# Patient Record
Sex: FEMALE | Race: WHITE | NOT HISPANIC OR LATINO | Employment: OTHER | URBAN - METROPOLITAN AREA
[De-identification: names, ages, dates, MRNs, and addresses within clinical notes are randomized per-mention and may not be internally consistent; named-entity substitution may affect disease eponyms.]

---

## 2017-02-13 ENCOUNTER — HOSPITAL ENCOUNTER (OUTPATIENT)
Dept: FAMILY MEDICINE CLINIC | Facility: CLINIC | Age: 62
Setting detail: SPECIMEN
Discharge: HOME | End: 2017-02-13
Attending: FAMILY MEDICINE | Admitting: FAMILY MEDICINE

## 2017-02-13 LAB
BASOPHILS # BLD AUTO: 0 10*3/UL (ref 0–0.2)
BASOPHILS NFR BLD AUTO: 1 % (ref 0–2)
DIFFERENTIAL METHOD BLD: (no result)
EOSINOPHIL # BLD AUTO: 0.2 10*3/UL (ref 0–0.3)
EOSINOPHIL # BLD AUTO: 3 % (ref 0–3)
ERYTHROCYTE [DISTWIDTH] IN BLOOD BY AUTOMATED COUNT: 13.7 % (ref 11.5–14.5)
HCT VFR BLD AUTO: 37.4 % (ref 35–49)
HGB BLD-MCNC: 12.1 G/DL (ref 12–15)
LYMPHOCYTES # BLD AUTO: 1.9 10*3/UL (ref 0.8–4.8)
LYMPHOCYTES NFR BLD AUTO: 38 % (ref 18–42)
MCH RBC QN AUTO: 26.2 PG (ref 26–32)
MCHC RBC AUTO-ENTMCNC: 32.4 G/DL (ref 32–36)
MCV RBC AUTO: 81 FL (ref 80–94)
MONOCYTES # BLD AUTO: 0.3 10*3/UL (ref 0.1–1.3)
MONOCYTES NFR BLD AUTO: 7 % (ref 2–11)
NEUTROPHILS # BLD AUTO: 2.6 10*3/UL (ref 2.3–8.6)
NEUTROPHILS NFR BLD AUTO: 51 % (ref 50–75)
NRBC BLD AUTO-RTO: 0 /100{WBCS}
NRBC/RBC NFR BLD MANUAL: 0 10*3/UL
PLATELET # BLD AUTO: 213 10*3/UL (ref 150–450)
PMV BLD AUTO: 9 FL (ref 7.4–10.4)
RBC # BLD AUTO: 4.63 10*6/UL (ref 4–5.4)
TSH SERPL-ACNC: 2.94 UIU/ML (ref 0.34–5.6)
WBC # BLD AUTO: 5 10*3/UL (ref 4.5–11.5)

## 2017-02-27 ENCOUNTER — HOSPITAL ENCOUNTER (OUTPATIENT)
Dept: ULTRASOUND IMAGING | Facility: HOSPITAL | Age: 62
Discharge: HOME | End: 2017-02-27
Attending: FAMILY MEDICINE | Admitting: FAMILY MEDICINE

## 2017-03-06 ENCOUNTER — OFFICE (OUTPATIENT)
Dept: URBAN - METROPOLITAN AREA CLINIC 64 | Facility: CLINIC | Age: 62
End: 2017-03-06

## 2017-03-06 ENCOUNTER — HOSPITAL ENCOUNTER (OUTPATIENT)
Dept: MAMMOGRAPHY | Facility: HOSPITAL | Age: 62
Discharge: HOME | End: 2017-03-06
Attending: FAMILY MEDICINE | Admitting: FAMILY MEDICINE

## 2017-03-06 VITALS
HEART RATE: 56 BPM | WEIGHT: 130 LBS | HEIGHT: 66 IN | DIASTOLIC BLOOD PRESSURE: 65 MMHG | SYSTOLIC BLOOD PRESSURE: 121 MMHG

## 2017-03-06 DIAGNOSIS — R10.11 RIGHT UPPER QUADRANT PAIN: ICD-10-CM

## 2017-03-06 DIAGNOSIS — K21.9 GASTRO-ESOPHAGEAL REFLUX DISEASE WITHOUT ESOPHAGITIS: ICD-10-CM

## 2017-03-06 DIAGNOSIS — R93.3 ABNORMAL FINDINGS ON DIAGNOSTIC IMAGING OF OTHER PARTS OF DI: ICD-10-CM

## 2017-03-06 DIAGNOSIS — K59.00 CONSTIPATION, UNSPECIFIED: ICD-10-CM

## 2017-03-06 LAB
AMYLASE, SERUM: 39 U/L (ref 31–124)
COMP. METABOLIC PANEL (14): A/G RATIO: 2.5 (ref 1.1–2.5)
COMP. METABOLIC PANEL (14): ALBUMIN, SERUM: 5 G/DL (ref 3.5–5.5)
COMP. METABOLIC PANEL (14): ALKALINE PHOSPHATASE, S: 84 IU/L (ref 39–117)
COMP. METABOLIC PANEL (14): ALT (SGPT): 14 IU/L (ref 0–32)
COMP. METABOLIC PANEL (14): AST (SGOT): 23 IU/L (ref 0–40)
COMP. METABOLIC PANEL (14): BILIRUBIN, TOTAL: <0.2 MG/DL
COMP. METABOLIC PANEL (14): BUN/CREATININE RATIO: 27 — HIGH (ref 9–23)
COMP. METABOLIC PANEL (14): BUN: 21 MG/DL (ref 6–24)
COMP. METABOLIC PANEL (14): CALCIUM, SERUM: 9.5 MG/DL (ref 8.7–10.2)
COMP. METABOLIC PANEL (14): CARBON DIOXIDE, TOTAL: 24 MMOL/L (ref 18–29)
COMP. METABOLIC PANEL (14): CHLORIDE, SERUM: 97 MMOL/L (ref 96–106)
COMP. METABOLIC PANEL (14): CREATININE, SERUM: 0.79 MG/DL (ref 0.57–1)
COMP. METABOLIC PANEL (14): EGFR IF AFRICN AM: 95 ML/MIN/1.73 (ref 59–?)
COMP. METABOLIC PANEL (14): EGFR IF NONAFRICN AM: 82 ML/MIN/1.73 (ref 59–?)
COMP. METABOLIC PANEL (14): GLOBULIN, TOTAL: 2 G/DL (ref 1.5–4.5)
COMP. METABOLIC PANEL (14): GLUCOSE, SERUM: 86 MG/DL (ref 65–99)
COMP. METABOLIC PANEL (14): POTASSIUM, SERUM: 4.7 MMOL/L (ref 3.5–5.2)
COMP. METABOLIC PANEL (14): PROTEIN, TOTAL, SERUM: 7 G/DL (ref 6–8.5)
COMP. METABOLIC PANEL (14): SODIUM, SERUM: 139 MMOL/L (ref 134–144)
LIPASE, SERUM: 35 U/L (ref 0–59)

## 2017-03-06 PROCEDURE — 99244 OFF/OP CNSLTJ NEW/EST MOD 40: CPT | Performed by: INTERNAL MEDICINE

## 2017-03-13 ENCOUNTER — HOSPITAL ENCOUNTER (OUTPATIENT)
Dept: MAMMOGRAPHY | Facility: HOSPITAL | Age: 62
Discharge: HOME | End: 2017-03-13
Attending: FAMILY MEDICINE | Admitting: FAMILY MEDICINE

## 2017-03-27 ENCOUNTER — ON CAMPUS - OUTPATIENT (OUTPATIENT)
Dept: URBAN - METROPOLITAN AREA HOSPITAL 77 | Facility: HOSPITAL | Age: 62
End: 2017-03-27

## 2017-03-27 DIAGNOSIS — R10.11 RIGHT UPPER QUADRANT PAIN: ICD-10-CM

## 2017-03-27 DIAGNOSIS — K83.8 OTHER SPECIFIED DISEASES OF BILIARY TRACT: ICD-10-CM

## 2017-03-27 DIAGNOSIS — R10.13 EPIGASTRIC PAIN: ICD-10-CM

## 2017-03-27 DIAGNOSIS — K83.1 OBSTRUCTION OF BILE DUCT: ICD-10-CM

## 2017-03-27 PROCEDURE — 43237 ENDOSCOPIC US EXAM ESOPH: CPT | Performed by: INTERNAL MEDICINE

## 2017-04-05 ENCOUNTER — ON CAMPUS - OUTPATIENT (OUTPATIENT)
Dept: URBAN - METROPOLITAN AREA HOSPITAL 77 | Facility: HOSPITAL | Age: 62
End: 2017-04-05

## 2017-04-05 DIAGNOSIS — K83.8 OTHER SPECIFIED DISEASES OF BILIARY TRACT: ICD-10-CM

## 2017-04-05 DIAGNOSIS — R10.11 RIGHT UPPER QUADRANT PAIN: ICD-10-CM

## 2017-04-05 PROCEDURE — 43277 ERCP EA DUCT/AMPULLA DILATE: CPT | Performed by: INTERNAL MEDICINE

## 2017-04-11 ENCOUNTER — OFFICE (OUTPATIENT)
Dept: URBAN - METROPOLITAN AREA CLINIC 64 | Facility: CLINIC | Age: 62
End: 2017-04-11

## 2017-04-11 VITALS
WEIGHT: 134 LBS | HEART RATE: 56 BPM | HEIGHT: 66 IN | DIASTOLIC BLOOD PRESSURE: 81 MMHG | SYSTOLIC BLOOD PRESSURE: 133 MMHG

## 2017-04-11 DIAGNOSIS — K59.00 CONSTIPATION, UNSPECIFIED: ICD-10-CM

## 2017-04-11 DIAGNOSIS — R11.0 NAUSEA: ICD-10-CM

## 2017-04-11 DIAGNOSIS — R10.11 RIGHT UPPER QUADRANT PAIN: ICD-10-CM

## 2017-04-11 PROCEDURE — 99213 OFFICE O/P EST LOW 20 MIN: CPT | Performed by: NURSE PRACTITIONER

## 2017-04-13 ENCOUNTER — HOSPITAL ENCOUNTER (OUTPATIENT)
Dept: CARDIOLOGY | Facility: HOSPITAL | Age: 62
Discharge: HOME | End: 2017-04-13
Attending: NURSE PRACTITIONER | Admitting: NURSE PRACTITIONER

## 2017-05-03 ENCOUNTER — HOSPITAL ENCOUNTER (OUTPATIENT)
Dept: GENERAL RADIOLOGY | Facility: HOSPITAL | Age: 62
Discharge: HOME | End: 2017-05-03
Attending: INTERNAL MEDICINE | Admitting: INTERNAL MEDICINE

## 2017-05-23 ENCOUNTER — OFFICE (OUTPATIENT)
Dept: URBAN - METROPOLITAN AREA CLINIC 64 | Facility: CLINIC | Age: 62
End: 2017-05-23
Payer: COMMERCIAL

## 2017-05-23 VITALS
WEIGHT: 126 LBS | HEIGHT: 66 IN | SYSTOLIC BLOOD PRESSURE: 124 MMHG | HEART RATE: 53 BPM | DIASTOLIC BLOOD PRESSURE: 67 MMHG

## 2017-05-23 DIAGNOSIS — R14.0 ABDOMINAL DISTENSION (GASEOUS): ICD-10-CM

## 2017-05-23 DIAGNOSIS — R11.0 NAUSEA: ICD-10-CM

## 2017-05-23 DIAGNOSIS — R10.11 RIGHT UPPER QUADRANT PAIN: ICD-10-CM

## 2017-05-23 PROCEDURE — 99213 OFFICE O/P EST LOW 20 MIN: CPT | Performed by: INTERNAL MEDICINE

## 2017-05-23 RX ORDER — METHSCOPOLAMINE BROMIDE 5 MG/1
5 TABLET ORAL
Qty: 30 | Refills: 11 | Status: COMPLETED
Start: 2017-05-23 | End: 2017-07-18

## 2017-07-18 ENCOUNTER — OFFICE (OUTPATIENT)
Dept: URBAN - METROPOLITAN AREA CLINIC 64 | Facility: CLINIC | Age: 62
End: 2017-07-18
Payer: COMMERCIAL

## 2017-07-18 VITALS
HEART RATE: 51 BPM | SYSTOLIC BLOOD PRESSURE: 115 MMHG | HEIGHT: 66 IN | WEIGHT: 126 LBS | DIASTOLIC BLOOD PRESSURE: 64 MMHG

## 2017-07-18 DIAGNOSIS — R10.11 RIGHT UPPER QUADRANT PAIN: ICD-10-CM

## 2017-07-18 DIAGNOSIS — R11.0 NAUSEA: ICD-10-CM

## 2017-07-18 PROCEDURE — 99213 OFFICE O/P EST LOW 20 MIN: CPT | Performed by: INTERNAL MEDICINE

## 2017-07-18 RX ORDER — TRIMETHOBENZAMIDE HYDROCHLORIDE 300 MG/1
600 CAPSULE ORAL
Qty: 30 | Refills: 3 | Status: COMPLETED
Start: 2017-07-18 | End: 2018-01-04

## 2017-07-18 RX ORDER — NORTRIPTYLINE HYDROCHLORIDE 25 MG/1
25 CAPSULE ORAL
Qty: 30 | Refills: 11 | Status: COMPLETED
Start: 2017-07-18 | End: 2018-01-04

## 2017-07-18 RX ORDER — TRAZODONE HYDROCHLORIDE 50 MG/1
TABLET, FILM COATED ORAL
Qty: 0 | Refills: 0 | Status: COMPLETED
End: 2017-07-18

## 2017-08-30 ENCOUNTER — HOSPITAL ENCOUNTER (OUTPATIENT)
Dept: LAB | Facility: HOSPITAL | Age: 62
Discharge: HOME | End: 2017-08-30
Attending: NURSE PRACTITIONER | Admitting: NURSE PRACTITIONER

## 2017-08-30 LAB
ALBUMIN SERPL-MCNC: 4.1 G/DL (ref 3.5–4.8)
ALBUMIN/GLOB SERPL: 1.6 {RATIO} (ref 1–1.7)
ALP SERPL-CCNC: 69 IU/L (ref 32–91)
ALT SERPL-CCNC: 16 IU/L (ref 14–54)
AMYLASE SERPL-CCNC: 32 U/L (ref 36–128)
ANION GAP SERPL CALC-SCNC: 14.2 MMOL/L (ref 10–20)
AST SERPL-CCNC: 19 IU/L (ref 15–41)
BASOPHILS # BLD AUTO: 0 10*3/UL (ref 0–0.2)
BASOPHILS NFR BLD AUTO: 1 % (ref 0–2)
BILIRUB SERPL-MCNC: 0.4 MG/DL (ref 0.3–1.2)
BUN SERPL-MCNC: 13 MG/DL (ref 8–20)
BUN/CREAT SERPL: 16.3 (ref 5.4–26.2)
CALCIUM SERPL-MCNC: 9.5 MG/DL (ref 8.9–10.3)
CHLORIDE SERPL-SCNC: 101 MMOL/L (ref 101–111)
CONV CO2: 29 MMOL/L (ref 22–32)
CONV TOTAL PROTEIN: 6.6 G/DL (ref 6.1–7.9)
CREAT UR-MCNC: 0.8 MG/DL (ref 0.4–1)
DIFFERENTIAL METHOD BLD: (no result)
EOSINOPHIL # BLD AUTO: 0.1 10*3/UL (ref 0–0.3)
EOSINOPHIL # BLD AUTO: 3 % (ref 0–3)
ERYTHROCYTE [DISTWIDTH] IN BLOOD BY AUTOMATED COUNT: 14 % (ref 11.5–14.5)
GLOBULIN UR ELPH-MCNC: 2.5 G/DL (ref 2.5–3.8)
GLUCOSE SERPL-MCNC: 91 MG/DL (ref 65–99)
HCT VFR BLD AUTO: 38.2 % (ref 35–49)
HGB BLD-MCNC: 12.4 G/DL (ref 12–15)
LIPASE SERPL-CCNC: 27 U/L (ref 22–51)
LYMPHOCYTES # BLD AUTO: 1.1 10*3/UL (ref 0.8–4.8)
LYMPHOCYTES NFR BLD AUTO: 28 % (ref 18–42)
MAGNESIUM SERPL-MCNC: 2 MG/DL (ref 1.8–2.5)
MCH RBC QN AUTO: 27.8 PG (ref 26–32)
MCHC RBC AUTO-ENTMCNC: 32.5 G/DL (ref 32–36)
MCV RBC AUTO: 85.6 FL (ref 80–94)
MONOCYTES # BLD AUTO: 0.2 10*3/UL (ref 0.1–1.3)
MONOCYTES NFR BLD AUTO: 6 % (ref 2–11)
NEUTROPHILS # BLD AUTO: 2.5 10*3/UL (ref 2.3–8.6)
NEUTROPHILS NFR BLD AUTO: 62 % (ref 50–75)
NRBC BLD AUTO-RTO: 0 /100{WBCS}
NRBC/RBC NFR BLD MANUAL: 0 10*3/UL
PLATELET # BLD AUTO: 228 10*3/UL (ref 150–450)
PMV BLD AUTO: 8.4 FL (ref 7.4–10.4)
POTASSIUM SERPL-SCNC: 4.2 MMOL/L (ref 3.6–5.1)
RBC # BLD AUTO: 4.46 10*6/UL (ref 4–5.4)
SODIUM SERPL-SCNC: 140 MMOL/L (ref 136–144)
TSH SERPL-ACNC: 2.76 UIU/ML (ref 0.34–5.6)
VIT B12 SERPL-MCNC: 407 PG/ML (ref 180–914)
WBC # BLD AUTO: 4 10*3/UL (ref 4.5–11.5)

## 2017-09-01 LAB
ANA SER QL IA: NORMAL

## 2017-09-18 ENCOUNTER — HOSPITAL ENCOUNTER (OUTPATIENT)
Dept: GENERAL RADIOLOGY | Facility: HOSPITAL | Age: 62
Discharge: HOME | End: 2017-09-18
Attending: INTERNAL MEDICINE | Admitting: INTERNAL MEDICINE

## 2017-10-19 ENCOUNTER — HOSPITAL ENCOUNTER (OUTPATIENT)
Dept: MAMMOGRAPHY | Facility: HOSPITAL | Age: 62
Discharge: HOME | End: 2017-10-19
Attending: INTERNAL MEDICINE | Admitting: INTERNAL MEDICINE

## 2017-11-27 ENCOUNTER — OFFICE (OUTPATIENT)
Dept: URBAN - METROPOLITAN AREA CLINIC 64 | Facility: CLINIC | Age: 62
End: 2017-11-27

## 2017-11-28 ENCOUNTER — OFFICE (OUTPATIENT)
Dept: URBAN - METROPOLITAN AREA CLINIC 66 | Facility: CLINIC | Age: 62
End: 2017-11-28
Payer: COMMERCIAL

## 2017-11-28 VITALS — DIASTOLIC BLOOD PRESSURE: 56 MMHG | SYSTOLIC BLOOD PRESSURE: 109 MMHG | WEIGHT: 124 LBS | HEIGHT: 66 IN

## 2017-11-28 DIAGNOSIS — R10.11 RIGHT UPPER QUADRANT PAIN: ICD-10-CM

## 2017-11-28 DIAGNOSIS — K83.8 OTHER SPECIFIED DISEASES OF BILIARY TRACT: ICD-10-CM

## 2017-11-28 DIAGNOSIS — K58.2 MIXED IRRITABLE BOWEL SYNDROME: ICD-10-CM

## 2017-11-28 DIAGNOSIS — K21.9 GASTRO-ESOPHAGEAL REFLUX DISEASE WITHOUT ESOPHAGITIS: ICD-10-CM

## 2017-11-28 PROCEDURE — 99214 OFFICE O/P EST MOD 30 MIN: CPT

## 2017-11-30 ENCOUNTER — ON CAMPUS - OUTPATIENT (OUTPATIENT)
Dept: URBAN - METROPOLITAN AREA HOSPITAL 134 | Facility: HOSPITAL | Age: 62
End: 2017-11-30
Payer: COMMERCIAL

## 2017-11-30 DIAGNOSIS — Z98.890 OTHER SPECIFIED POSTPROCEDURAL STATES: ICD-10-CM

## 2017-11-30 DIAGNOSIS — K83.1 OBSTRUCTION OF BILE DUCT: ICD-10-CM

## 2017-11-30 PROCEDURE — 43274 ERCP DUCT STENT PLACEMENT: CPT

## 2017-12-01 ENCOUNTER — INPATIENT HOSPITAL (OUTPATIENT)
Dept: URBAN - METROPOLITAN AREA HOSPITAL 133 | Facility: HOSPITAL | Age: 62
End: 2017-12-01
Payer: COMMERCIAL

## 2017-12-01 DIAGNOSIS — Z98.890 OTHER SPECIFIED POSTPROCEDURAL STATES: ICD-10-CM

## 2017-12-01 DIAGNOSIS — D64.9 ANEMIA, UNSPECIFIED: ICD-10-CM

## 2017-12-01 DIAGNOSIS — R11.0 NAUSEA: ICD-10-CM

## 2017-12-01 DIAGNOSIS — R10.9 UNSPECIFIED ABDOMINAL PAIN: ICD-10-CM

## 2017-12-01 DIAGNOSIS — K83.1 OBSTRUCTION OF BILE DUCT: ICD-10-CM

## 2017-12-01 PROCEDURE — 99253 IP/OBS CNSLTJ NEW/EST LOW 45: CPT

## 2017-12-02 PROCEDURE — 99231 SBSQ HOSP IP/OBS SF/LOW 25: CPT

## 2017-12-03 PROCEDURE — 99231 SBSQ HOSP IP/OBS SF/LOW 25: CPT

## 2017-12-04 ENCOUNTER — INPATIENT HOSPITAL (OUTPATIENT)
Dept: URBAN - METROPOLITAN AREA HOSPITAL 133 | Facility: HOSPITAL | Age: 62
End: 2017-12-04
Payer: COMMERCIAL

## 2017-12-04 DIAGNOSIS — Z98.890 OTHER SPECIFIED POSTPROCEDURAL STATES: ICD-10-CM

## 2017-12-04 DIAGNOSIS — K92.2 GASTROINTESTINAL HEMORRHAGE, UNSPECIFIED: ICD-10-CM

## 2017-12-04 DIAGNOSIS — D50.0 IRON DEFICIENCY ANEMIA SECONDARY TO BLOOD LOSS (CHRONIC): ICD-10-CM

## 2017-12-04 DIAGNOSIS — R10.9 UNSPECIFIED ABDOMINAL PAIN: ICD-10-CM

## 2017-12-04 DIAGNOSIS — K92.1 MELENA: ICD-10-CM

## 2017-12-04 PROCEDURE — 99232 SBSQ HOSP IP/OBS MODERATE 35: CPT

## 2017-12-07 ENCOUNTER — HOSPITAL ENCOUNTER (OUTPATIENT)
Dept: CARDIOLOGY | Facility: HOSPITAL | Age: 62
Discharge: HOME | End: 2017-12-07
Attending: FAMILY MEDICINE | Admitting: FAMILY MEDICINE

## 2017-12-09 ENCOUNTER — HOSPITAL ENCOUNTER (OUTPATIENT)
Dept: LAB | Facility: HOSPITAL | Age: 62
Discharge: HOME | End: 2017-12-09
Attending: FAMILY MEDICINE | Admitting: FAMILY MEDICINE

## 2017-12-09 LAB
BASOPHILS # BLD AUTO: 0 10*3/UL (ref 0–0.2)
BASOPHILS NFR BLD AUTO: 1 % (ref 0–2)
DIFFERENTIAL METHOD BLD: (no result)
EOSINOPHIL # BLD AUTO: 0.1 10*3/UL (ref 0–0.3)
EOSINOPHIL # BLD AUTO: 3 % (ref 0–3)
ERYTHROCYTE [DISTWIDTH] IN BLOOD BY AUTOMATED COUNT: 14.6 % (ref 11.5–14.5)
HCT VFR BLD AUTO: 30.2 % (ref 35–49)
HGB BLD-MCNC: 9.6 G/DL (ref 12–15)
LYMPHOCYTES # BLD AUTO: 1.1 10*3/UL (ref 0.8–4.8)
LYMPHOCYTES NFR BLD AUTO: 22 % (ref 18–42)
MCH RBC QN AUTO: 28 PG (ref 26–32)
MCHC RBC AUTO-ENTMCNC: 31.8 G/DL (ref 32–36)
MCV RBC AUTO: 88.1 FL (ref 80–94)
MONOCYTES # BLD AUTO: 0.4 10*3/UL (ref 0.1–1.3)
MONOCYTES NFR BLD AUTO: 9 % (ref 2–11)
NEUTROPHILS # BLD AUTO: 3.3 10*3/UL (ref 2.3–8.6)
NEUTROPHILS NFR BLD AUTO: 65 % (ref 50–75)
NRBC BLD AUTO-RTO: 0 /100{WBCS}
NRBC/RBC NFR BLD MANUAL: 0 10*3/UL
PLATELET # BLD AUTO: 324 10*3/UL (ref 150–450)
PMV BLD AUTO: 8 FL (ref 7.4–10.4)
RBC # BLD AUTO: 3.43 10*6/UL (ref 4–5.4)
WBC # BLD AUTO: 5 10*3/UL (ref 4.5–11.5)

## 2017-12-12 ENCOUNTER — OFFICE (OUTPATIENT)
Dept: URBAN - METROPOLITAN AREA CLINIC 66 | Facility: CLINIC | Age: 62
End: 2017-12-12
Payer: COMMERCIAL

## 2017-12-12 VITALS
WEIGHT: 124 LBS | HEIGHT: 66 IN | SYSTOLIC BLOOD PRESSURE: 115 MMHG | TEMPERATURE: 97.9 F | DIASTOLIC BLOOD PRESSURE: 752 MMHG | HEART RATE: 62 BPM

## 2017-12-12 DIAGNOSIS — K31.84 GASTROPARESIS: ICD-10-CM

## 2017-12-12 DIAGNOSIS — K83.8 OTHER SPECIFIED DISEASES OF BILIARY TRACT: ICD-10-CM

## 2017-12-12 DIAGNOSIS — T81.89XA OTHER COMPLICATIONS OF PROCEDURES, NOT ELSEWHERE CLASSIFIED,: ICD-10-CM

## 2017-12-12 DIAGNOSIS — D50.0 IRON DEFICIENCY ANEMIA SECONDARY TO BLOOD LOSS (CHRONIC): ICD-10-CM

## 2017-12-12 DIAGNOSIS — R11.0 NAUSEA: ICD-10-CM

## 2017-12-12 PROCEDURE — 99213 OFFICE O/P EST LOW 20 MIN: CPT

## 2017-12-12 RX ORDER — METOCLOPRAMIDE 5 MG/1
TABLET ORAL
Qty: 120 | Refills: 3 | Status: COMPLETED
Start: 2017-12-12 | End: 2018-05-02

## 2018-01-03 ENCOUNTER — HOSPITAL ENCOUNTER (OUTPATIENT)
Dept: LAB | Facility: HOSPITAL | Age: 63
Discharge: HOME | End: 2018-01-03
Attending: INTERNAL MEDICINE | Admitting: INTERNAL MEDICINE

## 2018-01-03 LAB
ALBUMIN SERPL-MCNC: 4 G/DL (ref 3.5–4.8)
ALBUMIN/GLOB SERPL: 1.4 {RATIO} (ref 1–1.7)
ALP SERPL-CCNC: 67 IU/L (ref 32–91)
ALT SERPL-CCNC: 17 IU/L (ref 14–54)
ANION GAP SERPL CALC-SCNC: 11.2 MMOL/L (ref 10–20)
AST SERPL-CCNC: 21 IU/L (ref 15–41)
BASOPHILS # BLD AUTO: 0 10*3/UL (ref 0–0.2)
BASOPHILS NFR BLD AUTO: 0 % (ref 0–2)
BILIRUB SERPL-MCNC: 0.3 MG/DL (ref 0.3–1.2)
BUN SERPL-MCNC: 16 MG/DL (ref 8–20)
BUN/CREAT SERPL: 22.9 (ref 5.4–26.2)
CALCIUM SERPL-MCNC: 9.8 MG/DL (ref 8.9–10.3)
CHLORIDE SERPL-SCNC: 100 MMOL/L (ref 101–111)
CONV CO2: 29 MMOL/L (ref 22–32)
CONV TOTAL PROTEIN: 6.8 G/DL (ref 6.1–7.9)
CREAT UR-MCNC: 0.7 MG/DL (ref 0.4–1)
DIFFERENTIAL METHOD BLD: (no result)
EOSINOPHIL # BLD AUTO: 0.1 10*3/UL (ref 0–0.3)
EOSINOPHIL # BLD AUTO: 1 % (ref 0–3)
ERYTHROCYTE [DISTWIDTH] IN BLOOD BY AUTOMATED COUNT: 16.4 % (ref 11.5–14.5)
GLOBULIN UR ELPH-MCNC: 2.8 G/DL (ref 2.5–3.8)
GLUCOSE SERPL-MCNC: 99 MG/DL (ref 65–99)
HCT VFR BLD AUTO: 31.9 % (ref 35–49)
HGB BLD-MCNC: 10.1 G/DL (ref 12–15)
LYMPHOCYTES # BLD AUTO: 1.1 10*3/UL (ref 0.8–4.8)
LYMPHOCYTES NFR BLD AUTO: 12 % (ref 18–42)
MCH RBC QN AUTO: 25.9 PG (ref 26–32)
MCHC RBC AUTO-ENTMCNC: 31.8 G/DL (ref 32–36)
MCV RBC AUTO: 81.4 FL (ref 80–94)
MONOCYTES # BLD AUTO: 0.5 10*3/UL (ref 0.1–1.3)
MONOCYTES NFR BLD AUTO: 5 % (ref 2–11)
NEUTROPHILS # BLD AUTO: 7.4 10*3/UL (ref 2.3–8.6)
NEUTROPHILS NFR BLD AUTO: 82 % (ref 50–75)
NRBC BLD AUTO-RTO: 0 /100{WBCS}
NRBC/RBC NFR BLD MANUAL: 0 10*3/UL
PLATELET # BLD AUTO: 296 10*3/UL (ref 150–450)
PMV BLD AUTO: 7.9 FL (ref 7.4–10.4)
POTASSIUM SERPL-SCNC: 4.2 MMOL/L (ref 3.6–5.1)
PREALB SERPL-MCNC: NORMAL MG/DL (ref 16–38)
RBC # BLD AUTO: 3.92 10*6/UL (ref 4–5.4)
SODIUM SERPL-SCNC: 136 MMOL/L (ref 136–144)
WBC # BLD AUTO: 9.1 10*3/UL (ref 4.5–11.5)

## 2018-01-04 ENCOUNTER — OFFICE (OUTPATIENT)
Dept: URBAN - METROPOLITAN AREA CLINIC 66 | Facility: CLINIC | Age: 63
End: 2018-01-04

## 2018-01-04 VITALS
HEIGHT: 66 IN | WEIGHT: 122 LBS | SYSTOLIC BLOOD PRESSURE: 137 MMHG | HEART RATE: 87 BPM | TEMPERATURE: 98.1 F | DIASTOLIC BLOOD PRESSURE: 89 MMHG

## 2018-01-04 DIAGNOSIS — K83.8 OTHER SPECIFIED DISEASES OF BILIARY TRACT: ICD-10-CM

## 2018-01-04 DIAGNOSIS — K31.84 GASTROPARESIS: ICD-10-CM

## 2018-01-04 DIAGNOSIS — R77.8 OTHER SPECIFIED ABNORMALITIES OF PLASMA PROTEINS: ICD-10-CM

## 2018-01-04 PROCEDURE — 99214 OFFICE O/P EST MOD 30 MIN: CPT

## 2018-01-11 ENCOUNTER — OFFICE (OUTPATIENT)
Dept: URBAN - METROPOLITAN AREA INFUSION 3 | Facility: INFUSION | Age: 63
End: 2018-01-11

## 2018-01-11 VITALS
HEART RATE: 67 BPM | RESPIRATION RATE: 16 BRPM | HEIGHT: 66 IN | SYSTOLIC BLOOD PRESSURE: 109 MMHG | HEART RATE: 56 BPM | DIASTOLIC BLOOD PRESSURE: 63 MMHG | DIASTOLIC BLOOD PRESSURE: 66 MMHG | RESPIRATION RATE: 18 BRPM | TEMPERATURE: 97.1 F | TEMPERATURE: 97.8 F

## 2018-01-11 DIAGNOSIS — K90.9 INTESTINAL MALABSORPTION, UNSPECIFIED: ICD-10-CM

## 2018-01-11 DIAGNOSIS — R77.8 OTHER SPECIFIED ABNORMALITIES OF PLASMA PROTEINS: ICD-10-CM

## 2018-01-11 PROCEDURE — 96365 THER/PROPH/DIAG IV INF INIT: CPT

## 2018-01-18 ENCOUNTER — OFFICE (OUTPATIENT)
Dept: URBAN - METROPOLITAN AREA INFUSION 3 | Facility: INFUSION | Age: 63
End: 2018-01-18
Payer: COMMERCIAL

## 2018-01-18 VITALS
DIASTOLIC BLOOD PRESSURE: 65 MMHG | HEART RATE: 64 BPM | RESPIRATION RATE: 18 BRPM | DIASTOLIC BLOOD PRESSURE: 58 MMHG | HEIGHT: 66 IN | TEMPERATURE: 97.6 F | TEMPERATURE: 97.9 F | SYSTOLIC BLOOD PRESSURE: 100 MMHG | SYSTOLIC BLOOD PRESSURE: 110 MMHG | HEART RATE: 58 BPM

## 2018-01-18 DIAGNOSIS — R77.8 OTHER SPECIFIED ABNORMALITIES OF PLASMA PROTEINS: ICD-10-CM

## 2018-01-18 DIAGNOSIS — K90.9 INTESTINAL MALABSORPTION, UNSPECIFIED: ICD-10-CM

## 2018-01-18 PROCEDURE — 96365 THER/PROPH/DIAG IV INF INIT: CPT

## 2018-01-27 ENCOUNTER — HOSPITAL ENCOUNTER (OUTPATIENT)
Dept: URGENT CARE | Facility: CLINIC | Age: 63
Discharge: HOME | End: 2018-01-27
Attending: FAMILY MEDICINE | Admitting: FAMILY MEDICINE

## 2018-01-30 ENCOUNTER — ON CAMPUS - OUTPATIENT (OUTPATIENT)
Dept: URBAN - METROPOLITAN AREA HOSPITAL 134 | Facility: HOSPITAL | Age: 63
End: 2018-01-30

## 2018-01-30 DIAGNOSIS — K83.8 OTHER SPECIFIED DISEASES OF BILIARY TRACT: ICD-10-CM

## 2018-01-30 DIAGNOSIS — K83.1 OBSTRUCTION OF BILE DUCT: ICD-10-CM

## 2018-01-30 DIAGNOSIS — Z98.890 OTHER SPECIFIED POSTPROCEDURAL STATES: ICD-10-CM

## 2018-01-30 DIAGNOSIS — Z96.89 PRESENCE OF OTHER SPECIFIED FUNCTIONAL IMPLANTS: ICD-10-CM

## 2018-01-30 DIAGNOSIS — K80.20 CALCULUS OF GALLBLADDER WITHOUT CHOLECYSTITIS WITHOUT OBSTRU: ICD-10-CM

## 2018-01-30 PROCEDURE — 43275 ERCP REMOVE FORGN BODY DUCT: CPT

## 2018-01-30 PROCEDURE — 43264 ERCP REMOVE DUCT CALCULI: CPT

## 2018-02-05 ENCOUNTER — OFFICE (OUTPATIENT)
Dept: URBAN - METROPOLITAN AREA CLINIC 66 | Facility: CLINIC | Age: 63
End: 2018-02-05

## 2018-02-05 VITALS
WEIGHT: 128 LBS | TEMPERATURE: 98.4 F | HEART RATE: 63 BPM | SYSTOLIC BLOOD PRESSURE: 128 MMHG | HEIGHT: 66 IN | DIASTOLIC BLOOD PRESSURE: 78 MMHG

## 2018-02-05 DIAGNOSIS — K83.8 OTHER SPECIFIED DISEASES OF BILIARY TRACT: ICD-10-CM

## 2018-02-05 DIAGNOSIS — K31.84 GASTROPARESIS: ICD-10-CM

## 2018-02-05 DIAGNOSIS — R10.11 RIGHT UPPER QUADRANT PAIN: ICD-10-CM

## 2018-02-05 PROCEDURE — 99214 OFFICE O/P EST MOD 30 MIN: CPT

## 2018-02-15 ENCOUNTER — HOSPITAL ENCOUNTER (OUTPATIENT)
Dept: LAB | Facility: HOSPITAL | Age: 63
Discharge: HOME | End: 2018-02-15
Attending: INTERNAL MEDICINE | Admitting: INTERNAL MEDICINE

## 2018-02-15 LAB
BASOPHILS # BLD AUTO: 0.1 10*3/UL (ref 0–0.2)
BASOPHILS NFR BLD AUTO: 1 % (ref 0–2)
CONV ABS BANDS: 0.1 10*3/UL
CONV PLATELETS GIANT IN BLOOD BY LIGHT MICROSCOPY: (no result)
DIFFERENTIAL METHOD BLD: (no result)
EOSINOPHIL # BLD AUTO: 0.2 10*3/UL (ref 0–0.3)
EOSINOPHIL # BLD AUTO: 3 % (ref 0–3)
ERYTHROCYTE [DISTWIDTH] IN BLOOD BY AUTOMATED COUNT: 22.8 % (ref 11.5–14.5)
HCT VFR BLD AUTO: 41.2 % (ref 35–49)
HGB BLD-MCNC: 13.6 G/DL (ref 12–15)
LYMPHOCYTES # BLD AUTO: 1.1 10*3/UL (ref 0.8–4.8)
LYMPHOCYTES NFR BLD AUTO: 19 % (ref 18–42)
MCH RBC QN AUTO: 28.8 PG (ref 26–32)
MCHC RBC AUTO-ENTMCNC: 33 G/DL (ref 32–36)
MCV RBC AUTO: (no result) FL (ref 80–94)
MONOCYTES # BLD AUTO: 0.2 10*3/UL (ref 0.1–1.3)
MONOCYTES NFR BLD AUTO: 4 % (ref 2–11)
NEUTROPHILS # BLD AUTO: 4.3 10*3/UL (ref 2.3–8.6)
NEUTROPHILS NFR BLD AUTO: 72 % (ref 50–75)
NEUTS BAND NFR BLD MANUAL: 1 % (ref 0–5)
PLATELET # BLD AUTO: 205 10*3/UL (ref 150–450)
PMV BLD AUTO: 8.5 FL (ref 7.4–10.4)
RBC # BLD AUTO: 4.72 10*6/UL (ref 4–5.4)
WBC # BLD AUTO: 6 10*3/UL (ref 4.5–11.5)

## 2018-03-12 ENCOUNTER — OFFICE VISIT (OUTPATIENT)
Dept: PAIN MEDICINE | Facility: CLINIC | Age: 63
End: 2018-03-12

## 2018-03-12 VITALS
OXYGEN SATURATION: 97 % | BODY MASS INDEX: 19.99 KG/M2 | DIASTOLIC BLOOD PRESSURE: 74 MMHG | SYSTOLIC BLOOD PRESSURE: 122 MMHG | RESPIRATION RATE: 16 BRPM | HEART RATE: 57 BPM | HEIGHT: 65 IN | WEIGHT: 120 LBS | TEMPERATURE: 98 F

## 2018-03-12 DIAGNOSIS — Z79.891 ENCOUNTER FOR LONG-TERM USE OF OPIATE ANALGESIC: ICD-10-CM

## 2018-03-12 DIAGNOSIS — Z97.8 PRESENCE OF INTRATHECAL PUMP: ICD-10-CM

## 2018-03-12 DIAGNOSIS — G89.4 CHRONIC PAIN SYNDROME: Primary | ICD-10-CM

## 2018-03-12 DIAGNOSIS — M96.1 POSTLAMINECTOMY SYNDROME, CERVICAL REGION: ICD-10-CM

## 2018-03-12 DIAGNOSIS — M96.1 POSTLAMINECTOMY SYNDROME OF LUMBAR REGION: ICD-10-CM

## 2018-03-12 DIAGNOSIS — G90.512 COMPLEX REGIONAL PAIN SYNDROME TYPE 1 OF LEFT UPPER EXTREMITY: ICD-10-CM

## 2018-03-12 DIAGNOSIS — M79.2 NEUROPATHIC PAIN: ICD-10-CM

## 2018-03-12 LAB
POC AMPHETAMINES: POSITIVE
POC BARBITURATES: NEGATIVE
POC BENZODIAZEPHINES: NEGATIVE
POC COCAINE: NEGATIVE
POC METHADONE: NEGATIVE
POC METHAMPHETAMINE SCREEN URINE: NEGATIVE
POC OPIATES: POSITIVE
POC OXYCODONE: POSITIVE
POC PHENCYCLIDINE: NEGATIVE
POC PROPOXYPHENE: NEGATIVE
POC THC: NEGATIVE
POC TRICYCLIC ANTIDEPRESSANTS: NEGATIVE

## 2018-03-12 PROCEDURE — 99203 OFFICE O/P NEW LOW 30 MIN: CPT | Performed by: ANESTHESIOLOGY

## 2018-03-12 PROCEDURE — 80305 DRUG TEST PRSMV DIR OPT OBS: CPT | Performed by: ANESTHESIOLOGY

## 2018-03-12 RX ORDER — POLYETHYLENE GLYCOL 3350 17 G/17G
POWDER, FOR SOLUTION ORAL
COMMUNITY
End: 2020-01-06

## 2018-03-12 RX ORDER — ERGOCALCIFEROL 1.25 MG/1
CAPSULE ORAL
Refills: 3 | COMMUNITY
Start: 2018-01-29 | End: 2020-01-06

## 2018-03-12 RX ORDER — DRONABINOL 2.5 MG/1
2.5 CAPSULE ORAL 2 TIMES DAILY
Refills: 3 | COMMUNITY
Start: 2017-12-21 | End: 2018-03-20

## 2018-03-12 RX ORDER — GABAPENTIN 600 MG/1
600 TABLET ORAL
COMMUNITY
End: 2018-04-04 | Stop reason: SDUPTHER

## 2018-03-12 RX ORDER — OMEGA-3 FATTY ACIDS/FISH OIL 300-1000MG
400 CAPSULE ORAL
COMMUNITY
End: 2019-02-15

## 2018-03-12 RX ORDER — OXYCODONE AND ACETAMINOPHEN 10; 325 MG/1; MG/1
1 TABLET ORAL
COMMUNITY
End: 2018-03-12 | Stop reason: SDUPTHER

## 2018-03-12 RX ORDER — DEXTROAMPHETAMINE SACCHARATE, AMPHETAMINE ASPARTATE, DEXTROAMPHETAMINE SULFATE AND AMPHETAMINE SULFATE 2.5; 2.5; 2.5; 2.5 MG/1; MG/1; MG/1; MG/1
5 TABLET ORAL
COMMUNITY
End: 2018-04-25

## 2018-03-12 RX ORDER — FLUOXETINE HYDROCHLORIDE 40 MG/1
40 CAPSULE ORAL DAILY
Refills: 3 | COMMUNITY
Start: 2018-02-06

## 2018-03-12 RX ORDER — OXYCODONE AND ACETAMINOPHEN 10; 325 MG/1; MG/1
1 TABLET ORAL EVERY 8 HOURS PRN
Qty: 90 TABLET | Refills: 0 | Status: SHIPPED | OUTPATIENT
Start: 2018-03-12 | End: 2018-04-25

## 2018-03-12 RX ORDER — DEXLANSOPRAZOLE 60 MG/1
1 CAPSULE, DELAYED RELEASE ORAL DAILY
Refills: 11 | COMMUNITY
Start: 2018-03-05

## 2018-03-12 RX ORDER — DRONABINOL 5 MG/1
5 CAPSULE ORAL 2 TIMES DAILY
Refills: 1 | COMMUNITY
Start: 2017-12-14 | End: 2018-03-20

## 2018-03-12 RX ORDER — LORATADINE 10 MG/1
10 TABLET ORAL
COMMUNITY

## 2018-03-12 RX ORDER — ONDANSETRON 4 MG/1
4 TABLET, FILM COATED ORAL EVERY 8 HOURS
Refills: 0 | COMMUNITY
Start: 2018-03-05 | End: 2021-09-21

## 2018-03-12 RX ORDER — LIDOCAINE 50 MG/G
1 PATCH TOPICAL
COMMUNITY
End: 2018-04-04 | Stop reason: SDUPTHER

## 2018-03-12 RX ORDER — SUMATRIPTAN 100 MG/1
TABLET, FILM COATED ORAL
Refills: 6 | COMMUNITY
Start: 2018-03-05

## 2018-03-12 RX ORDER — TRAZODONE HYDROCHLORIDE 50 MG/1
25 TABLET ORAL
COMMUNITY
End: 2020-06-10

## 2018-03-12 RX ORDER — CHLORHEXIDINE GLUCONATE 0.12 MG/ML
RINSE ORAL
Refills: 11 | COMMUNITY
Start: 2018-01-29

## 2018-03-14 ENCOUNTER — TELEPHONE (OUTPATIENT)
Dept: PAIN MEDICINE | Facility: CLINIC | Age: 63
End: 2018-03-14

## 2018-03-17 ENCOUNTER — RESULTS ENCOUNTER (OUTPATIENT)
Dept: PAIN MEDICINE | Facility: CLINIC | Age: 63
End: 2018-03-17

## 2018-03-17 DIAGNOSIS — M96.1 POSTLAMINECTOMY SYNDROME, CERVICAL REGION: ICD-10-CM

## 2018-03-17 DIAGNOSIS — G90.512 COMPLEX REGIONAL PAIN SYNDROME TYPE 1 OF LEFT UPPER EXTREMITY: ICD-10-CM

## 2018-03-17 DIAGNOSIS — M79.2 NEUROPATHIC PAIN: ICD-10-CM

## 2018-03-17 DIAGNOSIS — Z97.8 PRESENCE OF INTRATHECAL PUMP: ICD-10-CM

## 2018-03-17 DIAGNOSIS — M96.1 POSTLAMINECTOMY SYNDROME OF LUMBAR REGION: ICD-10-CM

## 2018-03-17 DIAGNOSIS — Z79.891 ENCOUNTER FOR LONG-TERM USE OF OPIATE ANALGESIC: ICD-10-CM

## 2018-03-17 DIAGNOSIS — G89.4 CHRONIC PAIN SYNDROME: ICD-10-CM

## 2018-03-19 ENCOUNTER — OFFICE (OUTPATIENT)
Dept: URBAN - METROPOLITAN AREA CLINIC 66 | Facility: CLINIC | Age: 63
End: 2018-03-19

## 2018-03-19 VITALS
HEIGHT: 66 IN | WEIGHT: 120 LBS | DIASTOLIC BLOOD PRESSURE: 76 MMHG | SYSTOLIC BLOOD PRESSURE: 136 MMHG | HEART RATE: 67 BPM | TEMPERATURE: 98.2 F

## 2018-03-19 DIAGNOSIS — K31.84 GASTROPARESIS: ICD-10-CM

## 2018-03-19 DIAGNOSIS — R77.8 OTHER SPECIFIED ABNORMALITIES OF PLASMA PROTEINS: ICD-10-CM

## 2018-03-19 DIAGNOSIS — R11.0 NAUSEA: ICD-10-CM

## 2018-03-19 PROCEDURE — 99213 OFFICE O/P EST LOW 20 MIN: CPT

## 2018-03-20 ENCOUNTER — OFFICE VISIT (OUTPATIENT)
Dept: PAIN MEDICINE | Facility: CLINIC | Age: 63
End: 2018-03-20

## 2018-03-20 ENCOUNTER — PROCEDURE VISIT (OUTPATIENT)
Dept: PAIN MEDICINE | Facility: CLINIC | Age: 63
End: 2018-03-20

## 2018-03-20 VITALS
SYSTOLIC BLOOD PRESSURE: 131 MMHG | TEMPERATURE: 97.5 F | DIASTOLIC BLOOD PRESSURE: 81 MMHG | RESPIRATION RATE: 18 BRPM | HEIGHT: 65 IN | WEIGHT: 120 LBS | BODY MASS INDEX: 19.99 KG/M2 | HEART RATE: 60 BPM

## 2018-03-20 VITALS
HEIGHT: 65 IN | WEIGHT: 120 LBS | HEART RATE: 60 BPM | DIASTOLIC BLOOD PRESSURE: 81 MMHG | OXYGEN SATURATION: 96 % | TEMPERATURE: 97.5 F | BODY MASS INDEX: 19.99 KG/M2 | RESPIRATION RATE: 16 BRPM | SYSTOLIC BLOOD PRESSURE: 131 MMHG

## 2018-03-20 DIAGNOSIS — M79.2 NEUROPATHIC PAIN: ICD-10-CM

## 2018-03-20 DIAGNOSIS — Z96.89 SPINAL CORD STIMULATOR STATUS: ICD-10-CM

## 2018-03-20 DIAGNOSIS — G89.4 CHRONIC PAIN SYNDROME: Primary | ICD-10-CM

## 2018-03-20 DIAGNOSIS — Z97.8 PRESENCE OF INTRATHECAL PUMP: ICD-10-CM

## 2018-03-20 DIAGNOSIS — M96.1 CERVICAL POST-LAMINECTOMY SYNDROME: ICD-10-CM

## 2018-03-20 DIAGNOSIS — G90.512 COMPLEX REGIONAL PAIN SYNDROME TYPE 1 OF LEFT UPPER EXTREMITY: ICD-10-CM

## 2018-03-20 DIAGNOSIS — M54.81 BILATERAL OCCIPITAL NEURALGIA: Primary | ICD-10-CM

## 2018-03-20 DIAGNOSIS — M96.1 LUMBAR POST-LAMINECTOMY SYNDROME: ICD-10-CM

## 2018-03-20 DIAGNOSIS — M54.81 BILATERAL OCCIPITAL NEURALGIA: ICD-10-CM

## 2018-03-20 PROCEDURE — 64405 NJX AA&/STRD GR OCPL NRV: CPT | Performed by: PAIN MEDICINE

## 2018-03-20 PROCEDURE — 99213 OFFICE O/P EST LOW 20 MIN: CPT | Performed by: NURSE PRACTITIONER

## 2018-03-20 NOTE — PROGRESS NOTES
"CHIEF COMPLAINT: Headache      Amalia Lopez is a 62 y.o. female.  She is here for the following procedure: bilateral OCN.  Dr. Strauss is out of the office today on vacation. I will perform her greater occipital nerve block given her acute pain today.     Vitals:    03/20/18 1223   BP: 131/81   Pulse: 60   Resp: 18   Temp: 97.5 °F (36.4 °C)   Weight: 54.4 kg (120 lb)   Height: 165 cm (64.96\")   PainSc:   7   PainLoc: Head       Depo-Medrol lot #: [id number] EXP: 12/2018  Bupiviacaine lot #: [id number] EXP: 10/2019  Total of: 40 mg of Methylprednisolone used.     Greater occipital  Date/Time: 3/20/2018 5:31 PM  Performed by: DESTINI COLEY  Authorized by: DESTINI COLEY   Consent: Verbal consent obtained. Written consent obtained.  Risks and benefits: risks, benefits and alternatives were discussed  Consent given by: patient  Patient understanding: patient states understanding of the procedure being performed  Patient consent: the patient's understanding of the procedure matches consent given  Patient identity confirmed: verbally with patient  Time out: Immediately prior to procedure a \"time out\" was called to verify the correct patient, procedure, equipment, support staff and site/side marked as required.  Indications: pain relief  Body area: head  Nerve: greater occipital  Laterality: bilateral.  Patient position: sitting  Needle size: 25 G  Location technique: anatomical landmarks  Local Anesthetic: bupivacaine 0.25% without epinephrine  Anesthetic total: 8 mL  Outcome: pain unchanged  Patient tolerance: Patient tolerated the procedure well with no immediate complications            Visit Diagnoses:  1. Bilateral occipital neuralgia        Destini Coley MD  Pain Management  "

## 2018-03-20 NOTE — PROGRESS NOTES
"CHIEF COMPLAINT  Pt has had headaches described as \"constant occipital  pain,L > R side,for about 1 week,with no decrease in pain since outset.    Subjective   Amalia Lopez is a 62 y.o. female  who presents to the office for follow-up.She has a history of chronic neuropathic pain, neck and low back pain, history of back and neck surgery, CRPS of the RUE, and presence of both a SCS and IT pump.  She was seen as a new patient at the previous office visit. The plan is to switch from IT morphine to Prialt, we are still waiting on the medication to come in the mail.  We will continue with this plan once the medication is available. She does not need reprogramming of either device today (SCS or IT pump).      C/o headache today.  This is the same as previous headache patterns.  Bilateral occipital, but left side worse than right.  Percocet does not help.  Ibuprofen helps but she has to stop because of dental work next Wednesday.  Occipital nerve blocks have helped in the past.  She requests to repeat ONB today.      She continues with Percocet which does help her neck and back pain.  She denies adverse reactions.      History of Present Illness     PEG Assessment   What number best describes your pain on average in the past week?7  What number best describes how, during the past week, pain has interfered with your enjoyment of life?7  What number best describes how, during the past week, pain has interfered with your general activity?  7    The following portions of the patient's history were reviewed and updated as appropriate: allergies, current medications, past family history, past medical history, past social history, past surgical history and problem list.    Review of Systems   Constitutional: Positive for activity change (continues to be decreased) and appetite change (decreased).   Eyes: Negative for visual disturbance.   Respiratory: Positive for apnea. Negative for chest tightness and shortness of breath.  " "  Cardiovascular: Negative for chest pain.   Gastrointestinal: Positive for abdominal pain, constipation (gastroparesis), diarrhea and nausea.   Genitourinary: Positive for difficulty urinating (occasional). Negative for dysuria.   Musculoskeletal: Positive for back pain and neck pain. Negative for gait problem.   Neurological: Positive for numbness (R leg). Negative for dizziness, seizures and light-headedness.   Psychiatric/Behavioral: Positive for sleep disturbance. Negative for suicidal ideas. The patient is nervous/anxious.      Vitals:    03/20/18 1136   BP: 131/81   Pulse: 60   Resp: 16   Temp: 97.5 °F (36.4 °C)   SpO2: 96%   Weight: 54.4 kg (120 lb)   Height: 165 cm (64.96\")   PainSc: 7  Comment: occipital pain,L > R side, ranges from 5-8/10   PainLoc: Head     Objective   Physical Exam   Constitutional: She is oriented to person, place, and time. She appears well-developed and well-nourished.   HENT:   Head: Normocephalic.   Eyes: Conjunctivae are normal.   Neck: Neck supple. Muscular tenderness present. Decreased range of motion present.   + bilateral occipital tenderness, left > right   Cardiovascular: Normal rate.    Pulmonary/Chest: Effort normal. No respiratory distress.   Musculoskeletal:        Cervical back: She exhibits decreased range of motion, tenderness and pain. She exhibits no spasm.   Neurological: She is alert and oriented to person, place, and time.   Skin: Skin is warm and dry.   Psychiatric: She has a normal mood and affect. Her behavior is normal.   Nursing note and vitals reviewed.      Assessment/Plan   Amalia was seen today for headache.    Diagnoses and all orders for this visit:    Chronic pain syndrome    Neuropathic pain    Complex regional pain syndrome type 1 of left upper extremity    Presence of intrathecal pump    Spinal cord stimulator status    Cervical post-laminectomy syndrome    Lumbar post-laminectomy syndrome    Bilateral occipital neuralgia      --- Bilateral ONB in " office today  --- F/u for IT pump refill once Prialt is available  --- Continue Percocet, does not need refill today  --- The urine drug screen confirmation from 3/12/18 has been reviewed and the result is appropriate based on patient history and SMITH report  --- Follow-up as scheduled          SMITH REPORT    As part of the patient's treatment plan, I am prescribing controlled substances. The patient has been made aware of appropriate use of such medications, including potential risk of somnolence, limited ability to drive and/or work safely, and the potential for dependence or overdose. It has also bee made clear that these medications are for use by this patient only, without concomitant use of alcohol or other substances unless prescribed.     Patient has completed prescribing agreement detailing terms of continued prescribing of controlled substances, including monitoring SMITH reports, urine drug screening, and pill counts if necessary. The patient is aware that inappropriate use will results in cessation of prescribing such medications.    SMITH report has been reviewed and scanned into the patient's chart.    Date of last SMITH : 3/19/2018    History and physical exam exhibit continued safe and appropriate use of controlled substances.      EMR Dragon/Transcription disclaimer:   Much of this encounter note is an electronic transcription/translation of spoken language to printed text. The electronic translation of spoken language may permit erroneous, or at times, nonsensical words or phrases to be inadvertently transcribed; Although I have reviewed the note for such errors, some may still exist.

## 2018-03-22 ENCOUNTER — HOSPITAL ENCOUNTER (OUTPATIENT)
Dept: PHARMACY | Facility: HOSPITAL | Age: 63
Discharge: HOME | End: 2018-03-22
Attending: INTERNAL MEDICINE | Admitting: INTERNAL MEDICINE

## 2018-04-02 ENCOUNTER — TELEPHONE (OUTPATIENT)
Dept: PAIN MEDICINE | Facility: CLINIC | Age: 63
End: 2018-04-02

## 2018-04-02 NOTE — TELEPHONE ENCOUNTER
I left a voice message with the Pt regarding having her to come this week (any day this week) to begin prialt therapy.

## 2018-04-04 ENCOUNTER — OFFICE VISIT (OUTPATIENT)
Dept: PAIN MEDICINE | Facility: CLINIC | Age: 63
End: 2018-04-04

## 2018-04-04 VITALS
BODY MASS INDEX: 19.66 KG/M2 | DIASTOLIC BLOOD PRESSURE: 76 MMHG | HEIGHT: 65 IN | HEART RATE: 62 BPM | WEIGHT: 118 LBS | TEMPERATURE: 98.6 F | RESPIRATION RATE: 16 BRPM | SYSTOLIC BLOOD PRESSURE: 125 MMHG | OXYGEN SATURATION: 95 %

## 2018-04-04 DIAGNOSIS — M96.1 LUMBAR POST-LAMINECTOMY SYNDROME: ICD-10-CM

## 2018-04-04 DIAGNOSIS — G89.4 CHRONIC PAIN SYNDROME: ICD-10-CM

## 2018-04-04 DIAGNOSIS — G90.512 COMPLEX REGIONAL PAIN SYNDROME TYPE 1 OF LEFT UPPER EXTREMITY: ICD-10-CM

## 2018-04-04 DIAGNOSIS — Z96.89 SPINAL CORD STIMULATOR STATUS: ICD-10-CM

## 2018-04-04 DIAGNOSIS — M96.1 CERVICAL POST-LAMINECTOMY SYNDROME: Primary | ICD-10-CM

## 2018-04-04 DIAGNOSIS — Z97.8 PRESENCE OF INTRATHECAL PUMP: ICD-10-CM

## 2018-04-04 PROCEDURE — 62368 ANALYZE SP INF PUMP W/REPROG: CPT | Performed by: ANESTHESIOLOGY

## 2018-04-04 PROCEDURE — 99211 OFF/OP EST MAY X REQ PHY/QHP: CPT | Performed by: ANESTHESIOLOGY

## 2018-04-04 RX ORDER — LIDOCAINE 50 MG/G
1 PATCH TOPICAL EVERY 24 HOURS
Qty: 30 PATCH | Refills: 11 | Status: SHIPPED | OUTPATIENT
Start: 2018-04-04 | End: 2018-10-08 | Stop reason: SDUPTHER

## 2018-04-04 RX ORDER — GABAPENTIN 600 MG/1
600 TABLET ORAL DAILY
Qty: 90 TABLET | Refills: 0 | Status: SHIPPED | OUTPATIENT
Start: 2018-04-04 | End: 2018-07-03 | Stop reason: SDUPTHER

## 2018-04-04 NOTE — PROGRESS NOTES
"CHIEF COMPLAINT  Pt reports significant relief following 3/20/18 Bilat. OCB.  However, L arm and bilateral leg pain continues to be significant with episodic severe pain.  Pt continues to be apprehensive about beginning prialt, stating she will be travelling to the [facility]  Next week for \"diffuse neuropathy and gastroparesis\".  Pt was therefore going to have her existing intrathecal morphine decreased to .35 mg per day but unforunately at her concentration we cannot go that low  Subjective   Amalia Lopez is a 62 y.o. female  who presents to the office for follow-up.She has a history of neuromodulation use for chronic pains.      History of Present Illness     PEG Assessment   What number best describes your pain on average in the past week?8  What number best describes how, during the past week, pain has interfered with your enjoyment of life?9  What number best describes how, during the past week, pain has interfered with your general activity?  8      The following portions of the patient's history were reviewed and updated as appropriate: allergies, current medications, past family history, past medical history, past social history, past surgical history and problem list.    Review of Systems   Constitutional: Positive for activity change (continues to be decreased) and appetite change (decreased).   Eyes: Negative for visual disturbance.   Respiratory: Positive for apnea. Negative for chest tightness and shortness of breath.    Cardiovascular: Negative for chest pain.   Gastrointestinal: Positive for abdominal pain, constipation (gastroparesis), diarrhea and nausea.   Genitourinary: Positive for difficulty urinating (occasional). Negative for dysuria.   Musculoskeletal: Positive for back pain and neck pain. Negative for gait problem.   Neurological: Positive for numbness (R leg). Negative for dizziness, seizures and light-headedness.   Psychiatric/Behavioral: Positive for sleep disturbance. Negative " "for suicidal ideas. The patient is nervous/anxious.        Vitals:    04/04/18 1012   BP: 125/76   Pulse: 62   Resp: 16   Temp: 98.6 °F (37 °C)   SpO2: 95%   Weight: 53.5 kg (118 lb)   Height: 165 cm (64.96\")   PainSc: 8  Comment: upper (L arm ) and lower (bilateral,R>L) ranges from 6-9/10   PainLoc: Generalized  Comment: upper and lower extremity pain         Objective   Physical Exam        Assessment/Plan   Amalia was seen today for arm pain and leg pain.    Diagnoses and all orders for this visit:    Cervical post-laminectomy syndrome    Lumbar post-laminectomy syndrome    Presence of intrathecal pump    Chronic pain syndrome    Spinal cord stimulator status    Complex regional pain syndrome type 1 of left upper extremity    Other orders  -     gabapentin (NEURONTIN) 600 MG tablet; Take 1 tablet by mouth Daily.  -     lidocaine (LIDODERM) 5 %; Place 1 patch on the skin Daily.  -     diclofenac (FLECTOR) 1.3 % patch patch; Apply 1 patch topically 2 (Two) Times a Day.        --- Follow-up in a few weeks after her visit to [facility].    -- IT pump interrogated... She is placed on the minimal rate for her concentration of opioid which is 0.48mg per day.    Notes scanned into the chart.    -- she does not wish to change therapy to Prialt until after she has evaluation at [facility].    -- multimodal analgesics for her multiple pains continues.                   EMR Dragon/Transcription disclaimer:   Much of this encounter note is an electronic transcription/translation of spoken language to printed text. The electronic translation of spoken language may permit erroneous, or at times, nonsensical words or phrases to be inadvertently transcribed; Although I have reviewed the note for such errors, some may still exist.         "

## 2018-04-17 ENCOUNTER — TELEPHONE (OUTPATIENT)
Dept: PAIN MEDICINE | Facility: CLINIC | Age: 63
End: 2018-04-17

## 2018-04-17 NOTE — TELEPHONE ENCOUNTER
Ms. Lopez called today and left a message in the voicemail wanting to inform you that she doesn't want the Prialt put in her pump. She wants to talk to you first.

## 2018-04-25 ENCOUNTER — OFFICE VISIT (OUTPATIENT)
Dept: PAIN MEDICINE | Facility: CLINIC | Age: 63
End: 2018-04-25

## 2018-04-25 VITALS
HEART RATE: 56 BPM | TEMPERATURE: 98.4 F | RESPIRATION RATE: 16 BRPM | HEIGHT: 65 IN | DIASTOLIC BLOOD PRESSURE: 70 MMHG | WEIGHT: 119 LBS | OXYGEN SATURATION: 99 % | SYSTOLIC BLOOD PRESSURE: 109 MMHG | BODY MASS INDEX: 19.83 KG/M2

## 2018-04-25 DIAGNOSIS — Z97.8 PRESENCE OF INTRATHECAL PUMP: ICD-10-CM

## 2018-04-25 DIAGNOSIS — M96.1 CERVICAL POST-LAMINECTOMY SYNDROME: ICD-10-CM

## 2018-04-25 DIAGNOSIS — G89.4 CHRONIC PAIN SYNDROME: Primary | ICD-10-CM

## 2018-04-25 DIAGNOSIS — M96.1 LUMBAR POST-LAMINECTOMY SYNDROME: ICD-10-CM

## 2018-04-25 DIAGNOSIS — Z96.89 SPINAL CORD STIMULATOR STATUS: ICD-10-CM

## 2018-04-25 PROCEDURE — 62367 ANALYZE SPINE INFUS PUMP: CPT | Performed by: ANESTHESIOLOGY

## 2018-04-25 PROCEDURE — 99213 OFFICE O/P EST LOW 20 MIN: CPT | Performed by: ANESTHESIOLOGY

## 2018-04-25 NOTE — PROGRESS NOTES
"CHIEF COMPLAINT  Since 4/4/18 office visit, Pt has D/Cd oxycodone (and adderall) on 4/14/18 due to \"Doctors in [facility] giving me grief about pain medicine\".  Pt reportedly has not noticed an increase in pain,although her L arm,bilateral legs and neck pain continues to be the chief complaints.    Subjective   Amalia Lopez is a 62 y.o. female  who presents to the office for follow-up.She has a history of chronic pain.     Was last evaluated in office by Dr. Strauss on 4/4/18.  Had a recent occipital nerve block performed by Dr. Brower on 3/20/18.  Had previous discussed potential for Prialt for IT pump.  She did not want to change IT medication to Prialt until after evaluation at [facility].    --  Consult from [facility]    --    Pump interrogated today.... It is Medtronic Synchromed II, 40 ml pump, infusing morphine @ a conc. Of 10mg/ml.  Dose is a minimum rate which is 0.48mg per day.  Also has PA of 2 doses per 24 hrs of .02mg.  Again the max daily dose therefore is 0.52mg per day.  Used 7 PTM does, one LO, zero unsuccessful.  DASH is 8 months.  Reservoir volume is 24.5ml estimated.      History of Present Illness     PEG Assessment   What number best describes your pain on average in the past week?8  What number best describes how, during the past week, pain has interfered with your enjoyment of life?8  What number best describes how, during the past week, pain has interfered with your general activity?  8    The following portions of the patient's history were reviewed and updated as appropriate: allergies, current medications, past family history, past medical history, past social history, past surgical history and problem list.    Review of Systems   Constitutional: Positive for activity change (continues to be decreased) and appetite change (decreased).   Eyes: Negative for visual disturbance.   Respiratory: Positive for apnea. Negative for chest tightness and shortness of breath.  " "  Cardiovascular: Negative for chest pain.   Gastrointestinal: Positive for abdominal pain, constipation (gastroparesisdecreased), diarrhea (improved) and nausea (gastroporesis).   Genitourinary: Negative for difficulty urinating (occasional) and dysuria.   Musculoskeletal: Positive for back pain and neck pain. Negative for gait problem.   Neurological: Positive for numbness (R leg). Negative for dizziness, seizures and light-headedness.   Psychiatric/Behavioral: Positive for sleep disturbance. Negative for suicidal ideas. The patient is nervous/anxious.        Vitals:    04/25/18 1412   BP: 109/70   Pulse: 56   Resp: 16   Temp: 98.4 °F (36.9 °C)   SpO2: 99%   Weight: 54 kg (119 lb)   Height: 165 cm (64.96\")   PainSc: 7  Comment: pain ranges from 5-8/10   PainLoc: Generalized     Objective   Physical Exam        Assessment/Plan   Amalia was seen today for extremity pain and neck pain.    Diagnoses and all orders for this visit:    Chronic pain syndrome    Cervical post-laminectomy syndrome    Lumbar post-laminectomy syndrome    Presence of intrathecal pump    Spinal cord stimulator status      --- Interrogated IT pump today as above.  --- Follow-up for pump refill...    Counseling about a plan going forward was significant, and was 15 minutes of today's 20 minute office visit.  She wishes to wean from Intrathecal therapy and wishes not to start Prialt.   Therefore we discussed a slow wean off rather than abrupt \"cold turkey\" and she agrees.  If she weans off over 2-3 months she could still reconsider whether to try Prialt before the pump battery expires.      Therefore we will order Morphine at 2mg/ml, and reprogram at the next visit to 0.35mg per day for 3 wks, then down to 0.20 or 0.25mg for a few weeks, etc, to wean to 0.05mg per day then off in about 9-12 wks.      We will not prescribe oral opioids.  She has weaned off them.    We will not manage further intrathecal opioids once weaned off.      We will " consider options for explant of the pump in the future.      SMITH REPORT    As part of the patient's treatment plan, I am prescribing controlled substances. The patient has been made aware of appropriate use of such medications, including potential risk of somnolence, limited ability to drive and/or work safely, and the potential for dependence or overdose. It has also bee made clear that these medications are for use by this patient only, without concomitant use of alcohol or other substances unless prescribed.     Patient has completed prescribing agreement detailing terms of continued prescribing of controlled substances, including monitoring SMITH reports, urine drug screening, and pill counts if necessary. The patient is aware that inappropriate use will results in cessation of prescribing such medications.    SMITH report has been reviewed and scanned into the patient's chart.    Date of last SMITH : 4-23-18    History and physical exam exhibit continued safe and appropriate use of controlled substances.      EMR Dragon/Transcription disclaimer:   Much of this encounter note is an electronic transcription/translation of spoken language to printed text. The electronic translation of spoken language may permit erroneous, or at times, nonsensical words or phrases to be inadvertently transcribed; Although I have reviewed the note for such errors, some may still exist.

## 2018-04-30 ENCOUNTER — PROCEDURE VISIT (OUTPATIENT)
Dept: PAIN MEDICINE | Facility: CLINIC | Age: 63
End: 2018-04-30

## 2018-04-30 ENCOUNTER — TELEPHONE (OUTPATIENT)
Dept: PAIN MEDICINE | Facility: CLINIC | Age: 63
End: 2018-04-30

## 2018-04-30 VITALS
HEART RATE: 58 BPM | HEIGHT: 65 IN | RESPIRATION RATE: 16 BRPM | WEIGHT: 119 LBS | BODY MASS INDEX: 19.83 KG/M2 | TEMPERATURE: 97.5 F | SYSTOLIC BLOOD PRESSURE: 112 MMHG | OXYGEN SATURATION: 98 % | DIASTOLIC BLOOD PRESSURE: 72 MMHG

## 2018-04-30 DIAGNOSIS — M54.81 BILATERAL OCCIPITAL NEURALGIA: ICD-10-CM

## 2018-04-30 PROCEDURE — 64405 NJX AA&/STRD GR OCPL NRV: CPT | Performed by: ANESTHESIOLOGY

## 2018-04-30 NOTE — TELEPHONE ENCOUNTER
Patient called wanting to know if she can come in for and ONB today. Her last ONB was on 3/20/2018

## 2018-05-02 ENCOUNTER — OFFICE (OUTPATIENT)
Dept: URBAN - METROPOLITAN AREA CLINIC 66 | Facility: CLINIC | Age: 63
End: 2018-05-02

## 2018-05-02 VITALS
WEIGHT: 120 LBS | HEART RATE: 61 BPM | SYSTOLIC BLOOD PRESSURE: 127 MMHG | HEIGHT: 66 IN | DIASTOLIC BLOOD PRESSURE: 83 MMHG

## 2018-05-02 DIAGNOSIS — K59.00 CONSTIPATION, UNSPECIFIED: ICD-10-CM

## 2018-05-02 DIAGNOSIS — R77.8 OTHER SPECIFIED ABNORMALITIES OF PLASMA PROTEINS: ICD-10-CM

## 2018-05-02 DIAGNOSIS — K83.8 OTHER SPECIFIED DISEASES OF BILIARY TRACT: ICD-10-CM

## 2018-05-02 DIAGNOSIS — K31.84 GASTROPARESIS: ICD-10-CM

## 2018-05-02 PROCEDURE — 99214 OFFICE O/P EST MOD 30 MIN: CPT

## 2018-05-02 RX ORDER — SCOPALAMINE 1 MG/3D
PATCH, EXTENDED RELEASE TRANSDERMAL
Qty: 5 | Refills: 0 | Status: COMPLETED
Start: 2018-05-02 | End: 2018-08-08

## 2018-05-09 ENCOUNTER — OFFICE VISIT (OUTPATIENT)
Dept: PAIN MEDICINE | Facility: CLINIC | Age: 63
End: 2018-05-09

## 2018-05-09 VITALS
SYSTOLIC BLOOD PRESSURE: 117 MMHG | HEIGHT: 65 IN | WEIGHT: 119 LBS | OXYGEN SATURATION: 100 % | TEMPERATURE: 97.8 F | BODY MASS INDEX: 19.83 KG/M2 | DIASTOLIC BLOOD PRESSURE: 75 MMHG | RESPIRATION RATE: 16 BRPM | HEART RATE: 54 BPM

## 2018-05-09 DIAGNOSIS — M96.1 LUMBAR POST-LAMINECTOMY SYNDROME: ICD-10-CM

## 2018-05-09 DIAGNOSIS — M96.1 CERVICAL POST-LAMINECTOMY SYNDROME: ICD-10-CM

## 2018-05-09 DIAGNOSIS — G89.4 CHRONIC PAIN SYNDROME: Primary | ICD-10-CM

## 2018-05-09 DIAGNOSIS — M47.812 CERVICAL SPONDYLOSIS WITHOUT MYELOPATHY: ICD-10-CM

## 2018-05-09 DIAGNOSIS — Z96.89 SPINAL CORD STIMULATOR STATUS: ICD-10-CM

## 2018-05-09 DIAGNOSIS — Z97.8 PRESENCE OF INTRATHECAL PUMP: ICD-10-CM

## 2018-05-09 PROCEDURE — 62369 ANAL SP INF PMP W/REPRG&FILL: CPT | Performed by: ANESTHESIOLOGY

## 2018-05-09 PROCEDURE — 99213 OFFICE O/P EST LOW 20 MIN: CPT | Performed by: ANESTHESIOLOGY

## 2018-05-09 NOTE — PROGRESS NOTES
"CHIEF COMPLAINT  Pt is here today for an intrathecal pump refill,using Morphine 1mg/ml.  Pt states her bilateral leg pain,R > L leg, as \"status quo\", with pain level ranging from 6-8/10.  Pt continues to not take oxycodone or adderal.    Subjective   Amalia Lopez is a 62 y.o. female  who presents to the office for follow-up.She has a history of multiple painful conditions.      IT pump interrogation & refill and reprogramming..... Pump accessed per routine and in sterile fashion after patient consents.  ERV 23.9ml, ARV 23.2ml.  Had Morphine at 10mg/ml running at 0.48mg per day.  PTM was discontinued.  Pump rinsed with 20ml of saline and then this extracted, then filled with 40ml of Morphine 1mg/ml.  Rate adjusted to 0.35mg per day.  DASH is 7 months.      Neck Pain    This is a recurrent problem. The current episode started more than 1 year ago. The problem has been gradually worsening. The pain is present in the left side (upper). The quality of the pain is described as aching and stabbing. The pain is severe. Associated symptoms include headaches. Pertinent negatives include no pain with swallowing or visual change. Numbness: L arm, R leg into foot.        PEG Assessment   What number best describes your pain on average in the past week?7  What number best describes how, during the past week, pain has interfered with your enjoyment of life?7  What number best describes how, during the past week, pain has interfered with your general activity?  7      The following portions of the patient's history were reviewed and updated as appropriate: allergies, current medications, past family history, past medical history, past social history, past surgical history and problem list.    Review of Systems   Constitutional: Positive for activity change (\"variable\") and appetite change (decreased).   HENT: Negative for hearing loss.    Eyes: Negative for visual disturbance.   Respiratory: Positive for apnea. Negative for chest " "tightness and shortness of breath.    Gastrointestinal: Positive for constipation, diarrhea and nausea (\"chronic\"-gastroporesis).   Genitourinary: Negative for difficulty urinating and dysuria.   Musculoskeletal: Positive for back pain and neck pain. Negative for gait problem.   Neurological: Positive for headaches. Negative for dizziness, seizures and light-headedness. Numbness: L arm, R leg into foot.   Psychiatric/Behavioral: Positive for sleep disturbance. Negative for suicidal ideas. The patient is nervous/anxious.        Vitals:    05/09/18 1049   BP: 117/75   Pulse: 54   Resp: 16   Temp: 97.8 °F (36.6 °C)   SpO2: 100%   Weight: 54 kg (119 lb)   Height: 165 cm (64.96\")   PainSc: 7  Comment: Bilateral leg pain ,R>L, ranges from 6-8/10   PainLoc: Leg         Objective   Physical Exam   Constitutional: She is oriented to person, place, and time. Vital signs are normal. She appears well-developed and well-nourished.  Non-toxic appearance. No distress.   HENT:   Head: Normocephalic and atraumatic.   Right Ear: Hearing and external ear normal.   Left Ear: Hearing and external ear normal.   Nose: Nose normal.   Eyes: Conjunctivae and lids are normal. Pupils are equal, round, and reactive to light.   Neck: Spinous process tenderness present. Decreased range of motion present.   Pulmonary/Chest: Effort normal. No respiratory distress.   Abdominal: Normal appearance.   Musculoskeletal:        Cervical back: She exhibits decreased range of motion, tenderness, bony tenderness (left upper facets) and pain (spurling is not truly radicular but does hurt axially to the left).   Neurological: She is alert and oriented to person, place, and time. No cranial nerve deficit.   Psychiatric: She has a normal mood and affect. Her behavior is normal.   Nursing note and vitals reviewed.          Assessment/Plan   Amalia was seen today for leg pain.    Diagnoses and all orders for this visit:    Chronic pain syndrome    Spinal cord " stimulator status    Presence of intrathecal pump    Cervical post-laminectomy syndrome    Lumbar post-laminectomy syndrome    Cervical spondylosis without myelopathy  -     Case Request        --- Follow-up for procedure.... Left C2-4 cervical medial branch blockade, x2, 2 wks apart  --- intrathecal pump refill today with reprogram as above to continue the morphine wean.    -- as previously noted, we will wean slowly over many weeks and then she can decide if she wants to trial Prialt for a month or two before the DASH date arrives on her pump.             SMITH REPORT    As part of the patient's treatment plan, I am prescribing controlled substances, in the form of the intrathecal medication as we wean it off.   The patient has been made aware of appropriate use of such medications, including potential risk of somnolence, limited ability to drive and/or work safely, and the potential for dependence or overdose. It has also bee made clear that these medications are for use by this patient only, without concomitant use of alcohol or other substances unless prescribed.     Patient has completed prescribing agreement detailing terms of continued prescribing of controlled substances, including monitoring SMITH reports, urine drug screening, and pill counts if necessary. The patient is aware that inappropriate use will results in cessation of prescribing such medications.    SMITH report has been reviewed and scanned into the patient's chart.    Date of last SMITH : checked today, scanned into the Media folder    History and physical exam exhibit continued safe and appropriate use of controlled substances.      EMR Dragon/Transcription disclaimer:   Much of this encounter note is an electronic transcription/translation of spoken language to printed text. The electronic translation of spoken language may permit erroneous, or at times, nonsensical words or phrases to be inadvertently transcribed; Although I have  reviewed the note for such errors, some may still exist.

## 2018-05-17 ENCOUNTER — HOSPITAL ENCOUNTER (OUTPATIENT)
Dept: LAB | Facility: HOSPITAL | Age: 63
Discharge: HOME | End: 2018-05-17
Attending: NURSE PRACTITIONER | Admitting: NURSE PRACTITIONER

## 2018-05-17 LAB
BASOPHILS # BLD AUTO: 0 10*3/UL (ref 0–0.2)
BASOPHILS NFR BLD AUTO: 1 % (ref 0–2)
DIFFERENTIAL METHOD BLD: (no result)
EOSINOPHIL # BLD AUTO: 0.1 10*3/UL (ref 0–0.3)
EOSINOPHIL # BLD AUTO: 1 % (ref 0–3)
ERYTHROCYTE [DISTWIDTH] IN BLOOD BY AUTOMATED COUNT: 12.9 % (ref 11.5–14.5)
HCT VFR BLD AUTO: 45 % (ref 35–49)
HGB BLD-MCNC: 14.8 G/DL (ref 12–15)
LYMPHOCYTES # BLD AUTO: 1 10*3/UL (ref 0.8–4.8)
LYMPHOCYTES NFR BLD AUTO: 13 % (ref 18–42)
MCH RBC QN AUTO: 29.9 PG (ref 26–32)
MCHC RBC AUTO-ENTMCNC: 32.9 G/DL (ref 32–36)
MCV RBC AUTO: 91 FL (ref 80–94)
MONOCYTES # BLD AUTO: 0.4 10*3/UL (ref 0.1–1.3)
MONOCYTES NFR BLD AUTO: 5 % (ref 2–11)
NEUTROPHILS # BLD AUTO: 5.7 10*3/UL (ref 2.3–8.6)
NEUTROPHILS NFR BLD AUTO: 80 % (ref 50–75)
NRBC BLD AUTO-RTO: 0 /100{WBCS}
NRBC/RBC NFR BLD MANUAL: 0 10*3/UL
PLATELET # BLD AUTO: 233 10*3/UL (ref 150–450)
PMV BLD AUTO: 8.3 FL (ref 7.4–10.4)
RBC # BLD AUTO: 4.94 10*6/UL (ref 4–5.4)
WBC # BLD AUTO: 7.2 10*3/UL (ref 4.5–11.5)

## 2018-05-23 ENCOUNTER — OFFICE VISIT (OUTPATIENT)
Dept: PAIN MEDICINE | Facility: CLINIC | Age: 63
End: 2018-05-23

## 2018-05-23 VITALS
DIASTOLIC BLOOD PRESSURE: 81 MMHG | OXYGEN SATURATION: 100 % | RESPIRATION RATE: 16 BRPM | SYSTOLIC BLOOD PRESSURE: 120 MMHG | BODY MASS INDEX: 20.23 KG/M2 | HEIGHT: 65 IN | HEART RATE: 64 BPM | WEIGHT: 121.4 LBS | TEMPERATURE: 98.5 F

## 2018-05-23 DIAGNOSIS — Z97.8 PRESENCE OF INTRATHECAL PUMP: Primary | ICD-10-CM

## 2018-05-23 PROCEDURE — 62368 ANALYZE SP INF PUMP W/REPROG: CPT | Performed by: ANESTHESIOLOGY

## 2018-05-23 NOTE — PROGRESS NOTES
"CHIEF COMPLAINT    F/U leg pain, Last visit 5-09-18. Pt has pain pump. States she had major dental surgery so that is causing her pain now in the top part of her mouth. States her leg pain is unchanged since last visit.     Subjective   Amalia Lopez is a 62 y.o. female  who presents to the office for follow-up.She has a history of severe chronic pain and intrathecal pump use which she wishes to wean from opioids.      At the last office visit, she had IT pump interrogation & refill and reprogramming.....  PTM was discontinued.  Pump rinsed with 20ml of saline and then this extracted, then filled with 40ml of Morphine 1mg/ml.  Rate adjusted to 0.35mg per day.      Pump interrogated today... This is confirmed to be running at 0.3497mg / day morphine.  ERV 38.1ml.  DASH 7 months.      Changed to 0.25mg per day.  No PTM.        History of Present Illness     PEG Assessment   What number best describes your pain on average in the past week?7-8  What number best describes how, during the past week, pain has interfered with your enjoyment of life?7-8  What number best describes how, during the past week, pain has interfered with your general activity?  7-8      The following portions of the patient's history were reviewed and updated as appropriate: allergies, current medications, past family history, past medical history, past social history, past surgical history and problem list.    Review of Systems   Constitutional: Positive for activity change (\"variable\") and appetite change (decreased). Negative for chills and fever.   HENT: Negative for hearing loss.    Eyes: Negative for visual disturbance.   Respiratory: Positive for apnea. Negative for chest tightness and shortness of breath.    Gastrointestinal: Positive for constipation, diarrhea and nausea (\"chronic\"-gastroporesis).   Genitourinary: Negative for difficulty urinating and dysuria.   Musculoskeletal: Positive for back pain and neck pain. Negative for gait " "problem.   Neurological: Positive for headaches. Negative for dizziness, seizures and light-headedness. Numbness: L arm, R leg into foot.   Psychiatric/Behavioral: Positive for sleep disturbance. Negative for suicidal ideas. The patient is nervous/anxious.             Vitals:    05/23/18 1446   BP: 120/81   Pulse: 64   Resp: 16   Temp: 98.5 °F (36.9 °C)   SpO2: 100%   Weight: 55.1 kg (121 lb 6.4 oz)   Height: 165 cm (64.96\")   PainSc:   7   PainLoc: Leg         Objective   Physical Exam        Assessment/Plan   Amalia was seen today for extremity pain.    Diagnoses and all orders for this visit:    Presence of intrathecal pump      -- office follow up for continued weaning.... Pt request 1 wk.  Plan to go down another 20-30% each time.    --- Follow-up for procedure.... Left C2-4 cervical medial branch blockade, x2, 2 wks apart  --- intrathecal pump refill today with reprogram as above to continue the morphine wean.     -- as previously noted, we will wean slowly over many weeks and then she can decide if she wants to trial Prialt for a month or two before the DASH date arrives on her pump.               SMITH REPORT    As part of the patient's treatment plan, I am prescribing controlled substances which is the weaning dose of intrathecal opioid. The patient has been made aware of appropriate use of such medications, including potential risk of somnolence, limited ability to drive and/or work safely, and the potential for dependence or overdose. It has also bee made clear that these medications are for use by this patient only, without concomitant use of alcohol or other substances unless prescribed.     Patient has completed prescribing agreement detailing terms of continued prescribing of controlled substances, including monitoring SMITH reports, urine drug screening, and pill counts if necessary. The patient is aware that inappropriate use will results in cessation of prescribing such medications.    SMITH " report has been reviewed and scanned into the patient's chart.    As the clinician, I personally reviewed the SMITH from as above while the patient was in the office today.    History and physical exam exhibit continued safe and appropriate use of controlled substances.      EMR Dragon/Transcription disclaimer:   Much of this encounter note is an electronic transcription/translation of spoken language to printed text. The electronic translation of spoken language may permit erroneous, or at times, nonsensical words or phrases to be inadvertently transcribed; Although I have reviewed the note for such errors, some may still exist.

## 2018-05-30 ENCOUNTER — OFFICE VISIT (OUTPATIENT)
Dept: PAIN MEDICINE | Facility: CLINIC | Age: 63
End: 2018-05-30

## 2018-05-30 VITALS
HEART RATE: 56 BPM | RESPIRATION RATE: 16 BRPM | BODY MASS INDEX: 20.66 KG/M2 | HEIGHT: 65 IN | WEIGHT: 124 LBS | DIASTOLIC BLOOD PRESSURE: 61 MMHG | OXYGEN SATURATION: 99 % | SYSTOLIC BLOOD PRESSURE: 107 MMHG | TEMPERATURE: 98 F

## 2018-05-30 DIAGNOSIS — G89.4 CHRONIC PAIN SYNDROME: ICD-10-CM

## 2018-05-30 DIAGNOSIS — Z97.8 PRESENCE OF INTRATHECAL PUMP: Primary | ICD-10-CM

## 2018-05-30 PROCEDURE — 62368 ANALYZE SP INF PUMP W/REPROG: CPT | Performed by: ANESTHESIOLOGY

## 2018-05-30 NOTE — PROGRESS NOTES
"CHIEF COMPLAINT  Pt is anxious to have a cervical medial branch block; overall, her generalized pain (neck,back,bilateral hips,legs) has not increased since having her intrathecal morphine daily dose lowered from .35 to .25 mg daily.  Pt also reports having no \"withdrawal\" symptoms.    Subjective   Amalia Lopez is a 62 y.o. female  who presents to the office for follow-up.She has a history of CRPS among other painful problems and she desires to wean off intrathecal opioids.    --  The aforementioned information the Chief Complaint section and above subjective data including any HPI data has been personally reviewed and affirmed.  --        Pump interrogated, infusing at 0.25mg morphine per day, est reserve volume in pump is >37ml and refill date is into January 2019 by the calculation.  Rate changed to 0.15mg per day.   No complications.      History of Present Illness     PEG Assessment   What number best describes your pain on average in the past week?7  What number best describes how, during the past week, pain has interfered with your enjoyment of life?7  What number best describes how, during the past week, pain has interfered with your general activity?  7      The following portions of the patient's history were reviewed and updated as appropriate: allergies, current medications, past family history, past medical history, past social history, past surgical history and problem list.    Review of Systems   Constitutional: Negative for activity change (\"variable\"), appetite change (decreased), chills and fever.   HENT: Negative for hearing loss.    Eyes: Negative for visual disturbance.   Respiratory: Positive for apnea. Negative for chest tightness and shortness of breath.    Gastrointestinal: Positive for constipation (no change) and diarrhea (chronic). Negative for nausea (\"chronic\"-gastroporesis).   Genitourinary: Negative for difficulty urinating and dysuria.   Musculoskeletal: Positive for back pain and " "neck pain. Negative for gait problem.   Neurological: Positive for headaches. Negative for dizziness, seizures and light-headedness. Numbness: L arm, R leg into foot.   Psychiatric/Behavioral: Positive for sleep disturbance. Negative for suicidal ideas. The patient is nervous/anxious.        Vitals:    05/30/18 1045   BP: 107/61   Pulse: 56   Resp: 16   Temp: 98 °F (36.7 °C)   SpO2: 99%   Weight: 56.2 kg (124 lb)   Height: 165 cm (64.96\")   PainSc:   7   PainLoc: Neck         Objective   Physical Exam   Nursing note and vitals reviewed.          Assessment/Plan   Amalia was seen today for neck pain.    Diagnoses and all orders for this visit:    Presence of intrathecal pump    Chronic pain syndrome            --- Follow-up 1-2 wks for more downward adjustment of IT therapy               SMITH REPORT    As part of the patient's treatment plan, I am prescribing controlled substances. The patient has been made aware of appropriate use of such medications, including potential risk of somnolence, limited ability to drive and/or work safely, and the potential for dependence or overdose. It has also bee made clear that these medications are for use by this patient only, without concomitant use of alcohol or other substances unless prescribed.     Patient has completed prescribing agreement detailing terms of continued prescribing of controlled substances, including monitoring SMITH reports, urine drug screening, and pill counts if necessary. The patient is aware that inappropriate use will results in cessation of prescribing such medications.    SMITH report has been reviewed and scanned into the patient's chart.    As the clinician, I personally reviewed the SMITH from 5-27-18 while the patient was in the office today.    History and physical exam exhibit continued safe and appropriate use of controlled substances.      EMR Dragon/Transcription disclaimer:   Much of this encounter note is an electronic " transcription/translation of spoken language to printed text. The electronic translation of spoken language may permit erroneous, or at times, nonsensical words or phrases to be inadvertently transcribed; Although I have reviewed the note for such errors, some may still exist.

## 2018-06-11 ENCOUNTER — OFFICE (OUTPATIENT)
Dept: URBAN - METROPOLITAN AREA CLINIC 66 | Facility: CLINIC | Age: 63
End: 2018-06-11
Payer: COMMERCIAL

## 2018-06-11 VITALS
SYSTOLIC BLOOD PRESSURE: 131 MMHG | HEIGHT: 66 IN | HEART RATE: 65 BPM | WEIGHT: 121 LBS | DIASTOLIC BLOOD PRESSURE: 74 MMHG

## 2018-06-11 DIAGNOSIS — R10.11 RIGHT UPPER QUADRANT PAIN: ICD-10-CM

## 2018-06-11 DIAGNOSIS — K31.84 GASTROPARESIS: ICD-10-CM

## 2018-06-11 DIAGNOSIS — K90.9 INTESTINAL MALABSORPTION, UNSPECIFIED: ICD-10-CM

## 2018-06-11 PROCEDURE — 99214 OFFICE O/P EST MOD 30 MIN: CPT

## 2018-06-13 ENCOUNTER — OFFICE VISIT (OUTPATIENT)
Dept: PAIN MEDICINE | Facility: CLINIC | Age: 63
End: 2018-06-13

## 2018-06-13 VITALS
OXYGEN SATURATION: 100 % | TEMPERATURE: 98.3 F | WEIGHT: 123 LBS | HEIGHT: 65 IN | BODY MASS INDEX: 20.49 KG/M2 | RESPIRATION RATE: 16 BRPM | DIASTOLIC BLOOD PRESSURE: 61 MMHG | HEART RATE: 58 BPM | SYSTOLIC BLOOD PRESSURE: 123 MMHG

## 2018-06-13 DIAGNOSIS — G89.4 CHRONIC PAIN SYNDROME: Primary | ICD-10-CM

## 2018-06-13 DIAGNOSIS — M96.1 CERVICAL POST-LAMINECTOMY SYNDROME: ICD-10-CM

## 2018-06-13 DIAGNOSIS — M96.1 LUMBAR POST-LAMINECTOMY SYNDROME: ICD-10-CM

## 2018-06-13 DIAGNOSIS — Z97.8 PRESENCE OF INTRATHECAL PUMP: ICD-10-CM

## 2018-06-13 PROCEDURE — 62368 ANALYZE SP INF PUMP W/REPROG: CPT | Performed by: NURSE PRACTITIONER

## 2018-06-13 PROCEDURE — 99214 OFFICE O/P EST MOD 30 MIN: CPT | Performed by: NURSE PRACTITIONER

## 2018-06-13 RX ORDER — HYDROCODONE BITARTRATE AND ACETAMINOPHEN 5; 325 MG/1; MG/1
1 TABLET ORAL EVERY 6 HOURS PRN
COMMUNITY
End: 2018-07-06

## 2018-06-13 NOTE — PROGRESS NOTES
"CHIEF COMPLAINT  Pt states she is \"ready for prialt\".  Pt's generalized pain has increased since 5/30/18 office visit.  Pt now is taking hydrocodone 5-325mg PRN since having 5 dental implants removed.    Xu Lopez is a 62 y.o. female  who presents to the office for follow-up.She has a history of chronic neck and back pain.      She presents today for IT pump check/reprogramming.  Working on weaning off IT Morphine with goal of switching to Prialt regimen.  Her current dose is 0.15mg/day (down from 0.25 mg/day at the previous visit). She reported no signs or symptoms of withdrawal.  Reports that her pain is worsening.  She states today \"I am ready for the Prialt\".      C/o generalized pain. Pain in neck, back, joints.  Pain today 6-8/10 in severity. Increasing since medication has been decreased.  Pain is worse with activity.  Better with rest and medication.      History of Present Illness     PEG Assessment   What number best describes your pain on average in the past week?7  What number best describes how, during the past week, pain has interfered with your enjoyment of life?7  What number best describes how, during the past week, pain has interfered with your general activity?  7    The following portions of the patient's history were reviewed and updated as appropriate: allergies, current medications, past family history, past medical history, past social history, past surgical history and problem list.    Review of Systems   Constitutional: Negative for activity change (\"variable\"), appetite change (decreased), chills and fever.   HENT: Negative for hearing loss.    Eyes: Negative for visual disturbance.   Respiratory: Positive for apnea. Negative for chest tightness and shortness of breath.    Gastrointestinal: Positive for diarrhea (chronic). Negative for constipation (no change) and nausea (\"chronic\"-gastroporesis).   Genitourinary: Negative for difficulty urinating and dysuria. " "  Musculoskeletal: Positive for back pain and neck pain. Negative for gait problem.   Neurological: Positive for headaches. Negative for dizziness, seizures and light-headedness. Numbness: L arm, R leg into foot.   Psychiatric/Behavioral: Positive for sleep disturbance. Negative for suicidal ideas. The patient is nervous/anxious.      Vitals:    06/13/18 1529   BP: 123/61   Pulse: 58   Resp: 16   Temp: 98.3 °F (36.8 °C)   SpO2: 100%   Weight: 55.8 kg (123 lb)   Height: 165 cm (64.96\")   PainSc: 7  Comment: generalized pain ranges from 6-8/10   PainLoc: Generalized     Objective   Physical Exam   Constitutional: She is oriented to person, place, and time. She appears well-developed and well-nourished.   HENT:   Head: Normocephalic.   Eyes: Conjunctivae and EOM are normal.   Cardiovascular: Normal rate.    Pulmonary/Chest: Effort normal. No respiratory distress.   Neurological: She is alert and oriented to person, place, and time. She has normal strength. Gait normal.   Skin: Skin is warm and dry.   Psychiatric: She has a normal mood and affect. Her behavior is normal.   Nursing note and vitals reviewed.    Assessment/Plan   Amalia was seen today for pain.    Diagnoses and all orders for this visit:    Chronic pain syndrome    Lumbar post-laminectomy syndrome    Cervical post-laminectomy syndrome    Presence of intrathecal pump      --- IT pump interrogated, results scanned in.  Decrease IT Morphine to 0.05 mg/day  --- Order Prialt and refill at next visit  --- Follow-up 1 week/Prialt refericka HANSEN  As part of the patient's treatment plan, I am prescribing controlled substances. The patient has been made aware of appropriate use of such medications, including potential risk of somnolence, limited ability to drive and/or work safely, and the potential for dependence or overdose. It has also bee made clear that these medications are for use by this patient only, without concomitant use of alcohol or " other substances unless prescribed.     Patient has completed prescribing agreement detailing terms of continued prescribing of controlled substances, including monitoring SMITH reports, urine drug screening, and pill counts if necessary. The patient is aware that inappropriate use will results in cessation of prescribing such medications.    SMITH report has been reviewed and scanned into the patient's chart.    As the clinician, I personally reviewed the SMITH from 6/7/2018 while the patient was in the office today.    History and physical exam exhibit continued safe and appropriate use of controlled substances.    EMR Dragon/Transcription disclaimer:   Much of this encounter note is an electronic transcription/translation of spoken language to printed text. The electronic translation of spoken language may permit erroneous, or at times, nonsensical words or phrases to be inadvertently transcribed; Although I have reviewed the note for such errors, some may still exist.

## 2018-06-14 ENCOUNTER — OFFICE VISIT (OUTPATIENT)
Dept: PAIN MEDICINE | Facility: CLINIC | Age: 63
End: 2018-06-14

## 2018-06-14 VITALS
SYSTOLIC BLOOD PRESSURE: 129 MMHG | HEART RATE: 71 BPM | HEIGHT: 65 IN | DIASTOLIC BLOOD PRESSURE: 85 MMHG | TEMPERATURE: 98.3 F | WEIGHT: 123 LBS | BODY MASS INDEX: 20.49 KG/M2 | RESPIRATION RATE: 16 BRPM | OXYGEN SATURATION: 99 %

## 2018-06-14 DIAGNOSIS — M96.1 LUMBAR POST-LAMINECTOMY SYNDROME: ICD-10-CM

## 2018-06-14 DIAGNOSIS — G89.4 CHRONIC PAIN SYNDROME: Primary | ICD-10-CM

## 2018-06-14 DIAGNOSIS — Z97.8 PRESENCE OF INTRATHECAL PUMP: ICD-10-CM

## 2018-06-14 DIAGNOSIS — M96.1 CERVICAL POST-LAMINECTOMY SYNDROME: ICD-10-CM

## 2018-06-14 PROCEDURE — 62368 ANALYZE SP INF PUMP W/REPROG: CPT | Performed by: NURSE PRACTITIONER

## 2018-06-14 PROCEDURE — 99214 OFFICE O/P EST MOD 30 MIN: CPT | Performed by: NURSE PRACTITIONER

## 2018-06-14 NOTE — PROGRESS NOTES
"CHIEF COMPLAINT  F/U generalized pain. Pt has a pain pump. Was seen yesterday and pain pump medicine decreased. Now pt c/o worsened pain, rating it 9/10.     Xu Lopez is a 62 y.o. female  who presents to the office for follow-up.She has a history of chronic neck and back pain.    Patient has been weaning down on IT Morphine over the previous two months.  She presented to the office yesterday to decrease pump and stated that she was \"ready for the Prialt\", therefore her pump rate was decreased from 0.15 mg/day to 0.05mgday. Today she returns stating that her pain is severe, she requests to go back up on the rate some.  She still wishes to switch to Prialt but wishes to continue with a more gradual decrease.      Asking for some acute breakthrough pain medication.  She previously weaned herself of oral opioids, Dr. Strauss discussed with her previously that we will not write for more orals in the future (note 4/25/2018)    History of Present Illness     PEG Assessment   What number best describes your pain on average in the past week?8-9  What number best describes how, during the past week, pain has interfered with your enjoyment of life?9  What number best describes how, during the past week, pain has interfered with your general activity?  9    The following portions of the patient's history were reviewed and updated as appropriate: allergies, current medications, past family history, past medical history, past social history, past surgical history and problem list.    Review of Systems   Constitutional: Negative for activity change (\"variable\"), appetite change (decreased), chills and fever.   HENT: Negative for hearing loss.    Eyes: Negative for visual disturbance.   Respiratory: Positive for apnea. Negative for chest tightness and shortness of breath.    Gastrointestinal: Positive for diarrhea (chronic). Negative for constipation (no change) and nausea (\"chronic\"-gastroporesis). " "  Genitourinary: Negative for difficulty urinating and dysuria.   Musculoskeletal: Positive for back pain and neck pain. Negative for gait problem.   Neurological: Positive for headaches. Negative for dizziness, seizures and light-headedness. Numbness: L arm, R leg into foot.   Psychiatric/Behavioral: Positive for sleep disturbance. Negative for suicidal ideas. The patient is nervous/anxious.        Vitals:    06/14/18 1024   BP: 129/85   Pulse: 71   Resp: 16   Temp: 98.3 °F (36.8 °C)   SpO2: 99%   Weight: 55.8 kg (123 lb)   Height: 165 cm (64.96\")   PainSc:   8   PainLoc: Generalized     Objective   Physical Exam   Constitutional: She is oriented to person, place, and time. She appears well-developed and well-nourished.   HENT:   Head: Normocephalic.   Eyes: Conjunctivae and EOM are normal.   Cardiovascular: Normal rate.    Pulmonary/Chest: Effort normal. No respiratory distress.   Musculoskeletal:        Cervical back: She exhibits tenderness.        Lumbar back: She exhibits tenderness.   Neurological: She is alert and oriented to person, place, and time. She has normal strength. Gait normal.   Skin: Skin is warm and dry.   Psychiatric: She has a normal mood and affect. Her behavior is normal.   Nursing note and vitals reviewed.    Assessment/Plan   Amalia was seen today for pain.    Diagnoses and all orders for this visit:    Chronic pain syndrome    Cervical post-laminectomy syndrome    Lumbar post-laminectomy syndrome    Presence of intrathecal pump      --- IT pump interrogated, results scanned into computer.  DASH = 6 months   --- Increase IT Morphine rate to 0.1. Next week will decrease to 0.075, then to 0.05, then off and switch to Prialt.    --- Simple one time bolus today of 0.05mg   --- Follow-up Monday          SMITH REPORT  As part of the patient's treatment plan, I am prescribing controlled substances. The patient has been made aware of appropriate use of such medications, including potential risk " of somnolence, limited ability to drive and/or work safely, and the potential for dependence or overdose. It has also bee made clear that these medications are for use by this patient only, without concomitant use of alcohol or other substances unless prescribed.     Patient has completed prescribing agreement detailing terms of continued prescribing of controlled substances, including monitoring SMITH reports, urine drug screening, and pill counts if necessary. The patient is aware that inappropriate use will results in cessation of prescribing such medications.    SMITH report has been reviewed and scanned into the patient's chart.    As the clinician, I personally reviewed the SMITH from 6/13/2018 while the patient was in the office today.    History and physical exam exhibit continued safe and appropriate use of controlled substances.    EMR Dragon/Transcription disclaimer:   Much of this encounter note is an electronic transcription/translation of spoken language to printed text. The electronic translation of spoken language may permit erroneous, or at times, nonsensical words or phrases to be inadvertently transcribed; Although I have reviewed the note for such errors, some may still exist.

## 2018-06-18 ENCOUNTER — OFFICE VISIT (OUTPATIENT)
Dept: PAIN MEDICINE | Facility: CLINIC | Age: 63
End: 2018-06-18

## 2018-06-18 VITALS
HEIGHT: 65 IN | OXYGEN SATURATION: 98 % | BODY MASS INDEX: 19.99 KG/M2 | WEIGHT: 120 LBS | DIASTOLIC BLOOD PRESSURE: 67 MMHG | HEART RATE: 63 BPM | TEMPERATURE: 97.6 F | SYSTOLIC BLOOD PRESSURE: 123 MMHG | RESPIRATION RATE: 16 BRPM

## 2018-06-18 DIAGNOSIS — Z97.8 PRESENCE OF INTRATHECAL PUMP: ICD-10-CM

## 2018-06-18 DIAGNOSIS — M96.1 LUMBAR POST-LAMINECTOMY SYNDROME: ICD-10-CM

## 2018-06-18 DIAGNOSIS — M47.812 CERVICAL SPONDYLOSIS WITHOUT MYELOPATHY: ICD-10-CM

## 2018-06-18 DIAGNOSIS — Z96.89 SPINAL CORD STIMULATOR STATUS: ICD-10-CM

## 2018-06-18 DIAGNOSIS — G89.4 CHRONIC PAIN SYNDROME: Primary | ICD-10-CM

## 2018-06-18 DIAGNOSIS — G90.512 COMPLEX REGIONAL PAIN SYNDROME TYPE 1 OF LEFT UPPER EXTREMITY: ICD-10-CM

## 2018-06-18 PROCEDURE — 62368 ANALYZE SP INF PUMP W/REPROG: CPT | Performed by: NURSE PRACTITIONER

## 2018-06-18 PROCEDURE — 99214 OFFICE O/P EST MOD 30 MIN: CPT | Performed by: NURSE PRACTITIONER

## 2018-06-18 NOTE — PROGRESS NOTES
"CHIEF COMPLAINT  Pt continues with significant bilateral legs into feet pain, ranging from a 7-9/10 since this weekend.  Initial evaluation by Jacy Mcnair   Amalia Lopez is a 62 y.o. female  who presents to the office for follow-up.She has a history of chronic pain in her legs, as well as a history of back and neck surgery.    Complains of pain in her legs and neck. Today her pain is 7-8/10VAS(legs) and 6/10VAS(neck) and 7/10VAS(LUE-due to CRPS). Describes the pain as continuous and worsening. Continues with Morphine IT pump medication. Denies any specific side effects from this. Admits her pain is worsening with weaning. \"I can't wait to try the Prialt.\"     Also has [employer] SCS.  Declines reprogramming. \"It's been reprogrammed so many times that it's probably as good as it is going to get.\"   Has a history of gastroparesis. Has biliary and pancreatic duct stenosis.   'If I don't get back to work soon, I don't know if I should.\"       Intrathecal pump was interrogated in office today. Results are in chart.    The patient is currently at a dose of morphine 1 mg/ml of .0999 mg/ per day.    +++ DASH is 6 months.+++    Neck Pain    This is a chronic problem. The current episode started more than 1 year ago. The problem occurs constantly. The problem has been gradually worsening. The pain is at a severity of 6/10. The pain is moderate. The symptoms are aggravated by position and bending. Associated symptoms include headaches. Pertinent negatives include no fever. Numbness: L arm, R leg into foot.   Back Pain   This is a chronic problem. The problem occurs constantly. The problem has been gradually worsening since onset. Radiates to: BLE's. The pain is at a severity of 7/10. The pain is moderate. The symptoms are aggravated by bending, position, sitting, standing and twisting. Associated symptoms include headaches. Pertinent negatives include no bladder incontinence, bowel incontinence, dysuria or " "fever. Numbness: L arm, R leg into foot. Treatments tried: IT pump. The treatment provided mild relief.   Arm Pain    Incident onset: chronic- reports history of CRPS- LUE. The pain radiates to the left arm. The pain is at a severity of 7/10. The pain is moderate. The pain has been worsening since the incident. Numbness: L arm, R leg into foot.      PEG Assessment   What number best describes your pain on average in the past week?8  What number best describes how, during the past week, pain has interfered with your enjoyment of life?8  What number best describes how, during the past week, pain has interfered with your general activity?  8    The following portions of the patient's history were reviewed and updated as appropriate: allergies, current medications, past family history, past medical history, past social history, past surgical history and problem list.    Review of Systems   Constitutional: Negative for activity change (\"variable\"), appetite change (decreased), chills and fever.   HENT: Negative for hearing loss.    Eyes: Negative for visual disturbance.   Respiratory: Positive for apnea. Negative for chest tightness and shortness of breath.    Gastrointestinal: Positive for diarrhea (chronic). Negative for bowel incontinence, constipation (no change) and nausea (\"chronic\"-gastroporesis).   Genitourinary: Negative for bladder incontinence, difficulty urinating and dysuria.   Musculoskeletal: Positive for back pain and neck pain. Negative for gait problem.   Neurological: Positive for headaches. Negative for dizziness, seizures and light-headedness. Numbness: L arm, R leg into foot.   Psychiatric/Behavioral: Positive for sleep disturbance. Negative for suicidal ideas. The patient is nervous/anxious.        Vitals:    06/18/18 1023   BP: 123/67   Pulse: 63   Resp: 16   Temp: 97.6 °F (36.4 °C)   SpO2: 98%   Weight: 54.4 kg (120 lb)   Height: 165 cm (64.96\")   PainSc: 7  Comment:  Bilateral leg pain ranges " from 7-9/10   PainLoc: Leg     Objective   Physical Exam   Constitutional: She is oriented to person, place, and time. Vital signs are normal. She appears well-developed and well-nourished. She is cooperative.   HENT:   Head: Normocephalic and atraumatic.   Nose: Nose normal.   Eyes: Conjunctivae and lids are normal.   Neck: Trachea normal. Neck supple. Spinous process tenderness present.   Posterior cervical scar   Cardiovascular: Normal rate.    Pulmonary/Chest: Effort normal.   Abdominal: Normal appearance.   Musculoskeletal:        Back:    Guarding of LUE  Surgical scars of lumbar spine.    Neurological: She is alert and oriented to person, place, and time. Gait normal.   Reflex Scores:       Patellar reflexes are 1+ on the right side and 1+ on the left side.  Skin: Skin is warm, dry and intact.   Psychiatric: She has a normal mood and affect. Her speech is normal and behavior is normal. Judgment and thought content normal. Cognition and memory are normal.   Nursing note and vitals reviewed.      Assessment/Plan   Amalia was seen today for leg pain.    Diagnoses and all orders for this visit:    Chronic pain syndrome    Lumbar post-laminectomy syndrome    Complex regional pain syndrome type 1 of left upper extremity    Presence of intrathecal pump    Spinal cord stimulator status      --- Proceed with cervical MBB as ordered by Dr. Strauss.  --- Decrease pump as planned by Deana VUONG.  --- Decrease IT morphine to 0.075 mg/day.   --- Plan to proceed with weaning until able to trial Prialt as previously ordered by Dr. Strauss and Deana VUONG.   --- Declines reprogramming of SCS at this time.   --- Will need to consider IT pump revision in future due to impending battery life depletion.  --- Follow-up as scheduled.        SMITH REPORT      SMITH report has been reviewed and scanned into the patient's chart.    As the clinician, I personally reviewed the SMITH from 6-15-18 while the patient was  in the office today.          EMR Dragon/Transcription disclaimer:   Much of this encounter note is an electronic transcription/translation of spoken language to printed text. The electronic translation of spoken language may permit erroneous, or at times, nonsensical words or phrases to be inadvertently transcribed; Although I have reviewed the note for such errors, some may still exist.

## 2018-06-20 ENCOUNTER — OFFICE VISIT (OUTPATIENT)
Dept: PAIN MEDICINE | Facility: CLINIC | Age: 63
End: 2018-06-20

## 2018-06-20 VITALS
DIASTOLIC BLOOD PRESSURE: 69 MMHG | BODY MASS INDEX: 19.99 KG/M2 | HEART RATE: 64 BPM | OXYGEN SATURATION: 100 % | SYSTOLIC BLOOD PRESSURE: 112 MMHG | RESPIRATION RATE: 16 BRPM | HEIGHT: 65 IN | TEMPERATURE: 98 F | WEIGHT: 120 LBS

## 2018-06-20 DIAGNOSIS — G89.4 CHRONIC PAIN SYNDROME: Primary | ICD-10-CM

## 2018-06-20 DIAGNOSIS — Z97.8 PRESENCE OF INTRATHECAL PUMP: ICD-10-CM

## 2018-06-20 DIAGNOSIS — M96.1 LUMBAR POST-LAMINECTOMY SYNDROME: ICD-10-CM

## 2018-06-20 DIAGNOSIS — M96.1 CERVICAL POST-LAMINECTOMY SYNDROME: ICD-10-CM

## 2018-06-20 PROCEDURE — 99214 OFFICE O/P EST MOD 30 MIN: CPT | Performed by: NURSE PRACTITIONER

## 2018-06-20 PROCEDURE — 62368 ANALYZE SP INF PUMP W/REPROG: CPT | Performed by: NURSE PRACTITIONER

## 2018-06-20 NOTE — PROGRESS NOTES
"CHIEF COMPLAINT  Pt is looking forward to changing to prialt on 6/22/18  Bilat leg pain remains severe,R > L     Subjective   Amalia Lopez is a 62 y.o. female  who presents to the office for follow-up.She has a history of chronic neck and back pain.    She presents today for IT pump check/reprogramming.  Working on weaning off IT Morphine with goal of switching to Prialt regimen.  Her current dose is 0.075mg/day (down from 0.1 mg/day at the previous visit). She reported no signs or symptoms of withdrawal.  Reports that her pain is worsening.       C/o generalized pain. Pain in neck, back, joints.  Pain today 7-8/10 in severity. Increasing since medication has been decreased.  Pain is worse with activity.  Better with rest and medication.      Neck Pain    This is a chronic problem. The current episode started more than 1 year ago. The problem occurs constantly. The problem has been gradually worsening (as IT medication has been reduced). The quality of the pain is described as aching and burning. The pain is at a severity of 8/10. The symptoms are aggravated by position and stress (activity). Associated symptoms include headaches (\"always\"). Pertinent negatives include no fever. Numbness: L arm, R leg into foot. She has tried NSAIDs, oral narcotics and muscle relaxants (IT Morphine) for the symptoms. The treatment provided mild relief.   Back Pain   This is a chronic problem. The current episode started more than 1 year ago. The problem occurs constantly. The problem has been gradually worsening (since IT medication has been weaned) since onset. The pain is present in the lumbar spine. The quality of the pain is described as aching, burning and shooting. The pain radiates to the left foot and right foot. The pain is at a severity of 8/10. The symptoms are aggravated by sitting and standing (activity). Associated symptoms include headaches (\"always\"). Pertinent negatives include no bladder incontinence, bowel " "incontinence, dysuria or fever. Numbness: L arm, R leg into foot. She has tried NSAIDs and analgesics (IT Morphine) for the symptoms. The treatment provided mild relief.      PEG Assessment   What number best describes your pain on average in the past week?7  What number best describes how, during the past week, pain has interfered with your enjoyment of life?8  What number best describes how, during the past week, pain has interfered with your general activity?  7    The following portions of the patient's history were reviewed and updated as appropriate: allergies, current medications, past family history, past medical history, past social history, past surgical history and problem list.    Review of Systems   Constitutional: Negative for activity change (\"variable\"), appetite change (decreased), chills and fever.   HENT: Negative for hearing loss.    Eyes: Negative for visual disturbance.   Respiratory: Positive for apnea. Negative for chest tightness and shortness of breath.    Gastrointestinal: Positive for diarrhea (chronic  improved). Negative for bowel incontinence, constipation (no change) and nausea (\"chronic\"-gastroporesis).   Genitourinary: Negative for bladder incontinence, difficulty urinating and dysuria.   Musculoskeletal: Positive for back pain and neck pain. Negative for gait problem.   Neurological: Positive for headaches (\"always\"). Negative for dizziness, seizures and light-headedness. Numbness: L arm, R leg into foot.   Psychiatric/Behavioral: Positive for sleep disturbance. Negative for suicidal ideas. The patient is nervous/anxious.      Vitals:    06/20/18 1314   BP: 112/69   Pulse: 64   Resp: 16   Temp: 98 °F (36.7 °C)   SpO2: 100%   Weight: 54.4 kg (120 lb)   Height: 165 cm (64.96\")   PainSc: 8  Comment: bilateral leg pain ranges from 7-8/10   PainLoc: Leg     Objective   Physical Exam   Constitutional: She is oriented to person, place, and time. She appears well-developed and " well-nourished.   HENT:   Head: Normocephalic.   Eyes: Conjunctivae and EOM are normal.   Cardiovascular: Normal rate.    Pulmonary/Chest: Effort normal. No respiratory distress.   Musculoskeletal:        Cervical back: She exhibits pain.        Lumbar back: She exhibits pain.   Neurological: She is alert and oriented to person, place, and time. She has normal strength. Gait normal.   Skin: Skin is warm and dry.   Psychiatric: She has a normal mood and affect. Her behavior is normal.   Nursing note and vitals reviewed.    Assessment/Plan   Amalia was seen today for leg pain.    Diagnoses and all orders for this visit:    Chronic pain syndrome    Lumbar post-laminectomy syndrome    Cervical post-laminectomy syndrome    Presence of intrathecal pump      --- Decrease IT Morphine to 0.05 mg/day  --- IT pump interrogated. Results scanned in.    --- Proceed with plan to switch to Prialt on Friday  --- Proceed with cervical MBB 6/26/18 as scheduled with Dr. Strauss  --- Will need to plan pump revision in the near future, DASH = 6 months   --- Follow-up Friday          SMITH REPORT  As part of the patient's treatment plan, I am prescribing controlled substances. The patient has been made aware of appropriate use of such medications, including potential risk of somnolence, limited ability to drive and/or work safely, and the potential for dependence or overdose. It has also bee made clear that these medications are for use by this patient only, without concomitant use of alcohol or other substances unless prescribed.     Patient has completed prescribing agreement detailing terms of continued prescribing of controlled substances, including monitoring SMITH reports, urine drug screening, and pill counts if necessary. The patient is aware that inappropriate use will results in cessation of prescribing such medications.    SMITH report has been reviewed and scanned into the patient's chart.    As the clinician, I personally  reviewed the SMITH from 6/19/2018 while the patient was in the office today.    History and physical exam exhibit continued safe and appropriate use of controlled substances.    EMR Dragon/Transcription disclaimer:   Much of this encounter note is an electronic transcription/translation of spoken language to printed text. The electronic translation of spoken language may permit erroneous, or at times, nonsensical words or phrases to be inadvertently transcribed; Although I have reviewed the note for such errors, some may still exist.

## 2018-06-22 ENCOUNTER — OFFICE VISIT (OUTPATIENT)
Dept: PAIN MEDICINE | Facility: CLINIC | Age: 63
End: 2018-06-22

## 2018-06-22 VITALS
WEIGHT: 121 LBS | DIASTOLIC BLOOD PRESSURE: 57 MMHG | RESPIRATION RATE: 16 BRPM | BODY MASS INDEX: 20.16 KG/M2 | OXYGEN SATURATION: 99 % | TEMPERATURE: 98.4 F | SYSTOLIC BLOOD PRESSURE: 115 MMHG | HEART RATE: 64 BPM | HEIGHT: 65 IN

## 2018-06-22 DIAGNOSIS — G89.4 CHRONIC PAIN SYNDROME: Primary | ICD-10-CM

## 2018-06-22 DIAGNOSIS — M96.1 LUMBAR POST-LAMINECTOMY SYNDROME: ICD-10-CM

## 2018-06-22 DIAGNOSIS — M96.1 CERVICAL POST-LAMINECTOMY SYNDROME: ICD-10-CM

## 2018-06-22 DIAGNOSIS — Z97.8 PRESENCE OF INTRATHECAL PUMP: ICD-10-CM

## 2018-06-22 PROCEDURE — 62369 ANAL SP INF PMP W/REPRG&FILL: CPT | Performed by: NURSE PRACTITIONER

## 2018-06-22 PROCEDURE — 99214 OFFICE O/P EST MOD 30 MIN: CPT | Performed by: NURSE PRACTITIONER

## 2018-06-22 NOTE — PROGRESS NOTES
"CHIEF COMPLAINT  Pt is here today to begin intrathecal prialt  for treatment of significant to severe LBP and R leg pain.    Subjective   Amalia Lopez is a 62 y.o. female  who presents to the office for follow-up.She has a history of chronic back and neck pain.    She presents today for IT pump refill with Prialt. IT Morphine has been weaned over the previous 2 months and today she wishes to change therapy to Prialt.      Neck Pain    This is a chronic problem. The current episode started more than 1 year ago. The problem occurs constantly. The problem has been gradually worsening (as IT medication has been reduced). The quality of the pain is described as aching and burning. The pain is at a severity of 8/10. The symptoms are aggravated by position and stress (activity). Associated symptoms include headaches (\"always\"). Pertinent negatives include no fever. Numbness: L arm, R leg into foot. She has tried NSAIDs, oral narcotics and muscle relaxants (IT Morphine) for the symptoms. The treatment provided mild relief.   Back Pain   This is a chronic problem. The current episode started more than 1 year ago. The problem occurs constantly. The problem has been gradually worsening (since IT medication has been weaned) since onset. The pain is present in the lumbar spine. The quality of the pain is described as aching, burning and shooting. The pain radiates to the left foot and right foot. The pain is at a severity of 8/10. The symptoms are aggravated by sitting and standing (activity). Associated symptoms include headaches (\"always\"). Pertinent negatives include no bladder incontinence, bowel incontinence, dysuria or fever. Numbness: L arm, R leg into foot. She has tried NSAIDs and analgesics (IT Morphine) for the symptoms. The treatment provided mild relief.      I have reviewed the above HPI today, 06/22/18.  The HPI is unchanged from the previous office visit.      PEG Assessment   What number best describes your " "pain on average in the past week?8  What number best describes how, during the past week, pain has interfered with your enjoyment of life?8  What number best describes how, during the past week, pain has interfered with your general activity?  8    The following portions of the patient's history were reviewed and updated as appropriate: allergies, current medications, past family history, past medical history, past social history, past surgical history and problem list.    Review of Systems   Constitutional: Negative for activity change (\"variable\"), appetite change (decreased), chills and fever.   HENT: Negative for hearing loss.    Eyes: Negative for visual disturbance.   Respiratory: Positive for apnea. Negative for chest tightness and shortness of breath.    Gastrointestinal: Negative for bowel incontinence, constipation (no change), diarrhea (chronic  improved) and nausea (\"chronic\"-gastroporesis).   Genitourinary: Negative for bladder incontinence, difficulty urinating and dysuria.   Musculoskeletal: Positive for back pain (leg pain,R>L as well) and neck pain. Negative for gait problem.   Neurological: Positive for headaches (\"always\"). Negative for dizziness, seizures and light-headedness. Numbness: L arm, R leg into foot.   Psychiatric/Behavioral: Positive for sleep disturbance. Negative for suicidal ideas. The patient is nervous/anxious.        Vitals:    06/22/18 1136   BP: 115/57   Pulse: 64   Resp: 16   Temp: 98.4 °F (36.9 °C)   SpO2: 99%   Weight: 54.9 kg (121 lb)   Height: 165 cm (64.96\")   PainSc: 8  Comment: LBP and R leg pain ranges from 7-8/10   PainLoc: Leg     Objective   Physical Exam   Constitutional: She is oriented to person, place, and time. She appears well-developed and well-nourished.   HENT:   Head: Normocephalic.   Eyes: Conjunctivae and EOM are normal.   Neck: Neck supple.   Cardiovascular: Normal rate.    Pulmonary/Chest: Effort normal. No respiratory distress.   Musculoskeletal:      "   Cervical back: She exhibits pain.        Lumbar back: She exhibits pain.   Neurological: She is alert and oriented to person, place, and time. She has normal strength. Gait normal.   Skin: Skin is warm and dry.   Psychiatric: Her behavior is normal. Her mood appears anxious.   Nursing note and vitals reviewed.      Intrathecal pump was interrogated in office today. Results are in chart.    The patient is currently at a dose of 0.05 mg of Morphine per day.     Under sterile technique, the pump was accessed using a [employer] proprietary refill kit. The volume was withdrawn from the pump. The expected residual volume of the pump was 33.5 ml. The actual residual volume of the pump was 33.2 ml.  Additionally, a catheter access kit was used to access the side port, and 0.3mL medication withdrawn from this.     The pump was then refilled with 20 ml of Prialt at a concentration of 25 mcg/ml.     Patient tolerated procedure well. Minimal bleeding was noted. Pump site remains intact with no evidence of erythema or drainage.    Patient will return for pump refill when due or sooner if needed.     DASH is 6 months.    Assessment/Plan   Amalia was seen today for back pain and leg pain.    Diagnoses and all orders for this visit:    Chronic pain syndrome    Cervical post-laminectomy syndrome    Lumbar post-laminectomy syndrome    Presence of intrathecal pump      --- IT Morphine discontinued and pump filled with Prialt as documented above  --- Prialt started at 1.5 mcg/day   --- Pump DASH is 6 months   --- Proceed with cervical MBB 6/26/18 as scheduled with Dr. Strauss  --- Follow-up 1 week          SMITH REPORT    SMITH report has been reviewed and scanned into the patient's chart.    As the clinician, I personally reviewed the SMITH from 6/21/2018 while the patient was in the office today.    EMR Dragon/Transcription disclaimer:   Much of this encounter note is an electronic transcription/translation of spoken language to  printed text. The electronic translation of spoken language may permit erroneous, or at times, nonsensical words or phrases to be inadvertently transcribed; Although I have reviewed the note for such errors, some may still exist.

## 2018-06-26 ENCOUNTER — OUTSIDE FACILITY SERVICE (OUTPATIENT)
Dept: PAIN MEDICINE | Facility: CLINIC | Age: 63
End: 2018-06-26

## 2018-06-26 ENCOUNTER — DOCUMENTATION (OUTPATIENT)
Dept: PAIN MEDICINE | Facility: CLINIC | Age: 63
End: 2018-06-26

## 2018-06-26 ENCOUNTER — CLINICAL SUPPORT (OUTPATIENT)
Dept: PAIN MEDICINE | Facility: CLINIC | Age: 63
End: 2018-06-26

## 2018-06-26 PROCEDURE — 64491 INJ PARAVERT F JNT C/T 2 LEV: CPT | Performed by: ANESTHESIOLOGY

## 2018-06-26 PROCEDURE — 62368 ANALYZE SP INF PUMP W/REPROG: CPT | Performed by: ANESTHESIOLOGY

## 2018-06-26 PROCEDURE — 64490 INJ PARAVERT F JNT C/T 1 LEV: CPT | Performed by: ANESTHESIOLOGY

## 2018-06-26 NOTE — PROGRESS NOTES
Pt has intrathecal pain pump. She rates her pain an 8/10.    Per verbal order from Dr. Strauss he is increasing her prialt basal dose to 3.0 mcg/day and giving her a single bolus of 1.0 mcg.     Pt tolerated increase.

## 2018-06-27 NOTE — PROGRESS NOTES
Two Independent procedures.....      ---    Cervical Medial Branch Blockade  [facility]      PREOPERATIVE DIAGNOSIS:  Cervical spondylosis without myelopathy   POSTOPERATIVE DIAGNOSIS:  Same as preoperative diagnosis    PROCEDURE:    Cervical Facet Nerve (medial branch) Blocks, with Fluoroscopy:      LEVELS: left  C2, C3 and C4    PRE-PROCEDURE DISCUSSION WITH PATIENT:    Risks and complications were discussed with the patient prior to starting the procedure and informed consent was obtained.  We discussed various topics, including but not limited to bleeding, infection, injury, nerve injury, paralysis, coma, death, postprocedural soreness or painful flare, worsening of clinical picture, lack of pain relief.  We discussed the diagnostic nature of medial branch blockades.      SURGEON:  Jordin Strauss MD    REASON FOR PROCEDURE:    The patient complains of pain that seems to have a significant axial component Tenderness of the affected facet joints on palpation Tenderness of the affected facet joints on exam under fluoroscopy L>R bilateral neck pain.     SEDATION:  Patient declined administration of moderate sedation   and , but an IV access was obtained for safety.  ANESTHETIC:   Marcaine 0.5%  STEROID:  Dexamethasone 3mg  TOTAL VOLUME OF SOLUTION:  3 mL      DESCRIPTON OF PROCEDURE:  After obtaining informed consent, the patient was placed in the prone position. IV access was obtained.  EKG, blood pressure, and pulse oximeter were monitored and all sedation was administered by an RN under my direct guidance.  The cervical area was prepped with Chloraprep and draped with sterile barrier.     Under fluoroscopic guidance the waists of the above mentioned vertebra were identified and marked.  Skin and subcutaneous tissue were then anesthetized with 1% lidocaine 1mL at each point. Then spinal needles were introduced under fluoroscopic guidance at the waists of these vertebrae contacting periosteum  on the lateral edge of the vertebra on the AP fluroscopic view. After confirming the position of the needle under fluoroscopic PA and lateral views, confirming the needle position in the center of the trapezoid at each level, and confirming negative aspiration of blood and CSF, 0.25 mL of Omnipaque was injected.  Proper spread and lack of vascular uptake was demonstrated.  A solution was prepared as above, and 1 mL of that solution was injected very slowly each level.      Needles were removed intact from all levels.  Vitals were stable throughout.       ESTIMATED BLOOD LOSS:  minimal  SPECIMENS:  None    COMPLICATIONS:    No complications were noted., There was no indication of vascular uptake on live injection of contrast dye., There was no indication of intrathecal uptake on live injection of contrast dye. and The patient did not have any signs of postprocedure numbness nor weakness.    TOLERANCE & DISCHARGE CONDITION:    The patient tolerated the procedure well.  The patient was transported to the recovery area without difficulties.  The patient was discharged to home under the care of family in stable and satisfactory condition.  The patient did notice improvement in pain on extension and rotation of the cervical spine.      PLAN OF CARE:  1. The patient was given our standard instruction sheet.  2. We discussed that Cervical Medial Branch Blockade is a diagnostic procedure in consideration for radiofrequency ablation if two diagnostic procedures proved to be positive for significant benefit.  If sustained relief of six to eight weeks is obtained, then an alternative plan could be therapeutic cervical medial branch blocks on an as-needed basis.  3. The patient is asked to keep a pain log hourly for 8 hours postoperatively today.  4. The patient will Return to clinic 1 wk, Repeat injection 2 wks and of note she is requesting pump adjustment, as below.  5. The patient will resume all medications as per the  medication reconciliation sheet.        --    Pump interrogation and reprogramming...  ddx  PLLS CPS PResc of IT pump  - the pump was reprogrammed as she does not feel pain relief in the low back after the switch to Prialt  - pump running at 1.5 mcg per day basal rate.  No ptm.    Pump ERV > 10 ml  - she is given a 1mcg bolus today  - rate increased to 3 mcg per day  - no ptm added.    - only a few months left on her DASH

## 2018-06-28 ENCOUNTER — OFFICE VISIT (OUTPATIENT)
Dept: PAIN MEDICINE | Facility: CLINIC | Age: 63
End: 2018-06-28

## 2018-06-28 VITALS
OXYGEN SATURATION: 98 % | SYSTOLIC BLOOD PRESSURE: 125 MMHG | HEIGHT: 65 IN | TEMPERATURE: 98.3 F | RESPIRATION RATE: 16 BRPM | DIASTOLIC BLOOD PRESSURE: 76 MMHG | HEART RATE: 65 BPM

## 2018-06-28 DIAGNOSIS — Z97.8 PRESENCE OF INTRATHECAL PUMP: ICD-10-CM

## 2018-06-28 DIAGNOSIS — M96.1 CERVICAL POST-LAMINECTOMY SYNDROME: ICD-10-CM

## 2018-06-28 DIAGNOSIS — G89.4 CHRONIC PAIN SYNDROME: Primary | ICD-10-CM

## 2018-06-28 DIAGNOSIS — M96.1 LUMBAR POST-LAMINECTOMY SYNDROME: ICD-10-CM

## 2018-06-28 PROCEDURE — 62368 ANALYZE SP INF PUMP W/REPROG: CPT | Performed by: NURSE PRACTITIONER

## 2018-06-28 PROCEDURE — 99214 OFFICE O/P EST MOD 30 MIN: CPT | Performed by: NURSE PRACTITIONER

## 2018-06-28 NOTE — PROGRESS NOTES
"CHIEF COMPLAINT  Pt states she is in excruciating pain,in arms and legs bilaterally,since beginning on prialt.  Pt reportedly taking 16 ibuprofen (3200 mg) and 8 tylenol 500 mg together \"and has in the past had to go to the [facility] in 1/18 for a G.I. Bleed\".    Subjective   Amalia Lopez is a 62 y.o. female  who presents to the office for follow-up.She has a history of chronic neck and back pain.    Patient was weaned from IT Morphine and switched to Prialt 6/22/18.  She reports that her pain has been terrible since that time.  Reports excruciating pain of all of the extremities.  Asks about oral pain medication today, I told her that this was not part of the plan, she stated \"well then I will just have to go get some alcohol then\".      Left cervical MBB (6/26/18) provided day of relief, \"I cant do anything else until this gets better\".      Neck Pain    This is a chronic problem. The current episode started more than 1 year ago. The problem occurs constantly. The problem has been gradually worsening (since changing IT medication). The quality of the pain is described as aching and burning. The pain is at a severity of 9/10. The symptoms are aggravated by position and stress (activity). Associated symptoms include headaches (\"always\"). Pertinent negatives include no fever. Numbness: L arm, R leg into foot. She has tried NSAIDs, oral narcotics and muscle relaxants (IT Morphine) for the symptoms. The treatment provided mild relief.   Back Pain   This is a chronic problem. The current episode started more than 1 year ago. The problem occurs constantly. The problem has been gradually worsening (since IT medication has been weaned) since onset. The pain is present in the lumbar spine. The quality of the pain is described as aching, burning and shooting. The pain radiates to the left foot and right foot. The pain is at a severity of 9/10. The symptoms are aggravated by sitting and standing (activity). Associated " "symptoms include headaches (\"always\"). Pertinent negatives include no bladder incontinence, bowel incontinence, dysuria or fever. Numbness: L arm, R leg into foot. She has tried NSAIDs and analgesics (IT Morphine) for the symptoms. The treatment provided mild relief.      PEG Assessment   What number best describes your pain on average in the past week?9  What number best describes how, during the past week, pain has interfered with your enjoyment of life?9  What number best describes how, during the past week, pain has interfered with your general activity?  9    The following portions of the patient's history were reviewed and updated as appropriate: allergies, current medications, past family history, past medical history, past social history, past surgical history and problem list.    Review of Systems   Constitutional: Positive for activity change (\"zero\"). Negative for appetite change (decreased), chills and fever.   HENT: Negative for hearing loss.    Eyes: Negative for visual disturbance.   Respiratory: Positive for apnea. Negative for chest tightness and shortness of breath.    Gastrointestinal: Negative for bowel incontinence, constipation (no change), diarrhea (chronic  improved) and nausea (\"chronic\"-gastroporesis).   Genitourinary: Negative for bladder incontinence, difficulty urinating and dysuria.   Musculoskeletal: Positive for back pain (leg pain,R>L as well) and neck pain. Negative for gait problem.   Neurological: Positive for headaches (\"always\"). Negative for dizziness, seizures and light-headedness. Numbness: L arm, R leg into foot.   Psychiatric/Behavioral: Positive for dysphoric mood and sleep disturbance. Negative for suicidal ideas. The patient is nervous/anxious.      Vitals:    06/28/18 1300   BP: 125/76   Pulse: 65   Resp: 16   Temp: 98.3 °F (36.8 °C)   SpO2: 98%   Height: 165 cm (64.96\")   PainSc: 9  Comment: extremity pain ranges from 8-9/10   PainLoc: Generalized       Objective "   Physical Exam   Constitutional: She is oriented to person, place, and time. She appears well-developed and well-nourished.   Patient lying on exam table, wrapped in a blanket from home    HENT:   Head: Normocephalic.   Eyes: Conjunctivae and EOM are normal.   Neck: Neck supple.   Cardiovascular: Normal rate.    Pulmonary/Chest: Effort normal. No respiratory distress.   Musculoskeletal:        Cervical back: She exhibits pain.        Lumbar back: She exhibits pain.   Neurological: She is alert and oriented to person, place, and time. She has normal strength. Gait normal.   Skin: Skin is warm and dry.   Psychiatric: Her behavior is normal. Her mood appears anxious.   Nursing note and vitals reviewed.    Assessment/Plan   Amalia was seen today for extremity pain.    Diagnoses and all orders for this visit:    Chronic pain syndrome    Lumbar post-laminectomy syndrome    Cervical post-laminectomy syndrome    Presence of intrathecal pump      --- Increase Prialt to 4.5 mcg / day  --- Given 1 mcg simple bolus today  --- No oral pain medications   --- Follow-up 1 week or sooner if needed          SMITH REPORT  SMITH report has been reviewed and scanned into the patient's chart.    As the clinician, I personally reviewed the SMITH from 6/27/2018 while the patient was in the office today.    EMR Dragon/Transcription disclaimer:   Much of this encounter note is an electronic transcription/translation of spoken language to printed text. The electronic translation of spoken language may permit erroneous, or at times, nonsensical words or phrases to be inadvertently transcribed; Although I have reviewed the note for such errors, some may still exist.

## 2018-06-30 ENCOUNTER — DOCUMENTATION (OUTPATIENT)
Dept: PAIN MEDICINE | Facility: CLINIC | Age: 63
End: 2018-06-30

## 2018-07-02 ENCOUNTER — OFFICE VISIT (OUTPATIENT)
Dept: PAIN MEDICINE | Facility: CLINIC | Age: 63
End: 2018-07-02

## 2018-07-02 VITALS
HEART RATE: 74 BPM | BODY MASS INDEX: 19.93 KG/M2 | SYSTOLIC BLOOD PRESSURE: 151 MMHG | OXYGEN SATURATION: 100 % | TEMPERATURE: 98.2 F | DIASTOLIC BLOOD PRESSURE: 87 MMHG | WEIGHT: 119.6 LBS | HEIGHT: 65 IN | RESPIRATION RATE: 16 BRPM

## 2018-07-02 DIAGNOSIS — G90.512 COMPLEX REGIONAL PAIN SYNDROME TYPE 1 OF LEFT UPPER EXTREMITY: ICD-10-CM

## 2018-07-02 DIAGNOSIS — G89.4 CHRONIC PAIN SYNDROME: Primary | ICD-10-CM

## 2018-07-02 DIAGNOSIS — M96.1 CERVICAL POST-LAMINECTOMY SYNDROME: ICD-10-CM

## 2018-07-02 DIAGNOSIS — M96.1 LUMBAR POST-LAMINECTOMY SYNDROME: ICD-10-CM

## 2018-07-02 DIAGNOSIS — Z97.8 PRESENCE OF INTRATHECAL PUMP: ICD-10-CM

## 2018-07-02 PROCEDURE — 99213 OFFICE O/P EST LOW 20 MIN: CPT | Performed by: NURSE PRACTITIONER

## 2018-07-02 RX ORDER — OXYCODONE AND ACETAMINOPHEN 10; 325 MG/1; MG/1
1 TABLET ORAL EVERY 6 HOURS PRN
COMMUNITY
End: 2018-07-06 | Stop reason: SDUPTHER

## 2018-07-02 RX ORDER — LEVETIRACETAM 250 MG/1
500 TABLET ORAL 2 TIMES DAILY
COMMUNITY
End: 2018-07-18

## 2018-07-02 RX ORDER — CARISOPRODOL 350 MG/1
350 TABLET ORAL 4 TIMES DAILY PRN
COMMUNITY
End: 2018-07-25

## 2018-07-02 NOTE — PROGRESS NOTES
"CHIEF COMPLAINT  F/U back, upper and lower extremity pain, and abdominal pain. Pt states prialt has not helped her pain at all. She was in [facility] over last weekend and states she got 8 mg of morphine, kepra, ativan, and zofran.     Subjective   Amalia Lopez is a 62 y.o. female  who presents to the office for follow-up.She has a history of chronic pain. She was recently transitioned from Morphine to Prialt in her IT pump. She called Dr. Strauss over the weekend and went to [facility]. Was given Percocet. ADmits she has been taking \"a lot of gabapentin.\" Has been taking 3600 mg/day, whereas she usually only takes 600 mg HS. Is having dizziness.     \"I think things worse than ever.\" \"I haven't had any relief with this.\" 'I;ve been pretty much bed and couch bound since we started.\"      She requests an increase today until she can see Dr. Strauss tomorrow.    She had a left C2-C4 MBB performed by Dr. Strasus on 6-26-18. She had 75-80% relief for one day. \"that's the least of my problems right now.\"     Complains of pain in her BUE, abdomen, neck and back. Today her pain is 8/10VAS. Her primary complaint is her abdomen. Was increased last week to Prialt 4.5 mcg/day. Denies any side effects. Had been given bolus but reports no relief. ADL's by self.      Neck Pain    This is a chronic problem. The current episode started more than 1 year ago. The problem occurs constantly. The problem has been gradually worsening. The pain is at a severity of 6/10. The pain is moderate. The symptoms are aggravated by position and bending. Associated symptoms include headaches (\"always\" to some degree, mild right now) and numbness (L arm, R leg into foot). Pertinent negatives include no fever.   Back Pain   This is a chronic problem. The problem occurs constantly. The problem has been gradually worsening since onset. Radiates to: BLE's. The pain is at a severity of 8/10. The pain is moderate. The symptoms are aggravated " "by bending, position, sitting, standing and twisting. Associated symptoms include abdominal pain, headaches (\"always\" to some degree, mild right now) and numbness (L arm, R leg into foot). Pertinent negatives include no bladder incontinence, bowel incontinence, dysuria or fever. Treatments tried: IT pump. The treatment provided mild relief.   Arm Pain    Incident onset: chronic- reports history of CRPS- LUE but also has RUE pain. The pain radiates to the left arm. The pain is at a severity of 8/10. The pain is severe. The pain has been worsening since the incident. Associated symptoms include numbness (L arm, R leg into foot).      PEG Assessment   What number best describes your pain on average in the past week?9  What number best describes how, during the past week, pain has interfered with your enjoyment of life?9  What number best describes how, during the past week, pain has interfered with your general activity?  9    The following portions of the patient's history were reviewed and updated as appropriate: allergies, current medications, past family history, past medical history, past social history, past surgical history and problem list.    Review of Systems   Constitutional: Positive for activity change. Negative for appetite change (decreased), chills and fever.   HENT: Negative for hearing loss.    Eyes: Negative for visual disturbance.   Respiratory: Positive for apnea. Negative for chest tightness and shortness of breath.    Gastrointestinal: Positive for abdominal pain and nausea (\"chronic\"-gastroporesis). Negative for bowel incontinence, constipation (no change) and diarrhea (chronic  improved).   Genitourinary: Negative for bladder incontinence, difficulty urinating and dysuria.   Musculoskeletal: Positive for back pain (leg pain,R>L as well) and neck pain. Negative for gait problem.   Neurological: Positive for dizziness, light-headedness, numbness (L arm, R leg into foot) and headaches (\"always\" to " "some degree, mild right now). Negative for seizures.   Psychiatric/Behavioral: Positive for sleep disturbance. Negative for agitation and suicidal ideas. The patient is nervous/anxious.        Vitals:    07/02/18 1449   BP: 151/87   Pulse: 74   Resp: 16   Temp: 98.2 °F (36.8 °C)   SpO2: 100%   Weight: 54.3 kg (119 lb 9.6 oz)   Height: 165 cm (64.96\")   PainSc:   8   PainLoc: Abdomen     Objective   Physical Exam   Constitutional: She is oriented to person, place, and time. Vital signs are normal. She appears well-developed and well-nourished. She is cooperative.   HENT:   Head: Normocephalic and atraumatic.   Nose: Nose normal.   Eyes: Conjunctivae and lids are normal.   Cardiovascular: Normal rate.    Pulmonary/Chest: Effort normal.   Abdominal: Normal appearance. There is guarding (RUQ).   Neurological: She is alert and oriented to person, place, and time. Gait normal.   Skin: Skin is warm, dry and intact.   Psychiatric: She has a normal mood and affect. Her speech is normal and behavior is normal.   Nursing note and vitals reviewed.      Assessment/Plan   Amalia was seen today for extremity pain, abdominal pain and back pain.    Diagnoses and all orders for this visit:    Chronic pain syndrome    Cervical post-laminectomy syndrome    Lumbar post-laminectomy syndrome    Presence of intrathecal pump    Complex regional pain syndrome type 1 of left upper extremity      --- Increase pump to 5.25 mcg/day.  --- She did ask about a bolus in office today. She states the previous bolus x2 did not help. \"Just to help get the medicine in faster.\" Hold on bolus at this time.  --- Follow-up as scheduled with Dr. Strauss.     +++ DASH is 5 months+++       SMITH REPORT    SMITH report has been reviewed and scanned into the patient's chart.    As the clinician, I personally reviewed the SMITH from 6-22-18 while the patient was in the office today.          EMR Dragon/Transcription disclaimer:   Much of this encounter note is " an electronic transcription/translation of spoken language to printed text. The electronic translation of spoken language may permit erroneous, or at times, nonsensical words or phrases to be inadvertently transcribed; Although I have reviewed the note for such errors, some may still exist.

## 2018-07-03 ENCOUNTER — OFFICE VISIT (OUTPATIENT)
Dept: PAIN MEDICINE | Facility: CLINIC | Age: 63
End: 2018-07-03

## 2018-07-03 VITALS
OXYGEN SATURATION: 100 % | RESPIRATION RATE: 16 BRPM | HEART RATE: 84 BPM | DIASTOLIC BLOOD PRESSURE: 83 MMHG | SYSTOLIC BLOOD PRESSURE: 140 MMHG | TEMPERATURE: 97.9 F | BODY MASS INDEX: 19.83 KG/M2 | HEIGHT: 65 IN | WEIGHT: 119 LBS

## 2018-07-03 DIAGNOSIS — G89.4 CHRONIC PAIN SYNDROME: Primary | ICD-10-CM

## 2018-07-03 DIAGNOSIS — G90.512 COMPLEX REGIONAL PAIN SYNDROME TYPE 1 OF LEFT UPPER EXTREMITY: ICD-10-CM

## 2018-07-03 DIAGNOSIS — M79.2 NEUROPATHIC PAIN: ICD-10-CM

## 2018-07-03 DIAGNOSIS — Z97.8 PRESENCE OF INTRATHECAL PUMP: ICD-10-CM

## 2018-07-03 DIAGNOSIS — Z96.89 SPINAL CORD STIMULATOR STATUS: ICD-10-CM

## 2018-07-03 DIAGNOSIS — M96.1 LUMBAR POST-LAMINECTOMY SYNDROME: ICD-10-CM

## 2018-07-03 DIAGNOSIS — M96.1 CERVICAL POST-LAMINECTOMY SYNDROME: ICD-10-CM

## 2018-07-03 PROCEDURE — 99212 OFFICE O/P EST SF 10 MIN: CPT | Performed by: ANESTHESIOLOGY

## 2018-07-03 PROCEDURE — 62368 ANALYZE SP INF PUMP W/REPROG: CPT | Performed by: ANESTHESIOLOGY

## 2018-07-03 RX ORDER — GABAPENTIN 600 MG/1
1200 TABLET ORAL 3 TIMES DAILY
Qty: 180 TABLET | Refills: 0 | Status: SHIPPED | OUTPATIENT
Start: 2018-07-03 | End: 2018-08-22

## 2018-07-03 NOTE — PROGRESS NOTES
"CHIEF COMPLAINT    F/U back, bilateral arm and leg pain. Pt also c/o abdominal pain. She has intrathecal pain pump and not experiencing relief yet with Prialt.     Subjective   Amalia Lopez is a 62 y.o. female  who presents to the office for follow-up.She has a history of CRPS.    She had a painful flare up over the weekend and went to [facility] ER after she spoke to me on the Answering Service on Saturday June 30th.  I spoke with the ER attending doctor and there were no acute deficits.    She has a history of radiating pain into the arms kya the LUE and also noted into the RLE.  Shooting, burning.  Arm is improving slightly.      Pump rate was changed yesterday.      Pump was running with its initial fill of Prialt, and running at 5.24 mcg per day.  Today the pump was reprogrammed to an increased rate of 7.25 mcg/day, and a PTM dosing was added of 0.25 mcg q6h PRN.  ERV 18.5ml.  DASH 5 months.      PUMP STATUS AFTER UPDATE:          History of Present Illness     PEG Assessment   What number best describes your pain on average in the past week?9  What number best describes how, during the past week, pain has interfered with your enjoyment of life?9  What number best describes how, during the past week, pain has interfered with your general activity?  9      The following portions of the patient's history were reviewed and updated as appropriate: allergies, current medications, past family history, past medical history, past social history, past surgical history and problem list.    Review of Systems   Constitutional: Positive for activity change and fatigue. Negative for appetite change (decreased), chills and fever.   HENT: Negative for hearing loss.    Eyes: Negative for visual disturbance.   Respiratory: Positive for apnea. Negative for chest tightness and shortness of breath.    Gastrointestinal: Positive for abdominal pain and nausea (\"chronic\"-gastroporesis). Negative for constipation (no change) and " "diarrhea (chronic  improved).   Genitourinary: Negative for difficulty urinating and dysuria.   Musculoskeletal: Positive for back pain (leg pain,R>L as well) and neck pain. Negative for gait problem.   Neurological: Positive for dizziness, light-headedness, numbness (L arm, R leg into foot) and headaches (\"always\" to some degree, mild right now). Negative for seizures.   Psychiatric/Behavioral: Positive for sleep disturbance. Negative for agitation and suicidal ideas. The patient is nervous/anxious.        Vitals:    07/03/18 1345   BP: 140/83   Pulse: 84   Resp: 16   Temp: 97.9 °F (36.6 °C)   SpO2: 100%   Weight: 54 kg (119 lb)   Height: 165 cm (64.96\")   PainSc:   8   PainLoc: Arm         Objective   Physical Exam   Nursing note and vitals reviewed.  she is using lidoderm patches cut into strips on the wrists/hands.         Assessment/Plan   Amalia was seen today for abdominal pain and extremity pain.    Diagnoses and all orders for this visit:    Chronic pain syndrome    Presence of intrathecal pump    Spinal cord stimulator status    Cervical post-laminectomy syndrome    Lumbar post-laminectomy syndrome    Complex regional pain syndrome type 1 of left upper extremity    Neuropathic pain    Other orders  -     gabapentin (NEURONTIN) 600 MG tablet; Take 2 tablets by mouth 3 (Three) Times a Day.        --- Follow-up Friday  -- increase in Prialt today  -- refill on Gabapentin                 SMITH HANSEN    As part of the patient's treatment plan, I am prescribing controlled substances. The patient has been made aware of appropriate use of such medications, including potential risk of somnolence, limited ability to drive and/or work safely, and the potential for dependence or overdose. It has also bee made clear that these medications are for use by this patient only, without concomitant use of alcohol or other substances unless prescribed.     Patient has completed prescribing agreement detailing terms of " continued prescribing of controlled substances, including monitoring SMITH reports, urine drug screening, and pill counts if necessary. The patient is aware that inappropriate use will results in cessation of prescribing such medications.    SMITH report has been reviewed and scanned into the patient's chart.    As the clinician, I personally reviewed the SMITH from today which is scanned into the media tab while the patient was in the office today.    History and physical exam exhibit continued safe and appropriate use of controlled substances.      EMR Dragon/Transcription disclaimer:   Much of this encounter note is an electronic transcription/translation of spoken language to printed text. The electronic translation of spoken language may permit erroneous, or at times, nonsensical words or phrases to be inadvertently transcribed; Although I have reviewed the note for such errors, some may still exist.

## 2018-07-06 ENCOUNTER — OFFICE VISIT (OUTPATIENT)
Dept: PAIN MEDICINE | Facility: CLINIC | Age: 63
End: 2018-07-06

## 2018-07-06 VITALS
DIASTOLIC BLOOD PRESSURE: 79 MMHG | TEMPERATURE: 98.1 F | WEIGHT: 116.8 LBS | BODY MASS INDEX: 19.46 KG/M2 | OXYGEN SATURATION: 100 % | HEART RATE: 89 BPM | RESPIRATION RATE: 16 BRPM | HEIGHT: 65 IN | SYSTOLIC BLOOD PRESSURE: 117 MMHG

## 2018-07-06 DIAGNOSIS — M79.2 NEUROPATHIC PAIN: ICD-10-CM

## 2018-07-06 DIAGNOSIS — F11.93 OPIOID WITHDRAWAL (HCC): ICD-10-CM

## 2018-07-06 DIAGNOSIS — Z97.8 PRESENCE OF INTRATHECAL PUMP: ICD-10-CM

## 2018-07-06 DIAGNOSIS — G89.4 CHRONIC PAIN SYNDROME: ICD-10-CM

## 2018-07-06 DIAGNOSIS — M96.1 CERVICAL POST-LAMINECTOMY SYNDROME: ICD-10-CM

## 2018-07-06 DIAGNOSIS — M96.1 LUMBAR POST-LAMINECTOMY SYNDROME: ICD-10-CM

## 2018-07-06 DIAGNOSIS — G90.512 COMPLEX REGIONAL PAIN SYNDROME TYPE 1 OF LEFT UPPER EXTREMITY: Primary | ICD-10-CM

## 2018-07-06 PROCEDURE — 62369 ANAL SP INF PMP W/REPRG&FILL: CPT | Performed by: ANESTHESIOLOGY

## 2018-07-06 PROCEDURE — 99212 OFFICE O/P EST SF 10 MIN: CPT | Performed by: ANESTHESIOLOGY

## 2018-07-06 RX ORDER — OXYCODONE AND ACETAMINOPHEN 10; 325 MG/1; MG/1
1 TABLET ORAL EVERY 6 HOURS PRN
Qty: 56 TABLET | Refills: 0 | Status: SHIPPED | OUTPATIENT
Start: 2018-07-06 | End: 2018-07-18 | Stop reason: SDUPTHER

## 2018-07-06 NOTE — PROGRESS NOTES
CHIEF COMPLAINT    F/U arm pain, neck pain. Pt has intrathecal pain pump with prialt. Pain is unchanged.     Subjective   Amalia Lopez is a 62 y.o. female  who presents to the office for follow-up.She has a history of CRPS 1, and also increase in neck pain.    IT pump running at 7.25 mcg/day... Also since Tuesday she used PTM 2 successful activations.  DASH is 5 months.  Reservoir volume is 17.7ml.  Of course, as stated below, we are refilling today.       Informed consent was obtained and under sterile conditions the RN tried to access the reservoir but had difficulties, and after a few attempts I attempted to access the reservoir personally.  Thankfully I was able to access the reservoir the first pass, and aspiration was positive for clear fluid out of the reservoir.  Actual and expected reservoir residual volumes were equivalent.  A new quantity of 20 mL of stock concentration ziconotide was placed into the reservoir by the RN.    The basal rate is increased from 7.5 µg daily, now to 8 µg per day.  PTM use is being initiated so that she can try to titrate some therapy and see if she is better able to tolerate higher doses of the medication and also achieve some pain relief with some higher doses.    History of Present Illness     She does not have any new increase or change in cervical radicular symptoms.  She does have continued lumbar radicular symptoms.  Moreover she just states overall level of feeling miserable and tired, flulike malaise, but no fever and no chills.  She does have some mild intermittent nausea.    PEG Assessment   What number best describes your pain on average in the past week?9  What number best describes how, during the past week, pain has interfered with your enjoyment of life?9  What number best describes how, during the past week, pain has interfered with your general activity?  9      The following portions of the patient's history were reviewed and updated as appropriate:  "allergies, current medications, past family history, past medical history, past social history, past surgical history and problem list.    Review of Systems   Constitutional: Positive for activity change, appetite change (decreased) and fatigue. Negative for chills and fever.   HENT: Negative for hearing loss.    Eyes: Negative for visual disturbance.   Respiratory: Positive for apnea. Negative for chest tightness and shortness of breath.    Gastrointestinal: Positive for abdominal pain and nausea (\"chronic\"-gastroporesis). Negative for constipation (no change) and diarrhea (chronic  improved).   Genitourinary: Positive for difficulty urinating (pt states she has trouble getting started and emptying her bladder). Negative for dysuria.   Musculoskeletal: Positive for back pain (leg pain,R>L as well) and neck pain. Negative for gait problem.   Neurological: Positive for dizziness, light-headedness, numbness (L arm, R leg into foot) and headaches (\"always\" to some degree, mild right now). Negative for seizures.   Psychiatric/Behavioral: Positive for sleep disturbance. Negative for agitation and suicidal ideas. The patient is nervous/anxious.            Vitals:    07/06/18 0921   BP: 117/79   Pulse: 89   Resp: 16   Temp: 98.1 °F (36.7 °C)   SpO2: 100%   Weight: 53 kg (116 lb 12.8 oz)   Height: 165 cm (64.96\")   PainSc:   9   PainLoc: Generalized  Comment: arms, abdomen, legs, and neck         Objective   Physical Exam  Alert and oriented ×3.  Normal mood and normal affect.  There was no irritation or erythema of the intrathecal pump site      Assessment/Plan   Amalia was seen today for neck pain and extremity pain.    Diagnoses and all orders for this visit:    Complex regional pain syndrome type 1 of left upper extremity    Neuropathic pain    Chronic pain syndrome    Presence of intrathecal pump    Cervical post-laminectomy syndrome    Lumbar post-laminectomy syndrome    Opioid withdrawal (CMS/HCC)    Other orders  - "     oxyCODONE-acetaminophen (PERCOCET)  MG per tablet; Take 1 tablet by mouth Every 6 (Six) Hours As Needed for Moderate Pain .        --- Follow-up 1 week    - IT pump refill today on schedule (14 day eric)...    -- IT Pump changes... Increase the basal from 7.25mcg/day up to 8mcg/day  -- ITP PTM... Change to 1mcg q4 hours  -- keep a log of PTM use    -- acute use of Percocet for the acute painful flareup.  Do not plan for long term therapy.     -- Some of her symptoms may be just the continued process of adjusting to life without chronic opiate therapy either orally or intrathecal.  Hopefully a small amount of some oral opiate can help to mitigate this transition.  It does not seem that she is having any new level change in her hypersensitivity syndrome and also it does not seem that she is ill or dealing with any infectious issues.               SMITH REPORT  SMITH report has been reviewed and scanned into the patient's chart.  As the clinician, I personally reviewed the SMITH from 7-2-18 while the patient was in the office today.  History and physical exam exhibit continued safe and appropriate use of controlled substances.      EMR Dragon/Transcription disclaimer:   Much of this encounter note is an electronic transcription/translation of spoken language to printed text. The electronic translation of spoken language may permit erroneous, or at times, nonsensical words or phrases to be inadvertently transcribed; Although I have reviewed the note for such errors, some may still exist.

## 2018-07-09 ENCOUNTER — DOCUMENTATION (OUTPATIENT)
Dept: PAIN MEDICINE | Facility: CLINIC | Age: 63
End: 2018-07-09

## 2018-07-09 NOTE — PROGRESS NOTES
Patient called answering service Saturday 7-7-18 at approximately 1500. She called complaining of nausea. She states she had seen Dr. Strauss the previous day and the IT Prialt had been increased to 8 mcg/day. Her PTM was also activated. Patient states that she had been nauseous since she left office. Decided to do first bolus with PTM morning of 7-8-18. States she was having nausea worse since then. She had self-medicated with Zofran and Tigan. Denies any vomiting.  Denies any fever, chills, bowel/bladder changes. She wanted me to decrease pump. Reviewed I did not have interrogator for this and I did not travel to patient's houses. THe best we could do was to have her come in Monday and decrease then. Patient verbalized understanding and agreed with plan of care.

## 2018-07-12 ENCOUNTER — OFFICE VISIT (OUTPATIENT)
Dept: PAIN MEDICINE | Facility: CLINIC | Age: 63
End: 2018-07-12

## 2018-07-12 VITALS
OXYGEN SATURATION: 99 % | HEIGHT: 65 IN | RESPIRATION RATE: 16 BRPM | BODY MASS INDEX: 19.66 KG/M2 | DIASTOLIC BLOOD PRESSURE: 81 MMHG | WEIGHT: 118 LBS | HEART RATE: 75 BPM | SYSTOLIC BLOOD PRESSURE: 136 MMHG | TEMPERATURE: 98.8 F

## 2018-07-12 DIAGNOSIS — G89.4 CHRONIC PAIN SYNDROME: Primary | ICD-10-CM

## 2018-07-12 DIAGNOSIS — Z96.89 SPINAL CORD STIMULATOR STATUS: ICD-10-CM

## 2018-07-12 DIAGNOSIS — M96.1 LUMBAR POST-LAMINECTOMY SYNDROME: ICD-10-CM

## 2018-07-12 DIAGNOSIS — M96.1 CERVICAL POST-LAMINECTOMY SYNDROME: ICD-10-CM

## 2018-07-12 PROCEDURE — 62368 ANALYZE SP INF PUMP W/REPROG: CPT | Performed by: NURSE PRACTITIONER

## 2018-07-12 PROCEDURE — 99214 OFFICE O/P EST MOD 30 MIN: CPT | Performed by: NURSE PRACTITIONER

## 2018-07-12 NOTE — PROGRESS NOTES
"CHIEF COMPLAINT  Pt had been an inpatient at [facility] for 2 nights,D/Cd 7/11/18,for dehydration,\"heart pounding',dyspnea.  Pt reports no relief of neck,bilat. arm pain.    Subjective   Amalia Lopez is a 62 y.o. female  who presents to the office for follow-up.She has a history of chronic neck and back pain.    Prialt increased to 8 mcg daily with a PTM of 1 mcg every 4 hours.  She continues to report absolutely no benefit with the Prialt.  She called the answering service over the weekend reporting nausea which she felt was associated with the recent pump increase and asked for the pump to be decreased.  She reports that she went to the hospital for dehydration, felt that she could not get up to eat or do anything. It should be noted that she was experiecing GI issues prior to the medication change including gastroparesis and nausea, however she felt that it was worse when the medication was changed \"it is obvious\".  She used the PTM once and felt that her nausea was worse from this.  She also reports that her gait seems to be off.  Stated \"I am over the Prialt\"     Neck Pain    This is a chronic problem. The current episode started more than 1 year ago. The problem occurs constantly. The problem has been gradually worsening. The quality of the pain is described as aching and burning. The pain is at a severity of 9/10. The symptoms are aggravated by position and stress (activity). Associated symptoms include headaches (\"always\" to some degree, mild right now) and numbness (L arm, R leg into foot). Pertinent negatives include no fever. She has tried NSAIDs, oral narcotics and muscle relaxants (IT Morphine) for the symptoms. The treatment provided mild relief.   Back Pain   This is a chronic problem. The current episode started more than 1 year ago. The problem occurs constantly. The problem has been gradually worsening since onset. The pain is present in the lumbar spine. The quality of the pain is described " "as aching, burning and shooting. The pain radiates to the left foot and right foot. The pain is at a severity of 9/10. The symptoms are aggravated by sitting and standing (activity). Associated symptoms include abdominal pain, headaches (\"always\" to some degree, mild right now) and numbness (L arm, R leg into foot). Pertinent negatives include no bladder incontinence, bowel incontinence, dysuria or fever. She has tried NSAIDs and analgesics (IT Morphine) for the symptoms. The treatment provided mild relief.      PEG Assessment   What number best describes your pain on average in the past week?9  What number best describes how, during the past week, pain has interfered with your enjoyment of life?9  What number best describes how, during the past week, pain has interfered with your general activity?  9    The following portions of the patient's history were reviewed and updated as appropriate: allergies, current medications, past family history, past medical history, past social history, past surgical history and problem list.    Review of Systems   Constitutional: Positive for activity change, appetite change (decreased) and fatigue. Negative for chills and fever.   HENT: Negative for hearing loss.    Eyes: Negative for visual disturbance.   Respiratory: Positive for apnea. Negative for chest tightness and shortness of breath.    Gastrointestinal: Positive for abdominal pain and nausea (\"chronic\"-gastroporesis). Negative for bowel incontinence, constipation (no change) and diarrhea (chronic  improved).   Genitourinary: Positive for difficulty urinating (pt states she has trouble getting started and emptying her bladder). Negative for bladder incontinence and dysuria.   Musculoskeletal: Positive for back pain (leg pain,R>L as well) and neck pain. Negative for gait problem.   Neurological: Positive for dizziness, light-headedness, numbness (L arm, R leg into foot) and headaches (\"always\" to some degree, mild right " "now). Negative for seizures.   Psychiatric/Behavioral: Positive for sleep disturbance. Negative for agitation and suicidal ideas. The patient is nervous/anxious.      Vitals:    07/12/18 1130   BP: 136/81   Pulse: 75   Resp: 16   Temp: 98.8 °F (37.1 °C)   SpO2: 99%   Weight: 53.5 kg (118 lb)   Height: 165 cm (64.96\")   PainSc: 9  Comment: neck,L arm pain ranges from 8-9/10   PainLoc: Neck     Objective   Physical Exam   Constitutional: She is oriented to person, place, and time. She appears well-developed and well-nourished.   HENT:   Head: Normocephalic.   Eyes: Conjunctivae and EOM are normal.   Neck: Neck supple.   Cardiovascular: Normal rate.    Pulmonary/Chest: Effort normal. No respiratory distress.   Musculoskeletal:        Cervical back: She exhibits pain.        Lumbar back: She exhibits pain.   Neurological: She is alert and oriented to person, place, and time. She has normal strength. Gait normal.   Skin: Skin is warm and dry.   Psychiatric: Her behavior is normal. Her mood appears anxious.   Nursing note and vitals reviewed.    Assessment/Plan   Amalia was seen today for neck pain.    Diagnoses and all orders for this visit:    Chronic pain syndrome    Lumbar post-laminectomy syndrome    Cervical post-laminectomy syndrome    Spinal cord stimulator status      --- Patient wishes to discontinue Prialt. We discussed weaning versus removing the medication completely and infusing saline through the pump.  She wishes to wean by 50% today and return in a few days to stop the Prialt completely.    --- Prialt dose reduced to 4 mcg/day and PTM discontinued  --- Follow-up approximately 1 week          SMITH REPORT  SMITH report has been reviewed and scanned into the patient's chart.    As the clinician, I personally reviewed the SMITH from 7/11/2018 while the patient was in the office today.    EMR Dragon/Transcription disclaimer:   Much of this encounter note is an electronic transcription/translation of " spoken language to printed text. The electronic translation of spoken language may permit erroneous, or at times, nonsensical words or phrases to be inadvertently transcribed; Although I have reviewed the note for such errors, some may still exist.

## 2018-07-18 ENCOUNTER — OFFICE VISIT (OUTPATIENT)
Dept: PAIN MEDICINE | Facility: CLINIC | Age: 63
End: 2018-07-18

## 2018-07-18 VITALS
TEMPERATURE: 98 F | BODY MASS INDEX: 19.49 KG/M2 | OXYGEN SATURATION: 99 % | WEIGHT: 117 LBS | RESPIRATION RATE: 16 BRPM | DIASTOLIC BLOOD PRESSURE: 64 MMHG | SYSTOLIC BLOOD PRESSURE: 113 MMHG | HEIGHT: 65 IN | HEART RATE: 75 BPM

## 2018-07-18 DIAGNOSIS — G90.512 COMPLEX REGIONAL PAIN SYNDROME TYPE 1 OF LEFT UPPER EXTREMITY: ICD-10-CM

## 2018-07-18 DIAGNOSIS — M96.1 CERVICAL POST-LAMINECTOMY SYNDROME: ICD-10-CM

## 2018-07-18 DIAGNOSIS — M96.1 LUMBAR POST-LAMINECTOMY SYNDROME: ICD-10-CM

## 2018-07-18 DIAGNOSIS — Z97.8 PRESENCE OF INTRATHECAL PUMP: ICD-10-CM

## 2018-07-18 DIAGNOSIS — G89.4 CHRONIC PAIN SYNDROME: Primary | ICD-10-CM

## 2018-07-18 DIAGNOSIS — M79.2 NEUROPATHIC PAIN: ICD-10-CM

## 2018-07-18 DIAGNOSIS — G90.50 COMPLEX REGIONAL PAIN SYNDROME TYPE 1, AFFECTING UNSPECIFIED SITE: ICD-10-CM

## 2018-07-18 DIAGNOSIS — Z96.89 SPINAL CORD STIMULATOR STATUS: ICD-10-CM

## 2018-07-18 PROCEDURE — 99214 OFFICE O/P EST MOD 30 MIN: CPT | Performed by: ANESTHESIOLOGY

## 2018-07-18 PROCEDURE — 62368 ANALYZE SP INF PUMP W/REPROG: CPT | Performed by: ANESTHESIOLOGY

## 2018-07-18 RX ORDER — PREGABALIN 50 MG/1
50 CAPSULE ORAL 3 TIMES DAILY
Qty: 42 CAPSULE | Refills: 0 | Status: SHIPPED | OUTPATIENT
Start: 2018-07-18 | End: 2018-08-22

## 2018-07-18 RX ORDER — OXYCODONE AND ACETAMINOPHEN 10; 325 MG/1; MG/1
1 TABLET ORAL EVERY 6 HOURS PRN
Qty: 56 TABLET | Refills: 0 | Status: SHIPPED | OUTPATIENT
Start: 2018-07-18 | End: 2018-08-22

## 2018-07-18 RX ORDER — PREGABALIN 75 MG/1
75 CAPSULE ORAL 2 TIMES DAILY
Qty: 60 CAPSULE | Refills: 0 | Status: SHIPPED | OUTPATIENT
Start: 2018-07-18 | End: 2018-08-01

## 2018-07-18 NOTE — PROGRESS NOTES
"CHIEF COMPLAINT  Pt states she \"continues to have no benefit\" from prialt therapy.    Subjective   Amalia Lopez is a 62 y.o. female  who presents to the office for follow-up.She has a history of CRPS and laminectomy syndromes.    The trial of Prialt (ziconotide) has not been successful.  She has been weaning it off... Currently the pump ERV is 17.1 ml of stock concentration 25mcg/ml, running at 4 mcg per day.  The PTM was previously discontinued.  DASH on the pump battery is 5 months. The pump was changed to a minimal rate.      Neck Pain    This is a chronic problem. The current episode started more than 1 year ago. The problem occurs constantly. The problem has been gradually worsening. The pain is present in the left side and right side. The quality of the pain is described as aching, burning and shooting (arms bilat). The pain is severe. The symptoms are aggravated by position and stress (activity). Associated symptoms include headaches (\"always\" to some degree, mild right now), numbness (intermittant in arms,legs) and weakness (arms,legs bilaterally). Pertinent negatives include no fever. She has tried NSAIDs, oral narcotics and muscle relaxants (IT Morphine) for the symptoms. The treatment provided no relief.   Back Pain   This is a chronic problem. The current episode started more than 1 year ago. The problem occurs constantly. The problem has been gradually worsening since onset. The pain is present in the lumbar spine. The quality of the pain is described as aching, burning and shooting. The pain radiates to the left foot and right foot. The pain is severe. The symptoms are aggravated by sitting and standing (activity). Associated symptoms include abdominal pain, headaches (\"always\" to some degree, mild right now), numbness (intermittant in arms,legs) and weakness (arms,legs bilaterally). Pertinent negatives include no bladder incontinence, bowel incontinence, dysuria or fever. She has tried NSAIDs and " "analgesics (IT Morphine) for the symptoms. The treatment provided mild relief.        PEG Assessment   What number best describes your pain on average in the past week?9  What number best describes how, during the past week, pain has interfered with your enjoyment of life?9  What number best describes how, during the past week, pain has interfered with your general activity?  9    --  The aforementioned information the Chief Complaint section and above subjective data including any HPI data has been personally reviewed and affirmed.  --        The following portions of the patient's history were reviewed and updated as appropriate: allergies, current medications, past family history, past medical history, past social history, past surgical history and problem list.    Review of Systems   Constitutional: Positive for activity change (decreased) and appetite change (decreased). Negative for fever.   HENT: Negative for voice change.    Eyes: Positive for visual disturbance (blurry).   Gastrointestinal: Positive for abdominal pain, constipation, diarrhea (gastroporesis) and nausea. Negative for bowel incontinence.   Genitourinary: Negative for bladder incontinence, difficulty urinating and dysuria.   Musculoskeletal: Positive for back pain (leg pain,R>L as well), gait problem (due to balance) and neck pain.   Neurological: Positive for weakness (arms,legs bilaterally), light-headedness, numbness (intermittant in arms,legs) and headaches (\"always\" to some degree, mild right now). Negative for dizziness.   Psychiatric/Behavioral: Positive for dysphoric mood and sleep disturbance. Negative for suicidal ideas.           Vitals:    07/18/18 1010   BP: 113/64   Pulse: 75   Resp: 16   Temp: 98 °F (36.7 °C)   SpO2: 99%   Weight: 53.1 kg (117 lb)   Height: 165 cm (64.96\")   PainSc: 8  Comment: extremity pain ranges  from 7-9/10   PainLoc: Generalized     Entire visit chaperoned by my RN, D.N.          Objective   Physical Exam "   Constitutional: She is oriented to person, place, and time. She appears well-developed and well-nourished.   HENT:   Head: Normocephalic and atraumatic.   Eyes: Conjunctivae and EOM are normal.   Neck: Neck supple.   Pulmonary/Chest: Effort normal.   Musculoskeletal:        Cervical back: She exhibits decreased range of motion, tenderness and pain.        Lumbar back: She exhibits decreased range of motion, tenderness and pain.   Neurological: She is alert and oriented to person, place, and time. She has normal strength. She displays no atrophy. No sensory deficit. Gait normal.   Skin: Skin is warm and dry.   Psychiatric: Her behavior is normal. Thought content normal. Her mood appears anxious. She exhibits a depressed mood. She expresses no suicidal ideation.   Nursing note and vitals reviewed.          Assessment/Plan   Amalia was seen today for extremity pain.    Diagnoses and all orders for this visit:    Chronic pain syndrome    Complex regional pain syndrome type 1 of left upper extremity  -     EMG & Nerve Conduction Test; Future  -     Ambulatory Referral to Psychology    Neuropathic pain  -     EMG & Nerve Conduction Test; Future  -     Ambulatory Referral to Psychology    Complex regional pain syndrome type 1, affecting unspecified site    Spinal cord stimulator status  -     Ambulatory Referral to Psychology    Presence of intrathecal pump  -     Ambulatory Referral to Psychology    Cervical post-laminectomy syndrome    Lumbar post-laminectomy syndrome    Other orders  -     pregabalin (LYRICA) 75 MG capsule; Take 1 capsule by mouth 2 (Two) Times a Day.  -     oxyCODONE-acetaminophen (PERCOCET)  MG per tablet; Take 1 tablet by mouth Every 6 (Six) Hours As Needed for Moderate Pain .  -     pregabalin (LYRICA) 50 MG capsule; Take 1 capsule by mouth 3 (Three) Times a Day.        -- IT Pump reprogramming as above.... In summary, the rate was set to minimal rate on the current Ziconotide, with an  intent to discontinue with trial of Ropivacaine, which will be ordered today    --- Follow-up 1 wk for pump refill... Plan to trial with Ropivacaine infusion.  We discussed risks including but not limited to weakness, sedation, respiratory depression, hemodynamic instability, coma, toxicity, death.     -- we will trial this in the IT pump.  I am hesitant to go back to opioid infusion for this patient.    -- with flaring upper and lower extremity symptoms, complaining of increasing 4 extremity pain, I think that further investigation is warranted to help to rule out peripheral disease versus a radicular component, versus the concern for potential spreading CRPS.  EMG/NCV ordered    -- pain psychology consultation placed for pain coping / CBT / multimodal strategies.   This could be helpful before we ever consider initiation of an intrathecal opioid, or even whether to replace her pump when the battery reaches end of life.      -- acute pain reliever Percocet.  Do not intend for this to be a long term solution and this is time limited.         Counseling was significant today, and we discussed that she had originally sought therapy here to request a trial of Prialt.  I counseled that I feel oral opioids are not a good long term solution.  Also that I am hesitant to go back on intrathecal opioids as she was not succeeding with such therapy.  We discussed expanding our options to trial local anesthetic.  She feels now that IT opioid was helping more than she thought; however, I counseled her that I would be hesitant to restart this.            SMITH REPORT    As part of the patient's treatment plan, I am prescribing controlled substances. The patient has been made aware of appropriate use of such medications, including potential risk of somnolence, limited ability to drive and/or work safely, and the potential for dependence or overdose. It has also bee made clear that these medications are for use by this patient  only, without concomitant use of alcohol or other substances unless prescribed.     SMITH report has been reviewed and scanned into the patient's chart.    As the clinician, I personally reviewed the SMITH from as above while the patient was in the office today.    History and physical exam exhibit continued safe and appropriate use of controlled substances.      EMR Dragon/Transcription disclaimer:   Much of this encounter note is an electronic transcription/translation of spoken language to printed text. The electronic translation of spoken language may permit erroneous, or at times, nonsensical words or phrases to be inadvertently transcribed; Although I have reviewed the note for such errors, some may still exist.

## 2018-07-23 ENCOUNTER — TELEPHONE (OUTPATIENT)
Dept: PAIN MEDICINE | Facility: CLINIC | Age: 63
End: 2018-07-23

## 2018-07-25 ENCOUNTER — OFFICE VISIT (OUTPATIENT)
Dept: PAIN MEDICINE | Facility: CLINIC | Age: 63
End: 2018-07-25

## 2018-07-25 VITALS
DIASTOLIC BLOOD PRESSURE: 64 MMHG | HEART RATE: 55 BPM | SYSTOLIC BLOOD PRESSURE: 99 MMHG | RESPIRATION RATE: 16 BRPM | OXYGEN SATURATION: 99 % | BODY MASS INDEX: 19.49 KG/M2 | TEMPERATURE: 98.1 F | HEIGHT: 65 IN | WEIGHT: 117 LBS

## 2018-07-25 DIAGNOSIS — Z97.8 PRESENCE OF INTRATHECAL PUMP: ICD-10-CM

## 2018-07-25 DIAGNOSIS — G89.4 CHRONIC PAIN SYNDROME: Primary | ICD-10-CM

## 2018-07-25 PROCEDURE — 62369 ANAL SP INF PMP W/REPRG&FILL: CPT | Performed by: ANESTHESIOLOGY

## 2018-07-25 NOTE — PROGRESS NOTES
CHIEF COMPLAINT  Pt is here today for a pump refill, changing to Naropin . Pt states she has not noticed any improvement with current intrathecal med (Prialt).    Subjective   Amalia Lopez is a 62 y.o. female  who presents to the office for follow-up.She has a history of CRPS, CPLS, PLLS.    Intrathecal pump was interrogated in office today. Results are in chart.    Patient tolerated procedure well. Minimal bleeding was noted. Pump site remains intact with no evidence of erythema or drainage.    DASH is 5 months.    The pump is running a stock concentration of Prialt @ 1.2 mcg per day.  ERV 16.7ml which is equiv to the ARV.  Pump accessed with sterile technique w/o difficulty.  The contents were removed and the pump was filled with Ropivacaine 5mg/ml, and set to run at 1mg per day.  There was a 1mg bolus given simply over 12 hrs.  No PTM.      History of Present Illness     PEG Assessment   What number best describes your pain on average in the past week?7  What number best describes how, during the past week, pain has interfered with your enjoyment of life?8  What number best describes how, during the past week, pain has interfered with your general activity?  8    --  The aforementioned information the Chief Complaint section and above subjective data including any HPI data has been personally reviewed and affirmed.  --        The following portions of the patient's history were reviewed and updated as appropriate: allergies, current medications, past family history, past medical history, past social history, past surgical history and problem list.    Review of Systems   Constitutional: Positive for activity change (decreased) and appetite change (decreased). Negative for fever.   HENT: Negative for voice change.    Eyes: Positive for visual disturbance (blurry).   Gastrointestinal: Positive for abdominal pain, constipation, diarrhea (gastroporesis) and nausea.   Genitourinary: Negative for difficulty urinating  "and dysuria.   Musculoskeletal: Positive for back pain (leg pain,R>L as well), gait problem (due to balance) and neck pain.   Neurological: Positive for weakness (arms,legs bilaterally), light-headedness (mild) and numbness (intermittant in arms,legs). Negative for dizziness and headaches (\"always\" to some degree, mild right now).   Psychiatric/Behavioral: Positive for dysphoric mood and sleep disturbance. Negative for suicidal ideas.       Vitals:    07/25/18 1040   BP: 99/64   Pulse: 55   Resp: 16   Temp: 98.1 °F (36.7 °C)   SpO2: 99%   Weight: 53.1 kg (117 lb)   Height: 165 cm (64.96\")   PainSc: 7  Comment: bilateral leg pain, R > L, ranges from 7-9/10   PainLoc: Leg         Objective   Physical Exam   Nursing note and vitals reviewed.          Assessment/Plan   Amalia was seen today for leg pain.    Diagnoses and all orders for this visit:    Chronic pain syndrome    Presence of intrathecal pump        --- Follow-up 1 wk  -- pump initiated with ropivacaine infusion as above    -- plan to escalate if not therapeutic and if tolerated well.  Would plan to go up by 0.5mg/day per week.      -- if not tolerated then deescalate by 0.25mg/day per week.      -- I do not plan to re-initiate opioid therapy in the pump at this time.               EMR Dragon/Transcription disclaimer:   Much of this encounter note is an electronic transcription/translation of spoken language to printed text. The electronic translation of spoken language may permit erroneous, or at times, nonsensical words or phrases to be inadvertently transcribed; Although I have reviewed the note for such errors, some may still exist.         "

## 2018-08-01 ENCOUNTER — OFFICE VISIT (OUTPATIENT)
Dept: PAIN MEDICINE | Facility: CLINIC | Age: 63
End: 2018-08-01

## 2018-08-01 VITALS
OXYGEN SATURATION: 100 % | SYSTOLIC BLOOD PRESSURE: 99 MMHG | HEART RATE: 52 BPM | WEIGHT: 119 LBS | RESPIRATION RATE: 15 BRPM | DIASTOLIC BLOOD PRESSURE: 60 MMHG | BODY MASS INDEX: 19.83 KG/M2 | TEMPERATURE: 97.4 F | HEIGHT: 65 IN

## 2018-08-01 DIAGNOSIS — G89.4 CHRONIC PAIN SYNDROME: Primary | ICD-10-CM

## 2018-08-01 DIAGNOSIS — G90.512 COMPLEX REGIONAL PAIN SYNDROME TYPE 1 OF LEFT UPPER EXTREMITY: ICD-10-CM

## 2018-08-01 DIAGNOSIS — Z97.8 PRESENCE OF INTRATHECAL PUMP: ICD-10-CM

## 2018-08-01 DIAGNOSIS — M96.1 CERVICAL POST-LAMINECTOMY SYNDROME: ICD-10-CM

## 2018-08-01 DIAGNOSIS — M96.1 LUMBAR POST-LAMINECTOMY SYNDROME: ICD-10-CM

## 2018-08-01 PROCEDURE — 99214 OFFICE O/P EST MOD 30 MIN: CPT | Performed by: NURSE PRACTITIONER

## 2018-08-01 NOTE — PROGRESS NOTES
"CHIEF COMPLAINT  F/U pain pump, leg pain. Pt states her side effects she was having with prialt has subsided. She is tolerating naropin and would like it to be increased.She is also asking about a refill on percocet which she will be out of on Monday.     Subjective   Amalia Lopez is a 62 y.o. female  who presents to the office for follow-up.She has a history of chronic back, neck and CRPS. Reports her pain is unchanged since last office visit. At last office visit, her IT medication was changed to Naropin from Prialt due to side effects.  Reports her side effects have dissipated but \" pain is no different.\"  Notes no side effects from the Ropivicaine.     Complains of pain in her neck, back, leg and arm. Today her pain is 7/10VAS. Describes the pain as continuous and unchanged.  Has been taking percocet 10/325 4/day. Is worried because she is going to run out soon.    Has an appointment with neuropsych next week.     Reviewed that DR. Strauss had no intention of re-initiating opioids in the pump. \"that's not at all what we discussed last week.\" I reviewed that according to Dr. Strauss's last office visit and my recent discussion of plan of care, that he did not intend to try opioid pain medication in the pump again. She states he had discussed with her last week that they were going to try an opioid with possibly bupivicaine. She states they also discussed about updating EMG's, imaging, and lab-work.     She is requesting refill of Percocet. Reviewed that prior to coming into her room for evaluation, I had discussed this with Dr. Strauss and he had refused to fill this. Reviewed with patient.     Neck Pain    This is a chronic problem. The current episode started more than 1 year ago. The problem occurs constantly. The problem has been gradually worsening. The pain is at a severity of 7/10. The pain is moderate. The symptoms are aggravated by position and bending. Associated symptoms include numbness " "(intermittent in arms,legs) and weakness (arms,legs bilaterally). Pertinent negatives include no fever or headaches (\"always\" to some degree, mild right now).   Back Pain   This is a chronic problem. The problem occurs constantly. The problem has been gradually worsening since onset. Radiates to: BLE's. The pain is at a severity of 7/10. The pain is moderate. The symptoms are aggravated by bending, position, sitting, standing and twisting. Associated symptoms include abdominal pain (pt sees her GI next week), numbness (intermittent in arms,legs) and weakness (arms,legs bilaterally). Pertinent negatives include no bladder incontinence, bowel incontinence, dysuria, fever or headaches (\"always\" to some degree, mild right now). Treatments tried: IT pump. The treatment provided mild relief.   Arm Pain    Incident onset: chronic- reports history of CRPS- LUE but also has RUE pain. The pain radiates to the left arm. The pain is at a severity of 7/10. The pain is severe. The pain has been worsening since the incident. Associated symptoms include numbness (intermittent in arms,legs).      PEG Assessment   What number best describes your pain on average in the past week?8  What number best describes how, during the past week, pain has interfered with your enjoyment of life?9  What number best describes how, during the past week, pain has interfered with your general activity?  9    The following portions of the patient's history were reviewed and updated as appropriate: allergies, current medications, past family history, past medical history, past social history, past surgical history and problem list.    Review of Systems   Constitutional: Positive for appetite change (decreased). Negative for activity change, chills and fever.   HENT: Negative for voice change.    Eyes: Positive for visual disturbance (blurry).   Gastrointestinal: Positive for abdominal pain (pt sees her GI next week), constipation, diarrhea " "(gastroporesis) and nausea (occ). Negative for bowel incontinence and vomiting.   Genitourinary: Negative for bladder incontinence, difficulty urinating and dysuria.   Musculoskeletal: Positive for back pain (leg pain,R>L as well) and neck pain. Negative for gait problem.   Neurological: Positive for weakness (arms,legs bilaterally) and numbness (intermittent in arms,legs). Negative for dizziness, light-headedness and headaches (\"always\" to some degree, mild right now).   Psychiatric/Behavioral: Positive for sleep disturbance. Negative for agitation, behavioral problems, confusion, hallucinations and suicidal ideas. The patient is not nervous/anxious.        Vitals:    08/01/18 1029   BP: 99/60   Pulse: 52   Resp: 15   Temp: 97.4 °F (36.3 °C)   SpO2: 100%   Weight: 54 kg (119 lb)   Height: 165 cm (64.96\")   PainSc:   7   PainLoc: Leg     Objective   Physical Exam   Constitutional: She is oriented to person, place, and time. Vital signs are normal. She appears well-developed and well-nourished. She is cooperative.   HENT:   Head: Normocephalic and atraumatic.   Nose: Nose normal.   Eyes: Conjunctivae and lids are normal.   Cardiovascular: Normal rate.    Pulmonary/Chest: Effort normal.   Abdominal: Normal appearance.   Musculoskeletal:   Posterior cervical scar   Neurological: She is alert and oriented to person, place, and time. Gait normal.   Reflex Scores:       Bicep reflexes are 1+ on the right side and 1+ on the left side.       Brachioradialis reflexes are 1+ on the right side and 1+ on the left side.       Patellar reflexes are 1+ on the right side and 1+ on the left side.  Skin: Skin is warm, dry and intact.   Psychiatric: She has a normal mood and affect. Her speech is normal.   Nursing note and vitals reviewed.      Assessment/Plan   Amalia was seen today for extremity pain and pain.    Diagnoses and all orders for this visit:    Chronic pain syndrome    Cervical post-laminectomy syndrome    Lumbar " post-laminectomy syndrome    Complex regional pain syndrome type 1 of left upper extremity    Presence of intrathecal pump      --- Reviewed plan of care at length with Dr. Strauss. Per Dr. Strauss, he has no intention of resuming IT opioid therapy or refilling Percocet.  Patient was very upset and voiced this frustration. I said I understood her concerns but that I was following dr. Strauss's previously outlined plan of care from previous office note and discussion before seeing patient.   --- No refill on Percocet today per Dr. Strauss order.  --- Increase IT pump to 1.5 mg/day of Ropivicaine per Dr. Strauss order. Reviewed max dose would be close to 2-2.5 mg/day.  Reviewed to call office if she has any type of side effects with increase, including but not limited to dizziness, hypotension, etc.  --- Continue other medications/regimen as previously prescribed.   --- Discussed potential for pump revision due to waning DASH. Patient wants to wait to discuss with Dr. Strauss. DASH is now 4 months.   --- Follow-up as scheduled or sooner if needed.       SMITH REPORT    SMITH report has been reviewed and scanned into the patient's chart.    As the clinician, I personally reviewed the SMITH from 7-31-18 while the patient was in the office today.          EMR Dragon/Transcription disclaimer:   Much of this encounter note is an electronic transcription/translation of spoken language to printed text. The electronic translation of spoken language may permit erroneous, or at times, nonsensical words or phrases to be inadvertently transcribed; Although I have reviewed the note for such errors, some may still exist.

## 2018-08-08 ENCOUNTER — OFFICE (OUTPATIENT)
Dept: URBAN - METROPOLITAN AREA CLINIC 66 | Facility: CLINIC | Age: 63
End: 2018-08-08
Payer: COMMERCIAL

## 2018-08-08 VITALS
HEIGHT: 66 IN | WEIGHT: 118 LBS | SYSTOLIC BLOOD PRESSURE: 110 MMHG | HEART RATE: 53 BPM | DIASTOLIC BLOOD PRESSURE: 72 MMHG

## 2018-08-08 DIAGNOSIS — R10.11 RIGHT UPPER QUADRANT PAIN: ICD-10-CM

## 2018-08-08 DIAGNOSIS — K31.84 GASTROPARESIS: ICD-10-CM

## 2018-08-08 DIAGNOSIS — R11.0 NAUSEA: ICD-10-CM

## 2018-08-08 PROCEDURE — 99213 OFFICE O/P EST LOW 20 MIN: CPT

## 2018-08-13 ENCOUNTER — TELEPHONE (OUTPATIENT)
Dept: PAIN MEDICINE | Facility: CLINIC | Age: 63
End: 2018-08-13

## 2018-08-13 NOTE — TELEPHONE ENCOUNTER
Pt called and is asking if you can increase the Lyrica up to 75 mg three times a day. States you had changed her rx to Lyrica 75 mg BID. She is wanting a higher dose and was thinking it would be an increase from her prior dose of 50 mg 3 times a day.She states she does not need a refill and has an apt next week. She is just asking if she can increase it to 75 mg TID.

## 2018-08-14 ENCOUNTER — HOSPITAL ENCOUNTER (OUTPATIENT)
Dept: INFUSION THERAPY | Facility: HOSPITAL | Age: 63
End: 2018-08-14
Attending: ANESTHESIOLOGY

## 2018-08-14 NOTE — TELEPHONE ENCOUNTER
I relayed the message to the patient; she has an appointment next week here and has enough lyrica to take TID until then.

## 2018-08-22 ENCOUNTER — OFFICE VISIT (OUTPATIENT)
Dept: PAIN MEDICINE | Facility: CLINIC | Age: 63
End: 2018-08-22

## 2018-08-22 VITALS
TEMPERATURE: 98.2 F | DIASTOLIC BLOOD PRESSURE: 73 MMHG | SYSTOLIC BLOOD PRESSURE: 110 MMHG | BODY MASS INDEX: 19.43 KG/M2 | OXYGEN SATURATION: 100 % | RESPIRATION RATE: 15 BRPM | HEART RATE: 72 BPM | HEIGHT: 65 IN | WEIGHT: 116.6 LBS

## 2018-08-22 DIAGNOSIS — G89.4 CHRONIC PAIN SYNDROME: Primary | ICD-10-CM

## 2018-08-22 DIAGNOSIS — Z96.89 SPINAL CORD STIMULATOR STATUS: ICD-10-CM

## 2018-08-22 DIAGNOSIS — M96.1 CERVICAL POST-LAMINECTOMY SYNDROME: ICD-10-CM

## 2018-08-22 DIAGNOSIS — Z97.8 PRESENCE OF INTRATHECAL PUMP: ICD-10-CM

## 2018-08-22 DIAGNOSIS — M79.2 NEUROPATHIC PAIN: ICD-10-CM

## 2018-08-22 DIAGNOSIS — G90.512 COMPLEX REGIONAL PAIN SYNDROME TYPE 1 OF LEFT UPPER EXTREMITY: ICD-10-CM

## 2018-08-22 DIAGNOSIS — M96.1 LUMBAR POST-LAMINECTOMY SYNDROME: ICD-10-CM

## 2018-08-22 PROCEDURE — 99213 OFFICE O/P EST LOW 20 MIN: CPT | Performed by: ANESTHESIOLOGY

## 2018-08-22 PROCEDURE — 62367 ANALYZE SPINE INFUS PUMP: CPT | Performed by: ANESTHESIOLOGY

## 2018-08-22 RX ORDER — OXYCODONE AND ACETAMINOPHEN 10; 325 MG/1; MG/1
1 TABLET ORAL 2 TIMES DAILY PRN
Qty: 60 TABLET | Refills: 0 | Status: SHIPPED | OUTPATIENT
Start: 2018-08-22 | End: 2019-02-15 | Stop reason: SDUPTHER

## 2018-08-22 RX ORDER — PREGABALIN 75 MG/1
75 CAPSULE ORAL 3 TIMES DAILY
Qty: 90 CAPSULE | Refills: 2 | Status: SHIPPED | OUTPATIENT
Start: 2018-08-22 | End: 2018-10-08 | Stop reason: DRUGHIGH

## 2018-08-22 RX ORDER — DEXTROAMPHETAMINE SACCHARATE, AMPHETAMINE ASPARTATE, DEXTROAMPHETAMINE SULFATE AND AMPHETAMINE SULFATE 2.5; 2.5; 2.5; 2.5 MG/1; MG/1; MG/1; MG/1
5 TABLET ORAL DAILY
COMMUNITY

## 2018-08-22 RX ORDER — PREGABALIN 75 MG/1
75 CAPSULE ORAL 3 TIMES DAILY
COMMUNITY
End: 2018-08-22 | Stop reason: SDUPTHER

## 2018-08-22 NOTE — PROGRESS NOTES
"CHIEF COMPLAINT    F/U bilateral leg pain. Pt states her leg pain has been decreased possibly due to increasing the dosage of her lyrica. States she has been having low back pain.     Subjective   Amalia Lopez is a 62 y.o. female  who presents to the office for follow-up.She has a history of CPRS, CPLS, PLLS.    Intrathecal pump interrogates.... Running with Naropin 5mg/ml, at a dose of 1.5 mg/day.  Still has 32 ml in the tank according to ERV.  The DASH is 4 months on the battery.  No PTM.  No rate changes today.      She notes that she is doing better with regards to neuropathic pain and also radicular pain with use of Lyrica.  She is tolerating it better than Gabapentin.  Off gabapentin now, she realized she was \"foggy\" and her mood is improved.      We discussed in detail that she notes she was feeling overall better with regards to back pain when she was using Intrathecal opioid, with functional goals of being able to ambulate better and also drive with less discomfort.     She is traveling frequently to see a family member, driving 160 miles round trip multiple times a week, and Low back is flared.   Aching, shooting.  Increases with driving and will not settle down until lying down flat.  Severe.        History of Present Illness     PEG Assessment   What number best describes your pain on average in the past week?8  What number best describes how, during the past week, pain has interfered with your enjoyment of life?6 (legs), 8-9 (back and left arm)  What number best describes how, during the past week, pain has interfered with your general activity?  7      The following portions of the patient's history were reviewed and updated as appropriate: allergies, current medications, past family history, past medical history, past social history, past surgical history and problem list.    Review of Systems   Constitutional: Positive for appetite change (increased with lyrica). Negative for activity change, " "chills and fever.   HENT: Negative for voice change.    Eyes: Positive for visual disturbance (blurry).   Respiratory: Positive for apnea (wears c-pap). Negative for cough, shortness of breath and wheezing.    Cardiovascular: Negative for chest pain.   Gastrointestinal: Positive for abdominal pain, diarrhea (gastroporesis) and nausea (occ). Negative for constipation and vomiting.   Genitourinary: Negative for difficulty urinating and dysuria.   Musculoskeletal: Positive for back pain (leg pain,R>L as well) and neck pain. Negative for gait problem.   Neurological: Positive for weakness (arms,legs bilaterally), numbness (intermittent in arms,legs) and headaches (occ). Negative for dizziness and light-headedness.   Psychiatric/Behavioral: Positive for sleep disturbance. Negative for agitation, behavioral problems, confusion, hallucinations and suicidal ideas. The patient is not nervous/anxious.          Vitals:    08/22/18 1109   BP: 110/73   Pulse: 72   Resp: 15   Temp: 98.2 °F (36.8 °C)   SpO2: 100%   Weight: 52.9 kg (116 lb 9.6 oz)   Height: 165 cm (64.96\")   PainSc:   7   PainLoc: Leg  Comment: and back         Objective   Physical Exam   Nursing note and vitals reviewed.          Assessment/Plan   Amalia was seen today for extremity pain and back pain.    Diagnoses and all orders for this visit:    Chronic pain syndrome    Complex regional pain syndrome type 1 of left upper extremity    Presence of intrathecal pump    Spinal cord stimulator status    Cervical post-laminectomy syndrome    Lumbar post-laminectomy syndrome    Neuropathic pain    Other orders  -     oxyCODONE-acetaminophen (PERCOCET)  MG per tablet; Take 1 tablet by mouth 2 (Two) Times a Day As Needed for Moderate Pain .  -     pregabalin (LYRICA) 75 MG capsule; Take 1 capsule by mouth 3 (Three) Times a Day.        --- Follow-up for pump refill... Ordering new pump med... Morphine 0.2mg/ml plus Naropin 3mg/ml, 40 ml.. Plan to run at 0.5 ml/day, " I.e. 0.1mg morphine / 1.5mg Naropin / day.    -- continue Lyrica for neuropathic pain    -- education was extended today, 15 minutes of this 18 minute office visit, in addition to the pump interrogation.  We discussed whether to continue pump therapy and the role of ITP therapy and also nonnarcotic adjuvant antineuropathics for treating her multiple neuropathic type pains.  Weighing risks and benefits she feels she would like to continue IT pump therapy at this time and we will trial microdosing opioid.  We will not plan to escalate opioid above microdosing.  We educated about oral opioid risk again.  We discussed IT pump battery replacement surgery risks and benefits.      -- no plan to continue oral opioids long term    -- she is also nearing the end of life on her Spinal cord stimulator battery             SMITH REPORT    As part of the patient's treatment plan, I am prescribing controlled substances. The patient has been made aware of appropriate use of such medications, including potential risk of somnolence, limited ability to drive and/or work safely, and the potential for dependence or overdose. It has also bee made clear that these medications are for use by this patient only, without concomitant use of alcohol or other substances unless prescribed.     Patient has completed prescribing agreement detailing terms of continued prescribing of controlled substances, including monitoring SMITH reports, urine drug screening, and pill counts if necessary. The patient is aware that inappropriate use will results in cessation of prescribing such medications.    SMITH report has been reviewed and scanned into the patient's chart.    As the clinician, I personally reviewed the SMITH from as above while the patient was in the office today.    History and physical exam exhibit continued safe and appropriate use of controlled substances.      EMR Dragon/Transcription disclaimer:   Much of this encounter note is an  electronic transcription/translation of spoken language to printed text. The electronic translation of spoken language may permit erroneous, or at times, nonsensical words or phrases to be inadvertently transcribed; Although I have reviewed the note for such errors, some may still exist.

## 2018-08-27 ENCOUNTER — TELEPHONE (OUTPATIENT)
Dept: PAIN MEDICINE | Facility: CLINIC | Age: 63
End: 2018-08-27

## 2018-09-10 ENCOUNTER — OFFICE VISIT (OUTPATIENT)
Dept: PAIN MEDICINE | Facility: CLINIC | Age: 63
End: 2018-09-10

## 2018-09-10 VITALS
HEIGHT: 65 IN | RESPIRATION RATE: 16 BRPM | DIASTOLIC BLOOD PRESSURE: 62 MMHG | BODY MASS INDEX: 19.83 KG/M2 | WEIGHT: 119 LBS | OXYGEN SATURATION: 99 % | TEMPERATURE: 98.1 F | SYSTOLIC BLOOD PRESSURE: 105 MMHG | HEART RATE: 71 BPM

## 2018-09-10 DIAGNOSIS — M96.1 CERVICAL POST-LAMINECTOMY SYNDROME: ICD-10-CM

## 2018-09-10 DIAGNOSIS — G90.512 COMPLEX REGIONAL PAIN SYNDROME TYPE 1 OF LEFT UPPER EXTREMITY: ICD-10-CM

## 2018-09-10 DIAGNOSIS — M96.1 LUMBAR POST-LAMINECTOMY SYNDROME: ICD-10-CM

## 2018-09-10 DIAGNOSIS — G89.4 CHRONIC PAIN SYNDROME: Primary | ICD-10-CM

## 2018-09-10 DIAGNOSIS — Z97.8 PRESENCE OF INTRATHECAL PUMP: ICD-10-CM

## 2018-09-10 DIAGNOSIS — Z96.89 SPINAL CORD STIMULATOR STATUS: ICD-10-CM

## 2018-09-10 PROCEDURE — 62369 ANAL SP INF PMP W/REPRG&FILL: CPT | Performed by: ANESTHESIOLOGY

## 2018-09-10 NOTE — PROGRESS NOTES
CHIEF COMPLAINT  Pt states her R leg pain overall has been improved since about 4 weeks now.    Subjective   Amalia Lopez is a 62 y.o. female  who presents to the office for follow-up.She has a history of CRPS, CPLS, PLLS.    Intrathecal pump was interrogated in office today. Results are in chart.    The patient is currently at a dose of Nauropin of 1.5mg per day. There was no PTM    Under sterile technique, the pump was accessed using a [employer] proprietary refill kit. The volume was withdrawn from the pump. The expected residual volume of the pump was 26.5 ml. The actual residual volume of the pump was 26.8 ml.    The pump was then refilled with Intrathecal pump was interrogated in office today. Results are in chart.       Morphine 0.2mg/ml plus Naropin 3mg/ml, 40 ml.  Run at 0.5 ml/day, I.e. 0.1mg morphine / 1.5mg Naropin / day.    No PTM programmed.    Patient tolerated procedure well. Minimal bleeding was noted. Pump site remains intact with no evidence of erythema or drainage.    Patient will return for pump refill when due or sooner if needed.     DASH is 3 months.        History of Present Illness     PEG Assessment   What number best describes your pain on average in the past week?6  What number best describes how, during the past week, pain has interfered with your enjoyment of life?7  What number best describes how, during the past week, pain has interfered with your general activity?  6      The following portions of the patient's history were reviewed and updated as appropriate: allergies, current medications, past family history, past medical history, past social history, past surgical history and problem list.    Review of Systems   Constitutional: Positive for appetite change (increased with lyrica). Negative for activity change, chills and fever.   HENT: Positive for sinus pain. Negative for voice change.    Eyes: Positive for visual disturbance (blurry).   Respiratory: Positive for apnea (wears  "c-pap). Negative for cough, shortness of breath and wheezing.    Cardiovascular: Negative for chest pain.   Gastrointestinal: Positive for abdominal pain (improved), diarrhea (gastroporesis) and nausea (occ). Negative for constipation and vomiting.   Genitourinary: Negative for difficulty urinating and dysuria.   Musculoskeletal: Positive for back pain (leg pain,R>L as well) and neck pain. Negative for gait problem.   Neurological: Positive for weakness (arms,legs bilaterally  improved), numbness (intermittent in arms,legs) and headaches (occ). Negative for dizziness and light-headedness.   Psychiatric/Behavioral: Positive for sleep disturbance. Negative for agitation, behavioral problems, confusion, hallucinations and suicidal ideas. The patient is not nervous/anxious.            Vitals:    09/10/18 1247   BP: 105/62   Pulse: 71   Resp: 16   Temp: 98.1 °F (36.7 °C)   SpO2: 99%   Weight: 54 kg (119 lb)   Height: 165 cm (64.96\")   PainSc: 7  Comment: R leg pain ranges from 6-8/10   PainLoc: Leg         Objective   Physical Exam        Assessment/Plan   Amalia was seen today for leg pain.    Diagnoses and all orders for this visit:    Chronic pain syndrome    Presence of intrathecal pump    Spinal cord stimulator status    Cervical post-laminectomy syndrome    Lumbar post-laminectomy syndrome    Complex regional pain syndrome type 1 of left upper extremity        --- Follow-up 1 wk    -- reinitiate pump therapy as above with the new solution: Morphine 0.2mg/ml plus Naropin 3mg/ml, 40 ml.  Run at 0.5 ml/day, I.e. 0.1mg morphine / 1.5mg Naropin / day.               SMITH REPORT    As part of the patient's treatment plan, I am prescribing controlled substances. The patient has been made aware of appropriate use of such medications, including potential risk of somnolence, limited ability to drive and/or work safely, and the potential for dependence or overdose. It has also bee made clear that these medications are for " use by this patient only, without concomitant use of alcohol or other substances unless prescribed.     Patient has completed prescribing agreement detailing terms of continued prescribing of controlled substances, including monitoring SMITH reports, urine drug screening, and pill counts if necessary. The patient is aware that inappropriate use will results in cessation of prescribing such medications.    SMITH report has been reviewed and scanned into the patient's chart.    As the clinician, I personally reviewed the SMITH from as above while the patient was in the office today.    History and physical exam exhibit continued safe and appropriate use of controlled substances.      EMR Dragon/Transcription disclaimer:   Much of this encounter note is an electronic transcription/translation of spoken language to printed text. The electronic translation of spoken language may permit erroneous, or at times, nonsensical words or phrases to be inadvertently transcribed; Although I have reviewed the note for such errors, some may still exist.

## 2018-09-17 ENCOUNTER — OFFICE VISIT (OUTPATIENT)
Dept: PAIN MEDICINE | Facility: CLINIC | Age: 63
End: 2018-09-17

## 2018-09-17 VITALS
RESPIRATION RATE: 15 BRPM | BODY MASS INDEX: 19.86 KG/M2 | HEIGHT: 65 IN | TEMPERATURE: 96.4 F | SYSTOLIC BLOOD PRESSURE: 106 MMHG | WEIGHT: 119.2 LBS | OXYGEN SATURATION: 95 % | HEART RATE: 70 BPM | DIASTOLIC BLOOD PRESSURE: 61 MMHG

## 2018-09-17 DIAGNOSIS — G89.4 CHRONIC PAIN SYNDROME: ICD-10-CM

## 2018-09-17 DIAGNOSIS — Z96.89 SPINAL CORD STIMULATOR STATUS: ICD-10-CM

## 2018-09-17 DIAGNOSIS — Z46.2 END OF BATTERY LIFE OF INTRATHECAL INFUSION PUMP: ICD-10-CM

## 2018-09-17 DIAGNOSIS — G90.512 COMPLEX REGIONAL PAIN SYNDROME TYPE 1 OF LEFT UPPER EXTREMITY: ICD-10-CM

## 2018-09-17 DIAGNOSIS — M96.1 LUMBAR POST-LAMINECTOMY SYNDROME: ICD-10-CM

## 2018-09-17 DIAGNOSIS — Z97.8 PRESENCE OF INTRATHECAL PUMP: Primary | ICD-10-CM

## 2018-09-17 DIAGNOSIS — M96.1 CERVICAL POST-LAMINECTOMY SYNDROME: ICD-10-CM

## 2018-09-17 PROCEDURE — 99213 OFFICE O/P EST LOW 20 MIN: CPT | Performed by: ANESTHESIOLOGY

## 2018-09-17 PROCEDURE — 62367 ANALYZE SPINE INFUS PUMP: CPT | Performed by: ANESTHESIOLOGY

## 2018-09-17 NOTE — PROGRESS NOTES
CHIEF COMPLAINT    F/U leg and back pain. Pt states her pain has improved.     Subjective   Amalia Lopez is a 62 y.o. female  who presents to the office for follow-up.She has a history of back and neck issues. H/o CRPS.  PLLS. CPLS.       Pain   This is a chronic problem. The problem occurs constantly. The problem has been gradually improving. Associated symptoms include abdominal pain (improved), headaches (occ), nausea (occ), neck pain, numbness (intermittent in arms,legs) and weakness (arms,legs bilaterally  improved). Pertinent negatives include no chest pain, chills, coughing, fever or vomiting. The symptoms are aggravated by exertion and stress. The treatment provided moderate relief.        At the last office visit, her Intrathecal pump therapy was changed to continue with local anesthetic along with opioid microdosing.    Interrogation today.....  Cath tip at T1.    Estimated DASH is 3 months  Pump has Morphine 0.2mg/ML with Naropin 3mg/ml, infusing at 0.5mL per day, which is 0.1mg morphine and 1.5mg ropivacaine per day  Simple mode, no PTM  ERV is 36.5ml, with a refill interval of 69 days.  No program changes.  --    She continues to manage gastroparesis with Dr. Marta Paulino, and she is trialing dronabinol for gastroparesis symptoms.     PEG Assessment   What number best describes your pain on average in the past week?6  What number best describes how, during the past week, pain has interfered with your enjoyment of life?3  What number best describes how, during the past week, pain has interfered with your general activity?  4    --  The aforementioned information the Chief Complaint section and above subjective data including any HPI data has been personally reviewed and affirmed.  --        The following portions of the patient's history were reviewed and updated as appropriate: allergies, current medications, past family history, past medical history, past social history, past surgical history and  problem list.    -------    The following portions of the patient's history were reviewed and updated as appropriate: allergies, current medications, past family history, past medical history, past social history, past surgical history and problem list.    Allergies   Allergen Reactions   • Codeine Itching   • Fentanyl Other (See Comments)     Dystonic movements   • Morphine Other (See Comments)     Other reaction(s): Other (See Comments)  Headaches         Current Outpatient Prescriptions:   •  amphetamine-dextroamphetamine (ADDERALL) 10 MG tablet, Take 5 mg by mouth Daily., Disp: , Rfl:   •  CBD oil (cannabidiol) capsule, Take 1 capsule by mouth. PRN sleep, Disp: , Rfl:   •  chlorhexidine (PERIDEX) 0.12 % solution, RINSE AND SPIT 15ML DAILY, Disp: , Rfl: 11  •  DEXILANT 60 MG capsule, Take 1 capsule by mouth Daily., Disp: , Rfl: 11  •  Dronabinol (MARINOL PO), Take  by mouth., Disp: , Rfl:   •  FLUoxetine (PROzac) 40 MG capsule, Take 40 mg by mouth Daily., Disp: , Rfl: 3  •  Ibuprofen 200 MG capsule, Take 400 mg by mouth., Disp: , Rfl:   •  lidocaine (LIDODERM) 5 %, Place 1 patch on the skin Daily., Disp: 30 patch, Rfl: 11  •  linaclotide (LINZESS) 145 MCG capsule capsule, Take  by mouth., Disp: , Rfl:   •  loratadine (CLARITIN) 10 MG tablet, Take 10 mg by mouth., Disp: , Rfl:   •  ondansetron (ZOFRAN) 4 MG tablet, Take 4 mg by mouth Every 8 (Eight) Hours., Disp: , Rfl: 0  •  oxyCODONE-acetaminophen (PERCOCET)  MG per tablet, Take 1 tablet by mouth 2 (Two) Times a Day As Needed for Moderate Pain ., Disp: 60 tablet, Rfl: 0  •  polyethylene glycol (MIRALAX) packet, Take  by mouth., Disp: , Rfl:   •  pregabalin (LYRICA) 75 MG capsule, Take 1 capsule by mouth 3 (Three) Times a Day., Disp: 90 capsule, Rfl: 2  •  SENNA CO, by Combination route, Disp: , Rfl:   •  SUMAtriptan (IMITREX) 100 MG tablet, TAKE 1 TABLET BY MOUTH AS NEEDED FOR HEADACHE, Disp: , Rfl: 6  •  traZODone (DESYREL) 50 MG tablet, Take 25 mg by  mouth., Disp: , Rfl:   •  vitamin D (ERGOCALCIFEROL) 77161 units capsule capsule, TAKE 1 CAPSULE BY MOUTH EVERY 2 WEEKS, Disp: , Rfl: 3    Current Outpatient Prescriptions on File Prior to Visit   Medication Sig Dispense Refill   • amphetamine-dextroamphetamine (ADDERALL) 10 MG tablet Take 5 mg by mouth Daily.     • chlorhexidine (PERIDEX) 0.12 % solution RINSE AND SPIT 15ML DAILY  11   • DEXILANT 60 MG capsule Take 1 capsule by mouth Daily.  11   • Dronabinol (MARINOL PO) Take  by mouth.     • FLUoxetine (PROzac) 40 MG capsule Take 40 mg by mouth Daily.  3   • Ibuprofen 200 MG capsule Take 400 mg by mouth.     • lidocaine (LIDODERM) 5 % Place 1 patch on the skin Daily. 30 patch 11   • linaclotide (LINZESS) 145 MCG capsule capsule Take  by mouth.     • loratadine (CLARITIN) 10 MG tablet Take 10 mg by mouth.     • ondansetron (ZOFRAN) 4 MG tablet Take 4 mg by mouth Every 8 (Eight) Hours.  0   • oxyCODONE-acetaminophen (PERCOCET)  MG per tablet Take 1 tablet by mouth 2 (Two) Times a Day As Needed for Moderate Pain . 60 tablet 0   • polyethylene glycol (MIRALAX) packet Take  by mouth.     • pregabalin (LYRICA) 75 MG capsule Take 1 capsule by mouth 3 (Three) Times a Day. 90 capsule 2   • SENNA CO by Combination route     • SUMAtriptan (IMITREX) 100 MG tablet TAKE 1 TABLET BY MOUTH AS NEEDED FOR HEADACHE  6   • traZODone (DESYREL) 50 MG tablet Take 25 mg by mouth.     • vitamin D (ERGOCALCIFEROL) 61180 units capsule capsule TAKE 1 CAPSULE BY MOUTH EVERY 2 WEEKS  3     No current facility-administered medications on file prior to visit.        Patient Active Problem List   Diagnosis   • Chronic pain syndrome   • Neuropathic pain   • Complex regional pain syndrome type 1 of left upper extremity   • Presence of intrathecal pump   • Spinal cord stimulator status   • Cervical post-laminectomy syndrome   • Lumbar post-laminectomy syndrome   • Bilateral occipital neuralgia   • Cervical spondylosis without myelopathy   •  End of battery life of intrathecal infusion pump       Past Medical History:   Diagnosis Date   • Depression    • Joint pain    • Low back pain    • Migraine    • Neck pain    • Osteoarthritis    • Peripheral neuropathy        Past Surgical History:   Procedure Laterality Date   • BACK SURGERY     • CHOLECYSTECTOMY     • EPIDURAL BLOCK     • HYSTERECTOMY     • IMPLANTATION / REPLACEMENT INFUSION PUMP     • KNEE ARTHROSCOPY Right    • NECK SURGERY     • PANCREAS SURGERY     • SPINAL CORD STIMULATOR IMPLANT     • TRIGGER POINT INJECTION         History reviewed. No pertinent family history.    Social History     Social History   • Marital status:      Spouse name: N/A   • Number of children: N/A   • Years of education: N/A     Occupational History   • Not on file.     Social History Main Topics   • Smoking status: Never Smoker   • Smokeless tobacco: Never Used   • Alcohol use No   • Drug use: No   • Sexual activity: Not on file     Other Topics Concern   • Not on file     Social History Narrative   • No narrative on file       -------        Review of Systems   Constitutional: Positive for appetite change (increased with lyrica). Negative for activity change, chills and fever.   HENT: Negative for sinus pain and voice change.    Eyes: Positive for visual disturbance (blurry).   Respiratory: Positive for apnea (wears c-pap). Negative for cough, shortness of breath and wheezing.    Cardiovascular: Negative for chest pain.   Gastrointestinal: Positive for abdominal pain (improved), diarrhea (gastroporesis) and nausea (occ). Negative for constipation and vomiting.   Genitourinary: Negative for difficulty urinating and dysuria.   Musculoskeletal: Positive for back pain (leg pain,R>L as well) and neck pain. Negative for gait problem.   Neurological: Positive for weakness (arms,legs bilaterally  improved), numbness (intermittent in arms,legs) and headaches (occ). Negative for dizziness and light-headedness.  "  Psychiatric/Behavioral: Positive for sleep disturbance (pt states she is trying CBD softgels for sleep). Negative for agitation, behavioral problems, confusion, hallucinations and suicidal ideas. The patient is not nervous/anxious.              Vitals:    09/17/18 1108   BP: 106/61   Pulse: 70   Resp: 15   Temp: 96.4 °F (35.8 °C)   SpO2: 95%   Weight: 54.1 kg (119 lb 3.2 oz)   Height: 165 cm (64.96\")   PainSc:   6   PainLoc: Leg  Comment: right leg and back         Objective   Physical Exam   Constitutional: She is oriented to person, place, and time. She appears well-developed and well-nourished.   HENT:   Head: Normocephalic and atraumatic.   Pulmonary/Chest: Effort normal.   Abdominal: Normal appearance.       Neurological: She is alert and oriented to person, place, and time.   Psychiatric: She has a normal mood and affect. Her behavior is normal.   Nursing note and vitals reviewed.          Assessment/Plan   Amalia was seen today for back pain and extremity pain.    Diagnoses and all orders for this visit:    Presence of intrathecal pump  -     Cancel: Case Request  -     Case Request    Spinal cord stimulator status    Cervical post-laminectomy syndrome    Lumbar post-laminectomy syndrome    Chronic pain syndrome    Complex regional pain syndrome type 1 of left upper extremity    End of battery life of intrathecal infusion pump  -     Case Request        --- Follow-up 2 months for routine follow up for pump refill  --- in the meantime we are seeking auth for pump battery replacement, as she is approaching end of life for the device                 SMITH REPORT    As part of the patient's treatment plan, I am prescribing controlled substances. The patient has been made aware of appropriate use of such medications, including potential risk of somnolence, limited ability to drive and/or work safely, and the potential for dependence or overdose. It has also bee made clear that these medications are for use by " this patient only, without concomitant use of alcohol or other substances unless prescribed.     Patient has completed prescribing agreement detailing terms of continued prescribing of controlled substances, including monitoring SMITH reports, urine drug screening, and pill counts if necessary. The patient is aware that inappropriate use will results in cessation of prescribing such medications.    SMITH report has been reviewed and scanned into the patient's chart.    As the clinician, I personally reviewed the SMITH from as above while the patient was in the office today.    History and physical exam exhibit continued safe and appropriate use of controlled substances.      EMR Dragon/Transcription disclaimer:   Much of this encounter note is an electronic transcription/translation of spoken language to printed text. The electronic translation of spoken language may permit erroneous, or at times, nonsensical words or phrases to be inadvertently transcribed; Although I have reviewed the note for such errors, some may still exist.

## 2018-09-21 ENCOUNTER — HOSPITAL ENCOUNTER (OUTPATIENT)
Dept: LAB | Facility: HOSPITAL | Age: 63
Discharge: HOME | End: 2018-09-21
Attending: INTERNAL MEDICINE | Admitting: INTERNAL MEDICINE

## 2018-09-21 LAB
ALBUMIN SERPL-MCNC: 4.7 G/DL (ref 3.5–4.8)
ALBUMIN/GLOB SERPL: 1.9 {RATIO} (ref 1–1.7)
ALP SERPL-CCNC: 68 IU/L (ref 32–91)
ALT SERPL-CCNC: 14 IU/L (ref 14–54)
ANION GAP SERPL CALC-SCNC: 13.1 MMOL/L (ref 10–20)
AST SERPL-CCNC: 19 IU/L (ref 15–41)
BASOPHILS # BLD AUTO: 0.1 10*3/UL (ref 0–0.2)
BASOPHILS NFR BLD AUTO: 1 % (ref 0–2)
BILIRUB SERPL-MCNC: 0.4 MG/DL (ref 0.3–1.2)
BUN SERPL-MCNC: 12 MG/DL (ref 8–20)
BUN/CREAT SERPL: 15 (ref 5.4–26.2)
CALCIUM SERPL-MCNC: 9.7 MG/DL (ref 8.9–10.3)
CHLORIDE SERPL-SCNC: 98 MMOL/L (ref 101–111)
CONV CO2: 29 MMOL/L (ref 22–32)
CONV TOTAL PROTEIN: 7.2 G/DL (ref 6.1–7.9)
CREAT UR-MCNC: 0.8 MG/DL (ref 0.4–1)
DIFFERENTIAL METHOD BLD: (no result)
EOSINOPHIL # BLD AUTO: 0.1 10*3/UL (ref 0–0.3)
EOSINOPHIL # BLD AUTO: 2 % (ref 0–3)
ERYTHROCYTE [DISTWIDTH] IN BLOOD BY AUTOMATED COUNT: 11.9 % (ref 11.5–14.5)
GLOBULIN UR ELPH-MCNC: 2.5 G/DL (ref 2.5–3.8)
GLUCOSE SERPL-MCNC: 121 MG/DL (ref 65–99)
HCT VFR BLD AUTO: 42.9 % (ref 35–49)
HGB BLD-MCNC: 14.2 G/DL (ref 12–15)
LYMPHOCYTES # BLD AUTO: 1.5 10*3/UL (ref 0.8–4.8)
LYMPHOCYTES NFR BLD AUTO: 23 % (ref 18–42)
MCH RBC QN AUTO: 30 PG (ref 26–32)
MCHC RBC AUTO-ENTMCNC: 33 G/DL (ref 32–36)
MCV RBC AUTO: 90.9 FL (ref 80–94)
MONOCYTES # BLD AUTO: 0.4 10*3/UL (ref 0.1–1.3)
MONOCYTES NFR BLD AUTO: 7 % (ref 2–11)
NEUTROPHILS # BLD AUTO: 4.5 10*3/UL (ref 2.3–8.6)
NEUTROPHILS NFR BLD AUTO: 67 % (ref 50–75)
NRBC BLD AUTO-RTO: 0 /100{WBCS}
NRBC/RBC NFR BLD MANUAL: 0 10*3/UL
PLATELET # BLD AUTO: 296 10*3/UL (ref 150–450)
PMV BLD AUTO: 8.7 FL (ref 7.4–10.4)
POTASSIUM SERPL-SCNC: 4.1 MMOL/L (ref 3.6–5.1)
RBC # BLD AUTO: 4.72 10*6/UL (ref 4–5.4)
SODIUM SERPL-SCNC: 136 MMOL/L (ref 136–144)
WBC # BLD AUTO: 6.7 10*3/UL (ref 4.5–11.5)

## 2018-09-24 ENCOUNTER — OFFICE (OUTPATIENT)
Dept: URBAN - METROPOLITAN AREA CLINIC 66 | Facility: CLINIC | Age: 63
End: 2018-09-24
Payer: COMMERCIAL

## 2018-09-24 VITALS
SYSTOLIC BLOOD PRESSURE: 112 MMHG | DIASTOLIC BLOOD PRESSURE: 72 MMHG | HEIGHT: 66 IN | HEART RATE: 56 BPM | WEIGHT: 118 LBS

## 2018-09-24 DIAGNOSIS — K21.9 GASTRO-ESOPHAGEAL REFLUX DISEASE WITHOUT ESOPHAGITIS: ICD-10-CM

## 2018-09-24 DIAGNOSIS — K31.84 GASTROPARESIS: ICD-10-CM

## 2018-09-24 DIAGNOSIS — R11.0 NAUSEA: ICD-10-CM

## 2018-09-24 DIAGNOSIS — D50.0 IRON DEFICIENCY ANEMIA SECONDARY TO BLOOD LOSS (CHRONIC): ICD-10-CM

## 2018-09-24 PROCEDURE — 99214 OFFICE O/P EST MOD 30 MIN: CPT

## 2018-10-03 DIAGNOSIS — Z01.818 PRE-OP EVALUATION: Primary | ICD-10-CM

## 2018-10-08 ENCOUNTER — TELEPHONE (OUTPATIENT)
Dept: PAIN MEDICINE | Facility: CLINIC | Age: 63
End: 2018-10-08

## 2018-10-08 ENCOUNTER — OFFICE VISIT (OUTPATIENT)
Dept: PAIN MEDICINE | Facility: CLINIC | Age: 63
End: 2018-10-08

## 2018-10-08 ENCOUNTER — RESULTS ENCOUNTER (OUTPATIENT)
Dept: PAIN MEDICINE | Facility: CLINIC | Age: 63
End: 2018-10-08

## 2018-10-08 ENCOUNTER — HOSPITAL ENCOUNTER (OUTPATIENT)
Dept: CARDIOLOGY | Facility: HOSPITAL | Age: 63
Discharge: HOME | End: 2018-10-08
Attending: ANESTHESIOLOGY

## 2018-10-08 ENCOUNTER — LAB (OUTPATIENT)
Dept: LAB | Facility: HOSPITAL | Age: 63
End: 2018-10-08

## 2018-10-08 ENCOUNTER — HOSPITAL ENCOUNTER (OUTPATIENT)
Dept: GENERAL RADIOLOGY | Facility: HOSPITAL | Age: 63
Discharge: HOME | End: 2018-10-08
Attending: ANESTHESIOLOGY | Admitting: ANESTHESIOLOGY

## 2018-10-08 VITALS
HEART RATE: 72 BPM | WEIGHT: 118.6 LBS | DIASTOLIC BLOOD PRESSURE: 59 MMHG | HEIGHT: 65 IN | BODY MASS INDEX: 19.76 KG/M2 | TEMPERATURE: 98 F | OXYGEN SATURATION: 99 % | RESPIRATION RATE: 15 BRPM | SYSTOLIC BLOOD PRESSURE: 98 MMHG

## 2018-10-08 DIAGNOSIS — M96.1 LUMBAR POST-LAMINECTOMY SYNDROME: ICD-10-CM

## 2018-10-08 DIAGNOSIS — Z01.818 PRE-OP EVALUATION: ICD-10-CM

## 2018-10-08 DIAGNOSIS — Z96.89 SPINAL CORD STIMULATOR STATUS: ICD-10-CM

## 2018-10-08 DIAGNOSIS — Z97.8 PRESENCE OF INTRATHECAL PUMP: ICD-10-CM

## 2018-10-08 DIAGNOSIS — G89.4 CHRONIC PAIN SYNDROME: Primary | ICD-10-CM

## 2018-10-08 DIAGNOSIS — M79.2 NEUROPATHIC PAIN: ICD-10-CM

## 2018-10-08 DIAGNOSIS — Z46.2 END OF BATTERY LIFE OF INTRATHECAL INFUSION PUMP: ICD-10-CM

## 2018-10-08 DIAGNOSIS — M96.1 CERVICAL POST-LAMINECTOMY SYNDROME: ICD-10-CM

## 2018-10-08 DIAGNOSIS — G90.512 COMPLEX REGIONAL PAIN SYNDROME TYPE 1 OF LEFT UPPER EXTREMITY: ICD-10-CM

## 2018-10-08 LAB
ANION GAP SERPL CALCULATED.3IONS-SCNC: 10.5 MMOL/L
BASOPHILS # BLD AUTO: 0.05 10*3/MM3 (ref 0–0.2)
BASOPHILS NFR BLD AUTO: 0.5 % (ref 0–1.5)
BUN BLD-MCNC: 14 MG/DL (ref 8–23)
BUN/CREAT SERPL: 18.2 (ref 7–25)
CALCIUM SPEC-SCNC: 9.7 MG/DL (ref 8.6–10.5)
CHLORIDE SERPL-SCNC: 101 MMOL/L (ref 98–107)
CO2 SERPL-SCNC: 29.5 MMOL/L (ref 22–29)
CREAT BLD-MCNC: 0.77 MG/DL (ref 0.57–1)
DEPRECATED RDW RBC AUTO: 39.8 FL (ref 37–54)
EOSINOPHIL # BLD AUTO: 0.14 10*3/MM3 (ref 0–0.7)
EOSINOPHIL NFR BLD AUTO: 1.4 % (ref 0.3–6.2)
ERYTHROCYTE [DISTWIDTH] IN BLOOD BY AUTOMATED COUNT: 11.8 % (ref 11.7–13)
GFR SERPL CREATININE-BSD FRML MDRD: 76 ML/MIN/1.73
GFR SERPL CREATININE-BSD FRML MDRD: 93 ML/MIN/1.73
GLUCOSE BLD-MCNC: 98 MG/DL (ref 65–99)
HCT VFR BLD AUTO: 38.6 % (ref 35.6–45.5)
HGB BLD-MCNC: 12.7 G/DL (ref 11.9–15.5)
IMM GRANULOCYTES # BLD: 0.04 10*3/MM3 (ref 0–0.03)
IMM GRANULOCYTES NFR BLD: 0.4 % (ref 0–0.5)
LYMPHOCYTES # BLD AUTO: 1.52 10*3/MM3 (ref 0.9–4.8)
LYMPHOCYTES NFR BLD AUTO: 15.7 % (ref 19.6–45.3)
MCH RBC QN AUTO: 30.3 PG (ref 26.9–32)
MCHC RBC AUTO-ENTMCNC: 32.9 G/DL (ref 32.4–36.3)
MCV RBC AUTO: 92.1 FL (ref 80.5–98.2)
MONOCYTES # BLD AUTO: 0.68 10*3/MM3 (ref 0.2–1.2)
MONOCYTES NFR BLD AUTO: 7 % (ref 5–12)
NEUTROPHILS # BLD AUTO: 7.28 10*3/MM3 (ref 1.9–8.1)
NEUTROPHILS NFR BLD AUTO: 75 % (ref 42.7–76)
NRBC BLD MANUAL-RTO: 0 /100 WBC (ref 0–0)
PLATELET # BLD AUTO: 204 10*3/MM3 (ref 140–500)
PMV BLD AUTO: 10.1 FL (ref 6–12)
POTASSIUM BLD-SCNC: 4.2 MMOL/L (ref 3.5–5.2)
RBC # BLD AUTO: 4.19 10*6/MM3 (ref 3.9–5.2)
SODIUM BLD-SCNC: 141 MMOL/L (ref 136–145)
WBC NRBC COR # BLD: 9.71 10*3/MM3 (ref 4.5–10.7)

## 2018-10-08 PROCEDURE — 36415 COLL VENOUS BLD VENIPUNCTURE: CPT | Performed by: NURSE PRACTITIONER

## 2018-10-08 PROCEDURE — 87081 CULTURE SCREEN ONLY: CPT | Performed by: ANESTHESIOLOGY

## 2018-10-08 PROCEDURE — 99214 OFFICE O/P EST MOD 30 MIN: CPT | Performed by: ANESTHESIOLOGY

## 2018-10-08 PROCEDURE — 80048 BASIC METABOLIC PNL TOTAL CA: CPT | Performed by: ANESTHESIOLOGY

## 2018-10-08 PROCEDURE — 62367 ANALYZE SPINE INFUS PUMP: CPT | Performed by: ANESTHESIOLOGY

## 2018-10-08 PROCEDURE — 85025 COMPLETE CBC W/AUTO DIFF WBC: CPT | Performed by: ANESTHESIOLOGY

## 2018-10-08 PROCEDURE — 71046 X-RAY EXAM CHEST 2 VIEWS: CPT

## 2018-10-08 RX ORDER — LIDOCAINE 50 MG/G
3 PATCH TOPICAL EVERY 24 HOURS
Qty: 90 PATCH | Refills: 11 | Status: SHIPPED | OUTPATIENT
Start: 2018-10-08 | End: 2020-06-12 | Stop reason: SDUPTHER

## 2018-10-08 RX ORDER — PREGABALIN 100 MG/1
100 CAPSULE ORAL 3 TIMES DAILY
Qty: 90 CAPSULE | Refills: 2 | Status: SHIPPED | OUTPATIENT
Start: 2018-10-08 | End: 2019-02-15 | Stop reason: SDUPTHER

## 2018-10-08 NOTE — PROGRESS NOTES
CHIEF COMPLAINT    F/U back pain. Pt's low back pain is decreased, legs and neck pain bothering her moreso.     Subjective   Amalia Lopez is a 62 y.o. female  who presents to the office for follow-up.She has a history of CRPS, spine pain.    Pump interrogation:  Running concentration of Morphine 0.2mg and Naropin 3mg/ml, running at 0.5 mL/day (0.1mg/1.5mg/day).  ERV 26ml.  Refill interval 48 days.  DASH is 2 months.  Cath vol is 0.197ml.    Cath tip at T1.  This is a 40ml pump but on replacement she requests a 20ml pump.     Regarding SCS therapy, she is recharging her SCS system every 2 days.  It is about 10 years old.       Pain   This is a chronic problem. The problem occurs constantly. The problem has been unchanged. Associated symptoms include abdominal pain (improved), headaches (occ), nausea (occ), neck pain, numbness (intermittent in arms,legs) and weakness (arms,legs bilaterally  improved). Pertinent negatives include no chest pain, chills, coughing, fever, rash or vomiting. The symptoms are aggravated by exertion and stress. The treatment provided moderate relief.   Back Pain   This is a chronic problem. The current episode started more than 1 year ago. The problem occurs constantly. The problem is unchanged. The pain is present in the lumbar spine. The quality of the pain is described as aching. The pain radiates to the left foot and right foot. The pain is moderate. Associated symptoms include abdominal pain (improved), headaches (occ), numbness (intermittent in arms,legs) and weakness (arms,legs bilaterally  improved). Pertinent negatives include no chest pain, dysuria or fever. Risk factors include menopause and sedentary lifestyle. She has tried analgesics for the symptoms. The treatment provided moderate relief.   Neck Pain    This is a chronic problem. The problem occurs constantly. The problem has been gradually worsening. The pain is present in the left side, right side, midline and occipital  region. The quality of the pain is described as aching, burning and shooting. The pain is moderate. Associated symptoms include headaches (occ), numbness (intermittent in arms,legs) and weakness (arms,legs bilaterally  improved). Pertinent negatives include no chest pain or fever. She has tried acetaminophen, heat, muscle relaxants and oral narcotics for the symptoms. The treatment provided mild relief.   Arm Pain    Incident onset: years ago. Injury mechanism: CRPS. The pain is present in the left hand, left wrist, left fingers and left forearm. The quality of the pain is described as burning and shooting. The pain is moderate. Associated symptoms include numbness (intermittent in arms,legs). Pertinent negatives include no chest pain. The symptoms are aggravated by movement and palpation. The treatment provided moderate relief.        PEG Assessment   What number best describes your pain on average in the past week?2 (low back), 7 (legs and neck)  What number best describes how, during the past week, pain has interfered with your enjoyment of life?4  What number best describes how, during the past week, pain has interfered with your general activity?  8    --  The aforementioned information the Chief Complaint section and above subjective data including any HPI data has been personally reviewed and affirmed.  --        The following portions of the patient's history were reviewed and updated as appropriate: allergies, current medications, past family history, past medical history, past social history, past surgical history and problem list.    -------    The following portions of the patient's history were reviewed and updated as appropriate: allergies, current medications, past family history, past medical history, past social history, past surgical history and problem list.    Allergies   Allergen Reactions   • Codeine Itching   • Fentanyl Other (See Comments)     Dystonic movements   • Morphine Other (See  Comments)     Other reaction(s): Other (See Comments)  Headaches         Current Outpatient Prescriptions:   •  amphetamine-dextroamphetamine (ADDERALL) 10 MG tablet, Take 5 mg by mouth Daily., Disp: , Rfl:   •  CBD oil (cannabidiol) capsule, Take 1 capsule by mouth. PRN sleep, Disp: , Rfl:   •  chlorhexidine (PERIDEX) 0.12 % solution, RINSE AND SPIT 15ML DAILY, Disp: , Rfl: 11  •  DEXILANT 60 MG capsule, Take 1 capsule by mouth Daily., Disp: , Rfl: 11  •  Dronabinol (MARINOL PO), Take  by mouth., Disp: , Rfl:   •  FLUoxetine (PROzac) 40 MG capsule, Take 40 mg by mouth Daily., Disp: , Rfl: 3  •  Ibuprofen 200 MG capsule, Take 400 mg by mouth., Disp: , Rfl:   •  lidocaine (LIDODERM) 5 %, Place 3 patches on the skin as directed by provider Daily., Disp: 90 patch, Rfl: 11  •  linaclotide (LINZESS) 145 MCG capsule capsule, Take  by mouth., Disp: , Rfl:   •  loratadine (CLARITIN) 10 MG tablet, Take 10 mg by mouth., Disp: , Rfl:   •  NON FORMULARY, Pancreatic enzymes, Disp: , Rfl:   •  ondansetron (ZOFRAN) 4 MG tablet, Take 4 mg by mouth Every 8 (Eight) Hours., Disp: , Rfl: 0  •  oxyCODONE-acetaminophen (PERCOCET)  MG per tablet, Take 1 tablet by mouth 2 (Two) Times a Day As Needed for Moderate Pain ., Disp: 60 tablet, Rfl: 0  •  polyethylene glycol (MIRALAX) packet, Take  by mouth., Disp: , Rfl:   •  SENNA CO, by Combination route, Disp: , Rfl:   •  SUMAtriptan (IMITREX) 100 MG tablet, TAKE 1 TABLET BY MOUTH AS NEEDED FOR HEADACHE, Disp: , Rfl: 6  •  traZODone (DESYREL) 50 MG tablet, Take 25 mg by mouth., Disp: , Rfl:   •  vitamin D (ERGOCALCIFEROL) 46642 units capsule capsule, TAKE 1 CAPSULE BY MOUTH EVERY 2 WEEKS, Disp: , Rfl: 3  •  pregabalin (LYRICA) 100 MG capsule, Take 1 capsule by mouth 3 (Three) Times a Day., Disp: 90 capsule, Rfl: 2    Current Outpatient Prescriptions on File Prior to Visit   Medication Sig Dispense Refill   • amphetamine-dextroamphetamine (ADDERALL) 10 MG tablet Take 5 mg by mouth Daily.      • CBD oil (cannabidiol) capsule Take 1 capsule by mouth. PRN sleep     • chlorhexidine (PERIDEX) 0.12 % solution RINSE AND SPIT 15ML DAILY  11   • DEXILANT 60 MG capsule Take 1 capsule by mouth Daily.  11   • Dronabinol (MARINOL PO) Take  by mouth.     • FLUoxetine (PROzac) 40 MG capsule Take 40 mg by mouth Daily.  3   • Ibuprofen 200 MG capsule Take 400 mg by mouth.     • linaclotide (LINZESS) 145 MCG capsule capsule Take  by mouth.     • loratadine (CLARITIN) 10 MG tablet Take 10 mg by mouth.     • ondansetron (ZOFRAN) 4 MG tablet Take 4 mg by mouth Every 8 (Eight) Hours.  0   • oxyCODONE-acetaminophen (PERCOCET)  MG per tablet Take 1 tablet by mouth 2 (Two) Times a Day As Needed for Moderate Pain . 60 tablet 0   • polyethylene glycol (MIRALAX) packet Take  by mouth.     • SENNA CO by Combination route     • SUMAtriptan (IMITREX) 100 MG tablet TAKE 1 TABLET BY MOUTH AS NEEDED FOR HEADACHE  6   • traZODone (DESYREL) 50 MG tablet Take 25 mg by mouth.     • vitamin D (ERGOCALCIFEROL) 08858 units capsule capsule TAKE 1 CAPSULE BY MOUTH EVERY 2 WEEKS  3     No current facility-administered medications on file prior to visit.        Patient Active Problem List   Diagnosis   • Chronic pain syndrome   • Neuropathic pain   • Complex regional pain syndrome type 1 of left upper extremity   • Presence of intrathecal pump   • Spinal cord stimulator status   • Cervical post-laminectomy syndrome   • Lumbar post-laminectomy syndrome   • Bilateral occipital neuralgia   • Cervical spondylosis without myelopathy   • End of battery life of intrathecal infusion pump       Past Medical History:   Diagnosis Date   • Depression    • Joint pain    • Low back pain    • Migraine    • Neck pain    • Osteoarthritis    • Peripheral neuropathy        Past Surgical History:   Procedure Laterality Date   • BACK SURGERY     • CHOLECYSTECTOMY     • EPIDURAL BLOCK     • HYSTERECTOMY     • IMPLANTATION / REPLACEMENT INFUSION PUMP     • KNEE  ARTHROSCOPY Right    • NECK SURGERY     • PANCREAS SURGERY     • SPINAL CORD STIMULATOR IMPLANT     • TRIGGER POINT INJECTION         History reviewed. No pertinent family history.    Social History     Social History   • Marital status:      Spouse name: N/A   • Number of children: N/A   • Years of education: N/A     Occupational History   • Not on file.     Social History Main Topics   • Smoking status: Never Smoker   • Smokeless tobacco: Never Used   • Alcohol use No   • Drug use: No   • Sexual activity: Not on file     Other Topics Concern   • Not on file     Social History Narrative   • No narrative on file       -------        Review of Systems   Constitutional: Positive for appetite change (increased taking myrinol ). Negative for activity change, chills and fever.   HENT: Negative for sinus pain and voice change.    Eyes: Positive for visual disturbance (blurry).   Respiratory: Positive for apnea (wears c-pap). Negative for cough, shortness of breath and wheezing.    Cardiovascular: Negative for chest pain.   Gastrointestinal: Positive for abdominal pain (improved), diarrhea (gastroporesis) and nausea (occ). Negative for constipation and vomiting.   Endocrine: Negative for polydipsia and polyuria.   Genitourinary: Negative for difficulty urinating and dysuria.   Musculoskeletal: Positive for back pain (leg pain,R>L as well) and neck pain. Negative for gait problem.   Skin: Negative for rash and wound.   Allergic/Immunologic: Negative for immunocompromised state.   Neurological: Positive for weakness (arms,legs bilaterally  improved), numbness (intermittent in arms,legs) and headaches (occ). Negative for dizziness and light-headedness.   Hematological: Does not bruise/bleed easily.   Psychiatric/Behavioral: Negative for agitation, behavioral problems, confusion, hallucinations, sleep disturbance and suicidal ideas. The patient is not nervous/anxious.            Vitals:    10/08/18 1238   BP: 98/59  "  Pulse: 72   Resp: 15   Temp: 98 °F (36.7 °C)   SpO2: 99%   Weight: 53.8 kg (118 lb 9.6 oz)   Height: 165 cm (64.96\")   PainSc:   5   PainLoc: Leg  Comment: and neck         Objective   Physical Exam   Constitutional: She is oriented to person, place, and time. She appears well-developed and well-nourished.   HENT:   Head: Normocephalic and atraumatic.   Abdominal:       Neurological: She is alert and oriented to person, place, and time.   Psychiatric: She has a normal mood and affect. Her behavior is normal.   Nursing note and vitals reviewed.          Assessment/Plan   Amalia was seen today for back pain, extremity pain and neck pain.    Diagnoses and all orders for this visit:    Chronic pain syndrome    Complex regional pain syndrome type 1 of left upper extremity    Neuropathic pain    Presence of intrathecal pump    Spinal cord stimulator status    Cervical post-laminectomy syndrome  -     Ambulatory Referral to Physical Therapy Evaluate and treat    Lumbar post-laminectomy syndrome    End of battery life of intrathecal infusion pump    Other orders  -     pregabalin (LYRICA) 100 MG capsule; Take 1 capsule by mouth 3 (Three) Times a Day.  -     lidocaine (LIDODERM) 5 %; Place 3 patches on the skin as directed by provider Daily.          --- Follow-up for IT pump replacement.. She requests a 20ml pump... We would need to go to a higher concentration.    --- increase in Lyrica from 75mg to 100mg    -- Lidoderm continues to help... Dose adjustment for nerve related pain    -- if this new Lyrica dose is helpful and if she is stable at that dose, then we could consider Lyrica CR at equivalent dose    -- request SCS interrogation with [facility]... Concern for battery end of life for the SCS system                 SMITH REPORT    As part of the patient's treatment plan, I am prescribing controlled substances. The patient has been made aware of appropriate use of such medications, including potential risk of " somnolence, limited ability to drive and/or work safely, and the potential for dependence or overdose. It has also bee made clear that these medications are for use by this patient only, without concomitant use of alcohol or other substances unless prescribed.     Patient has completed prescribing agreement detailing terms of continued prescribing of controlled substances, including monitoring SMITH reports, urine drug screening, and pill counts if necessary. The patient is aware that inappropriate use will results in cessation of prescribing such medications.    SMITH report has been reviewed and scanned into the patient's chart.    As the clinician, I personally reviewed the SMITH from as above while the patient was in the office today.    History and physical exam exhibit continued safe and appropriate use of controlled substances.      EMR Dragon/Transcription disclaimer:   Much of this encounter note is an electronic transcription/translation of spoken language to printed text. The electronic translation of spoken language may permit erroneous, or at times, nonsensical words or phrases to be inadvertently transcribed; Although I have reviewed the note for such errors, some may still exist.

## 2018-10-09 NOTE — TELEPHONE ENCOUNTER
Spoke to patient that she does need to complete the EKG anytime between now and up to a week before surgery

## 2018-10-10 LAB — MRSA SPEC QL CULT: NORMAL

## 2018-10-15 ENCOUNTER — HOSPITAL ENCOUNTER (OUTPATIENT)
Dept: OTHER | Facility: HOSPITAL | Age: 63
Setting detail: RECURRING SERIES
Discharge: HOME | End: 2018-12-21
Attending: ANESTHESIOLOGY | Admitting: ANESTHESIOLOGY

## 2018-10-17 ENCOUNTER — AMBULATORY SURGICAL CENTER (OUTPATIENT)
Dept: URBAN - METROPOLITAN AREA SURGERY 20 | Facility: SURGERY | Age: 63
End: 2018-10-17
Payer: COMMERCIAL

## 2018-10-17 ENCOUNTER — OFFICE (OUTPATIENT)
Dept: URBAN - METROPOLITAN AREA PATHOLOGY 4 | Facility: PATHOLOGY | Age: 63
End: 2018-10-17
Payer: COMMERCIAL

## 2018-10-17 DIAGNOSIS — K21.0 GASTRO-ESOPHAGEAL REFLUX DISEASE WITH ESOPHAGITIS: ICD-10-CM

## 2018-10-17 DIAGNOSIS — K63.5 POLYP OF COLON: ICD-10-CM

## 2018-10-17 DIAGNOSIS — K90.9 INTESTINAL MALABSORPTION, UNSPECIFIED: ICD-10-CM

## 2018-10-17 DIAGNOSIS — K63.89 OTHER SPECIFIED DISEASES OF INTESTINE: ICD-10-CM

## 2018-10-17 DIAGNOSIS — D50.9 IRON DEFICIENCY ANEMIA, UNSPECIFIED: ICD-10-CM

## 2018-10-17 DIAGNOSIS — K57.30 DIVERTICULOSIS OF LARGE INTESTINE WITHOUT PERFORATION OR ABS: ICD-10-CM

## 2018-10-17 DIAGNOSIS — K44.9 DIAPHRAGMATIC HERNIA WITHOUT OBSTRUCTION OR GANGRENE: ICD-10-CM

## 2018-10-17 DIAGNOSIS — Z86.010 PERSONAL HISTORY OF COLONIC POLYPS: ICD-10-CM

## 2018-10-17 DIAGNOSIS — D12.5 BENIGN NEOPLASM OF SIGMOID COLON: ICD-10-CM

## 2018-10-17 DIAGNOSIS — K64.8 OTHER HEMORRHOIDS: ICD-10-CM

## 2018-10-17 PROBLEM — Z83.71 FAMILY HISTORY OF COLON POLYPS: Status: ACTIVE | Noted: 2018-10-17

## 2018-10-17 PROBLEM — Z80.0 FAMILY HISTORY OF COLON CANCER: Status: ACTIVE | Noted: 2018-10-17

## 2018-10-17 PROBLEM — Z12.11 SURVEILLANCE DUE TO PRIOR COLONIC NEOPLASIA: Status: ACTIVE | Noted: 2018-10-17

## 2018-10-17 PROCEDURE — 43239 EGD BIOPSY SINGLE/MULTIPLE: CPT | Mod: 59

## 2018-10-17 PROCEDURE — 45385 COLONOSCOPY W/LESION REMOVAL: CPT | Mod: 33

## 2018-10-17 PROCEDURE — 45380 COLONOSCOPY AND BIOPSY: CPT | Mod: 33,59

## 2018-10-17 PROCEDURE — 88305 TISSUE EXAM BY PATHOLOGIST: CPT

## 2018-10-23 ENCOUNTER — TELEPHONE (OUTPATIENT)
Dept: PAIN MEDICINE | Facility: CLINIC | Age: 63
End: 2018-10-23

## 2018-10-23 NOTE — TELEPHONE ENCOUNTER
Amalia Jessica left a voice mail on 10/22/18 requesting having her SCS removed and replaced on the same day of her pump replacement on 1122/18!

## 2018-10-29 ENCOUNTER — HOSPITAL ENCOUNTER (OUTPATIENT)
Dept: CARDIOLOGY | Facility: HOSPITAL | Age: 63
Discharge: HOME | End: 2018-10-29
Attending: ANESTHESIOLOGY | Admitting: ANESTHESIOLOGY

## 2018-11-02 ENCOUNTER — OUTSIDE FACILITY SERVICE (OUTPATIENT)
Dept: PAIN MEDICINE | Facility: CLINIC | Age: 63
End: 2018-11-02

## 2018-11-02 ENCOUNTER — DOCUMENTATION (OUTPATIENT)
Dept: PAIN MEDICINE | Facility: CLINIC | Age: 63
End: 2018-11-02

## 2018-11-02 PROCEDURE — 62369 ANAL SP INF PMP W/REPRG&FILL: CPT | Performed by: ANESTHESIOLOGY

## 2018-11-02 PROCEDURE — 62362 IMPLANT SPINE INFUSION PUMP: CPT | Performed by: ANESTHESIOLOGY

## 2018-11-02 NOTE — PROGRESS NOTES
Intrathecal Drug Delivery System - Pump replacement  [facility]      Preop Dx: End of life of the intrathecal pump.  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  We discussed various topics including but not limited to bleeding, infection, injury, allergic reactions, spinal cord and/or neural injury, implant site pain, post procedural site soreness, equipment malfunction including but not limited to catheter fracture or migration or risk of high or low levels of medication delivery, risk of overdose & coma & death, risk of the lack of pain relief, and risk of worsening clinical picture.    Surgeon:     Jordin Strauss MD    Reason for procedure:  Success with previous ITP therapy and the patient wishes to continue.      Sedation:  General Anesthesia with LMA  Antibiotics:   Vancomycin 1g IV (for patient <80 kg)     Description of Procedure:    After obtaining informed consent, IV access had been obtained by nursing staff in the preoperative area.  The patient was transported to the operative suite and was placed in a supine position.  Appropriate padding was placed.  Anesthesia care and cardiopulmonary monitoring maintained by member of the anesthesiology team throughout the procedure.  Perioperative antibodies were given prior to incision per routine as indicated in the anesthesia record and indicated above.  The patient's abdomen was cleansed appropriately with routine cleansing, and then prepped in a sterile routine fashion.  The area was then draped in sterile routine fashion.     As noted above,  the pump pocket site was identified.  This line was marked in a perpendicular fashion on the skin and then the area along the line was anesthetized with copious amounts of local anesthetic, and also anesthetized caudad and cephalad to that line.  Also was anesthetized a line connecting the medial aspect of the IPG pocket to the inferior aspect of the midline incision.  The incision was made to about 1 cm in depth and then undermined caudad and cephalad to create a subcutaneous pocket at a depth of about 3/4 cm.  Electrocautery was used judiciously to control bleeding, and the pocket was then irrigated with copious amounts of irrigant solution ×2.  The old pump was carefully released from the pocket.    The new pump was primed per routine and prepared on the back table, observing strict sterile technique upon filling the pump.  This is detailed more below.    The catheter was connected to the pump in routine fashion using the locking pump catheter extension, and locked in place.  Catheter function was tested with positive aspiration from the side port confirming free CSF flow, and was noted to be functioning appropriately.  Excess catheter lead length was coiled beneath the pump battery and the pump battery was placed into the pocket.  The skin and subcutaneous tissues were easily opposable without any stress or duress on the overlying tissues.    The pump pocket incision was closed with 2-0 Vicryl interrupted sutures for the deep layer, 3-0 Vicryl running suture for the subcutaneous layer and Stapling Device for Skin Closure.   The incisions were dressed with Telfa and Tegaderm.        Estimated Blood Loss: Minimal   Specimens:   Her previous Synchromed 2 pump, which was a 40 ml device  Complications:  No complications were noted.    Tolerance and discharge condition:    The patient tolerated the procedure well and was awakened from anesthesia without incident.  The patient was transported to the recovery area  without difficulties.  Further device testing and programming was performed by the device representative in the recovery area as necessary.  The patient was again cautioned not to drive at this time and avoid strenuous activities and to avoid reaching overhead and to avoid bending and avoid lifting objects over 5 pounds.  The patient was discharged home under the care of family members in stable and satisfactory condition.      Plan of care:    The patient was given our standard instruction sheet and will resume all medications as per the medication reconciliation sheet.  The patient will return to my office at the appropriate time scheduled in about one business day with the  device representative for further device programming and education about device function if necessary.  The patient will also present to my office in 10-14 days for a postoperative wound check.    The patient understands that there is any signs of complication, bleeding, infection, fever, chills, increasing back pain or spine pain, any neurologic deficit, or any other concerning finding, that the patient of a family member should call my office at [phone] at any time to access the answering service.      INITIAL DEVICE SETTINGS:  The patient did request the 20ml pump.  14ml of drug was extracted from previous pump and instilled into the new pump on the back table.  Priming bolus of 21 minutes.  Refill date is 12-3-18.  No change therefore to concentration of drug, which is Morphine 0.2mg per ML with Naropin 3 mg per mil.... Daily dose slightly decreased from previous, and is now at 0.075mg of morphine per day, and therefore also 1.12mg of Naropin per day.

## 2018-11-05 ENCOUNTER — TELEPHONE (OUTPATIENT)
Dept: PAIN MEDICINE | Facility: CLINIC | Age: 63
End: 2018-11-05

## 2018-11-05 DIAGNOSIS — G89.18 POST-OP PAIN: Primary | ICD-10-CM

## 2018-11-05 NOTE — TELEPHONE ENCOUNTER
Ms. Roach called and left a message on the voicemail stating that she is having a lot of discomfort after having her pain pump replaced last Friday. She states that she's not taking any pain pills right now because she's constipated. She wanted to know if you could e-scribe and order for an abdominal binder to her [facility] pharmacy in  [place], [place] that she can go .

## 2018-11-05 NOTE — TELEPHONE ENCOUNTER
I faxed the abdominal binder order to [facility] in [place], [place] per Ms. Lopez's request. [facility] doesn't fill medical device orders.

## 2018-11-21 ENCOUNTER — OFFICE VISIT (OUTPATIENT)
Dept: PAIN MEDICINE | Facility: CLINIC | Age: 63
End: 2018-11-21

## 2018-11-21 VITALS
DIASTOLIC BLOOD PRESSURE: 74 MMHG | OXYGEN SATURATION: 99 % | TEMPERATURE: 98.2 F | WEIGHT: 119 LBS | BODY MASS INDEX: 19.83 KG/M2 | RESPIRATION RATE: 16 BRPM | HEIGHT: 65 IN | HEART RATE: 57 BPM | SYSTOLIC BLOOD PRESSURE: 119 MMHG

## 2018-11-21 DIAGNOSIS — Z96.89 SPINAL CORD STIMULATOR STATUS: ICD-10-CM

## 2018-11-21 DIAGNOSIS — M25.561 RIGHT KNEE PAIN, UNSPECIFIED CHRONICITY: ICD-10-CM

## 2018-11-21 DIAGNOSIS — G89.4 CHRONIC PAIN SYNDROME: ICD-10-CM

## 2018-11-21 DIAGNOSIS — M79.2 NEUROPATHIC PAIN: ICD-10-CM

## 2018-11-21 DIAGNOSIS — R19.04 ABDOMINAL SWELLING, LLQ: Primary | ICD-10-CM

## 2018-11-21 DIAGNOSIS — Z97.8 PRESENCE OF INTRATHECAL PUMP: ICD-10-CM

## 2018-11-21 DIAGNOSIS — Z45.42 BATTERY END OF LIFE OF SPINAL CORD STIMULATOR: ICD-10-CM

## 2018-11-21 DIAGNOSIS — G90.512 COMPLEX REGIONAL PAIN SYNDROME TYPE 1 OF LEFT UPPER EXTREMITY: ICD-10-CM

## 2018-11-21 PROCEDURE — 99212 OFFICE O/P EST SF 10 MIN: CPT | Performed by: ANESTHESIOLOGY

## 2018-11-21 PROCEDURE — 62369 ANAL SP INF PMP W/REPRG&FILL: CPT | Performed by: ANESTHESIOLOGY

## 2018-11-21 NOTE — PROGRESS NOTES
"CHIEF COMPLAINT  Pt is here for staple removal and Pump refill post op intrathecal replacement on 11/2/18.  Pt states the R leg pain has significantly increased over the last 2 weeks.      Subjective   Amalia Lopez is a 63 y.o. female  who presents to the office for follow-up of procedure.  She completed a pump replacement   on  11-2-18 performed by Dr. HARDING for management of CRPS pain and also radicular pain. Patient reports some modest abdominal pain and swelling at the site from the procedure.     Intrathecal pump was interrogated in office today. Results are in chart.    The patient is currently at a dose of Morphine/Naropin of 0.075mg/1.125mg per day. There is no PTM    Under sterile technique, the pump was accessed using a [employer] proprietary refill kit. The volume was withdrawn from the pump. The expected residual volume of the pump was 6.5 ml. The actual residual volume of the pump was equivalent to the ERV.    The pump was then refilled with 20 ml of Morphine/Naropin at a concentration of 0.66 mg/ 10mg/ ml. 2 ml of the medication was used for priming the needle, syringe and filling system. The pump was set to continue at  An increased dose, at 0.1mg morphine / qs Naropin / day.     This was her previous preop dose per day    Patient tolerated procedure well. Minimal bleeding was noted. Pump site remains intact with no evidence of erythema or drainage.    Patient will return for pump refill when due or sooner if needed.     DASH is 80 months.    Surgical incision is well-approximated with staples. Staples removed. Incision remained well-approximated. No erythema, edema, drainage or tenderness. Steri-strips applied.     --    She did not get fitted for back brace, wanted to \"wait and see\".    --    Worsening right knee pain x3 wks.  No trauma.  This is acute on chronic pain.  Radiates to ankle.  Tenderness medially above the joint line.      ---    History of Present Illness     PEG Assessment   What number " "best describes your pain on average in the past week?7  What number best describes how, during the past week, pain has interfered with your enjoyment of life?7  What number best describes how, during the past week, pain has interfered with your general activity?  6      The following portions of the patient's history were reviewed and updated as appropriate: allergies, current medications, past family history, past medical history, past social history, past surgical history and problem list.    Review of Systems   Constitutional: Negative for activity change, appetite change (increased taking myrinol ), chills and fever.   HENT: Negative for sinus pain and voice change.    Eyes: Positive for visual disturbance (blurry).   Respiratory: Positive for apnea (wears c-pap). Negative for cough, shortness of breath and wheezing.    Cardiovascular: Negative for chest pain.   Gastrointestinal: Positive for abdominal pain (improved gastroporesis) and nausea (occ). Negative for constipation, diarrhea (gastroporesis) and vomiting.   Endocrine: Negative for polydipsia and polyuria.   Genitourinary: Negative for difficulty urinating and dysuria.   Musculoskeletal: Positive for back pain (leg pain,R>L as well) and neck pain. Negative for gait problem.   Skin: Negative for rash and wound.   Allergic/Immunologic: Negative for immunocompromised state.   Neurological: Positive for weakness (arms,legs bilaterally  improved), numbness (intermittent in arms,legs) and headaches (occ). Negative for dizziness and light-headedness.   Hematological: Does not bruise/bleed easily.   Psychiatric/Behavioral: Positive for sleep disturbance. Negative for agitation, behavioral problems, confusion, hallucinations and suicidal ideas. The patient is not nervous/anxious.            Vitals:    11/21/18 1100   BP: 119/74   Pulse: 57   Resp: 16   Temp: 98.2 °F (36.8 °C)   SpO2: 99%   Weight: 54 kg (119 lb)   Height: 165 cm (64.96\")   PainSc:   7 "         Objective   Physical Exam   Constitutional: She appears well-developed and well-nourished.   Musculoskeletal:        Right knee: She exhibits decreased range of motion and swelling. She exhibits no effusion.   Skin:   See incision site discussion above.  Some pocket swelling. Not fluctuant and seems c/w normal postop state.   Nursing note and vitals reviewed.          Assessment/Plan   Amalia was seen today for leg pain.    Diagnoses and all orders for this visit:    Abdominal swelling, LLQ  -     Abdominal Binder    Presence of intrathecal pump    Spinal cord stimulator status    Chronic pain syndrome    Neuropathic pain    Complex regional pain syndrome type 1 of left upper extremity    Right knee pain, unspecified chronicity  -     XR Knee 3 View Right; Future    Battery end of life of spinal cord stimulator  -     Case Request        --- Follow-up for next pump refill  -- change in IT pump rate as above  -- Abdominal binder prescribed  -- she may want to wait about SCS IPG battery replacement... She will let us know.                  SMITH REPORT    As part of the patient's treatment plan, I am prescribing controlled substances. The patient has been made aware of appropriate use of such medications, including potential risk of somnolence, limited ability to drive and/or work safely, and the potential for dependence or overdose. It has also bee made clear that these medications are for use by this patient only, without concomitant use of alcohol or other substances unless prescribed.     Patient has completed prescribing agreement detailing terms of continued prescribing of controlled substances, including monitoring SMITH reports, urine drug screening, and pill counts if necessary. The patient is aware that inappropriate use will results in cessation of prescribing such medications.    SMITH report has been reviewed and scanned into the patient's chart.    As the clinician, I personally reviewed the  SMITH from as above while the patient was in the office today.    History and physical exam exhibit continued safe and appropriate use of controlled substances.       EMR Dragon/Transcription disclaimer:   Much of this encounter note is an electronic transcription/translation of spoken language to printed text. The electronic translation of spoken language may permit erroneous, or at times, nonsensical words or phrases to be inadvertently transcribed; Although I have reviewed the note for such errors, some may still exist.

## 2018-11-24 ENCOUNTER — HOSPITAL ENCOUNTER (OUTPATIENT)
Dept: OTHER | Facility: HOSPITAL | Age: 63
Discharge: HOME | End: 2018-11-24
Attending: NURSE PRACTITIONER | Admitting: NURSE PRACTITIONER

## 2018-12-11 ENCOUNTER — TELEPHONE (OUTPATIENT)
Dept: PAIN MEDICINE | Facility: CLINIC | Age: 63
End: 2018-12-11

## 2018-12-11 NOTE — TELEPHONE ENCOUNTER
Called patient,left voice mail instructing her (as previously planned) to come to the office this morning.

## 2018-12-12 ENCOUNTER — TELEPHONE (OUTPATIENT)
Dept: PAIN MEDICINE | Facility: CLINIC | Age: 63
End: 2018-12-12

## 2018-12-12 NOTE — TELEPHONE ENCOUNTER
"Pt called to report having increased leg pain since yesterday,when Pt's daily intrathecal pump medicine rate was reprogrammed to .1 mg per day.  Pt does not have insurance currently but says her new insurance,(\"[employer]\"),will be in effect by midnight (12/13/18).  Pt wanted to be seen this week instead of on 12/16/18 as scheduled,and became irate with me when I told her there is not availability to be seen before 12/16/18. I told her I will tell Dr Strauss about her increased leg pain and she continued to raise her voice and tell me it was our fault she has more pain.  "

## 2018-12-17 ENCOUNTER — OFFICE VISIT (OUTPATIENT)
Dept: PAIN MEDICINE | Facility: CLINIC | Age: 63
End: 2018-12-17

## 2018-12-17 VITALS
HEART RATE: 67 BPM | RESPIRATION RATE: 15 BRPM | SYSTOLIC BLOOD PRESSURE: 113 MMHG | BODY MASS INDEX: 19.73 KG/M2 | TEMPERATURE: 98.4 F | OXYGEN SATURATION: 97 % | WEIGHT: 118.4 LBS | HEIGHT: 65 IN | DIASTOLIC BLOOD PRESSURE: 62 MMHG

## 2018-12-17 DIAGNOSIS — M25.561 CHRONIC PAIN OF RIGHT KNEE: ICD-10-CM

## 2018-12-17 DIAGNOSIS — M96.1 CERVICAL POST-LAMINECTOMY SYNDROME: ICD-10-CM

## 2018-12-17 DIAGNOSIS — M96.1 LUMBAR POST-LAMINECTOMY SYNDROME: ICD-10-CM

## 2018-12-17 DIAGNOSIS — M79.2 NEUROPATHIC PAIN: ICD-10-CM

## 2018-12-17 DIAGNOSIS — M23.203 DEGENERATIVE TEAR OF MEDIAL MENISCUS OF RIGHT KNEE: ICD-10-CM

## 2018-12-17 DIAGNOSIS — Z97.8 PRESENCE OF INTRATHECAL PUMP: ICD-10-CM

## 2018-12-17 DIAGNOSIS — G89.29 CHRONIC PAIN OF RIGHT KNEE: ICD-10-CM

## 2018-12-17 DIAGNOSIS — G90.512 COMPLEX REGIONAL PAIN SYNDROME TYPE 1 OF LEFT UPPER EXTREMITY: ICD-10-CM

## 2018-12-17 DIAGNOSIS — Z96.89 SPINAL CORD STIMULATOR STATUS: ICD-10-CM

## 2018-12-17 DIAGNOSIS — G89.4 CHRONIC PAIN SYNDROME: Primary | ICD-10-CM

## 2018-12-17 PROBLEM — Z46.2 END OF BATTERY LIFE OF INTRATHECAL INFUSION PUMP: Status: RESOLVED | Noted: 2018-09-17 | Resolved: 2018-12-17

## 2018-12-17 PROCEDURE — 62368 ANALYZE SP INF PUMP W/REPROG: CPT | Performed by: ANESTHESIOLOGY

## 2018-12-17 PROCEDURE — 99213 OFFICE O/P EST LOW 20 MIN: CPT | Performed by: ANESTHESIOLOGY

## 2018-12-17 NOTE — PROGRESS NOTES
CHIEF COMPLAINT    F/U leg pain, right leg pain worse than left. Pt has pain pump.     Subjective   Amalia Lopez is a 63 y.o. female  who presents to the office for follow-up.She has a history of right medial knee pain.    Right knee pain runs medially to the foot, and she has some history of radicular pain also.      She is requesting an increase in intrathecal therapy.  I did ask her about her previous intrathecal usage, and reviewed that the previous intrathecal programming was appropriate on the device interrogation, but also reviewed the history that the programming was different when she had the low reservoir noted.  She denies owning or having access to a [employer] .    Knee Pain    The incident occurred more than 1 week ago (worse over the past few months). The injury mechanism is unknown (Previous medial meniscus tear, previous arthroscopies ×2). The pain is present in the right knee. The quality of the pain is described as aching. The pain is severe. The pain has been worsening since onset. Associated symptoms include numbness (intermittent in arms,legs). Associated symptoms comments: There is some radiating pain down the medial aspect of the right leg down to the ankle but she also has radicular symptoms down to the right dorsum of the foot including the big toes well. The symptoms are aggravated by movement and weight bearing. She has tried acetaminophen, ice, heat and rest for the symptoms. The treatment provided no relief.        PEG Assessment   What number best describes your pain on average in the past week?8  What number best describes how, during the past week, pain has interfered with your enjoyment of life?7  What number best describes how, during the past week, pain has interfered with your general activity?  7    --  The aforementioned information the Chief Complaint section and above subjective data including any HPI data has been personally reviewed and affirmed.  --        The  following portions of the patient's history were reviewed and updated as appropriate: allergies, current medications, past family history, past medical history, past social history, past surgical history and problem list.    -------    The following portions of the patient's history were reviewed and updated as appropriate: allergies, current medications, past family history, past medical history, past social history, past surgical history and problem list.    Allergies   Allergen Reactions   • Codeine Itching   • Fentanyl Other (See Comments)     Dystonic movements   • Morphine Other (See Comments)     Other reaction(s): Other (See Comments)  Headaches         Current Outpatient Medications:   •  amphetamine-dextroamphetamine (ADDERALL) 10 MG tablet, Take 5 mg by mouth Daily., Disp: , Rfl:   •  chlorhexidine (PERIDEX) 0.12 % solution, RINSE AND SPIT 15ML DAILY, Disp: , Rfl: 11  •  DEXILANT 60 MG capsule, Take 1 capsule by mouth Daily., Disp: , Rfl: 11  •  Dronabinol (MARINOL PO), Take  by mouth., Disp: , Rfl:   •  FLUoxetine (PROzac) 40 MG capsule, Take 40 mg by mouth Daily., Disp: , Rfl: 3  •  Ibuprofen 200 MG capsule, Take 400 mg by mouth., Disp: , Rfl:   •  lidocaine (LIDODERM) 5 %, Place 3 patches on the skin as directed by provider Daily., Disp: 90 patch, Rfl: 11  •  loratadine (CLARITIN) 10 MG tablet, Take 10 mg by mouth., Disp: , Rfl:   •  NON FORMULARY, Pancreatic enzymes, Disp: , Rfl:   •  ondansetron (ZOFRAN) 4 MG tablet, Take 4 mg by mouth Every 8 (Eight) Hours., Disp: , Rfl: 0  •  oxyCODONE-acetaminophen (PERCOCET)  MG per tablet, Take 1 tablet by mouth 2 (Two) Times a Day As Needed for Moderate Pain ., Disp: 60 tablet, Rfl: 0  •  polyethylene glycol (MIRALAX) packet, Take  by mouth., Disp: , Rfl:   •  pregabalin (LYRICA) 100 MG capsule, Take 1 capsule by mouth 3 (Three) Times a Day., Disp: 90 capsule, Rfl: 2  •  SENNA CO, by Combination route, Disp: , Rfl:   •  SUMAtriptan (IMITREX) 100 MG  tablet, TAKE 1 TABLET BY MOUTH AS NEEDED FOR HEADACHE, Disp: , Rfl: 6  •  traZODone (DESYREL) 50 MG tablet, Take 25 mg by mouth., Disp: , Rfl:   •  vitamin D (ERGOCALCIFEROL) 26351 units capsule capsule, TAKE 1 CAPSULE BY MOUTH EVERY 2 WEEKS, Disp: , Rfl: 3    Current Outpatient Medications on File Prior to Visit   Medication Sig Dispense Refill   • amphetamine-dextroamphetamine (ADDERALL) 10 MG tablet Take 5 mg by mouth Daily.     • chlorhexidine (PERIDEX) 0.12 % solution RINSE AND SPIT 15ML DAILY  11   • DEXILANT 60 MG capsule Take 1 capsule by mouth Daily.  11   • Dronabinol (MARINOL PO) Take  by mouth.     • FLUoxetine (PROzac) 40 MG capsule Take 40 mg by mouth Daily.  3   • Ibuprofen 200 MG capsule Take 400 mg by mouth.     • lidocaine (LIDODERM) 5 % Place 3 patches on the skin as directed by provider Daily. 90 patch 11   • loratadine (CLARITIN) 10 MG tablet Take 10 mg by mouth.     • NON FORMULARY Pancreatic enzymes     • ondansetron (ZOFRAN) 4 MG tablet Take 4 mg by mouth Every 8 (Eight) Hours.  0   • oxyCODONE-acetaminophen (PERCOCET)  MG per tablet Take 1 tablet by mouth 2 (Two) Times a Day As Needed for Moderate Pain . 60 tablet 0   • polyethylene glycol (MIRALAX) packet Take  by mouth.     • pregabalin (LYRICA) 100 MG capsule Take 1 capsule by mouth 3 (Three) Times a Day. 90 capsule 2   • SENNA CO by Combination route     • SUMAtriptan (IMITREX) 100 MG tablet TAKE 1 TABLET BY MOUTH AS NEEDED FOR HEADACHE  6   • traZODone (DESYREL) 50 MG tablet Take 25 mg by mouth.     • vitamin D (ERGOCALCIFEROL) 48923 units capsule capsule TAKE 1 CAPSULE BY MOUTH EVERY 2 WEEKS  3   • [DISCONTINUED] linaclotide (LINZESS) 145 MCG capsule capsule Take  by mouth.       No current facility-administered medications on file prior to visit.        Patient Active Problem List   Diagnosis   • Chronic pain syndrome   • Neuropathic pain   • Complex regional pain syndrome type 1 of left upper extremity   • Presence of  intrathecal pump   • Spinal cord stimulator status   • Cervical post-laminectomy syndrome   • Lumbar post-laminectomy syndrome   • Bilateral occipital neuralgia   • Cervical spondylosis without myelopathy   • End of battery life of intrathecal infusion pump       Past Medical History:   Diagnosis Date   • Depression    • Joint pain    • Low back pain    • Migraine    • Neck pain    • Osteoarthritis    • Peripheral neuropathy        Past Surgical History:   Procedure Laterality Date   • BACK SURGERY     • CHOLECYSTECTOMY     • EPIDURAL BLOCK     • HYSTERECTOMY     • IMPLANTATION / REPLACEMENT INFUSION PUMP     • KNEE ARTHROSCOPY Right    • NECK SURGERY     • PANCREAS SURGERY     • SPINAL CORD STIMULATOR IMPLANT     • TRIGGER POINT INJECTION         History reviewed. No pertinent family history.    Social History     Socioeconomic History   • Marital status:      Spouse name: Not on file   • Number of children: Not on file   • Years of education: Not on file   • Highest education level: Not on file   Social Needs   • Financial resource strain: Not on file   • Food insecurity - worry: Not on file   • Food insecurity - inability: Not on file   • Transportation needs - medical: Not on file   • Transportation needs - non-medical: Not on file   Occupational History   • Not on file   Tobacco Use   • Smoking status: Never Smoker   • Smokeless tobacco: Never Used   Substance and Sexual Activity   • Alcohol use: No   • Drug use: No   • Sexual activity: Not on file   Other Topics Concern   • Not on file   Social History Narrative   • Not on file       -------        Review of Systems   Constitutional: Negative for activity change, appetite change (increased taking myrinol ), chills and fever.   HENT: Negative for sinus pain and voice change.    Eyes: Positive for visual disturbance (blurry).   Respiratory: Positive for apnea (wears c-pap). Negative for cough, shortness of breath and wheezing.    Cardiovascular: Negative  "for chest pain.   Gastrointestinal: Positive for abdominal pain (improved gastroporesis) and nausea (occ). Negative for constipation, diarrhea (gastroporesis) and vomiting.   Endocrine: Negative for polydipsia and polyuria.   Genitourinary: Negative for difficulty urinating and dysuria.   Musculoskeletal: Positive for back pain (leg pain,R>L as well) and neck pain. Negative for gait problem.   Skin: Negative for rash and wound.   Allergic/Immunologic: Negative for immunocompromised state.   Neurological: Positive for weakness (arms,legs bilaterally  improved), numbness (intermittent in arms,legs) and headaches (occ). Negative for dizziness and light-headedness.   Hematological: Does not bruise/bleed easily.   Psychiatric/Behavioral: Positive for sleep disturbance. Negative for agitation, behavioral problems, confusion, hallucinations and suicidal ideas. The patient is not nervous/anxious.            Vitals:    12/17/18 1011   BP: 113/62   Pulse: 67   Resp: 15   Temp: 98.4 °F (36.9 °C)   SpO2: 97%   Weight: 53.7 kg (118 lb 6.4 oz)   Height: 165 cm (64.96\")   PainSc:   7   PainLoc: Leg         Objective   Physical Exam   Constitutional: She is oriented to person, place, and time. Vital signs are normal. She appears well-developed and well-nourished.  Non-toxic appearance. No distress.   HENT:   Head: Normocephalic and atraumatic.   Right Ear: Hearing and external ear normal.   Left Ear: Hearing and external ear normal.   Nose: Nose normal.   Eyes: Conjunctivae and lids are normal. Pupils are equal, round, and reactive to light.   Pulmonary/Chest: Effort normal. No respiratory distress.   Abdominal: Normal appearance.   Musculoskeletal:        Right knee: She exhibits decreased range of motion (mild crepitus) and deformity. She exhibits no swelling, no effusion, no ecchymosis, no laceration, no erythema and normal patellar mobility. Tenderness found. Medial joint line tenderness noted. No lateral joint line tenderness " noted.   Neurological: She is alert and oriented to person, place, and time. No cranial nerve deficit.   Skin:   Pump site healing well   Psychiatric: She has a normal mood and affect. Her behavior is normal.   Nursing note and vitals reviewed.          Assessment/Plan   Amalia was seen today for extremity pain.    Diagnoses and all orders for this visit:    Chronic pain syndrome    Chronic pain of right knee    Degenerative tear of medial meniscus of right knee        -- right knee injection, now.  No bill.    --- Follow-up for next scheduled pump refill  -- give information about [employer] patient assistance for Lyrica coverage assistance  -- she has some supply of previous Percocet, hope to not need continued refills on that         Right Knee injection:    Informed consent was obtained.  We discussed risks including but not limited to bleeding and infection and injury and risk of site soreness.  We discussed risk of postprocedural painful flareup.  Patient identified.  Laterality confirmed.      A solution was prepared of 4ml of Marcaine 0.5% and 40mg Depomedrol.  The areas over the entirety of the knee was prepared with a wide surgical prep of chlorhexidine solution.    On the RIGHT KNEE, a point on the medial aspect of the joint line was identified to enter into the joint space, and the sterile needle was inserted parallel with a slight posterior angle to enter easily into the joint space.  Aspiration was negative.  Then, slowly, half of the aforementioned solution was injected easily into the joint space.   The needle was removed intact on each side and bleeding was minimal. The insertion site was dressed with a Band-Aid.  There were no apparent complications.       Pump interrogation and reprogramming:  The intrathecal pump is a 20 mL Medtronic SynchroMed 2 pump.  Total Volume is 0.197 mL.  The last changes on December 11, 2018.  The pump contains morphine 0.7 mg/mL along with ropivacaine 10 mg/mL.  The  infusion mode is morphine at 0.1 mg per day and this includes with ropivacaine 1.43 mg per day.  The estimated battery life is 79 months.  The estimated residual volume is 19.2 mL.  The pump was reprogrammed with a 50% increase in the infusion rate, and increase in the morphine to 0.15 milligrams per day and ropivacaine 2.14 mg per day.  This changes the refill interval to 79 days.        SMITH REPORT    As part of the patient's treatment plan, I am prescribing controlled substances. The patient has been made aware of appropriate use of such medications, including potential risk of somnolence, limited ability to drive and/or work safely, and the potential for dependence or overdose. It has also bee made clear that these medications are for use by this patient only, without concomitant use of alcohol or other substances unless prescribed.     Patient has completed prescribing agreement detailing terms of continued prescribing of controlled substances, including monitoring SMITH reports, urine drug screening, and pill counts if necessary. The patient is aware that inappropriate use will results in cessation of prescribing such medications.    SMITH report has been reviewed and scanned into the patient's chart.    As the clinician, I personally reviewed the SMITH from as above while the patient was in the office today.    History and physical exam exhibit continued safe and appropriate use of controlled substances.      EMR Dragon/Transcription disclaimer:   Much of this encounter note is an electronic transcription/translation of spoken language to printed text. The electronic translation of spoken language may permit erroneous, or at times, nonsensical words or phrases to be inadvertently transcribed; Although I have reviewed the note for such errors, some may still exist.

## 2018-12-27 ENCOUNTER — OFFICE (OUTPATIENT)
Dept: URBAN - METROPOLITAN AREA CLINIC 66 | Facility: CLINIC | Age: 63
End: 2018-12-27

## 2018-12-27 VITALS
SYSTOLIC BLOOD PRESSURE: 110 MMHG | HEIGHT: 66 IN | WEIGHT: 120 LBS | DIASTOLIC BLOOD PRESSURE: 66 MMHG | HEART RATE: 74 BPM

## 2018-12-27 DIAGNOSIS — K21.9 GASTRO-ESOPHAGEAL REFLUX DISEASE WITHOUT ESOPHAGITIS: ICD-10-CM

## 2018-12-27 DIAGNOSIS — K31.84 GASTROPARESIS: ICD-10-CM

## 2018-12-27 DIAGNOSIS — R11.0 NAUSEA: ICD-10-CM

## 2018-12-27 DIAGNOSIS — K59.00 CONSTIPATION, UNSPECIFIED: ICD-10-CM

## 2018-12-27 PROCEDURE — 99214 OFFICE O/P EST MOD 30 MIN: CPT

## 2019-01-02 ENCOUNTER — TELEPHONE (OUTPATIENT)
Dept: PAIN MEDICINE | Facility: CLINIC | Age: 64
End: 2019-01-02

## 2019-01-04 ENCOUNTER — OFFICE VISIT (OUTPATIENT)
Dept: PAIN MEDICINE | Facility: CLINIC | Age: 64
End: 2019-01-04

## 2019-01-04 VITALS
WEIGHT: 118 LBS | SYSTOLIC BLOOD PRESSURE: 111 MMHG | BODY MASS INDEX: 19.66 KG/M2 | RESPIRATION RATE: 16 BRPM | TEMPERATURE: 97.7 F | HEIGHT: 65 IN | OXYGEN SATURATION: 95 % | HEART RATE: 85 BPM | DIASTOLIC BLOOD PRESSURE: 64 MMHG

## 2019-01-04 DIAGNOSIS — G90.512 COMPLEX REGIONAL PAIN SYNDROME TYPE 1 OF LEFT UPPER EXTREMITY: ICD-10-CM

## 2019-01-04 DIAGNOSIS — M96.1 CERVICAL POST-LAMINECTOMY SYNDROME: ICD-10-CM

## 2019-01-04 DIAGNOSIS — Z97.8 PRESENCE OF INTRATHECAL PUMP: ICD-10-CM

## 2019-01-04 DIAGNOSIS — G89.4 CHRONIC PAIN SYNDROME: Primary | ICD-10-CM

## 2019-01-04 DIAGNOSIS — M96.1 LUMBAR POST-LAMINECTOMY SYNDROME: ICD-10-CM

## 2019-01-04 PROCEDURE — 62368 ANALYZE SP INF PUMP W/REPROG: CPT | Performed by: ANESTHESIOLOGY

## 2019-01-04 NOTE — PROGRESS NOTES
CHIEF COMPLAINT  Pt is here to discuss continued significant  R leg pain  (bilateral with R being worse than L)        Subjective   Amalia Lopez is a 63 y.o. female  who presents to the office for follow-up.She has a history of PLLS and CRPS.    I feel that there could likely be some benefit from modification of pump settings.    History of Present Illness     PEG Assessment   What number best describes your pain on average in the past week?7  What number best describes how, during the past week, pain has interfered with your enjoyment of life?6  What number best describes how, during the past week, pain has interfered with your general activity?  7    --  The aforementioned information the Chief Complaint section and above subjective data including any HPI data has been personally reviewed and affirmed.  --        The following portions of the patient's history were reviewed and updated as appropriate: allergies, current medications, past family history, past medical history, past social history, past surgical history and problem list.    Review of Systems   Constitutional: Negative for activity change, appetite change (increased taking myrinol ), chills and fever.   HENT: Negative for sinus pain and voice change.    Eyes: Positive for visual disturbance (blurry).   Respiratory: Positive for apnea (wears c-pap). Negative for cough, shortness of breath and wheezing.    Cardiovascular: Negative for chest pain.   Gastrointestinal: Positive for abdominal pain (improved gastroporesis) and nausea (occ). Negative for constipation, diarrhea (gastroporesis) and vomiting.   Endocrine: Negative for polydipsia and polyuria.   Genitourinary: Negative for difficulty urinating and dysuria.   Musculoskeletal: Positive for back pain (leg pain,R>L as well) and neck pain. Negative for gait problem.   Skin: Negative for rash and wound.   Allergic/Immunologic: Negative for immunocompromised state.   Neurological: Positive for  "weakness (arms,legs bilaterally  improved) and numbness (intermittent in arms,legs). Negative for dizziness, light-headedness and headaches (occ).   Hematological: Does not bruise/bleed easily.   Psychiatric/Behavioral: Positive for sleep disturbance. Negative for agitation, behavioral problems, confusion, hallucinations and suicidal ideas. The patient is not nervous/anxious.        Vitals:    01/04/19 1201   BP: 111/64   Pulse: 85   Resp: 16   Temp: 97.7 °F (36.5 °C)   SpO2: 95%   Weight: 53.5 kg (118 lb)   Height: 165 cm (64.96\")   PainSc: 6  Comment: R leg pain ranges from 6-8/10         Objective   Physical Exam  VSS, NNR, NCAT, NMNA, NRD, AAOx3.  See below also.      Assessment/Plan   Amalia was seen today for leg pain.    Diagnoses and all orders for this visit:    Chronic pain syndrome    Presence of intrathecal pump    Lumbar post-laminectomy syndrome    Cervical post-laminectomy syndrome    Complex regional pain syndrome type 1 of left upper extremity        --- Follow-up for previously scheduled office visit which is in 3 wks.  --- adjustment in IT pump rate today  --- as she has more morning stiffness/pain, if other changes are required, we may consider a flex dose of medication in the AM         Pump Note:   Intrathecal pump was interrogated in office today. Results are in chart.  The patient is currently at a dose of 0.15mg of morphine with 2.1 mg ropivacaine per day. There is no PTM bolus.  The expected residual volume of the pump was 15.3 ml.   Pump site remains intact with no evidence of erythema or drainage.  DASH is 78 months.  The rate was changed.  New rate is 0.2mg/2.86mg/day.  Low res date is 2-19-19.            EMR Dragon/Transcription disclaimer:   Much of this encounter note is an electronic transcription/translation of spoken language to printed text. The electronic translation of spoken language may permit erroneous, or at times, nonsensical words or phrases to be inadvertently " transcribed; Although I have reviewed the note for such errors, some may still exist.

## 2019-01-23 ENCOUNTER — OFFICE VISIT (OUTPATIENT)
Dept: PAIN MEDICINE | Facility: CLINIC | Age: 64
End: 2019-01-23

## 2019-01-23 VITALS
WEIGHT: 118 LBS | HEART RATE: 87 BPM | OXYGEN SATURATION: 98 % | RESPIRATION RATE: 16 BRPM | HEIGHT: 65 IN | DIASTOLIC BLOOD PRESSURE: 63 MMHG | BODY MASS INDEX: 19.66 KG/M2 | SYSTOLIC BLOOD PRESSURE: 116 MMHG | TEMPERATURE: 97.6 F

## 2019-01-23 DIAGNOSIS — G90.512 COMPLEX REGIONAL PAIN SYNDROME TYPE 1 OF LEFT UPPER EXTREMITY: ICD-10-CM

## 2019-01-23 DIAGNOSIS — G89.4 CHRONIC PAIN SYNDROME: Primary | ICD-10-CM

## 2019-01-23 DIAGNOSIS — M96.1 LUMBAR POST-LAMINECTOMY SYNDROME: ICD-10-CM

## 2019-01-23 DIAGNOSIS — M96.1 CERVICAL POST-LAMINECTOMY SYNDROME: ICD-10-CM

## 2019-01-23 DIAGNOSIS — Z97.8 PRESENCE OF INTRATHECAL PUMP: ICD-10-CM

## 2019-01-23 DIAGNOSIS — Z96.89 SPINAL CORD STIMULATOR STATUS: ICD-10-CM

## 2019-01-23 PROCEDURE — 62368 ANALYZE SP INF PUMP W/REPROG: CPT | Performed by: ANESTHESIOLOGY

## 2019-01-23 PROCEDURE — 99212 OFFICE O/P EST SF 10 MIN: CPT | Performed by: ANESTHESIOLOGY

## 2019-01-23 NOTE — PROGRESS NOTES
CHIEF COMPLAINT  Pt states her bilateral leg pain,R > L, is gradually worsening over the last 4 weeks.    Subjective   Amalia Lopez is a 63 y.o. female  who presents to the office for follow-up.She has a history of CRPS in the upper extremity with treatment including IDDS with the medtronic pump... She also has a SJM stimulator in the thoracic spine for low back and leg pain issues.    We have discussed that the IT pump may not cover her back/leg pain adequately.      Pump interrogation:  Filled with Morphine 0.7mg/ml with Naropin 10mg/ml.  ERV is 9.8 ml.   DASH on the battery is 79 months.   Tip is at T1.    Rate is 0.2mg/2.86mg/day    Pump reprogram:       New flex dosing is trialed...  Basal rate is minimal infusion rate, with a dose of 0.07mg Morphine with QS Naropin every 8 hrs.  No PTM.   As below:         Pain   This is a chronic problem. The problem occurs constantly. The problem has been gradually worsening. Associated symptoms include abdominal pain (improved gastroporesis), neck pain, numbness (intermittent in arms,legs) and weakness (arms,legs bilaterally  improved). Pertinent negatives include no chest pain, chills, coughing, fever, headaches (occ), nausea (occ), rash or vomiting. The symptoms are aggravated by exertion. The treatment provided mild relief.        PEG Assessment   What number best describes your pain on average in the past week?7  What number best describes how, during the past week, pain has interfered with your enjoyment of life?6  What number best describes how, during the past week, pain has interfered with your general activity?  7    --  The aforementioned information the Chief Complaint section and above subjective data including any HPI data has been personally reviewed and affirmed.  --        The following portions of the patient's history were reviewed and updated as appropriate: allergies, current medications, past family history, past medical history, past social history,  past surgical history and problem list.    -------    The following portions of the patient's history were reviewed and updated as appropriate: allergies, current medications, past family history, past medical history, past social history, past surgical history and problem list.    Allergies   Allergen Reactions   • Codeine Itching   • Fentanyl Other (See Comments)     Dystonic movements   • Morphine Other (See Comments)     Other reaction(s): Other (See Comments)  Headaches         Current Outpatient Medications:   •  amphetamine-dextroamphetamine (ADDERALL) 10 MG tablet, Take 5 mg by mouth Daily., Disp: , Rfl:   •  chlorhexidine (PERIDEX) 0.12 % solution, RINSE AND SPIT 15ML DAILY, Disp: , Rfl: 11  •  DEXILANT 60 MG capsule, Take 1 capsule by mouth Daily., Disp: , Rfl: 11  •  Dronabinol (MARINOL PO), Take  by mouth., Disp: , Rfl:   •  FLUoxetine (PROzac) 40 MG capsule, Take 40 mg by mouth Daily., Disp: , Rfl: 3  •  Ibuprofen 200 MG capsule, Take 400 mg by mouth., Disp: , Rfl:   •  lidocaine (LIDODERM) 5 %, Place 3 patches on the skin as directed by provider Daily., Disp: 90 patch, Rfl: 11  •  loratadine (CLARITIN) 10 MG tablet, Take 10 mg by mouth., Disp: , Rfl:   •  NON FORMULARY, Pancreatic enzymes, Disp: , Rfl:   •  ondansetron (ZOFRAN) 4 MG tablet, Take 4 mg by mouth Every 8 (Eight) Hours., Disp: , Rfl: 0  •  oxyCODONE-acetaminophen (PERCOCET)  MG per tablet, Take 1 tablet by mouth 2 (Two) Times a Day As Needed for Moderate Pain ., Disp: 60 tablet, Rfl: 0  •  polyethylene glycol (MIRALAX) packet, Take  by mouth., Disp: , Rfl:   •  pregabalin (LYRICA) 100 MG capsule, Take 1 capsule by mouth 3 (Three) Times a Day., Disp: 90 capsule, Rfl: 2  •  SENNA CO, by Combination route, Disp: , Rfl:   •  SUMAtriptan (IMITREX) 100 MG tablet, TAKE 1 TABLET BY MOUTH AS NEEDED FOR HEADACHE, Disp: , Rfl: 6  •  traZODone (DESYREL) 50 MG tablet, Take 25 mg by mouth., Disp: , Rfl:   •  vitamin D (ERGOCALCIFEROL) 29787 units  capsule capsule, TAKE 1 CAPSULE BY MOUTH EVERY 2 WEEKS, Disp: , Rfl: 3    Current Outpatient Medications on File Prior to Visit   Medication Sig Dispense Refill   • amphetamine-dextroamphetamine (ADDERALL) 10 MG tablet Take 5 mg by mouth Daily.     • chlorhexidine (PERIDEX) 0.12 % solution RINSE AND SPIT 15ML DAILY  11   • DEXILANT 60 MG capsule Take 1 capsule by mouth Daily.  11   • Dronabinol (MARINOL PO) Take  by mouth.     • FLUoxetine (PROzac) 40 MG capsule Take 40 mg by mouth Daily.  3   • Ibuprofen 200 MG capsule Take 400 mg by mouth.     • lidocaine (LIDODERM) 5 % Place 3 patches on the skin as directed by provider Daily. 90 patch 11   • loratadine (CLARITIN) 10 MG tablet Take 10 mg by mouth.     • NON FORMULARY Pancreatic enzymes     • ondansetron (ZOFRAN) 4 MG tablet Take 4 mg by mouth Every 8 (Eight) Hours.  0   • oxyCODONE-acetaminophen (PERCOCET)  MG per tablet Take 1 tablet by mouth 2 (Two) Times a Day As Needed for Moderate Pain . 60 tablet 0   • polyethylene glycol (MIRALAX) packet Take  by mouth.     • pregabalin (LYRICA) 100 MG capsule Take 1 capsule by mouth 3 (Three) Times a Day. 90 capsule 2   • SENNA CO by Combination route     • SUMAtriptan (IMITREX) 100 MG tablet TAKE 1 TABLET BY MOUTH AS NEEDED FOR HEADACHE  6   • traZODone (DESYREL) 50 MG tablet Take 25 mg by mouth.     • vitamin D (ERGOCALCIFEROL) 37777 units capsule capsule TAKE 1 CAPSULE BY MOUTH EVERY 2 WEEKS  3     No current facility-administered medications on file prior to visit.        Patient Active Problem List   Diagnosis   • Chronic pain syndrome   • Neuropathic pain   • Complex regional pain syndrome type 1 of left upper extremity   • Presence of intrathecal pump   • Spinal cord stimulator status   • Cervical post-laminectomy syndrome   • Lumbar post-laminectomy syndrome   • Bilateral occipital neuralgia   • Cervical spondylosis without myelopathy       Past Medical History:   Diagnosis Date   • Depression    • Joint  pain    • Low back pain    • Migraine    • Neck pain    • Osteoarthritis    • Peripheral neuropathy        Past Surgical History:   Procedure Laterality Date   • BACK SURGERY     • CHOLECYSTECTOMY     • EPIDURAL BLOCK     • HYSTERECTOMY     • IMPLANTATION / REPLACEMENT INFUSION PUMP     • KNEE ARTHROSCOPY Right    • NECK SURGERY     • PANCREAS SURGERY     • SPINAL CORD STIMULATOR IMPLANT     • TRIGGER POINT INJECTION         History reviewed. No pertinent family history.    Social History     Socioeconomic History   • Marital status:      Spouse name: Not on file   • Number of children: Not on file   • Years of education: Not on file   • Highest education level: Not on file   Social Needs   • Financial resource strain: Not on file   • Food insecurity - worry: Not on file   • Food insecurity - inability: Not on file   • Transportation needs - medical: Not on file   • Transportation needs - non-medical: Not on file   Occupational History   • Not on file   Tobacco Use   • Smoking status: Never Smoker   • Smokeless tobacco: Never Used   Substance and Sexual Activity   • Alcohol use: No   • Drug use: No   • Sexual activity: Not on file   Other Topics Concern   • Not on file   Social History Narrative   • Not on file       -------        Review of Systems   Constitutional: Negative for activity change, appetite change (increased taking myrinol ), chills and fever.   HENT: Negative for sinus pain and voice change.    Eyes: Positive for visual disturbance (blurry).   Respiratory: Positive for apnea (wears c-pap). Negative for cough, shortness of breath and wheezing.    Cardiovascular: Negative for chest pain.   Gastrointestinal: Positive for abdominal pain (improved gastroporesis). Negative for constipation, diarrhea (gastroporesis), nausea (occ) and vomiting.   Endocrine: Negative for polydipsia and polyuria.   Genitourinary: Negative for difficulty urinating and dysuria.   Musculoskeletal: Positive for back pain  "(leg pain,R>L as well) and neck pain. Negative for gait problem.   Skin: Negative for rash and wound.   Allergic/Immunologic: Negative for immunocompromised state.   Neurological: Positive for weakness (arms,legs bilaterally  improved) and numbness (intermittent in arms,legs). Negative for dizziness, light-headedness and headaches (occ).   Hematological: Does not bruise/bleed easily.   Psychiatric/Behavioral: Positive for sleep disturbance. Negative for agitation, behavioral problems, confusion, hallucinations and suicidal ideas. The patient is not nervous/anxious.          Vitals:    01/23/19 1456   BP: 116/63   Pulse: 87   Resp: 16   Temp: 97.6 °F (36.4 °C)   SpO2: 98%   Weight: 53.5 kg (118 lb)   Height: 165 cm (64.96\")   PainSc:   7   PainLoc: Leg  Comment: bilateral leg pain ranges from 6-8/10         Objective   Physical Exam   Constitutional: She appears well-developed and well-nourished.   Psychiatric: She has a normal mood and affect.   Nursing note and vitals reviewed.          Assessment/Plan   Amalia was seen today for leg pain.    Diagnoses and all orders for this visit:    Chronic pain syndrome    Cervical post-laminectomy syndrome    Lumbar post-laminectomy syndrome    Presence of intrathecal pump    Spinal cord stimulator status    Complex regional pain syndrome type 1 of left upper extremity        --- Follow-up 1 wk for follow up  -- intrathecal pump reprogramming as above    - she is still taking Lyrica for radicular / CPLS & PLLS pain also                 SMITH REPORT    As part of the patient's treatment plan, I am prescribing controlled substances. The patient has been made aware of appropriate use of such medications, including potential risk of somnolence, limited ability to drive and/or work safely, and the potential for dependence or overdose. It has also bee made clear that these medications are for use by this patient only, without concomitant use of alcohol or other substances unless " prescribed.     Patient has completed prescribing agreement detailing terms of continued prescribing of controlled substances, including monitoring SMITH reports, urine drug screening, and pill counts if necessary. The patient is aware that inappropriate use will results in cessation of prescribing such medications.    SMITH report has been reviewed and scanned into the patient's chart.    As the clinician, I personally reviewed the SMITH from as above while the patient was in the office today.    History and physical exam exhibit continued safe and appropriate use of controlled substances.      EMR Dragon/Transcription disclaimer:   Much of this encounter note is an electronic transcription/translation of spoken language to printed text. The electronic translation of spoken language may permit erroneous, or at times, nonsensical words or phrases to be inadvertently transcribed; Although I have reviewed the note for such errors, some may still exist.

## 2019-01-30 ENCOUNTER — OFFICE VISIT (OUTPATIENT)
Dept: PAIN MEDICINE | Facility: CLINIC | Age: 64
End: 2019-01-30

## 2019-01-30 VITALS
RESPIRATION RATE: 15 BRPM | OXYGEN SATURATION: 98 % | TEMPERATURE: 97.8 F | HEART RATE: 59 BPM | BODY MASS INDEX: 19.66 KG/M2 | HEIGHT: 65 IN | DIASTOLIC BLOOD PRESSURE: 76 MMHG | WEIGHT: 118 LBS | SYSTOLIC BLOOD PRESSURE: 117 MMHG

## 2019-01-30 DIAGNOSIS — Z97.8 PRESENCE OF INTRATHECAL PUMP: ICD-10-CM

## 2019-01-30 DIAGNOSIS — M79.2 NEUROPATHIC PAIN: ICD-10-CM

## 2019-01-30 DIAGNOSIS — G89.4 CHRONIC PAIN SYNDROME: Primary | ICD-10-CM

## 2019-01-30 DIAGNOSIS — G90.512 COMPLEX REGIONAL PAIN SYNDROME TYPE 1 OF LEFT UPPER EXTREMITY: ICD-10-CM

## 2019-01-30 DIAGNOSIS — M96.1 CERVICAL POST-LAMINECTOMY SYNDROME: ICD-10-CM

## 2019-01-30 DIAGNOSIS — M96.1 LUMBAR POST-LAMINECTOMY SYNDROME: ICD-10-CM

## 2019-01-30 PROCEDURE — 62368 ANALYZE SP INF PUMP W/REPROG: CPT | Performed by: ANESTHESIOLOGY

## 2019-01-30 PROCEDURE — 99212 OFFICE O/P EST SF 10 MIN: CPT | Performed by: ANESTHESIOLOGY

## 2019-01-31 ENCOUNTER — TELEPHONE (OUTPATIENT)
Dept: PAIN MEDICINE | Facility: CLINIC | Age: 64
End: 2019-01-31

## 2019-01-31 NOTE — TELEPHONE ENCOUNTER
nIdira Strauss,  I spoke to a pharmacist at [facility] and was told the highest concentration of naropin they have is 10mg/ml.

## 2019-02-03 NOTE — TELEPHONE ENCOUNTER
Let's make it at 20ml @ 4mg/ml morphine and 10mg/ml ropivacaine .... This should allow her to go 6 months on refill intervals !

## 2019-02-15 ENCOUNTER — OFFICE VISIT (OUTPATIENT)
Dept: PAIN MEDICINE | Facility: CLINIC | Age: 64
End: 2019-02-15

## 2019-02-15 VITALS
HEIGHT: 65 IN | TEMPERATURE: 97.8 F | WEIGHT: 115 LBS | DIASTOLIC BLOOD PRESSURE: 76 MMHG | SYSTOLIC BLOOD PRESSURE: 121 MMHG | RESPIRATION RATE: 16 BRPM | HEART RATE: 60 BPM | OXYGEN SATURATION: 98 % | BODY MASS INDEX: 19.16 KG/M2

## 2019-02-15 DIAGNOSIS — Z97.8 PRESENCE OF INTRATHECAL PUMP: ICD-10-CM

## 2019-02-15 DIAGNOSIS — M96.1 CERVICAL POST-LAMINECTOMY SYNDROME: ICD-10-CM

## 2019-02-15 DIAGNOSIS — Z96.89 SPINAL CORD STIMULATOR STATUS: ICD-10-CM

## 2019-02-15 DIAGNOSIS — G90.512 COMPLEX REGIONAL PAIN SYNDROME TYPE 1 OF LEFT UPPER EXTREMITY: ICD-10-CM

## 2019-02-15 DIAGNOSIS — G57.01 PIRIFORMIS SYNDROME OF RIGHT SIDE: ICD-10-CM

## 2019-02-15 DIAGNOSIS — G89.4 CHRONIC PAIN SYNDROME: Primary | ICD-10-CM

## 2019-02-15 DIAGNOSIS — M96.1 LUMBAR POST-LAMINECTOMY SYNDROME: ICD-10-CM

## 2019-02-15 PROCEDURE — 62369 ANAL SP INF PMP W/REPRG&FILL: CPT | Performed by: ANESTHESIOLOGY

## 2019-02-15 PROCEDURE — 99213 OFFICE O/P EST LOW 20 MIN: CPT | Performed by: ANESTHESIOLOGY

## 2019-02-15 RX ORDER — OXYCODONE AND ACETAMINOPHEN 10; 325 MG/1; MG/1
1 TABLET ORAL DAILY PRN
Qty: 30 TABLET | Refills: 0 | Status: SHIPPED | OUTPATIENT
Start: 2019-02-15 | End: 2019-05-20 | Stop reason: SDUPTHER

## 2019-02-15 RX ORDER — CELECOXIB 200 MG/1
200 CAPSULE ORAL DAILY
Qty: 30 CAPSULE | Refills: 11 | Status: SHIPPED | OUTPATIENT
Start: 2019-02-15 | End: 2019-09-13 | Stop reason: SDUPTHER

## 2019-02-15 RX ORDER — PREGABALIN 100 MG/1
100 CAPSULE ORAL 3 TIMES DAILY
Qty: 90 CAPSULE | Refills: 2 | Status: SHIPPED | OUTPATIENT
Start: 2019-02-15 | End: 2019-03-11

## 2019-02-15 NOTE — PROGRESS NOTES
CHIEF COMPLAINT  Pt states her R leg pain is increasing and her mid line LBP is increased but not as painful as R leg.    Subjective   Amalia Lopez is a 63 y.o. female  who presents to the office for follow-up.She has a history of CRPS & PLLS & CPLS.        Pain   This is a chronic problem. The problem occurs constantly. The problem has been waxing and waning. Associated symptoms include abdominal pain (improved gastroporesis), congestion, neck pain and numbness (intermittent in arms,legs). Pertinent negatives include no chest pain, chills, coughing, fever, headaches (occ), nausea (occ), rash, vomiting or weakness (arms,legs bilaterally  improved). The symptoms are aggravated by exertion, stress, walking and standing. The treatment provided mild relief.   Back Pain   This is a recurrent problem. The problem has been gradually worsening since onset. The pain is present in the lumbar spine. The quality of the pain is described as aching and burning. The pain radiates to the right thigh. The pain is moderate. The pain is the same all the time. Associated symptoms include abdominal pain (improved gastroporesis) and numbness (intermittent in arms,legs). Pertinent negatives include no chest pain, dysuria, fever, headaches (occ) or weakness (arms,legs bilaterally  improved). She has tried bed rest, heat and ice for the symptoms. The treatment provided mild relief.   Neck Pain    This is a chronic problem. The problem occurs daily. The problem has been gradually improving. The pain is present in the left side, midline and right side. The quality of the pain is described as aching, burning and shooting. The pain is mild. Associated symptoms include numbness (intermittent in arms,legs). Pertinent negatives include no chest pain, fever, headaches (occ) or weakness (arms,legs bilaterally  improved). The treatment provided significant relief.        PEG Assessment   What number best describes your pain on average in the past  week?7  What number best describes how, during the past week, pain has interfered with your enjoyment of life?8  What number best describes how, during the past week, pain has interfered with your general activity?  7    --  The aforementioned information the Chief Complaint section and above subjective data including any HPI data has been personally reviewed and affirmed.  --        The following portions of the patient's history were reviewed and updated as appropriate: allergies, current medications, past family history, past medical history, past social history, past surgical history and problem list.    Review of Systems   Constitutional: Negative for activity change, appetite change (increased taking myrinol ), chills and fever.   HENT: Positive for congestion. Negative for sinus pain and voice change.    Eyes: Positive for visual disturbance (blurry).   Respiratory: Positive for apnea (wears c-pap). Negative for cough, shortness of breath and wheezing.    Cardiovascular: Negative for chest pain.   Gastrointestinal: Positive for abdominal pain (improved gastroporesis). Negative for constipation, diarrhea (gastroporesis), nausea (occ) and vomiting.   Endocrine: Negative for polydipsia and polyuria.   Genitourinary: Negative for difficulty urinating and dysuria.   Musculoskeletal: Positive for back pain (leg pain,R>L as well) and neck pain. Negative for gait problem.   Skin: Negative for rash and wound.   Allergic/Immunologic: Negative for immunocompromised state.   Neurological: Positive for numbness (intermittent in arms,legs). Negative for dizziness, weakness (arms,legs bilaterally  improved), light-headedness and headaches (occ).   Hematological: Does not bruise/bleed easily.   Psychiatric/Behavioral: Positive for sleep disturbance. Negative for agitation, behavioral problems, confusion, hallucinations and suicidal ideas. The patient is not nervous/anxious.            Vitals:    02/15/19 1215   BP: 121/76  "  Pulse: 60   Resp: 16   Temp: 97.8 °F (36.6 °C)   SpO2: 98%   Weight: 52.2 kg (115 lb)   Height: 165 cm (64.96\")   PainSc: 7  Comment: R leg pain ranges from 7-8/10         Objective   Physical Exam  VSS, NNR, NCAT, NMNA, NRD, AAOx3.  The right leg straight leg raising exam increases pain in the buttock, but is not classically radicular.  The piriformis exam is positive.  The Braxton exam of the right is painful but not as painful as the piriformis exam.    --  The aforementioned information the Physical Exam section and above objective and semiobjective data including any HPI & PE data has been personally reviewed and affirmed as unchanged.  --        Assessment/Plan   Amalia was seen today for leg pain and back pain.    Diagnoses and all orders for this visit:    Chronic pain syndrome    Cervical post-laminectomy syndrome    Lumbar post-laminectomy syndrome    Presence of intrathecal pump    Spinal cord stimulator status    Complex regional pain syndrome type 1 of left upper extremity    Piriformis syndrome of right side    Other orders  -     pregabalin (LYRICA) 100 MG capsule; Take 1 capsule by mouth 3 (Three) Times a Day.  -     oxyCODONE-acetaminophen (PERCOCET)  MG per tablet; Take 1 tablet by mouth Daily As Needed for Severe Pain .  -     celecoxib (CeleBREX) 200 MG capsule; Take 1 capsule by mouth Daily.      -- Pump refill and interrogation and reprogram today, as above    --- Follow-up in 2 wks, already scheduled  -- may consider a right piriformis injection at that time, in office with ultrasound guidance    --Regarding the narcotic management, as noted below, Lyrica is continued.  Percocet was provided for severe painful flareups, maximal 1 pill per day.       Patient appears stable with current regimen. No adverse effects. Regarding continuation of opioids, there is no evidence of aberrant behavior or any red flags.  The patient continues with appropriate response to opioid therapy. ADL's remain " intact by self.     she does have a significant history of CRPS & CPLS and PLLS and demonstrates moderate improvement with multimodal therapy including opioid therapy, which allows her to engage in ADLs and family activities. The continuation of opioid therapy is therefore not contraindicated. We will however respect the March 2016 CDC Guidelines and will plan to avoid overexposure to opioids and avoid dose escalation.        Intrathecal pump was interrogated in office today. Results are in chart.    Regarding the intrathecal pump, the patient was currently using flex dosing with a lower concentration of morphine.  The flex dosing did have a minumum basal rate, and was infusing 0.1 mg of morphine at 4 AM at the 0.07 mg of morphine at 7 PM for a total 24-hour dose of 0.21 mg/day, when accounting for them minimal basal rate.      Under sterile technique, the pump was accessed using a [employer] proprietary refill kit. The volume was withdrawn from the pump. The expected residual volume of the pump was 2.5 ml. The actual residual volume of the pump was 3.5 ml.    The pump was then refilled with 20 ml of Morphine & Roipvacaine at a concentration of 4mg/10mg/ml.     The pump was then reprogrammed.  The basal rate changed with the review volume of and concentration of morphine.  The basal rate was at 0.192 mg of morphine per day.  A flex dose of 0.1 mg of morphine was given over 15 minutes at 4 AM, a dose of 0.025 mg of morphine was given over 15 minutes at mood, and a dose of 0.05 mg of morphine was given at 7:30 PM, of course all of these doses are with the calculated quantities of ropivacaine.      Patient tolerated procedure well. Minimal bleeding was noted. Pump site remains intact with no evidence of erythema or drainage.    Patient will return for pump refill when due or sooner if needed.     DASH is >6 yrs.      SMITH REPORT    As part of the patient's treatment plan, I am prescribing controlled substances. The  patient has been made aware of appropriate use of such medications, including potential risk of somnolence, limited ability to drive and/or work safely, and the potential for dependence or overdose. It has also bee made clear that these medications are for use by this patient only, without concomitant use of alcohol or other substances unless prescribed.     Patient has completed prescribing agreement detailing terms of continued prescribing of controlled substances, including monitoring SMITH reports, urine drug screening, and pill counts if necessary. The patient is aware that inappropriate use will results in cessation of prescribing such medications.    SMITH report has been reviewed and scanned into the patient's chart.    As the clinician, I personally reviewed the SMITH from as above while the patient was in the office today.    History and physical exam exhibit continued safe and appropriate use of controlled substances.      EMR Dragon/Transcription disclaimer:   Much of this encounter note is an electronic transcription/translation of spoken language to printed text. The electronic translation of spoken language may permit erroneous, or at times, nonsensical words or phrases to be inadvertently transcribed; Although I have reviewed the note for such errors, some may still exist.

## 2019-02-20 ENCOUNTER — OFFICE (OUTPATIENT)
Dept: URBAN - METROPOLITAN AREA CLINIC 66 | Facility: CLINIC | Age: 64
End: 2019-02-20

## 2019-02-20 VITALS
DIASTOLIC BLOOD PRESSURE: 65 MMHG | HEIGHT: 66 IN | SYSTOLIC BLOOD PRESSURE: 111 MMHG | WEIGHT: 117 LBS | HEART RATE: 54 BPM

## 2019-02-20 DIAGNOSIS — K90.9 INTESTINAL MALABSORPTION, UNSPECIFIED: ICD-10-CM

## 2019-02-20 DIAGNOSIS — K59.00 CONSTIPATION, UNSPECIFIED: ICD-10-CM

## 2019-02-20 DIAGNOSIS — R53.83 OTHER FATIGUE: ICD-10-CM

## 2019-02-20 PROCEDURE — 99214 OFFICE O/P EST MOD 30 MIN: CPT

## 2019-02-27 ENCOUNTER — OFFICE VISIT (OUTPATIENT)
Dept: PAIN MEDICINE | Facility: CLINIC | Age: 64
End: 2019-02-27

## 2019-02-27 ENCOUNTER — PRIOR AUTHORIZATION (OUTPATIENT)
Dept: PAIN MEDICINE | Facility: CLINIC | Age: 64
End: 2019-02-27

## 2019-02-27 VITALS
SYSTOLIC BLOOD PRESSURE: 115 MMHG | TEMPERATURE: 97.9 F | DIASTOLIC BLOOD PRESSURE: 74 MMHG | WEIGHT: 115 LBS | BODY MASS INDEX: 19.16 KG/M2 | OXYGEN SATURATION: 94 % | HEART RATE: 83 BPM | RESPIRATION RATE: 16 BRPM | HEIGHT: 65 IN

## 2019-02-27 DIAGNOSIS — M96.1 LUMBAR POST-LAMINECTOMY SYNDROME: ICD-10-CM

## 2019-02-27 DIAGNOSIS — Z79.899 ENCOUNTER FOR LONG-TERM (CURRENT) USE OF HIGH-RISK MEDICATION: ICD-10-CM

## 2019-02-27 DIAGNOSIS — G89.4 CHRONIC PAIN SYNDROME: ICD-10-CM

## 2019-02-27 DIAGNOSIS — Z97.8 PRESENCE OF INTRATHECAL PUMP: Primary | ICD-10-CM

## 2019-02-27 DIAGNOSIS — M96.1 CERVICAL POST-LAMINECTOMY SYNDROME: ICD-10-CM

## 2019-02-27 PROCEDURE — 99212 OFFICE O/P EST SF 10 MIN: CPT | Performed by: ANESTHESIOLOGY

## 2019-02-27 PROCEDURE — 62368 ANALYZE SP INF PUMP W/REPROG: CPT | Performed by: ANESTHESIOLOGY

## 2019-02-27 NOTE — TELEPHONE ENCOUNTER
Amalia Lopez (Key: [id number])     Your request has been successfully sent to [employer] for review. You may close this dialog, return to your dashboard, and perform other tasks.  To check for an update later, open this request again from your dashboard.  Your request will be reviewed within 24 hours. If needed, you may contact [employer] at [phone].      PA initiated for Lyrica.

## 2019-03-01 ENCOUNTER — TELEPHONE (OUTPATIENT)
Dept: PAIN MEDICINE | Facility: CLINIC | Age: 64
End: 2019-03-01

## 2019-03-08 ENCOUNTER — TELEPHONE (OUTPATIENT)
Dept: PAIN MEDICINE | Facility: CLINIC | Age: 64
End: 2019-03-08

## 2019-03-11 ENCOUNTER — TELEPHONE (OUTPATIENT)
Dept: PAIN MEDICINE | Facility: CLINIC | Age: 64
End: 2019-03-11

## 2019-03-11 RX ORDER — PREGABALIN 50 MG/1
50 CAPSULE ORAL 2 TIMES DAILY
Qty: 28 CAPSULE | Refills: 0 | Status: SHIPPED | OUTPATIENT
Start: 2019-03-11 | End: 2020-01-06

## 2019-03-11 RX ORDER — LEVETIRACETAM 500 MG/1
500 TABLET ORAL 2 TIMES DAILY
Qty: 60 TABLET | Refills: 0 | Status: SHIPPED | OUTPATIENT
Start: 2019-03-11 | End: 2020-06-10

## 2019-03-11 NOTE — TELEPHONE ENCOUNTER
Relayed message to pt about Lyrica denial. She is asking if you can order 6-8 tablets Lyrica 50 mg so she can wean off of it. She will pay for this out of pocket. She states she would like to try Kepra and can't remember if she has had it before. Please e-scribe this to her pharmacy thank you.

## 2019-03-18 ENCOUNTER — TELEPHONE (OUTPATIENT)
Dept: PAIN MEDICINE | Facility: CLINIC | Age: 64
End: 2019-03-18

## 2019-05-20 ENCOUNTER — OFFICE VISIT (OUTPATIENT)
Dept: PAIN MEDICINE | Facility: CLINIC | Age: 64
End: 2019-05-20

## 2019-05-20 VITALS
BODY MASS INDEX: 19.66 KG/M2 | DIASTOLIC BLOOD PRESSURE: 65 MMHG | WEIGHT: 118 LBS | HEIGHT: 65 IN | SYSTOLIC BLOOD PRESSURE: 105 MMHG | RESPIRATION RATE: 16 BRPM | HEART RATE: 48 BPM | TEMPERATURE: 97.8 F | OXYGEN SATURATION: 100 %

## 2019-05-20 DIAGNOSIS — Z96.89 SPINAL CORD STIMULATOR STATUS: ICD-10-CM

## 2019-05-20 DIAGNOSIS — G89.4 CHRONIC PAIN SYNDROME: Primary | ICD-10-CM

## 2019-05-20 DIAGNOSIS — G90.512 COMPLEX REGIONAL PAIN SYNDROME TYPE 1 OF LEFT UPPER EXTREMITY: ICD-10-CM

## 2019-05-20 DIAGNOSIS — M25.561 RIGHT KNEE PAIN, UNSPECIFIED CHRONICITY: ICD-10-CM

## 2019-05-20 DIAGNOSIS — M96.1 LUMBAR POST-LAMINECTOMY SYNDROME: ICD-10-CM

## 2019-05-20 DIAGNOSIS — Z97.8 PRESENCE OF INTRATHECAL PUMP: ICD-10-CM

## 2019-05-20 DIAGNOSIS — M96.1 CERVICAL POST-LAMINECTOMY SYNDROME: ICD-10-CM

## 2019-05-20 PROCEDURE — 99214 OFFICE O/P EST MOD 30 MIN: CPT | Performed by: ANESTHESIOLOGY

## 2019-05-20 RX ORDER — OXYCODONE AND ACETAMINOPHEN 10; 325 MG/1; MG/1
1 TABLET ORAL DAILY PRN
Qty: 30 TABLET | Refills: 0 | Status: SHIPPED | OUTPATIENT
Start: 2019-05-20 | End: 2019-09-13 | Stop reason: SDUPTHER

## 2019-05-20 NOTE — PROGRESS NOTES
CHIEF COMPLAINT  Pt is here to f/u on back and R leg pain pt sts her pain is the same. Pt sts her Right knee has been bothering her lately. Pt also sts she needs her medication refilled.     Xu Lopez is a 63 y.o. female  who presents to the office for follow-up.She has a history of complex regional pain syndrome type I of the left arm and she also has history of lumbar and cervical radiculopathies.  She has a history of cervical postlaminectomy syndrome and lumbar postlaminectomy syndrome.  She uses an intrathecal pump and also has a spinal cord stimulator which has been placed with a cervical placement for the CRPS symptoms.    Overall, she states that she is doing pretty well.  She expresses that she has been feeling pretty good lately, has been somewhat more active, trying to get some exercise, and she also expresses some desire to hope to find a part-time job and she feels well enough to it in order to meet such a functional goal.    Pain   This is a chronic problem. The problem occurs constantly. The problem has been gradually improving. Associated symptoms include neck pain (Pt sts all the time) and numbness (Right foot). Pertinent negatives include no abdominal pain, chest pain, chills, congestion, coughing, fatigue, fever, headaches (occ), nausea (occ), rash, vomiting or weakness (arms,legs bilaterally  improved). The symptoms are aggravated by exertion, stress, walking and standing. The treatment provided moderate relief.   Back Pain   This is a recurrent problem. The problem has been gradually improving since onset. The pain is present in the lumbar spine. The quality of the pain is described as aching and burning. The pain radiates to the right thigh. The pain is moderate. The pain is the same all the time. Associated symptoms include numbness (Right foot). Pertinent negatives include no abdominal pain, chest pain, dysuria, fever, headaches (occ) or weakness (arms,legs bilaterally   improved). She has tried bed rest, heat and ice for the symptoms. The treatment provided moderate relief.   Neck Pain    This is a chronic problem. The problem occurs daily. The problem has been gradually improving. The pain is present in the left side, midline and right side. The quality of the pain is described as aching, burning and shooting. The pain is mild. Associated symptoms include numbness (Right foot). Pertinent negatives include no chest pain, fever, headaches (occ) or weakness (arms,legs bilaterally  improved). The treatment provided significant relief.   Knee Pain    The incident occurred more than 1 week ago. There was no injury mechanism. The pain is present in the right knee. The pain is moderate. The pain has been fluctuating since onset. Associated symptoms include numbness (Right foot). She reports no foreign bodies present. The symptoms are aggravated by movement. She has tried acetaminophen, heat, ice, NSAIDs and rest for the symptoms. The treatment provided mild relief.        PEG Assessment   What number best describes your pain on average in the past week?6  What number best describes how, during the past week, pain has interfered with your enjoyment of life?5  What number best describes how, during the past week, pain has interfered with your general activity?  5    --  The aforementioned information the Chief Complaint section and above subjective data including any HPI data has been personally reviewed and affirmed.  --        The following portions of the patient's history were reviewed and updated as appropriate: allergies, current medications, past family history, past medical history, past social history, past surgical history and problem list.    -------    The following portions of the patient's history were reviewed and updated as appropriate: allergies, current medications, past family history, past medical history, past social history, past surgical history and problem  list.    Allergies   Allergen Reactions   • Codeine Itching   • Fentanyl Other (See Comments)     Dystonic movements   • Morphine Other (See Comments)     Other reaction(s): Other (See Comments)  Headaches         Current Outpatient Medications:   •  amphetamine-dextroamphetamine (ADDERALL) 10 MG tablet, Take 5 mg by mouth Daily., Disp: , Rfl:   •  celecoxib (CeleBREX) 200 MG capsule, Take 1 capsule by mouth Daily., Disp: 30 capsule, Rfl: 11  •  chlorhexidine (PERIDEX) 0.12 % solution, RINSE AND SPIT 15ML DAILY, Disp: , Rfl: 11  •  Cholecalciferol (VITAMIN D PO), Take 50,000 Units by mouth Every 30 (Thirty) Days., Disp: , Rfl:   •  DEXILANT 60 MG capsule, Take 1 capsule by mouth Daily., Disp: , Rfl: 11  •  Dronabinol (MARINOL PO), Take  by mouth., Disp: , Rfl:   •  FLUoxetine (PROzac) 40 MG capsule, Take 40 mg by mouth Daily., Disp: , Rfl: 3  •  levETIRAcetam (KEPPRA) 500 MG tablet, Take 1 tablet by mouth 2 (Two) Times a Day. (start 1 tab po qhs x5 days, then BID), Disp: 60 tablet, Rfl: 0  •  lidocaine (LIDODERM) 5 %, Place 3 patches on the skin as directed by provider Daily., Disp: 90 patch, Rfl: 11  •  loratadine (CLARITIN) 10 MG tablet, Take 10 mg by mouth., Disp: , Rfl:   •  LYRICA 100 MG capsule, TAKE 1 CAPSULE BY MOUTH THREE TIMES A DAY, Disp: 90 capsule, Rfl: 2  •  NON FORMULARY, Pancreatic enzymes, Disp: , Rfl:   •  ondansetron (ZOFRAN) 4 MG tablet, Take 4 mg by mouth Every 8 (Eight) Hours., Disp: , Rfl: 0  •  oxyCODONE-acetaminophen (PERCOCET)  MG per tablet, Take 1 tablet by mouth Daily As Needed for Severe Pain ., Disp: 30 tablet, Rfl: 0  •  polyethylene glycol (MIRALAX) packet, Take  by mouth., Disp: , Rfl:   •  pregabalin (LYRICA) 50 MG capsule, Take 1 capsule by mouth 2 (Two) Times a Day., Disp: 28 capsule, Rfl: 0  •  SENNA CO, by Combination route, Disp: , Rfl:   •  SUMAtriptan (IMITREX) 100 MG tablet, TAKE 1 TABLET BY MOUTH AS NEEDED FOR HEADACHE, Disp: , Rfl: 6  •  traZODone (DESYREL) 50 MG  tablet, Take 25 mg by mouth., Disp: , Rfl:   •  vitamin D (ERGOCALCIFEROL) 84573 units capsule capsule, TAKE 1 CAPSULE BY MOUTH EVERY 2 WEEKS, Disp: , Rfl: 3    Current Outpatient Medications on File Prior to Visit   Medication Sig Dispense Refill   • amphetamine-dextroamphetamine (ADDERALL) 10 MG tablet Take 5 mg by mouth Daily.     • celecoxib (CeleBREX) 200 MG capsule Take 1 capsule by mouth Daily. 30 capsule 11   • chlorhexidine (PERIDEX) 0.12 % solution RINSE AND SPIT 15ML DAILY  11   • Cholecalciferol (VITAMIN D PO) Take 50,000 Units by mouth Every 30 (Thirty) Days.     • DEXILANT 60 MG capsule Take 1 capsule by mouth Daily.  11   • Dronabinol (MARINOL PO) Take  by mouth.     • FLUoxetine (PROzac) 40 MG capsule Take 40 mg by mouth Daily.  3   • levETIRAcetam (KEPPRA) 500 MG tablet Take 1 tablet by mouth 2 (Two) Times a Day. (start 1 tab po qhs x5 days, then BID) 60 tablet 0   • lidocaine (LIDODERM) 5 % Place 3 patches on the skin as directed by provider Daily. 90 patch 11   • loratadine (CLARITIN) 10 MG tablet Take 10 mg by mouth.     • LYRICA 100 MG capsule TAKE 1 CAPSULE BY MOUTH THREE TIMES A DAY 90 capsule 2   • NON FORMULARY Pancreatic enzymes     • ondansetron (ZOFRAN) 4 MG tablet Take 4 mg by mouth Every 8 (Eight) Hours.  0   • polyethylene glycol (MIRALAX) packet Take  by mouth.     • pregabalin (LYRICA) 50 MG capsule Take 1 capsule by mouth 2 (Two) Times a Day. 28 capsule 0   • SENNA CO by Combination route     • SUMAtriptan (IMITREX) 100 MG tablet TAKE 1 TABLET BY MOUTH AS NEEDED FOR HEADACHE  6   • traZODone (DESYREL) 50 MG tablet Take 25 mg by mouth.     • vitamin D (ERGOCALCIFEROL) 61896 units capsule capsule TAKE 1 CAPSULE BY MOUTH EVERY 2 WEEKS  3     No current facility-administered medications on file prior to visit.        Patient Active Problem List   Diagnosis   • Chronic pain syndrome   • Neuropathic pain   • Complex regional pain syndrome type 1 of left upper extremity   • Presence of  intrathecal pump   • Spinal cord stimulator status   • Cervical post-laminectomy syndrome   • Lumbar post-laminectomy syndrome   • Bilateral occipital neuralgia   • Cervical spondylosis without myelopathy       Past Medical History:   Diagnosis Date   • Depression    • Joint pain    • Low back pain    • Migraine    • Neck pain    • Osteoarthritis    • Peripheral neuropathy        Past Surgical History:   Procedure Laterality Date   • BACK SURGERY     • CHOLECYSTECTOMY     • EPIDURAL BLOCK     • HYSTERECTOMY     • IMPLANTATION / REPLACEMENT INFUSION PUMP     • KNEE ARTHROSCOPY Right    • NECK SURGERY     • PANCREAS SURGERY     • SPINAL CORD STIMULATOR IMPLANT     • TRIGGER POINT INJECTION         History reviewed. No pertinent family history.    Social History     Socioeconomic History   • Marital status:      Spouse name: Not on file   • Number of children: Not on file   • Years of education: Not on file   • Highest education level: Not on file   Tobacco Use   • Smoking status: Never Smoker   • Smokeless tobacco: Never Used   Substance and Sexual Activity   • Alcohol use: No   • Drug use: No       -------        Review of Systems   Constitutional: Negative for chills, fatigue and fever.   HENT: Negative for congestion.    Respiratory: Negative for cough and shortness of breath.    Cardiovascular: Negative for chest pain.   Gastrointestinal: Negative for abdominal pain, nausea (occ) and vomiting.   Genitourinary: Negative for difficulty urinating and dysuria.   Musculoskeletal: Positive for back pain (leg pain,R>L as well) and neck pain (Pt sts all the time).   Skin: Negative for rash.   Neurological: Positive for numbness (Right foot). Negative for weakness (arms,legs bilaterally  improved) and headaches (occ).   Psychiatric/Behavioral: Negative for sleep disturbance.           Vitals:    05/20/19 1250   BP: 105/65   Pulse: (!) 48   Resp: 16   Temp: 97.8 °F (36.6 °C)   SpO2: 100%   Weight: 53.5 kg (118 lb)  "  Height: 165 cm (64.96\")   PainSc:   7   PainLoc: Knee         Objective   Physical Exam   Constitutional: She is oriented to person, place, and time. Vital signs are normal. She appears well-developed and well-nourished.  Non-toxic appearance. No distress.   HENT:   Head: Normocephalic and atraumatic.   Right Ear: Hearing and external ear normal.   Left Ear: Hearing and external ear normal.   Nose: Nose normal.   Eyes: Conjunctivae and lids are normal. Pupils are equal, round, and reactive to light.   Pulmonary/Chest: Effort normal. No respiratory distress.   Abdominal: Normal appearance.   Musculoskeletal:        Right knee: She exhibits normal range of motion, no swelling, no effusion and no erythema. Tenderness found. Medial joint line and lateral joint line tenderness noted.   Neurological: She is alert and oriented to person, place, and time. No cranial nerve deficit.   Psychiatric: She has a normal mood and affect. Her behavior is normal.   Nursing note and vitals reviewed.          Assessment/Plan   Amalia was seen today for back pain, leg pain and knee pain.    Diagnoses and all orders for this visit:    Chronic pain syndrome    Complex regional pain syndrome type 1 of left upper extremity    Cervical post-laminectomy syndrome    Lumbar post-laminectomy syndrome    Presence of intrathecal pump    Spinal cord stimulator status    Right knee pain, unspecified chronicity    Other orders  -     oxyCODONE-acetaminophen (PERCOCET)  MG per tablet; Take 1 tablet by mouth Daily As Needed for Severe Pain .        --- Follow-up 2 months for pump refill  -- she needs to know cost of that appt and also that pump drug for the refill... This will be her last refill prior to Medicare  -- Celebrex for knee pain  -- continue Percocet, acute & intermittent judicious use  -- continue Lyrica         Patient appears stable with current regimen. No adverse effects. Regarding continuation of opioids, there is no evidence " of aberrant behavior or any red flags.  The patient continues with appropriate response to opioid therapy. ADL's remain intact by self.           SMITH REPORT    As part of the patient's treatment plan, I am prescribing controlled substances. The patient has been made aware of appropriate use of such medications, including potential risk of somnolence, limited ability to drive and/or work safely, and the potential for dependence or overdose. It has also bee made clear that these medications are for use by this patient only, without concomitant use of alcohol or other substances unless prescribed.     Patient has completed prescribing agreement detailing terms of continued prescribing of controlled substances, including monitoring SMITH reports, urine drug screening, and pill counts if necessary. The patient is aware that inappropriate use will results in cessation of prescribing such medications.    SMITH report has been reviewed and scanned into the patient's chart.    As the clinician, I personally reviewed the SMITH from as above while the patient was in the office today.    History and physical exam exhibit continued safe and appropriate use of controlled substances.      EMR Dragon/Transcription disclaimer:   Much of this encounter note is an electronic transcription/translation of spoken language to printed text. The electronic translation of spoken language may permit erroneous, or at times, nonsensical words or phrases to be inadvertently transcribed; Although I have reviewed the note for such errors, some may still exist.

## 2019-05-21 ENCOUNTER — CONVERSION ENCOUNTER (OUTPATIENT)
Dept: FAMILY MEDICINE CLINIC | Facility: CLINIC | Age: 64
End: 2019-05-21

## 2019-05-30 ENCOUNTER — OFFICE (OUTPATIENT)
Dept: URBAN - METROPOLITAN AREA CLINIC 66 | Facility: CLINIC | Age: 64
End: 2019-05-30
Payer: COMMERCIAL

## 2019-05-30 VITALS
DIASTOLIC BLOOD PRESSURE: 63 MMHG | HEIGHT: 66 IN | HEART RATE: 50 BPM | SYSTOLIC BLOOD PRESSURE: 123 MMHG | WEIGHT: 117 LBS

## 2019-05-30 DIAGNOSIS — K21.9 GASTRO-ESOPHAGEAL REFLUX DISEASE WITHOUT ESOPHAGITIS: ICD-10-CM

## 2019-05-30 DIAGNOSIS — K31.84 GASTROPARESIS: ICD-10-CM

## 2019-05-30 DIAGNOSIS — R53.83 OTHER FATIGUE: ICD-10-CM

## 2019-05-30 DIAGNOSIS — Z80.0 FAMILY HISTORY OF MALIGNANT NEOPLASM OF DIGESTIVE ORGANS: ICD-10-CM

## 2019-05-30 DIAGNOSIS — K59.00 CONSTIPATION, UNSPECIFIED: ICD-10-CM

## 2019-05-30 PROCEDURE — 99213 OFFICE O/P EST LOW 20 MIN: CPT

## 2019-06-04 VITALS
OXYGEN SATURATION: 100 % | BODY MASS INDEX: 19.66 KG/M2 | HEIGHT: 65 IN | SYSTOLIC BLOOD PRESSURE: 115 MMHG | DIASTOLIC BLOOD PRESSURE: 73 MMHG | WEIGHT: 118 LBS | HEART RATE: 52 BPM

## 2019-07-18 ENCOUNTER — TELEPHONE (OUTPATIENT)
Dept: PAIN MEDICINE | Facility: CLINIC | Age: 64
End: 2019-07-18

## 2019-07-18 NOTE — TELEPHONE ENCOUNTER
I saw that the patient is scheduled for a pump refill on wed 7/24. Her alarm date on her pump is 9/1/19. Do you want me to refill her on this date or is that for just a follow up?

## 2019-07-24 NOTE — TELEPHONE ENCOUNTER
Patient wanted to know if we could decrease her pump because she currently has a lapse in her insurance and wont have insurance until September. Please advise. Her alarm date is 8/8/19

## 2019-07-26 ENCOUNTER — CLINICAL SUPPORT (OUTPATIENT)
Dept: PAIN MEDICINE | Facility: CLINIC | Age: 64
End: 2019-07-26

## 2019-07-26 NOTE — PROGRESS NOTES
Patient arrived for pump adjustment- changes made and reviewed with Bev Card. New Alarm Date of 9/14/19

## 2019-08-19 ENCOUNTER — PRIOR AUTHORIZATION (OUTPATIENT)
Dept: PAIN MEDICINE | Facility: CLINIC | Age: 64
End: 2019-08-19

## 2019-08-19 NOTE — TELEPHONE ENCOUNTER
Amalia Lopez (Key: [id number]) - [id number]  Lidocaine 5% patches  Status: PA Request  Created: August 15th, 2019 [phone]  Sent: August 19th, 2019  Open  Archiv

## 2019-08-23 ENCOUNTER — TELEPHONE (OUTPATIENT)
Dept: PAIN MEDICINE | Facility: CLINIC | Age: 64
End: 2019-08-23

## 2019-08-23 RX ORDER — TIZANIDINE HYDROCHLORIDE 4 MG/1
4 CAPSULE, GELATIN COATED ORAL 3 TIMES DAILY PRN
Qty: 90 CAPSULE | Refills: 0 | Status: SHIPPED | OUTPATIENT
Start: 2019-08-23 | End: 2020-08-13

## 2019-08-23 NOTE — TELEPHONE ENCOUNTER
Pt called in today c/o increased spasms in her neck. She has been walking 4 miles every morning, she thinks that has exacerbated her neck pain. She used tizanidine 4mg in the past which helped her spasms and she was wondering if you would prescribe it to her? Thank you.

## 2019-09-11 ENCOUNTER — OFFICE VISIT (OUTPATIENT)
Dept: PAIN MEDICINE | Facility: CLINIC | Age: 64
End: 2019-09-11

## 2019-09-11 VITALS
DIASTOLIC BLOOD PRESSURE: 63 MMHG | RESPIRATION RATE: 20 BRPM | WEIGHT: 118.6 LBS | BODY MASS INDEX: 19.76 KG/M2 | HEART RATE: 61 BPM | HEIGHT: 65 IN | TEMPERATURE: 98.4 F | SYSTOLIC BLOOD PRESSURE: 123 MMHG | OXYGEN SATURATION: 100 %

## 2019-09-11 DIAGNOSIS — M25.561 CHRONIC PAIN OF RIGHT KNEE: ICD-10-CM

## 2019-09-11 DIAGNOSIS — Z97.8 PRESENCE OF INTRATHECAL PUMP: ICD-10-CM

## 2019-09-11 DIAGNOSIS — G90.512 COMPLEX REGIONAL PAIN SYNDROME TYPE 1 OF LEFT UPPER EXTREMITY: ICD-10-CM

## 2019-09-11 DIAGNOSIS — G89.4 CHRONIC PAIN SYNDROME: Primary | ICD-10-CM

## 2019-09-11 DIAGNOSIS — M79.2 NEUROPATHIC PAIN: ICD-10-CM

## 2019-09-11 DIAGNOSIS — M96.1 LUMBAR POST-LAMINECTOMY SYNDROME: ICD-10-CM

## 2019-09-11 DIAGNOSIS — Z96.89 SPINAL CORD STIMULATOR STATUS: ICD-10-CM

## 2019-09-11 DIAGNOSIS — G89.29 CHRONIC PAIN OF RIGHT KNEE: ICD-10-CM

## 2019-09-11 DIAGNOSIS — M96.1 CERVICAL POST-LAMINECTOMY SYNDROME: ICD-10-CM

## 2019-09-11 PROCEDURE — 20610 DRAIN/INJ JOINT/BURSA W/O US: CPT | Performed by: ANESTHESIOLOGY

## 2019-09-11 PROCEDURE — 62369 ANAL SP INF PMP W/REPRG&FILL: CPT | Performed by: ANESTHESIOLOGY

## 2019-09-11 PROCEDURE — 99212 OFFICE O/P EST SF 10 MIN: CPT | Performed by: ANESTHESIOLOGY

## 2019-09-11 RX ORDER — TRAZODONE HYDROCHLORIDE 50 MG/1
TABLET ORAL EVERY 24 HOURS
COMMUNITY
Start: 2019-02-05 | End: 2020-01-06

## 2019-09-11 RX ORDER — DEXTROAMPHETAMINE SACCHARATE, AMPHETAMINE ASPARTATE, DEXTROAMPHETAMINE SULFATE AND AMPHETAMINE SULFATE 2.5; 2.5; 2.5; 2.5 MG/1; MG/1; MG/1; MG/1
TABLET ORAL
COMMUNITY
Start: 2017-02-13 | End: 2020-01-06

## 2019-09-11 RX ORDER — LIDOCAINE 50 MG/G
PATCH TOPICAL
COMMUNITY
Start: 2017-02-13 | End: 2020-01-06

## 2019-09-11 RX ORDER — OXYCODONE AND ACETAMINOPHEN 7.5; 325 MG/1; MG/1
TABLET ORAL
COMMUNITY
Start: 2017-02-13 | End: 2019-09-13

## 2019-09-11 RX ORDER — BETAMETHASONE VALERATE 1.2 MG/G
AEROSOL, FOAM TOPICAL
COMMUNITY
Start: 2019-05-21 | End: 2021-09-21

## 2019-09-11 RX ORDER — CHLORHEXIDINE GLUCONATE 0.12 MG/ML
RINSE ORAL
COMMUNITY
Start: 2018-01-29 | End: 2020-01-06

## 2019-09-11 RX ORDER — PREGABALIN 100 MG/1
1 CAPSULE ORAL EVERY 12 HOURS
COMMUNITY
Start: 2019-02-05 | End: 2020-01-06

## 2019-09-11 RX ORDER — SUMATRIPTAN 100 MG/1
TABLET, FILM COATED ORAL
COMMUNITY
Start: 2017-02-13 | End: 2020-01-06

## 2019-09-11 RX ORDER — LORATADINE 10 MG/1
TABLET ORAL
COMMUNITY
Start: 2017-02-13 | End: 2020-01-06

## 2019-09-11 RX ORDER — FLUOXETINE HYDROCHLORIDE 40 MG/1
CAPSULE ORAL
COMMUNITY
Start: 2017-02-13 | End: 2020-01-06

## 2019-09-11 RX ORDER — DEXLANSOPRAZOLE 30 MG/1
CAPSULE, DELAYED RELEASE ORAL
COMMUNITY
Start: 2017-12-07 | End: 2020-01-06

## 2019-09-11 RX ORDER — IBUPROFEN 200 MG
TABLET ORAL
COMMUNITY
Start: 2017-02-13

## 2019-09-11 RX ORDER — PANCRELIPASE 36000; 180000; 114000 [USP'U]/1; [USP'U]/1; [USP'U]/1
CAPSULE, DELAYED RELEASE PELLETS ORAL
Refills: 3 | COMMUNITY
Start: 2019-08-30 | End: 2021-09-21

## 2019-09-11 NOTE — PROGRESS NOTES
CHIEF COMPLAINT  Here for pump refill. Pt would like to discuss increasing morphine and naropin in pump since it was decreased the last ov, insurance wouldn't cover med, now medicare can cover. Pt c/o increased right knee pain and neck pain. Pt currently walks 5 miles every morning. Pt would like a right knee injection in office today.     Subjective   Amalia Lopez is a 63 y.o. female  who presents to the office for follow-up.She has a history of CPLS, PLLS, CRPS, CPS, and Intrathecal pump use along with SCS use.    Acute flareup in right knee pain is noted and problematic.  Increasing pain with walking.  She is walking 5 miles a day.   She is requesting a right knee injection.     For neck pain, along with extremity pain and also spine pain in the lower spine she has been using Percocet very judiciously.  She only has been taking 1 or perhaps 2 tablets/week.  She continues to use of anti-neuropathic prescribed agents.  She has increased her activity as noted above.  She has radicular element pain along with dysesthesia into the right lower extremity and also in the left lower extremity, and also into the left upper extremity.  She continues to use her spinal cord stimulator for the left upper extremity burning pains and dysesthesia neuropathic pains of complex regional pain syndrome.    History of Present Illness     PEG Assessment   What number best describes your pain on average in the past week?6  What number best describes how, during the past week, pain has interfered with your enjoyment of life?6  What number best describes how, during the past week, pain has interfered with your general activity?  6    --  The aforementioned information the Chief Complaint section and above subjective data including any HPI data has been personally reviewed and affirmed.  --        The following portions of the patient's history were reviewed and updated as appropriate: allergies, current medications, past family history,  past medical history, past social history, past surgical history and problem list.    -------    The following portions of the patient's history were reviewed and updated as appropriate: allergies, current medications, past family history, past medical history, past social history, past surgical history and problem list.    Allergies   Allergen Reactions   • Codeine Itching   • Fentanyl Other (See Comments)     Dystonic movements   • Morphine Other (See Comments)     Other reaction(s): Other (See Comments)  Headaches         Current Outpatient Medications:   •  amphetamine-dextroamphetamine (ADDERALL) 10 MG tablet, Take 5 mg by mouth Daily., Disp: , Rfl:   •  amphetamine-dextroamphetamine (ADDERALL) 10 MG tablet, ADDERALL 10 MG TABS, Disp: , Rfl:   •  Betamethasone Valerate 0.12 % foam, BETAMETHASONE VALERATE 0.12 % FOAM, Disp: , Rfl:   •  celecoxib (CeleBREX) 200 MG capsule, Take 1 capsule by mouth Daily., Disp: 30 capsule, Rfl: 11  •  chlorhexidine (PERIDEX) 0.12 % solution, RINSE AND SPIT 15ML DAILY, Disp: , Rfl: 11  •  chlorhexidine (PERIDEX) 0.12 % solution, CHLORHEXIDINE GLUCONATE 0.12 % SOLN, Disp: , Rfl:   •  Cholecalciferol (VITAMIN D PO), Take 50,000 Units by mouth Every 30 (Thirty) Days., Disp: , Rfl:   •  ciclopirox (PENLAC) 8 % solution, APPLY TO AFFECTED AREA EVERY DAY, Disp: , Rfl: 6  •  CREON 99678 units capsule delayed-release particles, TAKE 1 CAPSULE BEFORE MEALS AND 1 CAPSULE BEFORE SNACKS, Disp: , Rfl: 3  •  DEXILANT 60 MG capsule, Take 1 capsule by mouth Daily., Disp: , Rfl: 11  •  dexlansoprazole (DEXILANT) 30 MG capsule, DEXILANT 30 MG CPDR, Disp: , Rfl:   •  Dronabinol (MARINOL PO), Take  by mouth., Disp: , Rfl:   •  FLUoxetine (PROzac) 40 MG capsule, Take 40 mg by mouth Daily., Disp: , Rfl: 3  •  FLUoxetine (PROZAC) 40 MG capsule, PROZAC 40 MG CAPS, Disp: , Rfl:   •  ibuprofen (ADVIL,MOTRIN) 200 MG tablet, IBUPROFEN 200 MG TABS, Disp: , Rfl:   •  levETIRAcetam (KEPPRA) 500 MG tablet, Take 1  tablet by mouth 2 (Two) Times a Day. (start 1 tab po qhs x5 days, then BID), Disp: 60 tablet, Rfl: 0  •  lidocaine (LIDODERM) 5 %, Place 3 patches on the skin as directed by provider Daily., Disp: 90 patch, Rfl: 11  •  lidocaine (LIDODERM) 5 %, LIDODERM 5 % PTCH, Disp: , Rfl:   •  Lidocaine (ZTLIDO) 1.8 % patch, Apply 1 patch topically Daily. Place 3 patches on the skin as directed by provider daily, Disp: 90 patch, Rfl: 11  •  linaclotide (LINZESS) 145 MCG capsule capsule, LINZESS 145 MCG CAPS, Disp: , Rfl:   •  loratadine (CLARITIN) 10 MG tablet, Take 10 mg by mouth., Disp: , Rfl:   •  loratadine (CLARITIN) 10 MG tablet, LORATADINE 10 MG TABS, Disp: , Rfl:   •  LYRICA 100 MG capsule, TAKE 1 CAPSULE BY MOUTH THREE TIMES A DAY, Disp: 90 capsule, Rfl: 2  •  NON FORMULARY, Pancreatic enzymes, Disp: , Rfl:   •  ondansetron (ZOFRAN) 4 MG tablet, Take 4 mg by mouth Every 8 (Eight) Hours., Disp: , Rfl: 0  •  oxyCODONE-acetaminophen (PERCOCET)  MG per tablet, Take 1 tablet by mouth Daily As Needed for Severe Pain ., Disp: 30 tablet, Rfl: 0  •  pancrelipase, Lip-Prot-Amyl, (CREON) 31591 units capsule delayed-release particles capsule, CREON 80949 UNIT CPEP, Disp: , Rfl:   •  polyethylene glycol (MIRALAX) packet, Take  by mouth., Disp: , Rfl:   •  Polyethylene Glycol 3350 (MIRALAX PO), MIRALAX POWD, Disp: , Rfl:   •  pregabalin (LYRICA) 100 MG capsule, 1 capsule Every 12 (Twelve) Hours., Disp: , Rfl:   •  pregabalin (LYRICA) 50 MG capsule, Take 1 capsule by mouth 2 (Two) Times a Day., Disp: 28 capsule, Rfl: 0  •  SENNA CO, by Combination route, Disp: , Rfl:   •  SENNOSIDES-DOCUSATE SODIUM PO, SM SENNA-S TABS, Disp: , Rfl:   •  SUMAtriptan (IMITREX) 100 MG tablet, TAKE 1 TABLET BY MOUTH AS NEEDED FOR HEADACHE, Disp: , Rfl: 6  •  SUMAtriptan (IMITREX) 100 MG tablet, IMITREX 100 MG TABS, Disp: , Rfl:   •  TiZANidine (ZANAFLEX) 4 MG capsule, Take 1 capsule by mouth 3 (Three) Times a Day As Needed for Muscle Spasms., Disp:  90 capsule, Rfl: 0  •  traZODone (DESYREL) 50 MG tablet, Take 25 mg by mouth., Disp: , Rfl:   •  traZODone (DESYREL) 50 MG tablet, Daily., Disp: , Rfl:   •  vitamin D (ERGOCALCIFEROL) 61112 units capsule capsule, TAKE 1 CAPSULE BY MOUTH EVERY 2 WEEKS, Disp: , Rfl: 3    Current Outpatient Medications on File Prior to Visit   Medication Sig Dispense Refill   • amphetamine-dextroamphetamine (ADDERALL) 10 MG tablet Take 5 mg by mouth Daily.     • amphetamine-dextroamphetamine (ADDERALL) 10 MG tablet ADDERALL 10 MG TABS     • Betamethasone Valerate 0.12 % foam BETAMETHASONE VALERATE 0.12 % FOAM     • chlorhexidine (PERIDEX) 0.12 % solution RINSE AND SPIT 15ML DAILY  11   • chlorhexidine (PERIDEX) 0.12 % solution CHLORHEXIDINE GLUCONATE 0.12 % SOLN     • Cholecalciferol (VITAMIN D PO) Take 50,000 Units by mouth Every 30 (Thirty) Days.     • ciclopirox (PENLAC) 8 % solution APPLY TO AFFECTED AREA EVERY DAY  6   • CREON 36667 units capsule delayed-release particles TAKE 1 CAPSULE BEFORE MEALS AND 1 CAPSULE BEFORE SNACKS  3   • DEXILANT 60 MG capsule Take 1 capsule by mouth Daily.  11   • dexlansoprazole (DEXILANT) 30 MG capsule DEXILANT 30 MG CPDR     • Dronabinol (MARINOL PO) Take  by mouth.     • FLUoxetine (PROzac) 40 MG capsule Take 40 mg by mouth Daily.  3   • FLUoxetine (PROZAC) 40 MG capsule PROZAC 40 MG CAPS     • ibuprofen (ADVIL,MOTRIN) 200 MG tablet IBUPROFEN 200 MG TABS     • levETIRAcetam (KEPPRA) 500 MG tablet Take 1 tablet by mouth 2 (Two) Times a Day. (start 1 tab po qhs x5 days, then BID) 60 tablet 0   • lidocaine (LIDODERM) 5 % Place 3 patches on the skin as directed by provider Daily. 90 patch 11   • lidocaine (LIDODERM) 5 % LIDODERM 5 % PTCH     • Lidocaine (ZTLIDO) 1.8 % patch Apply 1 patch topically Daily. Place 3 patches on the skin as directed by provider daily 90 patch 11   • linaclotide (LINZESS) 145 MCG capsule capsule LINZESS 145 MCG CAPS     • loratadine (CLARITIN) 10 MG tablet Take 10 mg by  mouth.     • loratadine (CLARITIN) 10 MG tablet LORATADINE 10 MG TABS     • LYRICA 100 MG capsule TAKE 1 CAPSULE BY MOUTH THREE TIMES A DAY 90 capsule 2   • NON FORMULARY Pancreatic enzymes     • ondansetron (ZOFRAN) 4 MG tablet Take 4 mg by mouth Every 8 (Eight) Hours.  0   • pancrelipase, Lip-Prot-Amyl, (CREON) 07080 units capsule delayed-release particles capsule CREON 33576 UNIT CPEP     • polyethylene glycol (MIRALAX) packet Take  by mouth.     • Polyethylene Glycol 3350 (MIRALAX PO) MIRALAX POWD     • pregabalin (LYRICA) 100 MG capsule 1 capsule Every 12 (Twelve) Hours.     • pregabalin (LYRICA) 50 MG capsule Take 1 capsule by mouth 2 (Two) Times a Day. 28 capsule 0   • SENNA CO by Combination route     • SENNOSIDES-DOCUSATE SODIUM PO SM SENNA-S TABS     • SUMAtriptan (IMITREX) 100 MG tablet TAKE 1 TABLET BY MOUTH AS NEEDED FOR HEADACHE  6   • SUMAtriptan (IMITREX) 100 MG tablet IMITREX 100 MG TABS     • TiZANidine (ZANAFLEX) 4 MG capsule Take 1 capsule by mouth 3 (Three) Times a Day As Needed for Muscle Spasms. 90 capsule 0   • traZODone (DESYREL) 50 MG tablet Take 25 mg by mouth.     • traZODone (DESYREL) 50 MG tablet Daily.     • vitamin D (ERGOCALCIFEROL) 73242 units capsule capsule TAKE 1 CAPSULE BY MOUTH EVERY 2 WEEKS  3     No current facility-administered medications on file prior to visit.        Patient Active Problem List   Diagnosis   • Chronic pain syndrome   • Neuropathic pain   • Complex regional pain syndrome type 1 of left upper extremity   • Presence of intrathecal pump   • Spinal cord stimulator status   • Cervical post-laminectomy syndrome   • Lumbar post-laminectomy syndrome   • Bilateral occipital neuralgia   • Cervical spondylosis without myelopathy       Past Medical History:   Diagnosis Date   • Depression    • Joint pain    • Low back pain    • Migraine    • Neck pain    • Osteoarthritis    • Peripheral neuropathy        Past Surgical History:   Procedure Laterality Date   • BACK  "SURGERY     • CHOLECYSTECTOMY     • EPIDURAL BLOCK     • HYSTERECTOMY     • IMPLANTATION / REPLACEMENT INFUSION PUMP     • KNEE ARTHROSCOPY Right    • NECK SURGERY     • PANCREAS SURGERY     • SPINAL CORD STIMULATOR IMPLANT     • TRIGGER POINT INJECTION         No family history on file.    Social History     Socioeconomic History   • Marital status:      Spouse name: Not on file   • Number of children: Not on file   • Years of education: Not on file   • Highest education level: Not on file   Tobacco Use   • Smoking status: Never Smoker   • Smokeless tobacco: Never Used   Substance and Sexual Activity   • Alcohol use: No   • Drug use: No       -------        Review of Systems   Constitutional: Positive for activity change (inc) and fatigue (occ). Negative for chills and fever.   HENT: Negative for congestion.    Respiratory: Negative for cough and shortness of breath.    Cardiovascular: Negative for chest pain.   Gastrointestinal: Positive for constipation (on linzess ), diarrhea and nausea (occ on zofran ). Negative for abdominal pain and vomiting.   Genitourinary: Negative for difficulty urinating and dysuria.   Musculoskeletal: Positive for arthralgias (bilat legs, right knee), back pain (leg pain,R>L as well), neck pain (Pt sts all the time) and neck stiffness.   Skin: Negative for rash.   Neurological: Positive for numbness (Right foot) and headaches (occ). Negative for dizziness, weakness (arms,legs bilaterally  improved) and light-headedness.   Psychiatric/Behavioral: Negative for agitation, sleep disturbance and suicidal ideas. The patient is not nervous/anxious.        Vitals:    09/11/19 1435   BP: 123/63   Pulse: 61   Resp: 20   Temp: 98.4 °F (36.9 °C)   SpO2: 100%   Weight: 53.8 kg (118 lb 9.6 oz)   Height: 165 cm (64.96\")   PainSc:   8   PainLoc: Knee  Comment: right     bupivicaine batch [id number]  EXP 10/2021    METHYLPREDNISOLONE 40MG/ML  LOT#[id number]  EXP05/20    Objective   Physical Exam "   Constitutional: She is oriented to person, place, and time. Vital signs are normal. She appears well-developed and well-nourished.  Non-toxic appearance. No distress.   HENT:   Head: Normocephalic and atraumatic.   Right Ear: Hearing and external ear normal.   Left Ear: Hearing and external ear normal.   Nose: Nose normal.   Eyes: Conjunctivae and lids are normal. Pupils are equal, round, and reactive to light.   Pulmonary/Chest: Effort normal. No respiratory distress.   Abdominal: Normal appearance.   Musculoskeletal:        Right knee: She exhibits swelling. Tenderness found. Medial joint line tenderness noted.        Cervical back: She exhibits tenderness.        Lumbar back: She exhibits tenderness.   Neurological: She is alert and oriented to person, place, and time. No cranial nerve deficit. Gait (Nonantalgic) abnormal.   She is able to tolerate light touch in the left arm without severe pain but she does complain that there is dysesthesia to light touch.   Psychiatric: She has a normal mood and affect. Her behavior is normal.   Nursing note and vitals reviewed.          Assessment/Plan   Amalia was seen today for knee pain and back pain.    Diagnoses and all orders for this visit:    Chronic pain syndrome    Presence of intrathecal pump    Spinal cord stimulator status    Cervical post-laminectomy syndrome    Lumbar post-laminectomy syndrome    Neuropathic pain    Complex regional pain syndrome type 1 of left upper extremity    Chronic pain of right knee    Other orders  -     oxyCODONE-acetaminophen (PERCOCET)  MG per tablet; Take 1 tablet by mouth Daily As Needed for Severe Pain .  -     celecoxib (CeleBREX) 200 MG capsule; Take 1 capsule by mouth Daily.        -- refill pump today, and reprogram...  --- Follow-up for pump refill again in 3 months or sooner PRN  -- right knee injection now... Medications as above and also noted in the note below.  -- refill Percocet.  Judicious use for severe  painful flareups is noted !  Averaging only 1-2 pills per week use now  --Continuation of prescription anti-neuropathic agents for the neuropathic pain and CRPS pain is definitely indicated and will be continued.  -- encourage continued exercise / walking  -- she may benefit from some Celebrex for arthritis pains with exercise.         -------------    Her Medtronic intrathecal pump was interrogated and refilled and reprogrammed.  Informed consent was obtained.  Her intrathecal pump was previously programmed to a lower rate to ensure that her medication level did not run to a 0 volume.  The reservoir expected residual volume was 1.2 mL any actual residual volume was 1 mL.  The reservoir was filled with morphine 4 mg/mL with ropivacaine 10 mg/mL.  The volume of the refill was 20 mL.  It was accessed without difficulty and using sterile technique.  The simple continuous mode was discontinued and the patient was converted to flex dosing once again.  The basal rate is 0.1957 mg of morphine per hour with quantity sufficient ropivacaine.  3 flex dosing's were set up.  All for over 10 minutes in duration.  At 4 AM there is a 0.1201 mg dose of morphine, at noon and there is a 0.04 mg dose of morphine, and that 7:30 PM there is a 0.75 mg dose of morphine, all with quantity sufficient ropivacaine per the concentration of this combined solution.  Total 24-hour dose of 0.4309 mg/day of her above morphine along with 1.077 mg/day of ropivacaine.  There were no complications.    -------------    Right Knee injection:    Informed consent was obtained.  We discussed risks including but not limited to bleeding and infection and injury and risk of site soreness.  We discussed risk of postprocedural painful flareup.  Patient identified.  Laterality confirmed.      A solution was prepared of 4ml of lidocaine 1% and 40mg Depomedrol.  The areas over the entirety of the knee was prepped with a wide surgical prep of chlorhexidine  solution.    On the RIGHT KNEE, a point on the medial aspect of the joint line was identified to enter into the joint space, and the sterile needle was inserted parallel with a slight posterior angle to enter easily into the joint space.  Aspiration was negative.  Then, slowly, half of the aforementioned solution was injected easily into the joint space.      The needle was removed intact on each side and bleeding was minimal. The insertion site was dressed with a Band-Aid.  There were no apparent complications.             --------------      SMITH REPORT    As part of the patient's treatment plan, I am prescribing controlled substances. The patient has been made aware of appropriate use of such medications, including potential risk of somnolence, limited ability to drive and/or work safely, and the potential for dependence or overdose. It has also bee made clear that these medications are for use by this patient only, without concomitant use of alcohol or other substances unless prescribed.     Patient has completed prescribing agreement detailing terms of continued prescribing of controlled substances, including monitoring SMITH reports, urine drug screening, and pill counts if necessary. The patient is aware that inappropriate use will results in cessation of prescribing such medications.    SMITH report has been ordered and will be scanned into the Media tab on the patient's Epic chart.  It is in manual processing now.     History and physical exam exhibit continued safe and appropriate use of controlled substances.      EMR Dragon/Transcription disclaimer:   Much of this encounter note is an electronic transcription/translation of spoken language to printed text. The electronic translation of spoken language may permit erroneous, or at times, nonsensical words or phrases to be inadvertently transcribed; Although I have reviewed the note for such errors, some may still exist.

## 2019-09-12 ENCOUNTER — OFFICE (OUTPATIENT)
Dept: URBAN - METROPOLITAN AREA CLINIC 66 | Facility: CLINIC | Age: 64
End: 2019-09-12

## 2019-09-12 VITALS
DIASTOLIC BLOOD PRESSURE: 59 MMHG | HEART RATE: 57 BPM | SYSTOLIC BLOOD PRESSURE: 100 MMHG | HEIGHT: 66 IN | WEIGHT: 116 LBS

## 2019-09-12 DIAGNOSIS — K21.9 GASTRO-ESOPHAGEAL REFLUX DISEASE WITHOUT ESOPHAGITIS: ICD-10-CM

## 2019-09-12 DIAGNOSIS — K59.00 CONSTIPATION, UNSPECIFIED: ICD-10-CM

## 2019-09-12 DIAGNOSIS — R53.83 OTHER FATIGUE: ICD-10-CM

## 2019-09-12 PROCEDURE — 99214 OFFICE O/P EST MOD 30 MIN: CPT

## 2019-09-13 ENCOUNTER — TRANSCRIBE ORDERS (OUTPATIENT)
Dept: ADMINISTRATIVE | Facility: HOSPITAL | Age: 64
End: 2019-09-13

## 2019-09-13 ENCOUNTER — LAB (OUTPATIENT)
Dept: LAB | Facility: HOSPITAL | Age: 64
End: 2019-09-13

## 2019-09-13 DIAGNOSIS — K59.00 CONSTIPATION, UNSPECIFIED CONSTIPATION TYPE: ICD-10-CM

## 2019-09-13 DIAGNOSIS — K21.9 GERD WITHOUT ESOPHAGITIS: ICD-10-CM

## 2019-09-13 DIAGNOSIS — K21.9 GASTROESOPHAGEAL REFLUX DISEASE WITHOUT ESOPHAGITIS: ICD-10-CM

## 2019-09-13 DIAGNOSIS — R53.83 TIREDNESS: ICD-10-CM

## 2019-09-13 DIAGNOSIS — R53.83 TIREDNESS: Primary | ICD-10-CM

## 2019-09-13 LAB
ALBUMIN SERPL-MCNC: 4.4 G/DL (ref 3.5–4.8)
ALBUMIN/GLOB SERPL: 2.4 G/DL (ref 1–1.7)
ALP SERPL-CCNC: 54 U/L (ref 32–91)
ALT SERPL W P-5'-P-CCNC: 21 U/L (ref 14–54)
ANION GAP SERPL CALCULATED.3IONS-SCNC: 15.4 MMOL/L (ref 5–15)
AST SERPL-CCNC: 25 U/L (ref 15–41)
BILIRUB SERPL-MCNC: 0.5 MG/DL (ref 0.3–1.2)
BUN BLD-MCNC: 16 MG/DL (ref 8–20)
BUN/CREAT SERPL: 16 (ref 5.4–26.2)
CALCIUM SPEC-SCNC: 9.5 MG/DL (ref 8.9–10.3)
CHLORIDE SERPL-SCNC: 99 MMOL/L (ref 101–111)
CO2 SERPL-SCNC: 27 MMOL/L (ref 22–32)
CREAT BLD-MCNC: 1 MG/DL (ref 0.4–1)
FERRITIN SERPL-MCNC: 63 NG/ML (ref 11–307)
GFR SERPL CREATININE-BSD FRML MDRD: 56 ML/MIN/1.73
GLOBULIN UR ELPH-MCNC: 1.8 GM/DL (ref 2.5–3.8)
GLUCOSE BLD-MCNC: 93 MG/DL (ref 65–99)
IRON 24H UR-MRATE: 74 MCG/DL (ref 28–170)
IRON SATN MFR SERPL: 22 % (ref 15–50)
POTASSIUM BLD-SCNC: 4.4 MMOL/L (ref 3.6–5.1)
PROT SERPL-MCNC: 6.2 G/DL (ref 6.1–7.9)
SODIUM BLD-SCNC: 137 MMOL/L (ref 136–144)
T3FREE SERPL-MCNC: 2.18 PG/ML (ref 2.39–6.79)
T4 FREE SERPL-MCNC: 0.59 NG/DL (ref 0.58–1.64)
TIBC SERPL-MCNC: 340 MCG/DL (ref 228–428)
TRANSFERRIN SERPL-MCNC: 243 MG/DL (ref 190–380)
TSH SERPL DL<=0.05 MIU/L-ACNC: 1.78 UIU/ML (ref 0.34–5.6)

## 2019-09-13 PROCEDURE — 36415 COLL VENOUS BLD VENIPUNCTURE: CPT

## 2019-09-13 PROCEDURE — 80053 COMPREHEN METABOLIC PANEL: CPT

## 2019-09-13 PROCEDURE — 84439 ASSAY OF FREE THYROXINE: CPT

## 2019-09-13 PROCEDURE — 83540 ASSAY OF IRON: CPT

## 2019-09-13 PROCEDURE — 84481 FREE ASSAY (FT-3): CPT

## 2019-09-13 PROCEDURE — 82728 ASSAY OF FERRITIN: CPT

## 2019-09-13 PROCEDURE — 84443 ASSAY THYROID STIM HORMONE: CPT

## 2019-09-13 PROCEDURE — 84466 ASSAY OF TRANSFERRIN: CPT

## 2019-09-13 RX ORDER — CELECOXIB 200 MG/1
200 CAPSULE ORAL DAILY
Qty: 30 CAPSULE | Refills: 11 | Status: SHIPPED | OUTPATIENT
Start: 2019-09-13 | End: 2021-09-21

## 2019-09-13 RX ORDER — OXYCODONE AND ACETAMINOPHEN 10; 325 MG/1; MG/1
1 TABLET ORAL DAILY PRN
Qty: 30 TABLET | Refills: 0 | Status: SHIPPED | OUTPATIENT
Start: 2019-09-13 | End: 2019-12-17 | Stop reason: SDUPTHER

## 2019-11-11 RX ORDER — PREGABALIN 100 MG/1
CAPSULE ORAL
Qty: 90 CAPSULE | Refills: 2 | Status: SHIPPED | OUTPATIENT
Start: 2019-11-11 | End: 2020-02-12 | Stop reason: SDUPTHER

## 2019-12-09 ENCOUNTER — OFFICE (OUTPATIENT)
Dept: URBAN - METROPOLITAN AREA CLINIC 66 | Facility: CLINIC | Age: 64
End: 2019-12-09

## 2019-12-09 VITALS
DIASTOLIC BLOOD PRESSURE: 73 MMHG | HEART RATE: 54 BPM | HEIGHT: 66 IN | SYSTOLIC BLOOD PRESSURE: 121 MMHG | WEIGHT: 116 LBS

## 2019-12-09 DIAGNOSIS — K86.1 OTHER CHRONIC PANCREATITIS: ICD-10-CM

## 2019-12-09 DIAGNOSIS — Z86.010 PERSONAL HISTORY OF COLONIC POLYPS: ICD-10-CM

## 2019-12-09 DIAGNOSIS — D80.3 SELECTIVE DEFICIENCY OF IMMUNOGLOBULIN G [IGG] SUBCLASSES: ICD-10-CM

## 2019-12-09 DIAGNOSIS — K59.00 CONSTIPATION, UNSPECIFIED: ICD-10-CM

## 2019-12-09 DIAGNOSIS — K31.84 GASTROPARESIS: ICD-10-CM

## 2019-12-09 PROCEDURE — 99213 OFFICE O/P EST LOW 20 MIN: CPT

## 2019-12-17 RX ORDER — OXYCODONE AND ACETAMINOPHEN 10; 325 MG/1; MG/1
1 TABLET ORAL DAILY PRN
Qty: 30 TABLET | Refills: 0 | Status: SHIPPED | OUTPATIENT
Start: 2019-12-17 | End: 2020-02-12 | Stop reason: SDUPTHER

## 2019-12-17 NOTE — TELEPHONE ENCOUNTER
Medication Refill Request    Date of phone call: 19    Medication being requested: percocet 10/325mg si tab po daily prn   Qty: 30    Date of last visit: 19    Date of last refill: 19    SMITH up to date?: yes    Next Follow up?: 20    Any new pertinent information? (i.e, new medication allergies, new use of medications, change in patient's health or condition, non-compliance or inconsistency with prescribing agreement?):

## 2020-01-06 ENCOUNTER — OFFICE VISIT (OUTPATIENT)
Dept: PAIN MEDICINE | Facility: CLINIC | Age: 65
End: 2020-01-06

## 2020-01-06 VITALS
HEIGHT: 65 IN | SYSTOLIC BLOOD PRESSURE: 125 MMHG | TEMPERATURE: 98.1 F | HEART RATE: 51 BPM | RESPIRATION RATE: 18 BRPM | BODY MASS INDEX: 19.29 KG/M2 | OXYGEN SATURATION: 98 % | DIASTOLIC BLOOD PRESSURE: 65 MMHG | WEIGHT: 115.8 LBS

## 2020-01-06 DIAGNOSIS — G89.4 CHRONIC PAIN SYNDROME: Primary | ICD-10-CM

## 2020-01-06 DIAGNOSIS — Z97.8 PRESENCE OF INTRATHECAL PUMP: ICD-10-CM

## 2020-01-06 DIAGNOSIS — G43.011 INTRACTABLE MIGRAINE WITHOUT AURA AND WITH STATUS MIGRAINOSUS: ICD-10-CM

## 2020-01-06 DIAGNOSIS — M96.1 LUMBAR POST-LAMINECTOMY SYNDROME: ICD-10-CM

## 2020-01-06 DIAGNOSIS — Z96.89 SPINAL CORD STIMULATOR STATUS: ICD-10-CM

## 2020-01-06 DIAGNOSIS — G57.01 PIRIFORMIS SYNDROME OF RIGHT SIDE: ICD-10-CM

## 2020-01-06 DIAGNOSIS — M96.1 CERVICAL POST-LAMINECTOMY SYNDROME: ICD-10-CM

## 2020-01-06 PROCEDURE — 99213 OFFICE O/P EST LOW 20 MIN: CPT | Performed by: ANESTHESIOLOGY

## 2020-01-06 PROCEDURE — 62368 ANALYZE SP INF PUMP W/REPROG: CPT | Performed by: ANESTHESIOLOGY

## 2020-01-06 NOTE — PROGRESS NOTES
CHIEF COMPLAINT  Follow-up for back,neck and leg pain. Ms. Lopez states that her pain has increased in the last 2 months.    Subjective   Amalia Lopez is a 64 y.o. female  who presents to the office for follow-up.She has a history of lumbar and cervical postlaminectomy syndromes and also complex regional pain syndrome.  She has a spinal cord stimulator which is being used for the upper extremity CRPS and she also has an intrathecal pump with a lower thoracic catheter tip placement..    With migraines and poor sleep she has been had flareup of neck pain and back pain noticing radicular symptoms more.  General aches and pains continue to be present.  Overall her knee pain is moderate in fluctuates with right knee swelling occasionally.  There is also some posterior right hip pain radiating from the lumbosacral area over to the right hip.    Over the past couple months she has been having more aches and pains and in fact is been having more migraines been triggering.  She has 4-7 migraines per week and migraines last anywhere from 5 to 10 hours on each event.  She uses a prescription strength NSAIDs also uses multiple anti-neuropathic agents including pregabalin and Keppra.  She uses prescription strength muscle relaxants and gets short-term relief from the use of sumatriptan and but the allotted 9 pills/month really only gets her through about a week and a half  ; also she has failed on beta-blockers and also she takes antidepressants as well but overall these do not seem to be helping the painful condition.      Pain   This is a chronic problem. The current episode started more than 1 month ago. The problem occurs constantly. The problem has been gradually worsening. Associated symptoms include fatigue, neck pain and weakness. Pertinent negatives include no abdominal pain, chest pain, chills, congestion, coughing, fever, headaches (occ), nausea, numbness, rash or vomiting. The symptoms are aggravated by  exertion, stress, walking and standing. The treatment provided mild relief.   Back Pain   This is a recurrent problem. The problem has been gradually worsening since onset. The pain is present in the lumbar spine. The quality of the pain is described as aching and burning. The pain radiates to the right thigh. The pain is moderate. The pain is the same all the time. Stiffness is present in the morning. Associated symptoms include weakness. Pertinent negatives include no abdominal pain, chest pain, dysuria, fever, headaches (occ) or numbness. She has tried bed rest, heat and ice for the symptoms. The treatment provided mild relief.   Neck Pain    This is a chronic problem. The problem occurs daily. The problem has been gradually worsening. The pain is associated with nothing. The pain is present in the left side, midline and right side. The quality of the pain is described as aching, burning and shooting. The pain is moderate. Associated symptoms include weakness. Pertinent negatives include no chest pain, fever, headaches (occ) or numbness. The treatment provided mild relief.   Knee Pain    There was no injury mechanism. The pain is present in the right knee. The pain is moderate. The pain has been fluctuating since onset. Pertinent negatives include no numbness. She reports no foreign bodies present. The symptoms are aggravated by movement. She has tried acetaminophen, heat, ice, NSAIDs and rest for the symptoms. The treatment provided moderate relief.   Migraine    This is a chronic problem. The current episode started more than 1 year ago. The problem occurs daily. The problem has been gradually worsening. The pain is located in the bilateral region. The pain radiates to the face. The pain quality is similar to prior headaches. The quality of the pain is described as aching and stabbing. The pain is moderate. Associated symptoms include back pain, neck pain and weakness. Pertinent negatives include no abdominal  pain, coughing, fever, nausea, numbness or vomiting. The symptoms are aggravated by weather changes. She has tried acetaminophen, antidepressants, beta blockers, ergotamines, Excedrin, NSAIDs, oral narcotics, triptans, cold packs and darkened room for the symptoms. The treatment provided mild relief. Her past medical history is significant for migraine headaches and migraines in the family.        PEG Assessment   What number best describes your pain on average in the past week?7  What number best describes how, during the past week, pain has interfered with your enjoyment of life?7  What number best describes how, during the past week, pain has interfered with your general activity?  7    --  The aforementioned information the Chief Complaint section and above subjective data including any HPI data has been personally reviewed and affirmed.  --        The following portions of the patient's history were reviewed and updated as appropriate: allergies, current medications, past family history, past medical history, past social history, past surgical history and problem list.    -------    The following portions of the patient's history were reviewed and updated as appropriate: allergies, current medications, past family history, past medical history, past social history, past surgical history and problem list.    Allergies   Allergen Reactions   • Codeine Itching   • Fentanyl Other (See Comments)     Dystonic movements   • Morphine Other (See Comments)     Other reaction(s): Other (See Comments)  Headaches         Current Outpatient Medications:   •  amphetamine-dextroamphetamine (ADDERALL) 10 MG tablet, Take 5 mg by mouth Daily., Disp: , Rfl:   •  Betamethasone Valerate 0.12 % foam, BETAMETHASONE VALERATE 0.12 % FOAM, Disp: , Rfl:   •  celecoxib (CeleBREX) 200 MG capsule, Take 1 capsule by mouth Daily., Disp: 30 capsule, Rfl: 11  •  chlorhexidine (PERIDEX) 0.12 % solution, RINSE AND SPIT 15ML DAILY, Disp: , Rfl:  11  •  ciclopirox (PENLAC) 8 % solution, APPLY TO AFFECTED AREA EVERY DAY, Disp: , Rfl: 6  •  CREON 17829 units capsule delayed-release particles, TAKE 1 CAPSULE BEFORE MEALS AND 1 CAPSULE BEFORE SNACKS, Disp: , Rfl: 3  •  DEXILANT 60 MG capsule, Take 1 capsule by mouth Daily., Disp: , Rfl: 11  •  FLUoxetine (PROzac) 40 MG capsule, Take 40 mg by mouth Daily., Disp: , Rfl: 3  •  ibuprofen (ADVIL,MOTRIN) 200 MG tablet, IBUPROFEN 200 MG TABS, Disp: , Rfl:   •  levETIRAcetam (KEPPRA) 500 MG tablet, Take 1 tablet by mouth 2 (Two) Times a Day. (start 1 tab po qhs x5 days, then BID), Disp: 60 tablet, Rfl: 0  •  lidocaine (LIDODERM) 5 %, Place 3 patches on the skin as directed by provider Daily., Disp: 90 patch, Rfl: 11  •  linaclotide (LINZESS) 145 MCG capsule capsule, LINZESS 145 MCG CAPS, Disp: , Rfl:   •  loratadine (CLARITIN) 10 MG tablet, Take 10 mg by mouth., Disp: , Rfl:   •  mupirocin (BACTROBAN) 2 % ointment, APPLY TO AFFECTED AREA OF NOSE THREE TIMES DAILY FOR 3 WEEKS, Disp: , Rfl:   •  NON FORMULARY, Pancreatic enzymes, Disp: , Rfl:   •  ondansetron (ZOFRAN) 4 MG tablet, Take 4 mg by mouth Every 8 (Eight) Hours., Disp: , Rfl: 0  •  oxyCODONE-acetaminophen (PERCOCET)  MG per tablet, Take 1 tablet by mouth Daily As Needed for Severe Pain ., Disp: 30 tablet, Rfl: 0  •  Polyethylene Glycol 3350 (MIRALAX PO), MIRALAX POWD, Disp: , Rfl:   •  pregabalin (LYRICA) 100 MG capsule, TAKE 1 CAPSULE BY MOUTH THREE TIMES A DAY, Disp: 90 capsule, Rfl: 2  •  SENNOSIDES-DOCUSATE SODIUM PO, SM SENNA-S TABS, Disp: , Rfl:   •  SUMAtriptan (IMITREX) 100 MG tablet, TAKE 1 TABLET BY MOUTH AS NEEDED FOR HEADACHE, Disp: , Rfl: 6  •  TiZANidine (ZANAFLEX) 4 MG capsule, Take 1 capsule by mouth 3 (Three) Times a Day As Needed for Muscle Spasms., Disp: 90 capsule, Rfl: 0  •  traZODone (DESYREL) 50 MG tablet, Take 25 mg by mouth., Disp: , Rfl:   •  Erenumab-aooe (AIMOVIG) 140 MG/ML solution auto-injector, Inject 1 mL under the skin into  the appropriate area as directed Every 30 (Thirty) Days., Disp: 1.12 mL, Rfl: 11    Current Outpatient Medications on File Prior to Visit   Medication Sig Dispense Refill   • amphetamine-dextroamphetamine (ADDERALL) 10 MG tablet Take 5 mg by mouth Daily.     • Betamethasone Valerate 0.12 % foam BETAMETHASONE VALERATE 0.12 % FOAM     • celecoxib (CeleBREX) 200 MG capsule Take 1 capsule by mouth Daily. 30 capsule 11   • chlorhexidine (PERIDEX) 0.12 % solution RINSE AND SPIT 15ML DAILY  11   • ciclopirox (PENLAC) 8 % solution APPLY TO AFFECTED AREA EVERY DAY  6   • CREON 24185 units capsule delayed-release particles TAKE 1 CAPSULE BEFORE MEALS AND 1 CAPSULE BEFORE SNACKS  3   • DEXILANT 60 MG capsule Take 1 capsule by mouth Daily.  11   • FLUoxetine (PROzac) 40 MG capsule Take 40 mg by mouth Daily.  3   • ibuprofen (ADVIL,MOTRIN) 200 MG tablet IBUPROFEN 200 MG TABS     • levETIRAcetam (KEPPRA) 500 MG tablet Take 1 tablet by mouth 2 (Two) Times a Day. (start 1 tab po qhs x5 days, then BID) 60 tablet 0   • lidocaine (LIDODERM) 5 % Place 3 patches on the skin as directed by provider Daily. 90 patch 11   • linaclotide (LINZESS) 145 MCG capsule capsule LINZESS 145 MCG CAPS     • loratadine (CLARITIN) 10 MG tablet Take 10 mg by mouth.     • mupirocin (BACTROBAN) 2 % ointment APPLY TO AFFECTED AREA OF NOSE THREE TIMES DAILY FOR 3 WEEKS     • NON FORMULARY Pancreatic enzymes     • ondansetron (ZOFRAN) 4 MG tablet Take 4 mg by mouth Every 8 (Eight) Hours.  0   • oxyCODONE-acetaminophen (PERCOCET)  MG per tablet Take 1 tablet by mouth Daily As Needed for Severe Pain . 30 tablet 0   • Polyethylene Glycol 3350 (MIRALAX PO) MIRALAX POWD     • pregabalin (LYRICA) 100 MG capsule TAKE 1 CAPSULE BY MOUTH THREE TIMES A DAY 90 capsule 2   • SENNOSIDES-DOCUSATE SODIUM PO SM SENNA-S TABS     • SUMAtriptan (IMITREX) 100 MG tablet TAKE 1 TABLET BY MOUTH AS NEEDED FOR HEADACHE  6   • TiZANidine (ZANAFLEX) 4 MG capsule Take 1 capsule by  mouth 3 (Three) Times a Day As Needed for Muscle Spasms. 90 capsule 0   • traZODone (DESYREL) 50 MG tablet Take 25 mg by mouth.       No current facility-administered medications on file prior to visit.        Patient Active Problem List   Diagnosis   • Chronic pain syndrome   • Neuropathic pain   • Complex regional pain syndrome type 1 of left upper extremity   • Presence of intrathecal pump   • Spinal cord stimulator status   • Cervical post-laminectomy syndrome   • Lumbar post-laminectomy syndrome   • Bilateral occipital neuralgia   • Cervical spondylosis without myelopathy       Past Medical History:   Diagnosis Date   • Depression    • Joint pain    • Low back pain    • Migraine    • Neck pain    • Osteoarthritis    • Peripheral neuropathy        Past Surgical History:   Procedure Laterality Date   • BACK SURGERY     • CHOLECYSTECTOMY     • EPIDURAL BLOCK     • HYSTERECTOMY     • IMPLANTATION / REPLACEMENT INFUSION PUMP     • KNEE ARTHROSCOPY Right    • NECK SURGERY     • PANCREAS SURGERY     • SPINAL CORD STIMULATOR IMPLANT     • TRIGGER POINT INJECTION         History reviewed. No pertinent family history.    Social History     Socioeconomic History   • Marital status:      Spouse name: Not on file   • Number of children: Not on file   • Years of education: Not on file   • Highest education level: Not on file   Tobacco Use   • Smoking status: Never Smoker   • Smokeless tobacco: Never Used   Substance and Sexual Activity   • Alcohol use: No   • Drug use: No       -------        Review of Systems   Constitutional: Positive for fatigue. Negative for chills and fever.   HENT: Negative for congestion.    Eyes: Positive for visual disturbance (blurry vision).   Respiratory: Negative for cough, shortness of breath and wheezing.    Cardiovascular: Negative.  Negative for chest pain.   Gastrointestinal: Negative for abdominal pain, diarrhea, nausea and vomiting.   Genitourinary: Negative for difficulty  "urinating and dysuria.   Musculoskeletal: Positive for back pain and neck pain.   Skin: Negative for rash.   Allergic/Immunologic: Negative for immunocompromised state.   Neurological: Positive for weakness. Negative for numbness and headaches (occ).   Hematological: Does not bruise/bleed easily.   Psychiatric/Behavioral: Negative for sleep disturbance and suicidal ideas. The patient is not nervous/anxious.            Vitals:    01/06/20 1354   BP: 125/65   Pulse: 51   Resp: 18   Temp: 98.1 °F (36.7 °C)   SpO2: 98%   Weight: 52.5 kg (115 lb 12.8 oz)   Height: 165 cm (64.96\")   PainSc:   7   PainLoc: Back         Objective   Physical Exam   Constitutional: She is oriented to person, place, and time. Vital signs are normal. She appears well-developed and well-nourished.  Non-toxic appearance. No distress.   HENT:   Head: Normocephalic and atraumatic.   Right Ear: Hearing and external ear normal.   Left Ear: Hearing and external ear normal.   Nose: Nose normal.   Eyes: Pupils are equal, round, and reactive to light. Conjunctivae and lids are normal.   Pulmonary/Chest: Effort normal. No respiratory distress.   Abdominal: Normal appearance.   Musculoskeletal:        Right hip: She exhibits tenderness (piriformis tender.  positive piriformis stretch sign). She exhibits normal range of motion and no crepitus.        Right knee: She exhibits decreased range of motion and swelling. She exhibits no effusion and no ecchymosis. Tenderness found. Medial joint line tenderness noted.        Cervical back: She exhibits spasm.        Lumbar back: She exhibits spasm. She exhibits no edema.   Neurological: She is alert and oriented to person, place, and time. No cranial nerve deficit.   Psychiatric: She has a normal mood and affect. Her behavior is normal.   Nursing note and vitals reviewed.          Assessment/Plan   Amalia was seen today for back pain, neck pain and leg pain.    Diagnoses and all orders for this visit:    Chronic " pain syndrome    Presence of intrathecal pump    Spinal cord stimulator status    Cervical post-laminectomy syndrome    Lumbar post-laminectomy syndrome    Intractable migraine without aura and with status migrainosus    Piriformis syndrome of right side    Other orders  -     Erenumab-aooe (AIMOVIG) 140 MG/ML solution auto-injector; Inject 1 mL under the skin into the appropriate area as directed Every 30 (Thirty) Days.      --Reprogramming of her intrathecal pump as below because of low back painful flareup  -- continue scs for neck pain  --- Follow-up in about 6 wks for a pump refill  --Continue icing that right knee  --She is continuing Lyrica and also Celebrex.  --Judicious use of Percocet.  But not escalating any Percocet at this time, as we can try a mild increase in the intrathecal therapy.  -- she could consider a piriformis injection but we will try this intrathecal escalation first.       Her [employer] intrathecal pump was interrogated & reprogrammed.  Informed consent was obtained.  The reservoir expected residual volume was 7.5 mL.   It was accessed without difficulty and using sterile technique.  The simple continuous mode was discontinued and the patient was converted to flex dosing once again.  The basal rate is 0.1957 mg of morphine per hour with quantity sufficient ropivacaine.  3 flex dosing's were set up.  All for over 10 minutes in duration.  At 4 AM there is a 0.1201 mg dose of morphine, at noon and there is a 0.04 mg dose of morphine, and that 7:30 PM there is a 0.75 mg dose of morphine, all with quantity sufficient ropivacaine per the concentration of this combined solution.  Total 24-hour dose of 0.4309 mg/day of her above morphine along with 1.077 mg/day of ropivacaine.    Modification in program....  Base rate increased to 0.015mg per hour.  Total daily now is 0.5879 mg per day with qs bupivacaine.          SMITH REPORT    As part of the patient's treatment plan, I am prescribing  controlled substances. The patient has been made aware of appropriate use of such medications, including potential risk of somnolence, limited ability to drive and/or work safely, and the potential for dependence or overdose. It has also bee made clear that these medications are for use by this patient only, without concomitant use of alcohol or other substances unless prescribed.     Patient has completed prescribing agreement detailing terms of continued prescribing of controlled substances, including monitoring SMITH reports, urine drug screening, and pill counts if necessary. The patient is aware that inappropriate use will results in cessation of prescribing such medications.    SMITH report has been reviewed and scanned into the patient's chart.    As the clinician, I personally reviewed the SMITH from as above while the patient was in the office today.    History and physical exam exhibit continued safe and appropriate use of controlled substances.      EMR Dragon/Transcription disclaimer:   Much of this encounter note is an electronic transcription/translation of spoken language to printed text. The electronic translation of spoken language may permit erroneous, or at times, nonsensical words or phrases to be inadvertently transcribed; Although I have reviewed the note for such errors, some may still exist.

## 2020-01-07 ENCOUNTER — PRIOR AUTHORIZATION (OUTPATIENT)
Dept: PAIN MEDICINE | Facility: CLINIC | Age: 65
End: 2020-01-07

## 2020-01-07 NOTE — TELEPHONE ENCOUNTER
Amalia Lopez (Key: [id number])  Aimovig 140MG/ML auto-injectors  Status: Sent to Plan  Created: January 7th, 2020  Sent: January 7th, 2020  Open  Brant as not sent  Archive

## 2020-02-12 ENCOUNTER — OFFICE VISIT (OUTPATIENT)
Dept: PAIN MEDICINE | Facility: CLINIC | Age: 65
End: 2020-02-12

## 2020-02-12 VITALS
HEART RATE: 45 BPM | SYSTOLIC BLOOD PRESSURE: 130 MMHG | RESPIRATION RATE: 20 BRPM | TEMPERATURE: 97.2 F | DIASTOLIC BLOOD PRESSURE: 56 MMHG | HEIGHT: 65 IN | BODY MASS INDEX: 19.69 KG/M2 | WEIGHT: 118.2 LBS

## 2020-02-12 DIAGNOSIS — Z97.8 PRESENCE OF INTRATHECAL PUMP: ICD-10-CM

## 2020-02-12 DIAGNOSIS — G90.512 COMPLEX REGIONAL PAIN SYNDROME TYPE 1 OF LEFT UPPER EXTREMITY: ICD-10-CM

## 2020-02-12 DIAGNOSIS — Z96.89 SPINAL CORD STIMULATOR STATUS: ICD-10-CM

## 2020-02-12 DIAGNOSIS — M96.1 CERVICAL POST-LAMINECTOMY SYNDROME: ICD-10-CM

## 2020-02-12 DIAGNOSIS — M96.1 LUMBAR POST-LAMINECTOMY SYNDROME: ICD-10-CM

## 2020-02-12 DIAGNOSIS — G89.4 CHRONIC PAIN SYNDROME: Primary | ICD-10-CM

## 2020-02-12 DIAGNOSIS — M79.2 NEUROPATHIC PAIN: ICD-10-CM

## 2020-02-12 PROCEDURE — 62369 ANAL SP INF PMP W/REPRG&FILL: CPT | Performed by: ANESTHESIOLOGY

## 2020-02-12 PROCEDURE — 99213 OFFICE O/P EST LOW 20 MIN: CPT | Performed by: ANESTHESIOLOGY

## 2020-02-12 RX ORDER — PREGABALIN 100 MG/1
100 CAPSULE ORAL 3 TIMES DAILY
Qty: 90 CAPSULE | Refills: 2 | Status: SHIPPED | OUTPATIENT
Start: 2020-02-12 | End: 2020-05-21

## 2020-02-12 RX ORDER — OXYCODONE AND ACETAMINOPHEN 10; 325 MG/1; MG/1
1 TABLET ORAL DAILY PRN
Qty: 30 TABLET | Refills: 0 | Status: SHIPPED | OUTPATIENT
Start: 2020-02-12 | End: 2020-04-15 | Stop reason: SDUPTHER

## 2020-02-12 NOTE — PROGRESS NOTES
CHIEF COMPLAINT  F/u back pain. Pt here for pump refill today.     Subjective   Amalia Lopez is a 64 y.o. female  who presents to the office for follow-up.She has a history of neck pain and back pain and radicular symptoms as well as a complex regional pain syndrome in the upper extremity.  She has cervical and lumbar postlaminectomy syndromes and also peripheral neuropathy.      Intrathecal pump interrogation and refill and reprogramming: This is a Medtronic SynchroMed 220 mL pump.  Catheter tip is at T1.  The estimated residual volume was approximately equal to the actual residual volume, being 2.2 versus 1.5 mL.  The pump was refilled with a solution of morphine 4 mg/mL along with ropivacaine 10 mg/mL.  A flex dosing regimen is being utilized and the doses will be expressed as the morphine/ropivacaine.  No changes to the program were made today.  The basal rate is 0.35/0.88 mg/h.  Flex step 1 is 0.12/0.3 mg administered over 10 minutes at 4:00 AM.  Flex step 2 is 0.04/0.1 mg administered over 10 minutes at noon.  Flex step 3 is 0.05 mg / 0.19 mg administered over 10 minutes at 7:30 PM.  Therefore the 24-hour dose is morphine 0.5879 mg along with ropivacaine 1.47 mg/day.  The DASH on the pump is July 2025.  Refill date is now June 13, 2020.    In review, her back and neck pain are both stable.  There is bilateral aching and burning symptoms from the low back in the neck along with shooting pains into all 4 extremities.  Moderate pain is noted.  Moderate aching knee pain waxes and wanes but is stable at this time, increases with increased walking or with standing.  Headaches are an issue with a history of migraine disorder and these have been gradually worsening overall but she has had much less headaches since she has had a holiday from the IVIG infusion.  Her migraines are bilateral and when she is using the IVIG infusion she has headaches at least 5 days/week that lasts 6 to 12 hours.    Pain   This is a  chronic problem. The current episode started more than 1 month ago. The problem occurs constantly. The problem has been unchanged. Associated symptoms include abdominal pain (hx gastroparesis), fatigue, nausea, neck pain and weakness. Pertinent negatives include no chest pain, chills, congestion, coughing, fever, headaches (occ), numbness, rash or vomiting. The symptoms are aggravated by exertion, stress, walking and standing. The treatment provided mild relief.   Back Pain   This is a recurrent problem. The current episode started more than 1 year ago. The problem occurs daily. The problem is unchanged. The pain is present in the lumbar spine. The quality of the pain is described as aching and burning. The pain radiates to the right thigh. The pain is moderate. The pain is the same all the time. Stiffness is present in the morning. Associated symptoms include abdominal pain (hx gastroparesis) and weakness. Pertinent negatives include no chest pain, dysuria, fever, headaches (occ) or numbness. She has tried bed rest, heat and ice for the symptoms. The treatment provided moderate relief.   Neck Pain    This is a chronic problem. The problem occurs daily. The problem has been unchanged. The pain is associated with nothing. The pain is present in the left side, midline and right side. The quality of the pain is described as aching, burning and shooting. The pain is moderate. Associated symptoms include weakness. Pertinent negatives include no chest pain, fever, headaches (occ) or numbness. The treatment provided moderate relief.   Knee Pain    There was no injury mechanism. The pain is present in the right knee. The pain is moderate. The pain has been fluctuating since onset. Pertinent negatives include no numbness. She reports no foreign bodies present. The symptoms are aggravated by movement. She has tried acetaminophen, heat, ice, NSAIDs and rest for the symptoms. The treatment provided mild relief.   Migraine     This is a chronic problem. The current episode started more than 1 year ago. The problem occurs daily (as above). The problem has been gradually worsening. The pain is located in the bilateral region. The pain radiates to the face. The pain quality is similar to prior headaches. The quality of the pain is described as aching and stabbing. The pain is moderate. Associated symptoms include abdominal pain (hx gastroparesis), back pain (pain pump), nausea, neck pain and weakness. Pertinent negatives include no coughing, fever, numbness or vomiting. The symptoms are aggravated by weather changes (IVIG). She has tried acetaminophen, antidepressants, beta blockers, ergotamines, Excedrin, NSAIDs, oral narcotics, triptans, cold packs and darkened room for the symptoms. The treatment provided mild (modest) relief. Her past medical history is significant for migraine headaches and migraines in the family.        PEG Assessment   What number best describes your pain on average in the past week?4  What number best describes how, during the past week, pain has interfered with your enjoyment of life?4  What number best describes how, during the past week, pain has interfered with your general activity?  4    --  The aforementioned information the Chief Complaint section and above subjective data including any HPI data has been personally reviewed and affirmed.  --        The following portions of the patient's history were reviewed and updated as appropriate: allergies, current medications, past family history, past medical history, past social history, past surgical history and problem list.     -------    The following portions of the patient's history were reviewed and updated as appropriate: allergies, current medications, past family history, past medical history, past social history, past surgical history and problem list.    Allergies   Allergen Reactions   • Codeine Itching   • Fentanyl Other (See Comments)     Dystonic  movements   • Morphine Other (See Comments)     Other reaction(s): Other (See Comments)  Headaches         Current Outpatient Medications:   •  amphetamine-dextroamphetamine (ADDERALL) 10 MG tablet, Take 5 mg by mouth Daily., Disp: , Rfl:   •  Betamethasone Valerate 0.12 % foam, BETAMETHASONE VALERATE 0.12 % FOAM, Disp: , Rfl:   •  celecoxib (CeleBREX) 200 MG capsule, Take 1 capsule by mouth Daily., Disp: 30 capsule, Rfl: 11  •  chlorhexidine (PERIDEX) 0.12 % solution, RINSE AND SPIT 15ML DAILY, Disp: , Rfl: 11  •  ciclopirox (PENLAC) 8 % solution, APPLY TO AFFECTED AREA EVERY DAY, Disp: , Rfl: 6  •  CREON 99274 units capsule delayed-release particles, TAKE 1 CAPSULE BEFORE MEALS AND 1 CAPSULE BEFORE SNACKS, Disp: , Rfl: 3  •  DEXILANT 60 MG capsule, Take 1 capsule by mouth Daily., Disp: , Rfl: 11  •  FLUoxetine (PROzac) 40 MG capsule, Take 40 mg by mouth Daily., Disp: , Rfl: 3  •  Fremanezumab-vfrm (AJOVY) 225 MG/1.5ML solution prefilled syringe, Inject 225 mg under the skin into the appropriate area as directed Every 30 (Thirty) Days., Disp: 1 syringe, Rfl: 11  •  ibuprofen (ADVIL,MOTRIN) 200 MG tablet, IBUPROFEN 200 MG TABS, Disp: , Rfl:   •  levETIRAcetam (KEPPRA) 500 MG tablet, Take 1 tablet by mouth 2 (Two) Times a Day. (start 1 tab po qhs x5 days, then BID), Disp: 60 tablet, Rfl: 0  •  lidocaine (LIDODERM) 5 %, Place 3 patches on the skin as directed by provider Daily., Disp: 90 patch, Rfl: 11  •  linaclotide (LINZESS) 145 MCG capsule capsule, LINZESS 145 MCG CAPS, Disp: , Rfl:   •  loratadine (CLARITIN) 10 MG tablet, Take 10 mg by mouth., Disp: , Rfl:   •  mupirocin (BACTROBAN) 2 % ointment, APPLY TO AFFECTED AREA OF NOSE THREE TIMES DAILY FOR 3 WEEKS, Disp: , Rfl:   •  NON FORMULARY, Pancreatic enzymes, Disp: , Rfl:   •  ondansetron (ZOFRAN) 4 MG tablet, Take 4 mg by mouth Every 8 (Eight) Hours., Disp: , Rfl: 0  •  oxyCODONE-acetaminophen (PERCOCET)  MG per tablet, Take 1 tablet by mouth Daily As  Needed for Severe Pain ., Disp: 30 tablet, Rfl: 0  •  Polyethylene Glycol 3350 (MIRALAX PO), MIRALAX POWD, Disp: , Rfl:   •  pregabalin (LYRICA) 100 MG capsule, Take 1 capsule by mouth 3 (Three) Times a Day., Disp: 90 capsule, Rfl: 2  •  SENNOSIDES-DOCUSATE SODIUM PO, SM SENNA-S TABS, Disp: , Rfl:   •  SUMAtriptan (IMITREX) 100 MG tablet, TAKE 1 TABLET BY MOUTH AS NEEDED FOR HEADACHE, Disp: , Rfl: 6  •  TiZANidine (ZANAFLEX) 4 MG capsule, Take 1 capsule by mouth 3 (Three) Times a Day As Needed for Muscle Spasms., Disp: 90 capsule, Rfl: 0  •  traZODone (DESYREL) 50 MG tablet, Take 25 mg by mouth., Disp: , Rfl:     Current Outpatient Medications on File Prior to Visit   Medication Sig Dispense Refill   • amphetamine-dextroamphetamine (ADDERALL) 10 MG tablet Take 5 mg by mouth Daily.     • Betamethasone Valerate 0.12 % foam BETAMETHASONE VALERATE 0.12 % FOAM     • celecoxib (CeleBREX) 200 MG capsule Take 1 capsule by mouth Daily. 30 capsule 11   • chlorhexidine (PERIDEX) 0.12 % solution RINSE AND SPIT 15ML DAILY  11   • ciclopirox (PENLAC) 8 % solution APPLY TO AFFECTED AREA EVERY DAY  6   • CREON 16751 units capsule delayed-release particles TAKE 1 CAPSULE BEFORE MEALS AND 1 CAPSULE BEFORE SNACKS  3   • DEXILANT 60 MG capsule Take 1 capsule by mouth Daily.  11   • FLUoxetine (PROzac) 40 MG capsule Take 40 mg by mouth Daily.  3   • ibuprofen (ADVIL,MOTRIN) 200 MG tablet IBUPROFEN 200 MG TABS     • levETIRAcetam (KEPPRA) 500 MG tablet Take 1 tablet by mouth 2 (Two) Times a Day. (start 1 tab po qhs x5 days, then BID) 60 tablet 0   • lidocaine (LIDODERM) 5 % Place 3 patches on the skin as directed by provider Daily. 90 patch 11   • linaclotide (LINZESS) 145 MCG capsule capsule LINZESS 145 MCG CAPS     • loratadine (CLARITIN) 10 MG tablet Take 10 mg by mouth.     • mupirocin (BACTROBAN) 2 % ointment APPLY TO AFFECTED AREA OF NOSE THREE TIMES DAILY FOR 3 WEEKS     • NON FORMULARY Pancreatic enzymes     • ondansetron  (ZOFRAN) 4 MG tablet Take 4 mg by mouth Every 8 (Eight) Hours.  0   • Polyethylene Glycol 3350 (MIRALAX PO) MIRALAX POWD     • SENNOSIDES-DOCUSATE SODIUM PO SM SENNA-S TABS     • SUMAtriptan (IMITREX) 100 MG tablet TAKE 1 TABLET BY MOUTH AS NEEDED FOR HEADACHE  6   • TiZANidine (ZANAFLEX) 4 MG capsule Take 1 capsule by mouth 3 (Three) Times a Day As Needed for Muscle Spasms. 90 capsule 0   • traZODone (DESYREL) 50 MG tablet Take 25 mg by mouth.     • [DISCONTINUED] Erenumab-aooe (AIMOVIG) 140 MG/ML solution auto-injector Inject 1 mL under the skin into the appropriate area as directed Every 30 (Thirty) Days. 1.12 mL 11   • [DISCONTINUED] oxyCODONE-acetaminophen (PERCOCET)  MG per tablet Take 1 tablet by mouth Daily As Needed for Severe Pain . 30 tablet 0   • [DISCONTINUED] pregabalin (LYRICA) 100 MG capsule TAKE 1 CAPSULE BY MOUTH THREE TIMES A DAY 90 capsule 2     No current facility-administered medications on file prior to visit.        Patient Active Problem List   Diagnosis   • Chronic pain syndrome   • Neuropathic pain   • Complex regional pain syndrome type 1 of left upper extremity   • Presence of intrathecal pump   • Spinal cord stimulator status   • Cervical post-laminectomy syndrome   • Lumbar post-laminectomy syndrome   • Bilateral occipital neuralgia   • Cervical spondylosis without myelopathy       Past Medical History:   Diagnosis Date   • Depression    • Joint pain    • Low back pain    • Migraine    • Neck pain    • Osteoarthritis    • Peripheral neuropathy        Past Surgical History:   Procedure Laterality Date   • BACK SURGERY     • CHOLECYSTECTOMY     • EPIDURAL BLOCK     • HYSTERECTOMY     • IMPLANTATION / REPLACEMENT INFUSION PUMP     • KNEE ARTHROSCOPY Right    • NECK SURGERY     • PANCREAS SURGERY     • SPINAL CORD STIMULATOR IMPLANT     • TRIGGER POINT INJECTION         History reviewed. No pertinent family history.    Social History     Socioeconomic History   • Marital status:  "     Spouse name: Not on file   • Number of children: Not on file   • Years of education: Not on file   • Highest education level: Not on file   Tobacco Use   • Smoking status: Never Smoker   • Smokeless tobacco: Never Used   Substance and Sexual Activity   • Alcohol use: No   • Drug use: No       -------        Review of Systems   Constitutional: Positive for fatigue. Negative for activity change, chills and fever.   HENT: Negative for congestion.    Eyes: Positive for visual disturbance (blurry vision).   Respiratory: Negative for cough, shortness of breath and wheezing.    Cardiovascular: Negative.  Negative for chest pain.   Gastrointestinal: Positive for abdominal pain (hx gastroparesis) and nausea. Negative for diarrhea and vomiting.   Genitourinary: Negative for difficulty urinating and dysuria.   Musculoskeletal: Positive for back pain (pain pump) and neck pain. Negative for gait problem.   Skin: Negative for rash.   Allergic/Immunologic: Negative for immunocompromised state.   Neurological: Positive for weakness. Negative for numbness and headaches (occ).   Hematological: Does not bruise/bleed easily.   Psychiatric/Behavioral: Negative for agitation, sleep disturbance and suicidal ideas. The patient is not nervous/anxious.        ---    JULIO CÉSAR Boswell is reviewed with a request #[id number].  This report was run last month.  It is a 3 page report.  No prescription data on page 3.  The patient has used a stimulant prescribed by Dr. Orville Garner.  Prescriptions of the occasional judicious use of Percocet as well as use of Lyrica are present on the report which are prescribed by our practice along with the morphine that is being used by advanced infusion services in the intrathecal infusion solution.    ---    Vitals:    02/12/20 1332   BP: 130/56   Pulse: (!) 45   Resp: 20   Temp: 97.2 °F (36.2 °C)   Weight: 53.6 kg (118 lb 3.2 oz)   Height: 165 cm (64.96\")   PainSc:   7   PainLoc: Back         Objective "   Physical Exam  VSS, NNR, NCAT, NMNA, NRD, AAOx3.  Gait is normal.  Intrathecal pump site is not irritated.      Assessment/Plan   Amalia was seen today for back pain.    Diagnoses and all orders for this visit:    Chronic pain syndrome    Cervical post-laminectomy syndrome    Lumbar post-laminectomy syndrome    Presence of intrathecal pump    Spinal cord stimulator status    Complex regional pain syndrome type 1 of left upper extremity    Neuropathic pain    Other orders  -     Fremanezumab-vfrm (AJOVY) 225 MG/1.5ML solution prefilled syringe; Inject 225 mg under the skin into the appropriate area as directed Every 30 (Thirty) Days.  -     oxyCODONE-acetaminophen (PERCOCET)  MG per tablet; Take 1 tablet by mouth Daily As Needed for Severe Pain .  -     pregabalin (LYRICA) 100 MG capsule; Take 1 capsule by mouth 3 (Three) Times a Day.        --- Follow-up on schedule for next pump refill which the refill date is June 13  --Also note plan for random drug screen at next visit       Patient appears stable with current regimen. No adverse effects. Regarding continuation of opioids, there is no evidence of aberrant behavior or any red flags.  The patient continues with appropriate response to opioid therapy. ADL's remain intact by self.     Is very reasonable to try the CGRP medication as we consider trying Aimovig in the past for her migraine pain but even though she qualifies the for the drug with 5-7 headache days per week, her out-of-pocket cost was going to be exorbitant on the Aimovig so we will try Ajrenee MTZ REPORT    As part of the patient's treatment plan, I am prescribing controlled substances. The patient has been made aware of appropriate use of such medications, including potential risk of somnolence, limited ability to drive and/or work safely, and the potential for dependence or overdose. It has also bee made clear that these medications are for use by this patient only, without concomitant  use of alcohol or other substances unless prescribed.     Patient has completed prescribing agreement detailing terms of continued prescribing of controlled substances, including monitoring SMITH reports, urine drug screening, and pill counts if necessary. The patient is aware that inappropriate use will results in cessation of prescribing such medications.    SMITH report has been reviewed and scanned into the patient's chart.    As the clinician, I personally reviewed the SMITH from as above while the patient was in the office today.    History and physical exam exhibit continued safe and appropriate use of controlled substances.      EMR Dragon/Transcription disclaimer:   Much of this encounter note is an electronic transcription/translation of spoken language to printed text. The electronic translation of spoken language may permit erroneous, or at times, nonsensical words or phrases to be inadvertently transcribed; Although I have reviewed the note for such errors, some may still exist.

## 2020-02-13 ENCOUNTER — TELEPHONE (OUTPATIENT)
Dept: PAIN MEDICINE | Facility: CLINIC | Age: 65
End: 2020-02-13

## 2020-02-13 NOTE — TELEPHONE ENCOUNTER
Received a fax from [facility] today in regards to the Ajovy- it is not a formulary medication. They list Aimovig and Emgality and a Tier 3 formulary. They did gave her a 30 day supply but suggested we change the medication. Do you want to change the medication or do you want me to perform a PA.

## 2020-04-15 RX ORDER — OXYCODONE AND ACETAMINOPHEN 10; 325 MG/1; MG/1
1 TABLET ORAL DAILY PRN
Qty: 30 TABLET | Refills: 0 | Status: SHIPPED | OUTPATIENT
Start: 2020-04-15 | End: 2020-06-12 | Stop reason: SDUPTHER

## 2020-04-15 NOTE — TELEPHONE ENCOUNTER
Medication Refill Request    Date of phone call: 4/15/20    Medication being requested: Percocet  mg   si tab po daily PRn  Qty: 30    Date of last visit: 20    Date of last refill: 20    SMITH up to date?: yes    Next Follow up?: 6/10/20    Any new pertinent information? (i.e, new medication allergies, new use of medications, change in patient's health or condition, non-compliance or inconsistency with prescribing agreement?):

## 2020-05-21 RX ORDER — PREGABALIN 100 MG/1
CAPSULE ORAL
Qty: 90 CAPSULE | Refills: 2 | Status: SHIPPED | OUTPATIENT
Start: 2020-05-21 | End: 2020-08-19 | Stop reason: SDUPTHER

## 2020-06-10 ENCOUNTER — OFFICE VISIT (OUTPATIENT)
Dept: PAIN MEDICINE | Facility: CLINIC | Age: 65
End: 2020-06-10

## 2020-06-10 VITALS
RESPIRATION RATE: 16 BRPM | WEIGHT: 112.6 LBS | BODY MASS INDEX: 18.09 KG/M2 | SYSTOLIC BLOOD PRESSURE: 112 MMHG | OXYGEN SATURATION: 100 % | DIASTOLIC BLOOD PRESSURE: 73 MMHG | HEIGHT: 66 IN | TEMPERATURE: 97.1 F | HEART RATE: 50 BPM

## 2020-06-10 DIAGNOSIS — G43.909 MIGRAINE WITHOUT STATUS MIGRAINOSUS, NOT INTRACTABLE, UNSPECIFIED MIGRAINE TYPE: ICD-10-CM

## 2020-06-10 DIAGNOSIS — Z79.891 ENCOUNTER FOR LONG-TERM OPIATE ANALGESIC USE: ICD-10-CM

## 2020-06-10 DIAGNOSIS — M96.1 LUMBAR POST-LAMINECTOMY SYNDROME: ICD-10-CM

## 2020-06-10 DIAGNOSIS — Z96.89 SPINAL CORD STIMULATOR STATUS: ICD-10-CM

## 2020-06-10 DIAGNOSIS — M79.2 NEUROPATHIC PAIN: ICD-10-CM

## 2020-06-10 DIAGNOSIS — M17.11 ARTHRITIS OF RIGHT KNEE: ICD-10-CM

## 2020-06-10 DIAGNOSIS — G89.29 CHRONIC PAIN OF RIGHT KNEE: ICD-10-CM

## 2020-06-10 DIAGNOSIS — G89.4 CHRONIC PAIN SYNDROME: Primary | ICD-10-CM

## 2020-06-10 DIAGNOSIS — M96.1 CERVICAL POST-LAMINECTOMY SYNDROME: ICD-10-CM

## 2020-06-10 DIAGNOSIS — M25.561 CHRONIC PAIN OF RIGHT KNEE: ICD-10-CM

## 2020-06-10 DIAGNOSIS — M54.81 BILATERAL OCCIPITAL NEURALGIA: ICD-10-CM

## 2020-06-10 DIAGNOSIS — G90.512 COMPLEX REGIONAL PAIN SYNDROME TYPE 1 OF LEFT UPPER EXTREMITY: ICD-10-CM

## 2020-06-10 PROCEDURE — 99214 OFFICE O/P EST MOD 30 MIN: CPT | Performed by: ANESTHESIOLOGY

## 2020-06-10 PROCEDURE — 62369 ANAL SP INF PMP W/REPRG&FILL: CPT | Performed by: ANESTHESIOLOGY

## 2020-06-10 PROCEDURE — 20610 DRAIN/INJ JOINT/BURSA W/O US: CPT | Performed by: ANESTHESIOLOGY

## 2020-06-10 NOTE — PROGRESS NOTES
CHIEF COMPLAINT  F/U back pain- patient states that her pain has worsened since her last visit.     Xu Lopez is a 64 y.o. female  who presents for follow-up.She has a history of multiple painful problems including cervical and lumbar postlaminectomy syndromes and she uses a spinal stimulator to try to help with the cervical postlaminectomy syndrome along with complex regional pain syndrome in the upper extremity, and she has an intrathecal pump which she is utilizing for spine pain especially the back pain.  In addition she complains of a flareup of right knee pain today.  She uses low-dose multimodal regimen of systemic medications in addition to the intrathecal therapy especially for the arthritic pains and the spine pain.  She is use the oral pain medications also the anti-neuropathic agent to help with migraine pains as well, which are stable.    She presents today with a concern about bradycardia.  She has had cardiology evaluations and no abnormalities have been noted.  She expresses concern about the intrathecal administration of the local anesthetic, wondering if that could be a contributing factor.  ---    Intrathecal pump refill and reprogramming.  The intrathecal pump expected residual volume was 2.6 mL and the actual residual volume extracted was 2 mL.  The pump was infusing morphine 4 mg/mL along with ropivacaine 10 mg/mL and the total daily dose was 0.6 mg of morphine per day and that there was a flex dosing regimen with that to get to that total daily dose.  The further details are scanned into the media tab.  Those details are a bit moot at this point because we are decreasing her daily dose.  She is being set to a simple continuous infusion with morphine 0.4 mg a day, therefore decreased by one third, and of course with sufficient corresponding quantity of ropivacaine.  Sterile technique was utilized per routine to access the port and easily accessed and withdrew all the  medication as above and 20 mL were reinfused back into the pump with routine technique and without incident.  The estimated replacement date on the pump is July 2025.  Notes indicate catheter tip at T1.    ----      Pain   This is a chronic problem. The current episode started more than 1 month ago. The problem occurs constantly. The problem has been waxing and waning. Associated symptoms include abdominal pain (hx gastroparesis), fatigue, headaches (occ), nausea, neck pain, numbness (right foot) and weakness. Pertinent negatives include no chest pain, chills, congestion, coughing, fever, rash or vomiting. The symptoms are aggravated by exertion, stress, walking and standing. The treatment provided mild relief.   Back Pain   This is a recurrent problem. The current episode started more than 1 year ago. The problem occurs daily. The problem is unchanged. The pain is present in the lumbar spine. The quality of the pain is described as aching and burning. The pain radiates to the right thigh. The pain is moderate. The pain is the same all the time. Stiffness is present in the morning. Associated symptoms include abdominal pain (hx gastroparesis), headaches (occ), numbness (right foot) and weakness. Pertinent negatives include no chest pain, dysuria or fever. She has tried bed rest, heat and ice for the symptoms. The treatment provided significant relief.   Neck Pain    This is a chronic problem. The problem occurs daily. The problem has been unchanged. The pain is associated with nothing. The pain is present in the left side, midline and right side. The quality of the pain is described as aching, burning and shooting. The pain is moderate. Associated symptoms include headaches (occ), numbness (right foot) and weakness. Pertinent negatives include no chest pain or fever. The treatment provided significant relief.   Knee Pain    There was no injury mechanism. The pain is present in the right knee. The pain is severe. The  pain has been worsening since onset. Associated symptoms include numbness (right foot). She reports no foreign bodies present. The symptoms are aggravated by movement. She has tried acetaminophen, heat, ice, NSAIDs and rest for the symptoms. The treatment provided mild relief.   Migraine    This is a chronic problem. The current episode started more than 1 year ago. The problem occurs daily (as above). The problem has been waxing and waning. The pain is located in the bilateral region. The pain radiates to the face. The pain quality is similar to prior headaches. The quality of the pain is described as aching and stabbing. The pain is mild. Associated symptoms include abdominal pain (hx gastroparesis), back pain (pain pump), nausea, neck pain, numbness (right foot) and weakness. Pertinent negatives include no coughing, dizziness, fever or vomiting. The symptoms are aggravated by weather changes (IVIG). She has tried acetaminophen, antidepressants, beta blockers, ergotamines, Excedrin, NSAIDs, oral narcotics, triptans, cold packs and darkened room for the symptoms. The treatment provided mild (modest) relief. Her past medical history is significant for migraine headaches and migraines in the family.        PEG Assessment   What number best describes your pain on average in the past week?6  What number best describes how, during the past week, pain has interfered with your enjoyment of life?3  What number best describes how, during the past week, pain has interfered with your general activity?  3    --  The aforementioned information the Chief Complaint section and above subjective data including any HPI data has been personally reviewed and affirmed.  --        The following portions of the patient's history were reviewed and updated as appropriate: allergies, current medications, past family history, past medical history, past social history, past surgical history and problem list.    -------    The following  portions of the patient's history were reviewed and updated as appropriate: allergies, current medications, past family history, past medical history, past social history, past surgical history and problem list.    Allergies   Allergen Reactions   • Codeine Itching   • Fentanyl Other (See Comments)     Dystonic movements   • Morphine Other (See Comments)     Other reaction(s): Other (See Comments)  Headaches         Current Outpatient Medications:   •  amphetamine-dextroamphetamine (ADDERALL) 10 MG tablet, Take 5 mg by mouth Daily., Disp: , Rfl:   •  Betamethasone Valerate 0.12 % foam, BETAMETHASONE VALERATE 0.12 % FOAM, Disp: , Rfl:   •  celecoxib (CeleBREX) 200 MG capsule, Take 1 capsule by mouth Daily., Disp: 30 capsule, Rfl: 11  •  chlorhexidine (PERIDEX) 0.12 % solution, RINSE AND SPIT 15ML DAILY, Disp: , Rfl: 11  •  ciclopirox (PENLAC) 8 % solution, APPLY TO AFFECTED AREA EVERY DAY, Disp: , Rfl: 6  •  CREON 90308 units capsule delayed-release particles, TAKE 1 CAPSULE BEFORE MEALS AND 1 CAPSULE BEFORE SNACKS, Disp: , Rfl: 3  •  DEXILANT 60 MG capsule, Take 1 capsule by mouth Daily., Disp: , Rfl: 11  •  FLUoxetine (PROzac) 40 MG capsule, Take 40 mg by mouth Daily., Disp: , Rfl: 3  •  ibuprofen (ADVIL,MOTRIN) 200 MG tablet, IBUPROFEN 200 MG TABS, Disp: , Rfl:   •  linaclotide (LINZESS) 145 MCG capsule capsule, LINZESS 145 MCG CAPS, Disp: , Rfl:   •  loratadine (CLARITIN) 10 MG tablet, Take 10 mg by mouth., Disp: , Rfl:   •  mupirocin (BACTROBAN) 2 % ointment, APPLY TO AFFECTED AREA OF NOSE THREE TIMES DAILY FOR 3 WEEKS, Disp: , Rfl:   •  NON FORMULARY, Pancreatic enzymes, Disp: , Rfl:   •  ondansetron (ZOFRAN) 4 MG tablet, Take 4 mg by mouth Every 8 (Eight) Hours., Disp: , Rfl: 0  •  Polyethylene Glycol 3350 (MIRALAX PO), MIRALAX POWD, Disp: , Rfl:   •  pregabalin (LYRICA) 100 MG capsule, TAKE 1 CAPSULE BY MOUTH THREE TIMES A DAY, Disp: 90 capsule, Rfl: 2  •  SENNOSIDES-DOCUSATE SODIUM PO, SM SENNA-S TABS, Disp:  , Rfl:   •  SUMAtriptan (IMITREX) 100 MG tablet, TAKE 1 TABLET BY MOUTH AS NEEDED FOR HEADACHE, Disp: , Rfl: 6  •  TiZANidine (ZANAFLEX) 4 MG capsule, Take 1 capsule by mouth 3 (Three) Times a Day As Needed for Muscle Spasms., Disp: 90 capsule, Rfl: 0  •  lidocaine (LIDODERM) 5 %, Place 3 patches on the skin as directed by provider Daily., Disp: 90 patch, Rfl: 11  •  oxyCODONE-acetaminophen (PERCOCET)  MG per tablet, Take 1 tablet by mouth Daily As Needed for Severe Pain ., Disp: 30 tablet, Rfl: 0    Current Outpatient Medications on File Prior to Visit   Medication Sig Dispense Refill   • amphetamine-dextroamphetamine (ADDERALL) 10 MG tablet Take 5 mg by mouth Daily.     • Betamethasone Valerate 0.12 % foam BETAMETHASONE VALERATE 0.12 % FOAM     • celecoxib (CeleBREX) 200 MG capsule Take 1 capsule by mouth Daily. 30 capsule 11   • chlorhexidine (PERIDEX) 0.12 % solution RINSE AND SPIT 15ML DAILY  11   • ciclopirox (PENLAC) 8 % solution APPLY TO AFFECTED AREA EVERY DAY  6   • CREON 30440 units capsule delayed-release particles TAKE 1 CAPSULE BEFORE MEALS AND 1 CAPSULE BEFORE SNACKS  3   • DEXILANT 60 MG capsule Take 1 capsule by mouth Daily.  11   • FLUoxetine (PROzac) 40 MG capsule Take 40 mg by mouth Daily.  3   • ibuprofen (ADVIL,MOTRIN) 200 MG tablet IBUPROFEN 200 MG TABS     • linaclotide (LINZESS) 145 MCG capsule capsule LINZESS 145 MCG CAPS     • loratadine (CLARITIN) 10 MG tablet Take 10 mg by mouth.     • mupirocin (BACTROBAN) 2 % ointment APPLY TO AFFECTED AREA OF NOSE THREE TIMES DAILY FOR 3 WEEKS     • NON FORMULARY Pancreatic enzymes     • ondansetron (ZOFRAN) 4 MG tablet Take 4 mg by mouth Every 8 (Eight) Hours.  0   • Polyethylene Glycol 3350 (MIRALAX PO) MIRALAX POWD     • pregabalin (LYRICA) 100 MG capsule TAKE 1 CAPSULE BY MOUTH THREE TIMES A DAY 90 capsule 2   • SENNOSIDES-DOCUSATE SODIUM PO SM SENNA-S TABS     • SUMAtriptan (IMITREX) 100 MG tablet TAKE 1 TABLET BY MOUTH AS NEEDED FOR  HEADACHE  6   • TiZANidine (ZANAFLEX) 4 MG capsule Take 1 capsule by mouth 3 (Three) Times a Day As Needed for Muscle Spasms. 90 capsule 0     No current facility-administered medications on file prior to visit.        Patient Active Problem List   Diagnosis   • Chronic pain syndrome   • Neuropathic pain   • Complex regional pain syndrome type 1 of left upper extremity   • Presence of intrathecal pump   • Spinal cord stimulator status   • Cervical post-laminectomy syndrome   • Lumbar post-laminectomy syndrome   • Bilateral occipital neuralgia   • Cervical spondylosis without myelopathy   • Encounter for long-term opiate analgesic use   • Migraine without status migrainosus, not intractable   • Arthritis of right knee   • Chronic pain of right knee       Past Medical History:   Diagnosis Date   • Depression    • Joint pain    • Low back pain    • Migraine    • Neck pain    • Osteoarthritis    • Peripheral neuropathy        Past Surgical History:   Procedure Laterality Date   • BACK SURGERY     • CHOLECYSTECTOMY     • EPIDURAL BLOCK     • HYSTERECTOMY     • IMPLANTATION / REPLACEMENT INFUSION PUMP     • KNEE ARTHROSCOPY Right    • NECK SURGERY     • PANCREAS SURGERY     • SPINAL CORD STIMULATOR IMPLANT     • TRIGGER POINT INJECTION         History reviewed. No pertinent family history.    Social History     Socioeconomic History   • Marital status:      Spouse name: Not on file   • Number of children: Not on file   • Years of education: Not on file   • Highest education level: Not on file   Tobacco Use   • Smoking status: Never Smoker   • Smokeless tobacco: Never Used   Substance and Sexual Activity   • Alcohol use: No   • Drug use: No       -------        Review of Systems   Constitutional: Positive for fatigue. Negative for activity change, chills and fever.   HENT: Negative for congestion.    Eyes: Positive for visual disturbance (blurry vision).   Respiratory: Negative for cough, shortness of breath and  "wheezing.    Cardiovascular: Negative.  Negative for chest pain.   Gastrointestinal: Positive for abdominal pain (hx gastroparesis), constipation, diarrhea and nausea. Negative for vomiting.   Genitourinary: Negative for difficulty urinating, dyspareunia and dysuria.   Musculoskeletal: Positive for back pain (pain pump) and neck pain. Negative for gait problem.   Skin: Negative for rash.   Allergic/Immunologic: Negative for immunocompromised state.   Neurological: Positive for weakness, light-headedness, numbness (right foot) and headaches (occ). Negative for dizziness.   Hematological: Does not bruise/bleed easily.   Psychiatric/Behavioral: Positive for sleep disturbance. Negative for agitation and suicidal ideas. The patient is not nervous/anxious.       [facility] report is reviewed with a request #[id number].  It is a 2 page report.  Pregabalin was refilled on May 21 and Percocet was last refilled on April 15.    Vitals:    06/10/20 1304   BP: 112/73   Pulse: 50   Resp: 16   Temp: 97.1 °F (36.2 °C)   SpO2: 100%   Weight: 51.1 kg (112 lb 9.6 oz)   Height: 167.6 cm (66\")   PainSc:   7   PainLoc: Back         Objective   Physical Exam  VSS, NNR, NCAT, NMNA, NRD, AAOx3.  No apparent distress.  Intrathecal pump site is well healed and not indurated or inflamed.  Normal gait.  Right knee is mildly swollen and tender.      Assessment/Plan   Amalia was seen today for back pain.    Diagnoses and all orders for this visit:    Chronic pain syndrome    Spinal cord stimulator status    Cervical post-laminectomy syndrome    Lumbar post-laminectomy syndrome    Complex regional pain syndrome type 1 of left upper extremity    Chronic pain of right knee    Arthritis of right knee    Neuropathic pain    Bilateral occipital neuralgia    Migraine without status migrainosus, not intractable, unspecified migraine type    Encounter for long-term opiate analgesic use        In summary, it is very unclear if the low-dose intrathecal " administration of local anesthetic could be a contributing factor or not, but it is reasonable to remove that and see if she tolerates it well and also to see if the bradycardia complaint improved.  Therefore we are decreasing her intrathecal dosing today and will order new medication and that does not contain local anesthetic and on the next refill we will likely increase the opioid administration once again when we are utilizing monotherapy.    Her right knee has flared up and has been a significant amount of time, greater than 6 months, since she is had a right knee injection.  She request that today and that seems very reasonable.    Evaluation of the opiate therapy… Patient appears stable with current regimen. No adverse effects. Regarding continuation of opioids, there is no evidence of aberrant behavior or any red flags.  The patient continues with appropriate response to opioid therapy. ADL's remain intact by self.  Her doses are very low and judicious and she is only using it for severe painful flareups.  She also continues Lyrica as part of the multimodal regimen.    She will continues that tizanidine as needed for diffuse myofascial painful flareups and also she is continuing lidocaine topical patch therapy.  Continuation of the Lyrica for neuropathic pain including lumbar postlaminectomy syndrome elements as well as migraine disorder.    From a coding perspective, she has quite a few established painful problems that are relatively stable in addition to the intrathecal pump status and the spinal stimulator status and the encounter for controlled substances.  Therefore there are several more diagnoses which are in addition to the cervical postlaminectomy syndrome which is being treated by the pump and the right knee arthritis being treated by the knee injection.  While this coding is complex, an established level 4 is appropriate.    --- Follow-up next week for pump refill (changing medication)  -- pump  dose changed today to simple infusion at 0.4mg morphine / 1 mg naropin / day  -- right knee injection today (bupivacaine 0.25% batch [id number], exp 7-2022; depomedol 40mg lot [id number] exp 3-2021)         ----------------------------------  Right Knee injection:    Informed consent was obtained.  We discussed risks including but not limited to bleeding and infection and injury and risk of site soreness.  We discussed risk of postprocedural painful flareup.  Patient identified.  Laterality confirmed.      A solution was prepared of 4ml of Marcaine 0.5% and 40mg Depomedrol.  The areas over the entirety of both knees with a wide surgical prep of chlorhexidine solution.    On the RIGHT KNEE, a point on the lateral aspect of the joint line was identified to enter into the joint space, and the sterile needle was inserted parallel with a slight posterior angle to enter easily into the joint space.  Aspiration was negative.  Then, slowly, the forementioned solution was injected easily into the joint space.  The needle was removed intact and bleeding was minimal. The insertion site was dressed with a Band-Aid.  There were no apparent complications.       -------------------------------              SMITH REPORT    As part of the patient's treatment plan, I am prescribing controlled substances. The patient has been made aware of appropriate use of such medications, including potential risk of somnolence, limited ability to drive and/or work safely, and the potential for dependence or overdose. It has also bee made clear that these medications are for use by this patient only, without concomitant use of alcohol or other substances unless prescribed.     Patient has completed prescribing agreement detailing terms of continued prescribing of controlled substances, including monitoring SMITH reports, urine drug screening, and pill counts if necessary. The patient is aware that inappropriate use will results in cessation of  prescribing such medications.    SMITH report has been reviewed and scanned into the patient's chart.    As the clinician, I personally reviewed the SMITH from as above .    History and physical exam exhibit continued safe and appropriate use of controlled substances.      EMR Dragon/Transcription disclaimer:   Much of this encounter note is an electronic transcription/translation of spoken language to printed text. The electronic translation of spoken language may permit erroneous, or at times, nonsensical words or phrases to be inadvertently transcribed; Although I have reviewed the note for such errors, some may still exist.

## 2020-06-12 RX ORDER — OXYCODONE AND ACETAMINOPHEN 10; 325 MG/1; MG/1
1 TABLET ORAL DAILY PRN
Qty: 30 TABLET | Refills: 0 | Status: SHIPPED | OUTPATIENT
Start: 2020-06-12 | End: 2020-07-15 | Stop reason: SDUPTHER

## 2020-06-12 RX ORDER — LIDOCAINE 50 MG/G
3 PATCH TOPICAL EVERY 24 HOURS
Qty: 90 PATCH | Refills: 11 | Status: SHIPPED | OUTPATIENT
Start: 2020-06-12 | End: 2021-06-16

## 2020-06-12 NOTE — TELEPHONE ENCOUNTER
Patient called and stated that [facility] told her they didn't receive her prescription. Can you please refill her Percocet and Lidoderm Patches.

## 2020-06-14 PROBLEM — G89.29 CHRONIC PAIN OF RIGHT KNEE: Status: ACTIVE | Noted: 2020-06-14

## 2020-06-14 PROBLEM — M17.11 ARTHRITIS OF RIGHT KNEE: Status: ACTIVE | Noted: 2020-06-14

## 2020-06-14 PROBLEM — M25.561 CHRONIC PAIN OF RIGHT KNEE: Status: ACTIVE | Noted: 2020-06-14

## 2020-06-14 PROBLEM — G43.909 MIGRAINE WITHOUT STATUS MIGRAINOSUS, NOT INTRACTABLE: Status: ACTIVE | Noted: 2020-06-14

## 2020-06-14 PROBLEM — Z79.891 ENCOUNTER FOR LONG-TERM OPIATE ANALGESIC USE: Status: ACTIVE | Noted: 2020-06-14

## 2020-06-15 ENCOUNTER — PRIOR AUTHORIZATION (OUTPATIENT)
Dept: PAIN MEDICINE | Facility: CLINIC | Age: 65
End: 2020-06-15

## 2020-06-15 NOTE — TELEPHONE ENCOUNTER
Amalia Lopez (Key: [id number]) - [id number]  Lidocaine 5% patches  Status: Sent to Plan  Created: June 12th, 2020 [phone]  Sent: June 15th, 2020

## 2020-06-29 ENCOUNTER — OFFICE VISIT (OUTPATIENT)
Dept: PAIN MEDICINE | Facility: CLINIC | Age: 65
End: 2020-06-29

## 2020-06-29 VITALS
RESPIRATION RATE: 16 BRPM | HEART RATE: 49 BPM | BODY MASS INDEX: 18.09 KG/M2 | OXYGEN SATURATION: 98 % | HEIGHT: 66 IN | DIASTOLIC BLOOD PRESSURE: 77 MMHG | SYSTOLIC BLOOD PRESSURE: 129 MMHG | TEMPERATURE: 96.6 F | WEIGHT: 112.6 LBS

## 2020-06-29 DIAGNOSIS — Z97.8 PRESENCE OF INTRATHECAL PUMP: ICD-10-CM

## 2020-06-29 DIAGNOSIS — G89.4 CHRONIC PAIN SYNDROME: Primary | ICD-10-CM

## 2020-06-29 PROCEDURE — 62369 ANAL SP INF PMP W/REPRG&FILL: CPT | Performed by: ANESTHESIOLOGY

## 2020-07-15 ENCOUNTER — OFFICE VISIT (OUTPATIENT)
Dept: PAIN MEDICINE | Facility: CLINIC | Age: 65
End: 2020-07-15

## 2020-07-15 VITALS
RESPIRATION RATE: 18 BRPM | DIASTOLIC BLOOD PRESSURE: 64 MMHG | SYSTOLIC BLOOD PRESSURE: 111 MMHG | HEIGHT: 66 IN | BODY MASS INDEX: 18.19 KG/M2 | TEMPERATURE: 96.9 F | OXYGEN SATURATION: 98 % | WEIGHT: 113.2 LBS | HEART RATE: 50 BPM

## 2020-07-15 DIAGNOSIS — M79.18 CERVICAL MYOFASCIAL PAIN SYNDROME: Primary | ICD-10-CM

## 2020-07-15 DIAGNOSIS — M96.1 LUMBAR POST-LAMINECTOMY SYNDROME: ICD-10-CM

## 2020-07-15 DIAGNOSIS — T85.192D FAILURE OF SPINAL CORD STIMULATOR, SUBSEQUENT ENCOUNTER: ICD-10-CM

## 2020-07-15 DIAGNOSIS — Z96.89 SPINAL CORD STIMULATOR STATUS: ICD-10-CM

## 2020-07-15 DIAGNOSIS — Z97.8 PRESENCE OF INTRATHECAL PUMP: ICD-10-CM

## 2020-07-15 DIAGNOSIS — Z45.42 BATTERY END OF LIFE OF SPINAL CORD STIMULATOR: ICD-10-CM

## 2020-07-15 DIAGNOSIS — M17.11 ARTHRITIS OF RIGHT KNEE: ICD-10-CM

## 2020-07-15 DIAGNOSIS — Z79.891 ENCOUNTER FOR LONG-TERM OPIATE ANALGESIC USE: ICD-10-CM

## 2020-07-15 DIAGNOSIS — M79.2 NEUROPATHIC PAIN: ICD-10-CM

## 2020-07-15 DIAGNOSIS — G89.4 CHRONIC PAIN SYNDROME: ICD-10-CM

## 2020-07-15 DIAGNOSIS — G90.512 COMPLEX REGIONAL PAIN SYNDROME TYPE 1 OF LEFT UPPER EXTREMITY: ICD-10-CM

## 2020-07-15 DIAGNOSIS — M96.1 CERVICAL POST-LAMINECTOMY SYNDROME: ICD-10-CM

## 2020-07-15 PROCEDURE — 99214 OFFICE O/P EST MOD 30 MIN: CPT | Performed by: ANESTHESIOLOGY

## 2020-07-15 PROCEDURE — 62368 ANALYZE SP INF PUMP W/REPROG: CPT | Performed by: ANESTHESIOLOGY

## 2020-07-15 RX ORDER — OXYCODONE AND ACETAMINOPHEN 10; 325 MG/1; MG/1
1 TABLET ORAL DAILY PRN
Qty: 30 TABLET | Refills: 0 | Status: SHIPPED | OUTPATIENT
Start: 2020-07-15 | End: 2020-08-19 | Stop reason: SDUPTHER

## 2020-07-15 RX ORDER — DIPHENHYDRAMINE HYDROCHLORIDE 50 MG/ML
INJECTION INTRAMUSCULAR; INTRAVENOUS
COMMUNITY
Start: 2020-06-30

## 2020-07-15 RX ORDER — SODIUM CHLORIDE 9 MG/ML
INJECTION, SOLUTION INTRAVENOUS
COMMUNITY
Start: 2020-06-30

## 2020-07-15 RX ORDER — IMMUNE GLOBULIN INTRAVENOUS (HUMAN) 100 MG/ML
SOLUTION INTRAVENOUS
COMMUNITY
Start: 2020-06-30 | End: 2022-03-08

## 2020-07-15 NOTE — PROGRESS NOTES
CHIEF COMPLAINT  Follow-up for back pain. Pain unchanged.    Subjective   Amalia Lopez is a 64 y.o. female  who presents for follow-up.  She has a history of spinal stimulation use and also intrathecal therapy use for her multiple painful problems.  She occasionally uses Percocet for severe painful flareups for joint related pain.  The spinal stimulator is targeting her complex regional pain syndrome as is the intrathecal therapy.  Both neuromodulation options also help with spinal related pain.    She is having more pain with the change in her intrathecal therapy at the last visit.  We refilled her pump with a morphine only solution 2 weeks ago.  There was concern as to whether the local anesthetic in her intrathecal infusion might be causing some bradycardia.  She notes no change subjectively since the local anesthetic has been removed.  With the increase in pain she is asking if the intrathecal solution could be replaced with an solution containing with a local anesthetic once again.    She is having some muscle spasms in her neck, which are recurrent, complains that these feel severe.    Her spinal stimulator battery is difficult to charge, that is able take a charge but it is not holding a charge well.  She has to recharge as frequently as less than 48 hours.    Pain   This is a chronic problem. The current episode started more than 1 month ago. The problem occurs constantly. The problem has been gradually worsening. Associated symptoms include arthralgias, fatigue, headaches (occ) and neck pain. Pertinent negatives include no chest pain, chills, congestion or rash. The symptoms are aggravated by exertion, stress, walking and standing. The treatment provided mild relief.   Back Pain   This is a recurrent problem. The current episode started more than 1 year ago. The problem occurs daily. The problem is unchanged. The pain is present in the lumbar spine. The quality of the pain is described as aching and  burning. The pain radiates to the right thigh. The pain is moderate. The pain is the same all the time. Stiffness is present in the morning. Associated symptoms include headaches (occ). Pertinent negatives include no chest pain or dysuria. She has tried bed rest, heat and ice for the symptoms. The treatment provided significant relief.   Neck Pain    This is a chronic problem. The problem occurs daily. The problem has been rapidly worsening. The pain is associated with nothing. The pain is present in the left side, midline and right side. The quality of the pain is described as aching, burning and shooting (Spasm). The pain is severe. Associated symptoms include headaches (occ). Pertinent negatives include no chest pain. She has tried oral narcotics, bed rest, acetaminophen, heat, home exercises and muscle relaxants for the symptoms. The treatment provided mild relief.   Knee Pain    There was no injury mechanism. The pain is present in the right knee. The pain is moderate. The pain has been constant since onset. She reports no foreign bodies present. The symptoms are aggravated by movement. She has tried acetaminophen, heat, ice, NSAIDs and rest for the symptoms. The treatment provided moderate relief.        PEG Assessment   What number best describes your pain on average in the past week?7  What number best describes how, during the past week, pain has interfered with your enjoyment of life?6  What number best describes how, during the past week, pain has interfered with your general activity?  6    --  The aforementioned information the Chief Complaint section and above subjective data including any HPI data has been personally reviewed and affirmed.  --        The following portions of the patient's history were reviewed and updated as appropriate: allergies, current medications, past family history, past medical history, past social history, past surgical history and problem list.    -------    The following  portions of the patient's history were reviewed and updated as appropriate: allergies, current medications, past family history, past medical history, past social history, past surgical history and problem list.    Allergies   Allergen Reactions   • Codeine Itching   • Fentanyl Other (See Comments)     Dystonic movements   • Morphine Other (See Comments)     Other reaction(s): Other (See Comments)  Headaches         Current Outpatient Medications:   •  amphetamine-dextroamphetamine (ADDERALL) 10 MG tablet, Take 5 mg by mouth Daily., Disp: , Rfl:   •  Betamethasone Valerate 0.12 % foam, BETAMETHASONE VALERATE 0.12 % FOAM, Disp: , Rfl:   •  celecoxib (CeleBREX) 200 MG capsule, Take 1 capsule by mouth Daily., Disp: 30 capsule, Rfl: 11  •  chlorhexidine (PERIDEX) 0.12 % solution, RINSE AND SPIT 15ML DAILY, Disp: , Rfl: 11  •  ciclopirox (PENLAC) 8 % solution, APPLY TO AFFECTED AREA EVERY DAY, Disp: , Rfl: 6  •  CREON 05149 units capsule delayed-release particles, TAKE 1 CAPSULE BEFORE MEALS AND 1 CAPSULE BEFORE SNACKS, Disp: , Rfl: 3  •  DEXILANT 60 MG capsule, Take 1 capsule by mouth Daily., Disp: , Rfl: 11  •  diphenhydrAMINE (BENADRYL) 50 MG/ML injection, , Disp: , Rfl:   •  FLUoxetine (PROzac) 40 MG capsule, Take 40 mg by mouth Daily., Disp: , Rfl: 3  •  ibuprofen (ADVIL,MOTRIN) 200 MG tablet, IBUPROFEN 200 MG TABS, Disp: , Rfl:   •  lidocaine (LIDODERM) 5 %, Place 3 patches on the skin as directed by provider Daily., Disp: 90 patch, Rfl: 11  •  linaclotide (LINZESS) 145 MCG capsule capsule, LINZESS 145 MCG CAPS, Disp: , Rfl:   •  loratadine (CLARITIN) 10 MG tablet, Take 10 mg by mouth., Disp: , Rfl:   •  mupirocin (BACTROBAN) 2 % ointment, APPLY TO AFFECTED AREA OF NOSE THREE TIMES DAILY FOR 3 WEEKS, Disp: , Rfl:   •  NON FORMULARY, Pancreatic enzymes, Disp: , Rfl:   •  ondansetron (ZOFRAN) 4 MG tablet, Take 4 mg by mouth Every 8 (Eight) Hours., Disp: , Rfl: 0  •  oxyCODONE-acetaminophen (PERCOCET)  MG per  tablet, Take 1 tablet by mouth Daily As Needed for Severe Pain ., Disp: 30 tablet, Rfl: 0  •  PANZYGA 10 GM/100ML solution infusion, , Disp: , Rfl:   •  Polyethylene Glycol 3350 (MIRALAX PO), MIRALAX POWD, Disp: , Rfl:   •  pregabalin (LYRICA) 100 MG capsule, TAKE 1 CAPSULE BY MOUTH THREE TIMES A DAY, Disp: 90 capsule, Rfl: 2  •  SENNOSIDES-DOCUSATE SODIUM PO, SM SENNA-S TABS, Disp: , Rfl:   •  sodium chloride 0.9 % solution, , Disp: , Rfl:   •  SUMAtriptan (IMITREX) 100 MG tablet, TAKE 1 TABLET BY MOUTH AS NEEDED FOR HEADACHE, Disp: , Rfl: 6  •  TiZANidine (ZANAFLEX) 4 MG capsule, Take 1 capsule by mouth 3 (Three) Times a Day As Needed for Muscle Spasms., Disp: 90 capsule, Rfl: 0    Current Outpatient Medications on File Prior to Visit   Medication Sig Dispense Refill   • amphetamine-dextroamphetamine (ADDERALL) 10 MG tablet Take 5 mg by mouth Daily.     • Betamethasone Valerate 0.12 % foam BETAMETHASONE VALERATE 0.12 % FOAM     • celecoxib (CeleBREX) 200 MG capsule Take 1 capsule by mouth Daily. 30 capsule 11   • chlorhexidine (PERIDEX) 0.12 % solution RINSE AND SPIT 15ML DAILY  11   • ciclopirox (PENLAC) 8 % solution APPLY TO AFFECTED AREA EVERY DAY  6   • CREON 92928 units capsule delayed-release particles TAKE 1 CAPSULE BEFORE MEALS AND 1 CAPSULE BEFORE SNACKS  3   • DEXILANT 60 MG capsule Take 1 capsule by mouth Daily.  11   • diphenhydrAMINE (BENADRYL) 50 MG/ML injection      • FLUoxetine (PROzac) 40 MG capsule Take 40 mg by mouth Daily.  3   • ibuprofen (ADVIL,MOTRIN) 200 MG tablet IBUPROFEN 200 MG TABS     • lidocaine (LIDODERM) 5 % Place 3 patches on the skin as directed by provider Daily. 90 patch 11   • linaclotide (LINZESS) 145 MCG capsule capsule LINZESS 145 MCG CAPS     • loratadine (CLARITIN) 10 MG tablet Take 10 mg by mouth.     • mupirocin (BACTROBAN) 2 % ointment APPLY TO AFFECTED AREA OF NOSE THREE TIMES DAILY FOR 3 WEEKS     • NON FORMULARY Pancreatic enzymes     • ondansetron (ZOFRAN) 4 MG  tablet Take 4 mg by mouth Every 8 (Eight) Hours.  0   • PANZYGA 10 GM/100ML solution infusion      • Polyethylene Glycol 3350 (MIRALAX PO) MIRALAX POWD     • pregabalin (LYRICA) 100 MG capsule TAKE 1 CAPSULE BY MOUTH THREE TIMES A DAY 90 capsule 2   • SENNOSIDES-DOCUSATE SODIUM PO SM SENNA-S TABS     • sodium chloride 0.9 % solution      • SUMAtriptan (IMITREX) 100 MG tablet TAKE 1 TABLET BY MOUTH AS NEEDED FOR HEADACHE  6   • TiZANidine (ZANAFLEX) 4 MG capsule Take 1 capsule by mouth 3 (Three) Times a Day As Needed for Muscle Spasms. 90 capsule 0     No current facility-administered medications on file prior to visit.        Patient Active Problem List   Diagnosis   • Chronic pain syndrome   • Neuropathic pain   • Complex regional pain syndrome type 1 of left upper extremity   • Presence of intrathecal pump   • Spinal cord stimulator status   • Cervical post-laminectomy syndrome   • Lumbar post-laminectomy syndrome   • Bilateral occipital neuralgia   • Cervical spondylosis without myelopathy   • Encounter for long-term opiate analgesic use   • Migraine without status migrainosus, not intractable   • Arthritis of right knee   • Chronic pain of right knee       Past Medical History:   Diagnosis Date   • Depression    • Joint pain    • Low back pain    • Migraine    • Neck pain    • Osteoarthritis    • Peripheral neuropathy        Past Surgical History:   Procedure Laterality Date   • BACK SURGERY     • CHOLECYSTECTOMY     • EPIDURAL BLOCK     • HYSTERECTOMY     • IMPLANTATION / REPLACEMENT INFUSION PUMP     • KNEE ARTHROSCOPY Right    • NECK SURGERY     • PANCREAS SURGERY     • SPINAL CORD STIMULATOR IMPLANT     • TRIGGER POINT INJECTION         History reviewed. No pertinent family history.    Social History     Socioeconomic History   • Marital status:      Spouse name: Not on file   • Number of children: Not on file   • Years of education: Not on file   • Highest education level: Not on file   Tobacco Use  "  • Smoking status: Never Smoker   • Smokeless tobacco: Never Used   Substance and Sexual Activity   • Alcohol use: No   • Drug use: No       -------        Review of Systems   Constitutional: Positive for fatigue. Negative for chills.   HENT: Negative for congestion, sinus pressure and sinus pain.    Eyes: Positive for visual disturbance. Negative for pain.   Respiratory: Negative for shortness of breath and wheezing.    Cardiovascular: Negative.  Negative for chest pain.   Gastrointestinal: Positive for diarrhea. Negative for constipation.   Genitourinary: Negative for difficulty urinating and dysuria.   Musculoskeletal: Positive for arthralgias, back pain and neck pain.   Skin: Negative for rash.   Neurological: Positive for headaches (occ).   Hematological: Bruises/bleeds easily.   Psychiatric/Behavioral: Negative for sleep disturbance and suicidal ideas. The patient is not nervous/anxious.      JULIO CÉSAR Boswell report is reviewed with request #[id number].  It is a 3 page report.  Morphine was listed on June 25 which was from the compounding pharmacy for the intrathecal solution.  Percocet had been refilled on June 12 and Lyrica on June 24.  She is using the same pharmacy in [facility]        Vitals:    07/15/20 1312   BP: 111/64   Pulse: 50   Resp: 18   Temp: 96.9 °F (36.1 °C)   SpO2: 98%   Weight: 51.3 kg (113 lb 3.2 oz)   Height: 167.6 cm (66\")   PainSc:   7   PainLoc: Leg         Objective   Physical Exam   Constitutional: She is oriented to person, place, and time. Vital signs are normal. She appears well-developed and well-nourished.  Non-toxic appearance. No distress.   HENT:   Head: Normocephalic and atraumatic.   Right Ear: Hearing and external ear normal.   Left Ear: Hearing and external ear normal.   Nose: Nose normal.   Eyes: Pupils are equal, round, and reactive to light. Conjunctivae and lids are normal.   Pulmonary/Chest: Effort normal. No respiratory distress.   Abdominal: Normal appearance. "   Musculoskeletal:        Cervical back: She exhibits spasm.   Neurological: She is alert and oriented to person, place, and time. No cranial nerve deficit.   Psychiatric: She has a normal mood and affect. Her behavior is normal.   Nursing note and vitals reviewed.          Assessment/Plan   Amalia was seen today for back pain.    Diagnoses and all orders for this visit:    Cervical myofascial pain syndrome  -     Ambulatory Referral to Physical Therapy Evaluate and treat; Stretching  -     oxyCODONE-acetaminophen (PERCOCET)  MG per tablet; Take 1 tablet by mouth Daily As Needed for Severe Pain .    Chronic pain syndrome    Presence of intrathecal pump    Cervical post-laminectomy syndrome    Lumbar post-laminectomy syndrome    Encounter for long-term opiate analgesic use    Arthritis of right knee    Complex regional pain syndrome type 1 of left upper extremity    Neuropathic pain    Spinal cord stimulator status  -     Case Request    Failure of spinal cord stimulator, subsequent encounter    Battery end of life of spinal cord stimulator  -     Case Request        --- Follow-up on schedule for next pump refill.      Intrathecal pump refill and reprogram:       The reservoir was accessed and the expected volumeis noted below.  The solution in the intrathecal pump is morphine 2.5 mg/mL.  The infusion of morphine was increased as below.  No difficulties with the reprogramming.  Changes verified by me.     Pump rate increased from 0.65mg/per day to now 0.85mg/day.  Solution is morphine 2.5mg/ml.   Gave a single bolus of 0.2mg over 1 hr.  DASH is 60 months.  Estimated reservoir volume 15.8ml.  Low reservoir alarm 8-    DASH on the battery in July 2025.     --------------------------      -- Pump rate increased from 0.65mg/per day to now 0.85mg/day.  Solution is morphine 2.5mg/ml.   Gave a single bolus of 0.2mg over 1 hr.  DASH is 60 months.  Estimated reservoir volume 15.8ml.  Low reservoir alarm 8-        --Referred to physical therapy for stretching and also consideration of dry needling because of cervical myofascial pain        SMITH REPORT    As part of the patient's treatment plan, I am prescribing controlled substances. The patient has been made aware of appropriate use of such medications, including potential risk of somnolence, limited ability to drive and/or work safely, and the potential for dependence or overdose. It has also bee made clear that these medications are for use by this patient only, without concomitant use of alcohol or other substances unless prescribed.     Patient has completed prescribing agreement detailing terms of continued prescribing of controlled substances, including monitoring SMITH reports, urine drug screening, and pill counts if necessary. The patient is aware that inappropriate use will results in cessation of prescribing such medications.    SMITH report has been reviewed and scanned into the patient's chart.    As the clinician, I personally reviewed the SMITH from as above.    History and physical exam exhibit continued safe and appropriate use of controlled substances.      EMR Dragon/Transcription disclaimer:   Much of this encounter note is an electronic transcription/translation of spoken language to printed text. The electronic translation of spoken language may permit erroneous, or at times, nonsensical words or phrases to be inadvertently transcribed; Although I have reviewed the note for such errors, some may still exist.

## 2020-07-21 ENCOUNTER — TRANSCRIBE ORDERS (OUTPATIENT)
Dept: ADMINISTRATIVE | Facility: HOSPITAL | Age: 65
End: 2020-07-21

## 2020-07-21 ENCOUNTER — LAB (OUTPATIENT)
Dept: LAB | Facility: HOSPITAL | Age: 65
End: 2020-07-21

## 2020-07-21 ENCOUNTER — HOSPITAL ENCOUNTER (OUTPATIENT)
Dept: INFUSION THERAPY | Facility: HOSPITAL | Age: 65
Discharge: HOME | End: 2020-07-21
Admitting: NURSE PRACTITIONER

## 2020-07-21 ENCOUNTER — APPOINTMENT (OUTPATIENT)
Dept: LAB | Facility: HOSPITAL | Age: 65
End: 2020-07-21

## 2020-07-21 DIAGNOSIS — R00.1 BRADYCARDIA: ICD-10-CM

## 2020-07-21 DIAGNOSIS — R00.1 BRADYCARDIA: Primary | ICD-10-CM

## 2020-07-21 DIAGNOSIS — M54.81 BILATERAL OCCIPITAL NEURALGIA: ICD-10-CM

## 2020-07-21 PROCEDURE — 36592 COLLECT BLOOD FROM PICC: CPT

## 2020-07-21 PROCEDURE — 80176 ASSAY OF LIDOCAINE: CPT

## 2020-07-23 LAB — LIDOCAIN SERPL-MCNC: NORMAL UG/ML (ref 1.5–5)

## 2020-08-12 ENCOUNTER — TREATMENT (OUTPATIENT)
Dept: PHYSICAL THERAPY | Facility: CLINIC | Age: 65
End: 2020-08-12

## 2020-08-12 DIAGNOSIS — G43.011 INTRACTABLE MIGRAINE WITHOUT AURA AND WITH STATUS MIGRAINOSUS: Primary | ICD-10-CM

## 2020-08-12 DIAGNOSIS — M54.2 PAIN, NECK: ICD-10-CM

## 2020-08-12 PROCEDURE — 97162 PT EVAL MOD COMPLEX 30 MIN: CPT | Performed by: PHYSICAL THERAPIST

## 2020-08-12 PROCEDURE — DRYNDL PR CUSTOM DRY NEEDLING SELF PAY: Performed by: PHYSICAL THERAPIST

## 2020-08-12 NOTE — PROGRESS NOTES
Physical Therapy Initial Evaluation and Plan of Care    Patient: Amalia Lopez   : 1955  Diagnosis/ICD-10 Code:  Intractable migraine without aura and with status migrainosus [G43.011]  Referring practitioner: Jordin Strauss MD    Subjective Evaluation    History of Present Illness  Mechanism of injury: Pt reports she has experienced neck pain with migraines occurring for years now. She reports that she wakes up with migraines and they will sometimes diminish slightly throughout the day, but most of the time they stay pretty consistent in intensity. Reports that when she walks during the first 10 minutes she will have the most neck pain and a migraine will begin. Her migraines primarily occur on the L side of her head, but will sometimes refer into her L anterior face and eye. She is on meds for the migraines and they help a little bit, but they don't completely take away the pain. Pt also reports having CRPS in LUE with most pain occurring in L medial forearm. She also reported history of L ulnar nerve surgery and still has pain in her L elbow and into her medial hand.     Quality of life: good    Pain  Current pain ratin  At best pain ratin  At worst pain ratin  Quality: throbbing, discomfort, dull ache, radiating, pressure and tight  Relieving factors: rest and relaxation  Aggravating factors: lifting, movement, overhead activity, prolonged positioning, sleeping, repetitive movement and outstretched reach  Progression: worsening    Diagnostic Tests  No diagnostic tests performed    Treatments  Previous treatment: injection treatment, physical therapy and medication  Current treatment: physical therapy  Patient Goals  Patient goals for therapy: decreased pain, increased motion, increased strength, independence with ADLs/IADLs and return to sport/leisure activities             Objective          Palpation   Left   Tenderness of the sternocleidomastoid and upper trapezius.   Trigger point  to sternocleidomastoid and upper trapezius.     Right Tenderness of the upper trapezius.   Trigger point to upper trapezius.     Tenderness     Additional Tenderness Details  L C2 TP  L C4/5 TP  C7  T1/2 hypomobility (mild) w/tenderness in supine PA  Anterior scalenes on L side.   L temporalis m.     Neurological Testing     Sensation   Cervical/Thoracic   Left   Intact: light touch    Right   Diminished: light touch    Comments   Left light touch: with exception of hypersensitivty from L arm CRPS.   Right light touch: C5.     Reflexes     Right   Biceps (C5/C6): normal (2+)  Brachioradialis (C6): normal (2+)  Triceps (C7): normal (2+)    Comments   Left Deltoid (C5): LUE NT 2' CRPS    Active Range of Motion   Cervical/Thoracic Spine   Cervical    Flexion: 29 degrees   Extension: 32 degrees   Left lateral flexion: 34 degrees   Right lateral flexion: 26 degrees   Left rotation: 29 degrees   Right rotation: 28 degrees     Passive Range of Motion     Additional Passive Range of Motion Details  R rotation restricted w/pain   AA rotation slight restriction with pain bilaterally     Strength/Myotome Testing     Additional Strength Details  Not tested due to pt reporting CRPS and sensitivity in LUE especially in medial forearm.           Assessment & Plan     Assessment  Impairments: abnormal or restricted ROM, activity intolerance, impaired physical strength, lacks appropriate home exercise program and pain with function  Assessment details: Pt is a 64 year old female reporting to the clinic with bilateral neck pain with migraines, primarily occurring on the L side, occurring at the same time. Palpation to c-spine and t-spine showed tenderness throughout bilaterally with slight massage producing moderate pain relief. She had restricted AROM in all cervical motions as well as restricted cervical PROM and AA PROM, all accompanied by pain. Cervical distraction was able to reduce pain intensity and dry needling to c-spine and  UT muscles was initiated to reduce pain and sensitivity in neck and upper thoracic region. She will benefit from skilled therapy to address impairments, increase activity tolerance, and improve quality of life.   Prognosis: fair  Functional Limitations: carrying objects, lifting, sleeping, walking, pulling, pushing, uncomfortable because of pain, reaching overhead and unable to perform repetitive tasks  Goals  Plan Goals: STG - 3 weeks:  1. Pt to be compliant and independent with HEP.  2. Pt to report pain at worse with migraine 7/10 or less to improve activity tolerance.  3. Pt to be able to walk 10 minutes without migraine onset to improve ability to exercise and do recreational activities.     LTG - 6 weeks:   1. Pt to achieve NDI score of 50% or less to show improvements in cervical function.  2. Pt be able to walk for 20 minutes without migraine rating 3/10 or higher to increase ability to exercise and improve quality of life.   3. Pt be able to achieve 40 degrees of cervical motion bilaterally to decrease stiffness with activity and improve ability to recreate comfortably.     Plan  Therapy options: will be seen for skilled physical therapy services  Planned modality interventions: cryotherapy, electrical stimulation/Anguillan stimulation, ultrasound, traction, thermotherapy (hydrocollator packs) and TENS  Planned therapy interventions: ADL retraining, balance/weight-bearing training, body mechanics training, flexibility, functional ROM exercises, home exercise program, joint mobilization, therapeutic activities, stretching, strengthening, spinal/joint mobilization, soft tissue mobilization, neuromuscular re-education and manual therapy  Other planned therapy interventions: Dry needling   Frequency: 2x week  Duration in visits: 16  Treatment plan discussed with: patient        History # of Personal Factors and/or Comorbidities: HIGH (3+)  Examination of Body System(s): # of elements: MODERATE (3)  Clinical  Presentation: EVOLVING  Clinical Decision Making: MODERATE    Timed:         Manual Therapy:         mins  64449;     Therapeutic Exercise:         mins  61505;     Neuromuscular Patel:        mins  56851;    Therapeutic Activity:          mins  46809;     Gait Training:           mins  44993;     Ultrasound:          mins  38573;    Ionto                                   mins   35505  Self Care                            mins   95051        Un-Timed:  Electrical Stimulation:         mins  62838 ( );  Dry Needling     10     mins self-pay  Traction          mins 92396  Low Eval          Mins  96147  Mod Eval     35     Mins  63438  High Eval                            Mins  78029  Re-Eval                               mins  61687        Timed Treatment:   45   mins   Total Treatment:     45   mins  PT SIGNATURE: Patricia Sheikh, PT, DPT    DATE TREATMENT INITIATED: 8/12/2020    Initial Certification  Certification Period: 11/10/2020  I certify that the therapy services are furnished while this patient is under my care.  The services outlined above are required by this patient, and will be reviewed every 90 days.     PHYSICIAN: Jordin Strauss MD      DATE:     Please sign and return via fax to [phone].. Thank you, [facility].

## 2020-08-13 RX ORDER — TIZANIDINE HYDROCHLORIDE 4 MG/1
4 CAPSULE, GELATIN COATED ORAL 3 TIMES DAILY PRN
Qty: 90 CAPSULE | Refills: 0 | Status: SHIPPED | OUTPATIENT
Start: 2020-08-13 | End: 2021-09-21

## 2020-08-17 ENCOUNTER — OFFICE VISIT (OUTPATIENT)
Dept: PAIN MEDICINE | Facility: CLINIC | Age: 65
End: 2020-08-17

## 2020-08-17 ENCOUNTER — TREATMENT (OUTPATIENT)
Dept: PHYSICAL THERAPY | Facility: CLINIC | Age: 65
End: 2020-08-17

## 2020-08-17 VITALS
HEIGHT: 66 IN | OXYGEN SATURATION: 100 % | SYSTOLIC BLOOD PRESSURE: 123 MMHG | BODY MASS INDEX: 17.87 KG/M2 | TEMPERATURE: 96.8 F | HEART RATE: 51 BPM | WEIGHT: 111.2 LBS | DIASTOLIC BLOOD PRESSURE: 78 MMHG | RESPIRATION RATE: 16 BRPM

## 2020-08-17 DIAGNOSIS — G90.512 COMPLEX REGIONAL PAIN SYNDROME TYPE 1 OF LEFT UPPER EXTREMITY: ICD-10-CM

## 2020-08-17 DIAGNOSIS — Z96.89 SPINAL CORD STIMULATOR STATUS: ICD-10-CM

## 2020-08-17 DIAGNOSIS — M79.18 CERVICAL MYOFASCIAL PAIN SYNDROME: ICD-10-CM

## 2020-08-17 DIAGNOSIS — M96.1 CERVICAL POST-LAMINECTOMY SYNDROME: ICD-10-CM

## 2020-08-17 DIAGNOSIS — Z79.891 ENCOUNTER FOR LONG-TERM OPIATE ANALGESIC USE: ICD-10-CM

## 2020-08-17 DIAGNOSIS — G43.011 INTRACTABLE MIGRAINE WITHOUT AURA AND WITH STATUS MIGRAINOSUS: ICD-10-CM

## 2020-08-17 DIAGNOSIS — Z45.42 BATTERY END OF LIFE OF SPINAL CORD STIMULATOR: ICD-10-CM

## 2020-08-17 DIAGNOSIS — G89.4 CHRONIC PAIN SYNDROME: Primary | ICD-10-CM

## 2020-08-17 DIAGNOSIS — M54.2 PAIN, NECK: Primary | ICD-10-CM

## 2020-08-17 DIAGNOSIS — M96.1 LUMBAR POST-LAMINECTOMY SYNDROME: ICD-10-CM

## 2020-08-17 DIAGNOSIS — Z97.8 PRESENCE OF INTRATHECAL PUMP: ICD-10-CM

## 2020-08-17 PROCEDURE — 97012 MECHANICAL TRACTION THERAPY: CPT | Performed by: PHYSICAL THERAPIST

## 2020-08-17 PROCEDURE — 99212 OFFICE O/P EST SF 10 MIN: CPT | Performed by: ANESTHESIOLOGY

## 2020-08-17 PROCEDURE — 62369 ANAL SP INF PMP W/REPRG&FILL: CPT | Performed by: ANESTHESIOLOGY

## 2020-08-17 PROCEDURE — 97140 MANUAL THERAPY 1/> REGIONS: CPT | Performed by: PHYSICAL THERAPIST

## 2020-08-17 PROCEDURE — DRYNDL PR CUSTOM DRY NEEDLING SELF PAY: Performed by: PHYSICAL THERAPIST

## 2020-08-17 NOTE — PROGRESS NOTES
CHIEF COMPLAINT  F/U back and neck pain-pump refill-  patient states that her pain has improved since her last visit. She states that the dry needling is helping her neck pain.     Subjective   Amalia Lopez is a 64 y.o. female  who presents for follow-up.  She has a history of neck pain and back pain and complex regional pain syndrome, myofascial pain in the neck that has shown improvement with initial physical therapy evaluation and dry needling.  Her spinal stimulator continues to be moderately effective but charging is a very significant issue.  The battery is many years old and she is having to charge every 48 hours and this is consistent with the end of battery life.    She has found some slightly less efficacy with the opioid monotherapy over the compounded therapy that was opiate plus local anesthetic.  She did find some mild improvement with the increase in the dose at the last visit.  We discussed slight upward titration once again to try to maximize efficacy of the intrathecal therapy for the spine related pain which seems very reasonable..    --  Intrathecal pump refill and interrogation and reprogramming.  The reservoir volume was 4.6 mL and the actual return was 4.5 mL.  This is a 20 mL pump.  It was filled with morphine at 2.5 mg/mL running at 0.85 mg/day.  The catheter tip is at T1.  The device flow rate was changed to 0.95 mg/day which is an increase and 11% in the therapy.  The estimated replacement date is July 2025.  The low reservoir alarm is October 3, 2020.  --    Constant pain is gradually improving.  But there is still moderate to severe elements of back pain and knee pain as well as neck pain and left arm pain associated with complex regional pain syndrome.  Aching and burning elements are noted.  Shooting is noted.  Pain increases with most any activity.  She is been trying to be more active and do more walking but this can flareup her pain.  She tries not ACE inhibitor she does get some  relief from bedrest as well as heat and ice.  She does have some constant element of pain.  Chronic pain.       PEG Assessment   What number best describes your pain on average in the past week?6  What number best describes how, during the past week, pain has interfered with your enjoyment of life?5  What number best describes how, during the past week, pain has interfered with your general activity?  5    --  The aforementioned information the Chief Complaint section and above subjective data including any HPI data, and also the Review of Systems data, has been personally reviewed and affirmed.  --        The following portions of the patient's history were reviewed and updated as appropriate: allergies, current medications, past family history, past medical history, past social history, past surgical history and problem list.    -------    The following portions of the patient's history were reviewed and updated as appropriate: allergies, current medications, past family history, past medical history, past social history, past surgical history and problem list.    Allergies   Allergen Reactions   • Codeine Itching   • Fentanyl Other (See Comments)     Dystonic movements   • Morphine Other (See Comments)     Other reaction(s): Other (See Comments)  Headaches         Current Outpatient Medications:   •  amphetamine-dextroamphetamine (ADDERALL) 10 MG tablet, Take 5 mg by mouth Daily., Disp: , Rfl:   •  Betamethasone Valerate 0.12 % foam, BETAMETHASONE VALERATE 0.12 % FOAM, Disp: , Rfl:   •  celecoxib (CeleBREX) 200 MG capsule, Take 1 capsule by mouth Daily., Disp: 30 capsule, Rfl: 11  •  chlorhexidine (PERIDEX) 0.12 % solution, RINSE AND SPIT 15ML DAILY, Disp: , Rfl: 11  •  ciclopirox (PENLAC) 8 % solution, APPLY TO AFFECTED AREA EVERY DAY, Disp: , Rfl: 6  •  CREON 09388 units capsule delayed-release particles, TAKE 1 CAPSULE BEFORE MEALS AND 1 CAPSULE BEFORE SNACKS, Disp: , Rfl: 3  •  DEXILANT 60 MG capsule, Take 1  capsule by mouth Daily., Disp: , Rfl: 11  •  diphenhydrAMINE (BENADRYL) 50 MG/ML injection, , Disp: , Rfl:   •  FLUoxetine (PROzac) 40 MG capsule, Take 40 mg by mouth Daily., Disp: , Rfl: 3  •  ibuprofen (ADVIL,MOTRIN) 200 MG tablet, IBUPROFEN 200 MG TABS, Disp: , Rfl:   •  lidocaine (LIDODERM) 5 %, Place 3 patches on the skin as directed by provider Daily., Disp: 90 patch, Rfl: 11  •  linaclotide (LINZESS) 145 MCG capsule capsule, LINZESS 145 MCG CAPS, Disp: , Rfl:   •  loratadine (CLARITIN) 10 MG tablet, Take 10 mg by mouth., Disp: , Rfl:   •  mupirocin (BACTROBAN) 2 % ointment, APPLY TO AFFECTED AREA OF NOSE THREE TIMES DAILY FOR 3 WEEKS, Disp: , Rfl:   •  NON FORMULARY, Pancreatic enzymes, Disp: , Rfl:   •  ondansetron (ZOFRAN) 4 MG tablet, Take 4 mg by mouth Every 8 (Eight) Hours., Disp: , Rfl: 0  •  oxyCODONE-acetaminophen (PERCOCET)  MG per tablet, Take 1 tablet by mouth Daily As Needed for Severe Pain ., Disp: 30 tablet, Rfl: 0  •  PANZYGA 10 GM/100ML solution infusion, , Disp: , Rfl:   •  Polyethylene Glycol 3350 (MIRALAX PO), MIRALAX POWD, Disp: , Rfl:   •  pregabalin (LYRICA) 100 MG capsule, Take 1 capsule by mouth 3 (Three) Times a Day., Disp: 90 capsule, Rfl: 2  •  SENNOSIDES-DOCUSATE SODIUM PO, SM SENNA-S TABS, Disp: , Rfl:   •  sodium chloride 0.9 % solution, , Disp: , Rfl:   •  SUMAtriptan (IMITREX) 100 MG tablet, TAKE 1 TABLET BY MOUTH AS NEEDED FOR HEADACHE, Disp: , Rfl: 6  •  TiZANidine (ZANAFLEX) 4 MG capsule, TAKE 1 CAPSULE BY MOUTH 3 (THREE) TIMES A DAY AS NEEDED FOR MUSCLE SPASMS., Disp: 90 capsule, Rfl: 0    Current Outpatient Medications on File Prior to Visit   Medication Sig Dispense Refill   • amphetamine-dextroamphetamine (ADDERALL) 10 MG tablet Take 5 mg by mouth Daily.     • Betamethasone Valerate 0.12 % foam BETAMETHASONE VALERATE 0.12 % FOAM     • celecoxib (CeleBREX) 200 MG capsule Take 1 capsule by mouth Daily. 30 capsule 11   • chlorhexidine (PERIDEX) 0.12 % solution RINSE  AND SPIT 15ML DAILY  11   • ciclopirox (PENLAC) 8 % solution APPLY TO AFFECTED AREA EVERY DAY  6   • CREON 25116 units capsule delayed-release particles TAKE 1 CAPSULE BEFORE MEALS AND 1 CAPSULE BEFORE SNACKS  3   • DEXILANT 60 MG capsule Take 1 capsule by mouth Daily.  11   • diphenhydrAMINE (BENADRYL) 50 MG/ML injection      • FLUoxetine (PROzac) 40 MG capsule Take 40 mg by mouth Daily.  3   • ibuprofen (ADVIL,MOTRIN) 200 MG tablet IBUPROFEN 200 MG TABS     • lidocaine (LIDODERM) 5 % Place 3 patches on the skin as directed by provider Daily. 90 patch 11   • linaclotide (LINZESS) 145 MCG capsule capsule LINZESS 145 MCG CAPS     • loratadine (CLARITIN) 10 MG tablet Take 10 mg by mouth.     • mupirocin (BACTROBAN) 2 % ointment APPLY TO AFFECTED AREA OF NOSE THREE TIMES DAILY FOR 3 WEEKS     • NON FORMULARY Pancreatic enzymes     • ondansetron (ZOFRAN) 4 MG tablet Take 4 mg by mouth Every 8 (Eight) Hours.  0   • PANZYGA 10 GM/100ML solution infusion      • Polyethylene Glycol 3350 (MIRALAX PO) MIRALAX POWD     • SENNOSIDES-DOCUSATE SODIUM PO SM SENNA-S TABS     • sodium chloride 0.9 % solution      • SUMAtriptan (IMITREX) 100 MG tablet TAKE 1 TABLET BY MOUTH AS NEEDED FOR HEADACHE  6   • TiZANidine (ZANAFLEX) 4 MG capsule TAKE 1 CAPSULE BY MOUTH 3 (THREE) TIMES A DAY AS NEEDED FOR MUSCLE SPASMS. 90 capsule 0   • [DISCONTINUED] oxyCODONE-acetaminophen (PERCOCET)  MG per tablet Take 1 tablet by mouth Daily As Needed for Severe Pain . 30 tablet 0   • [DISCONTINUED] pregabalin (LYRICA) 100 MG capsule TAKE 1 CAPSULE BY MOUTH THREE TIMES A DAY 90 capsule 2     No current facility-administered medications on file prior to visit.        Patient Active Problem List   Diagnosis   • Chronic pain syndrome   • Neuropathic pain   • Complex regional pain syndrome type 1 of left upper extremity   • Presence of intrathecal pump   • Spinal cord stimulator status   • Cervical post-laminectomy syndrome   • Lumbar post-laminectomy  syndrome   • Bilateral occipital neuralgia   • Cervical spondylosis without myelopathy   • Encounter for long-term opiate analgesic use   • Migraine without status migrainosus, not intractable   • Arthritis of right knee   • Chronic pain of right knee       Past Medical History:   Diagnosis Date   • Depression    • Joint pain    • Low back pain    • Migraine    • Neck pain    • Osteoarthritis    • Peripheral neuropathy        Past Surgical History:   Procedure Laterality Date   • BACK SURGERY     • CHOLECYSTECTOMY     • EPIDURAL BLOCK     • HYSTERECTOMY     • IMPLANTATION / REPLACEMENT INFUSION PUMP     • KNEE ARTHROSCOPY Right    • NECK SURGERY     • PANCREAS SURGERY     • SPINAL CORD STIMULATOR IMPLANT     • TRIGGER POINT INJECTION         No family history on file.    Social History     Socioeconomic History   • Marital status:      Spouse name: Not on file   • Number of children: Not on file   • Years of education: Not on file   • Highest education level: Not on file   Tobacco Use   • Smoking status: Never Smoker   • Smokeless tobacco: Never Used   Substance and Sexual Activity   • Alcohol use: No   • Drug use: No       -------        Review of Systems   Constitutional: Positive for fatigue. Negative for activity change, chills and fever.   HENT: Negative for congestion, sinus pressure and sinus pain.    Eyes: Positive for visual disturbance. Negative for pain.   Respiratory: Negative for chest tightness, shortness of breath and wheezing.    Cardiovascular: Negative.  Negative for chest pain.   Gastrointestinal: Positive for abdominal pain, constipation and diarrhea.   Genitourinary: Positive for difficulty urinating. Negative for dyspareunia and dysuria.   Musculoskeletal: Positive for arthralgias, back pain and neck pain.   Skin: Negative for rash.   Neurological: Positive for weakness, light-headedness, numbness and headaches (occ). Negative for dizziness.   Hematological: Bruises/bleeds easily.  "  Psychiatric/Behavioral: Positive for sleep disturbance. Negative for agitation and suicidal ideas. The patient is not nervous/anxious.      JULIO CÉSAR Boswell report reviewed with request #[id number].  It is a 3 page report.  There is no pertinent on the third page.  All the recent prescriptions on the report of come from our practice.  This includes the morphine component in the intrathecal infusion therapy as well as the small doses of Percocet and also the chronic use of Lyrica for neuropathic painful elements.  Vitals:    08/17/20 1140   BP: 123/78   Pulse: 51   Resp: 16   Temp: 96.8 °F (36 °C)   SpO2: 100%   Weight: 50.4 kg (111 lb 3.2 oz)   Height: 167.6 cm (66\")   PainSc:   6   PainLoc: Back         Objective   Physical Exam  VSS, NNR, NCAT, NMNA, NRD, AAOx3.        Assessment/Plan   Amalia was seen today for back pain.    Diagnoses and all orders for this visit:    Chronic pain syndrome    Cervical myofascial pain syndrome  -     oxyCODONE-acetaminophen (PERCOCET)  MG per tablet; Take 1 tablet by mouth Daily As Needed for Severe Pain .    Complex regional pain syndrome type 1 of left upper extremity    Spinal cord stimulator status    Cervical post-laminectomy syndrome    Lumbar post-laminectomy syndrome    Presence of intrathecal pump    Encounter for long-term opiate analgesic use    Battery end of life of spinal cord stimulator    Other orders  -     pregabalin (LYRICA) 100 MG capsule; Take 1 capsule by mouth 3 (Three) Times a Day.        -- pump refill / reprogram as above  --- Follow-up in about 6 weeks for pump refill, as above  -- may continue multimodal analgesics as needed.      --I will follow-up with my surgery scheduler to see where we stand with regards to her spinal cord stimulator revision procedure which is the battery replacement.     Patient appears stable with current regimen. No adverse effects. Regarding continuation of opioids, there is no evidence of aberrant behavior or any red flags.  " The patient continues with appropriate response to opioid therapy. ADL's remain intact by self.       SMITH REPORT  As part of the patient's treatment plan, I am prescribing controlled substances. The patient has been made aware of appropriate use of such medications, including potential risk of somnolence, limited ability to drive and/or work safely, and the potential for dependence or overdose. It has also bee made clear that these medications are for use by this patient only, without concomitant use of alcohol or other substances unless prescribed.     Patient has completed prescribing agreement detailing terms of continued prescribing of controlled substances, including monitoring SMITH reports, urine drug screening, and pill counts if necessary. The patient is aware that inappropriate use will results in cessation of prescribing such medications.    SMITH report has been reviewed and scanned into the patient's chart.    As the clinician, I personally reviewed the SMITH from as above while the patient was in the office today.    History and physical exam exhibit continued safe and appropriate use of controlled substances.        EMR Dragon/Transcription disclaimer:   Much of this encounter note is an electronic transcription/translation of spoken language to printed text. The electronic translation of spoken language may permit erroneous, or at times, nonsensical words or phrases to be inadvertently transcribed; Although I have reviewed the note for such errors, some may still exist.

## 2020-08-19 ENCOUNTER — TREATMENT (OUTPATIENT)
Dept: PHYSICAL THERAPY | Facility: CLINIC | Age: 65
End: 2020-08-19

## 2020-08-19 DIAGNOSIS — G43.011 INTRACTABLE MIGRAINE WITHOUT AURA AND WITH STATUS MIGRAINOSUS: Primary | ICD-10-CM

## 2020-08-19 DIAGNOSIS — M54.2 PAIN, NECK: ICD-10-CM

## 2020-08-19 PROCEDURE — 97140 MANUAL THERAPY 1/> REGIONS: CPT | Performed by: PHYSICAL THERAPIST

## 2020-08-19 PROCEDURE — DRYNDL PR CUSTOM DRY NEEDLING SELF PAY: Performed by: PHYSICAL THERAPIST

## 2020-08-19 RX ORDER — PREGABALIN 100 MG/1
100 CAPSULE ORAL 3 TIMES DAILY
Qty: 90 CAPSULE | Refills: 2 | Status: SHIPPED | OUTPATIENT
Start: 2020-08-19 | End: 2020-11-30 | Stop reason: SDUPTHER

## 2020-08-19 RX ORDER — OXYCODONE AND ACETAMINOPHEN 10; 325 MG/1; MG/1
1 TABLET ORAL DAILY PRN
Qty: 30 TABLET | Refills: 0 | Status: SHIPPED | OUTPATIENT
Start: 2020-08-19 | End: 2020-10-04 | Stop reason: SDUPTHER

## 2020-08-19 NOTE — PROGRESS NOTES
Physical Therapy Daily Progress Note    VISIT#: 3/16 in POC.     Subjective   Amalia Lopez reports she is doing ok today.  Had a migraine this morning.  Thinks she is only going to do dry needling once per week.     Pain Rating (0/10): 4    Objective     See Exercise, Manual, and Modality Logs for complete treatment.     Assessment/Plan   Patient with good response to dry needling and treatment today.  Did not perform traction as she felt that it might have made her sore.       Plan  Progress per Plan of Care            Timed:         Manual Therapy:    15     mins  76832;     Therapeutic Exercise:         mins  65388;     Neuromuscular Patel:        mins  66777;    Therapeutic Activity:          mins  06001;     Gait Training:           mins  90386;     Ultrasound:          mins  10123;    Ionto                                   mins   72032  Self Care                            mins   06904    Un-Timed:  Electrical Stimulation:         mins  95981 ( );  Dry Needling     20     mins self-pay  Traction          mins 26269  Low Eval          Mins  09208  Mod Eval          Mins  16312  High Eval                            Mins  48405  Re-Eval                               mins  10189    Timed Treatment:   15   mins   Total Treatment:     35   mins    Patricia Sheikh, PT, DPT  Physical Therapist

## 2020-08-25 ENCOUNTER — TREATMENT (OUTPATIENT)
Dept: PHYSICAL THERAPY | Facility: CLINIC | Age: 65
End: 2020-08-25

## 2020-08-25 DIAGNOSIS — M54.2 PAIN, NECK: ICD-10-CM

## 2020-08-25 DIAGNOSIS — G43.011 INTRACTABLE MIGRAINE WITHOUT AURA AND WITH STATUS MIGRAINOSUS: Primary | ICD-10-CM

## 2020-08-25 PROCEDURE — DRYNDL PR CUSTOM DRY NEEDLING SELF PAY: Performed by: PHYSICAL THERAPIST

## 2020-08-25 PROCEDURE — 97140 MANUAL THERAPY 1/> REGIONS: CPT | Performed by: PHYSICAL THERAPIST

## 2020-08-25 NOTE — PROGRESS NOTES
Physical Therapy Daily Progress Note    VISIT#: 4/16 in POC.     Subjective   Amalia Lopez reports she is doing ok.  She has not had a headache for a couple of days.  She was able to sleep 7 hour straight, but when she woke up she was very stiff and hurting.     Pain Rating (0/10): 3    Objective     See Exercise, Manual, and Modality Logs for complete treatment.     Assessment/Plan   Patient with reduction in pain post treatment today. Less UT tightness.    Plan  Progress per Plan of Care            Timed:         Manual Therapy:    17     mins  12939;     Therapeutic Exercise:         mins  79577;     Neuromuscular Patel:        mins  53239;    Therapeutic Activity:          mins  35169;     Gait Training:           mins  59723;     Ultrasound:          mins  29063;    Ionto                                   mins   45633  Self Care                            mins   63994    Un-Timed:  Electrical Stimulation:         mins  50581 ( );  Dry Needling     25     mins self-pay  Traction          mins 22528  Low Eval          Mins  23289  Mod Eval          Mins  07948  High Eval                            Mins  61423  Re-Eval                               mins  98962    Timed Treatment:  17    mins   Total Treatment:     42   mins    Patricia Sheikh, PT, DPT  Physical Therapist

## 2020-09-01 DIAGNOSIS — Z01.818 PRE-OP TESTING: Primary | ICD-10-CM

## 2020-09-04 ENCOUNTER — TREATMENT (OUTPATIENT)
Dept: PHYSICAL THERAPY | Facility: CLINIC | Age: 65
End: 2020-09-04

## 2020-09-04 DIAGNOSIS — G43.011 INTRACTABLE MIGRAINE WITHOUT AURA AND WITH STATUS MIGRAINOSUS: Primary | ICD-10-CM

## 2020-09-04 DIAGNOSIS — M54.2 PAIN, NECK: ICD-10-CM

## 2020-09-04 PROCEDURE — 97140 MANUAL THERAPY 1/> REGIONS: CPT | Performed by: PHYSICAL THERAPIST

## 2020-09-04 PROCEDURE — DRYNDL PR CUSTOM DRY NEEDLING SELF PAY: Performed by: PHYSICAL THERAPIST

## 2020-09-04 NOTE — PROGRESS NOTES
Physical Therapy Daily Progress Note    VISIT#: 5/16 in POC.     Subjective   Amalia Lopez reports she is having less spasms in her neck when she begins her daily walks.  Is noticing still pain/tightness at the base of her skull today.  Headaches/migraines have not been as intense.     Pain Rating (0/10): 5    Objective    strength:  R: 31, 25,25; avg = 27lb  L: 15,12,15; avg = 14 lb    See Exercise, Manual, and Modality Logs for complete treatment. Pt was positioned in prone for manual therapy today 2' having her pain stimulator on and it was causing her legs to jump in supine/hooklying.     Assessment/Plan   Patient had good response to treatment today.  She is showing benefit from dry needling and manual therapy.  Will benefit from slow progression into deep cervical neck musculature strengthening when pt is agreeable.     Plan  Progress per Plan of Care.  Reassess ROM next visit.             Timed:         Manual Therapy:    20     mins  54597;     Therapeutic Exercise:         mins  45806;     Neuromuscular Patel:        mins  78630;    Therapeutic Activity:          mins  04604;     Gait Training:           mins  98053;     Ultrasound:          mins  41132;    Ionto                                   mins   16968  Self Care                            mins   52159    Un-Timed:  Electrical Stimulation:         mins  90649 ( );  Dry Needling     25     mins self-pay  Traction          mins 27697  Low Eval          Mins  74635  Mod Eval          Mins  03743  High Eval                            Mins  48762  Re-Eval                               mins  07279    Timed Treatment:   20   mins   Total Treatment:     45   mins    Patricia Sheikh, PT, DPT  Physical Therapist

## 2020-09-29 ENCOUNTER — HOSPITAL ENCOUNTER (OUTPATIENT)
Dept: INFUSION THERAPY | Facility: HOSPITAL | Age: 65
Setting detail: INFUSION SERIES
Discharge: HOME | End: 2020-09-29

## 2020-09-29 VITALS — TEMPERATURE: 96.8 F

## 2020-09-29 DIAGNOSIS — Z13.29 SCREENING FOR ENDOCRINE, NUTRITIONAL, METABOLIC AND IMMUNITY DISORDER: ICD-10-CM

## 2020-09-29 DIAGNOSIS — Z13.228 SCREENING FOR ENDOCRINE, NUTRITIONAL, METABOLIC AND IMMUNITY DISORDER: ICD-10-CM

## 2020-09-29 DIAGNOSIS — Z83.42 FAMILY HISTORY OF FAMILIAL HYPERCHOLESTEROLEMIA: ICD-10-CM

## 2020-09-29 DIAGNOSIS — R53.83 FATIGUE, UNSPECIFIED TYPE: ICD-10-CM

## 2020-09-29 DIAGNOSIS — G89.4 CHRONIC PAIN SYNDROME: Primary | ICD-10-CM

## 2020-09-29 DIAGNOSIS — Z13.0 SCREENING FOR ENDOCRINE, NUTRITIONAL, METABOLIC AND IMMUNITY DISORDER: ICD-10-CM

## 2020-09-29 DIAGNOSIS — E55.9 VITAMIN D DEFICIENCY: ICD-10-CM

## 2020-09-29 DIAGNOSIS — D64.9 ANEMIA, UNSPECIFIED TYPE: ICD-10-CM

## 2020-09-29 DIAGNOSIS — Z13.21 SCREENING FOR ENDOCRINE, NUTRITIONAL, METABOLIC AND IMMUNITY DISORDER: ICD-10-CM

## 2020-09-29 DIAGNOSIS — R53.82 CHRONIC FATIGUE: ICD-10-CM

## 2020-09-29 PROBLEM — Z79.899 MEDICATION MANAGEMENT: Status: ACTIVE | Noted: 2020-09-29

## 2020-09-29 LAB
25(OH)D3 SERPL-MCNC: 47.9 NG/ML (ref 30–100)
ALBUMIN SERPL-MCNC: 4.3 G/DL (ref 3.5–5.2)
ALBUMIN/GLOB SERPL: 1.5 G/DL
ALP SERPL-CCNC: 62 U/L (ref 39–117)
ALT SERPL W P-5'-P-CCNC: 12 U/L (ref 1–33)
ANION GAP SERPL CALCULATED.3IONS-SCNC: 9 MMOL/L (ref 5–15)
AST SERPL-CCNC: 20 U/L (ref 1–32)
BASOPHILS # BLD AUTO: 0 10*3/MM3 (ref 0–0.2)
BASOPHILS NFR BLD AUTO: 1 % (ref 0–1.5)
BILIRUB SERPL-MCNC: 0.3 MG/DL (ref 0–1.2)
BUN SERPL-MCNC: 18 MG/DL (ref 8–23)
BUN SERPL-MCNC: NORMAL MG/DL
BUN/CREAT SERPL: NORMAL
CALCIUM SPEC-SCNC: 9.6 MG/DL (ref 8.6–10.5)
CHLORIDE SERPL-SCNC: 103 MMOL/L (ref 98–107)
CHOLEST SERPL-MCNC: 186 MG/DL (ref 0–200)
CO2 SERPL-SCNC: 27 MMOL/L (ref 22–29)
CREAT SERPL-MCNC: 0.9 MG/DL (ref 0.57–1)
DEPRECATED RDW RBC AUTO: 41.1 FL (ref 37–54)
EOSINOPHIL # BLD AUTO: 0.2 10*3/MM3 (ref 0–0.4)
EOSINOPHIL NFR BLD AUTO: 3.7 % (ref 0.3–6.2)
ERYTHROCYTE [DISTWIDTH] IN BLOOD BY AUTOMATED COUNT: 12.7 % (ref 12.3–15.4)
FERRITIN SERPL-MCNC: 81.98 NG/ML (ref 13–150)
GFR SERPL CREATININE-BSD FRML MDRD: 63 ML/MIN/1.73
GLOBULIN UR ELPH-MCNC: 2.8 GM/DL
GLUCOSE SERPL-MCNC: 81 MG/DL (ref 65–99)
HCT VFR BLD AUTO: 38.7 % (ref 34–46.6)
HDLC SERPL-MCNC: 55 MG/DL (ref 40–60)
HGB BLD-MCNC: 12.8 G/DL (ref 12–15.9)
HOLD SPECIMEN: NORMAL
IRON 24H UR-MRATE: 79 MCG/DL (ref 37–145)
IRON SATN MFR SERPL: 21 % (ref 20–50)
LDLC SERPL CALC-MCNC: 111 MG/DL (ref 0–100)
LDLC/HDLC SERPL: 2.02 {RATIO}
LYMPHOCYTES # BLD AUTO: 0.7 10*3/MM3 (ref 0.7–3.1)
LYMPHOCYTES NFR BLD AUTO: 17.7 % (ref 19.6–45.3)
MCH RBC QN AUTO: 30.3 PG (ref 26.6–33)
MCHC RBC AUTO-ENTMCNC: 33 G/DL (ref 31.5–35.7)
MCV RBC AUTO: 91.8 FL (ref 79–97)
MONOCYTES # BLD AUTO: 0.3 10*3/MM3 (ref 0.1–0.9)
MONOCYTES NFR BLD AUTO: 7.3 % (ref 5–12)
NEUTROPHILS NFR BLD AUTO: 2.9 10*3/MM3 (ref 1.7–7)
NEUTROPHILS NFR BLD AUTO: 70.3 % (ref 42.7–76)
NRBC BLD AUTO-RTO: 0.1 /100 WBC (ref 0–0.2)
PLATELET # BLD AUTO: 152 10*3/MM3 (ref 140–450)
PMV BLD AUTO: 10.6 FL (ref 6–12)
POTASSIUM SERPL-SCNC: 4.2 MMOL/L (ref 3.5–5.2)
PROT SERPL-MCNC: 7.1 G/DL (ref 6–8.5)
RBC # BLD AUTO: 4.22 10*6/MM3 (ref 3.77–5.28)
SODIUM SERPL-SCNC: 139 MMOL/L (ref 136–145)
TIBC SERPL-MCNC: 370 MCG/DL (ref 298–536)
TRANSFERRIN SERPL-MCNC: 248 MG/DL (ref 200–360)
TRIGL SERPL-MCNC: 99 MG/DL (ref 0–150)
TSH SERPL DL<=0.05 MIU/L-ACNC: 2.57 UIU/ML (ref 0.27–4.2)
VLDLC SERPL-MCNC: 19.8 MG/DL
WBC # BLD AUTO: 4.1 10*3/MM3 (ref 3.4–10.8)

## 2020-09-29 PROCEDURE — 80061 LIPID PANEL: CPT

## 2020-09-29 PROCEDURE — 82306 VITAMIN D 25 HYDROXY: CPT

## 2020-09-29 PROCEDURE — 84466 ASSAY OF TRANSFERRIN: CPT

## 2020-09-29 PROCEDURE — 82728 ASSAY OF FERRITIN: CPT

## 2020-09-29 PROCEDURE — 36592 COLLECT BLOOD FROM PICC: CPT

## 2020-09-29 PROCEDURE — 80053 COMPREHEN METABOLIC PANEL: CPT

## 2020-09-29 PROCEDURE — 85025 COMPLETE CBC W/AUTO DIFF WBC: CPT

## 2020-09-29 PROCEDURE — 83540 ASSAY OF IRON: CPT

## 2020-09-29 PROCEDURE — 84443 ASSAY THYROID STIM HORMONE: CPT

## 2020-09-30 ENCOUNTER — OFFICE VISIT (OUTPATIENT)
Dept: PAIN MEDICINE | Facility: CLINIC | Age: 65
End: 2020-09-30

## 2020-09-30 VITALS
DIASTOLIC BLOOD PRESSURE: 73 MMHG | WEIGHT: 107.6 LBS | OXYGEN SATURATION: 98 % | TEMPERATURE: 97.1 F | HEIGHT: 66 IN | RESPIRATION RATE: 16 BRPM | SYSTOLIC BLOOD PRESSURE: 109 MMHG | HEART RATE: 48 BPM | BODY MASS INDEX: 17.29 KG/M2

## 2020-09-30 DIAGNOSIS — Z45.42 BATTERY END OF LIFE OF SPINAL CORD STIMULATOR: ICD-10-CM

## 2020-09-30 DIAGNOSIS — M96.1 LUMBAR POST-LAMINECTOMY SYNDROME: ICD-10-CM

## 2020-09-30 DIAGNOSIS — Z97.8 PRESENCE OF INTRATHECAL PUMP: Primary | ICD-10-CM

## 2020-09-30 DIAGNOSIS — Z79.891 ENCOUNTER FOR LONG-TERM OPIATE ANALGESIC USE: ICD-10-CM

## 2020-09-30 DIAGNOSIS — M96.1 CERVICAL POST-LAMINECTOMY SYNDROME: ICD-10-CM

## 2020-09-30 DIAGNOSIS — G89.4 CHRONIC PAIN SYNDROME: ICD-10-CM

## 2020-09-30 DIAGNOSIS — G90.512 COMPLEX REGIONAL PAIN SYNDROME TYPE 1 OF LEFT UPPER EXTREMITY: ICD-10-CM

## 2020-09-30 DIAGNOSIS — M79.18 CERVICAL MYOFASCIAL PAIN SYNDROME: ICD-10-CM

## 2020-09-30 PROCEDURE — 62369 ANAL SP INF PMP W/REPRG&FILL: CPT | Performed by: ANESTHESIOLOGY

## 2020-09-30 PROCEDURE — 99214 OFFICE O/P EST MOD 30 MIN: CPT | Performed by: ANESTHESIOLOGY

## 2020-10-04 RX ORDER — OXYCODONE AND ACETAMINOPHEN 10; 325 MG/1; MG/1
1 TABLET ORAL DAILY PRN
Qty: 30 TABLET | Refills: 0 | Status: SHIPPED | OUTPATIENT
Start: 2020-10-04 | End: 2020-11-30

## 2020-10-19 ENCOUNTER — LAB REQUISITION (OUTPATIENT)
Dept: LAB | Facility: HOSPITAL | Age: 65
End: 2020-10-19

## 2020-10-19 DIAGNOSIS — Z00.00 ENCOUNTER FOR GENERAL ADULT MEDICAL EXAMINATION WITHOUT ABNORMAL FINDINGS: ICD-10-CM

## 2020-10-21 ENCOUNTER — HOSPITAL ENCOUNTER (OUTPATIENT)
Dept: INFUSION THERAPY | Facility: HOSPITAL | Age: 65
Setting detail: INFUSION SERIES
Discharge: HOME | End: 2020-10-21

## 2020-10-21 ENCOUNTER — HOSPITAL ENCOUNTER (OUTPATIENT)
Dept: GENERAL RADIOLOGY | Facility: HOSPITAL | Age: 65
Discharge: HOME | End: 2020-10-21

## 2020-10-21 ENCOUNTER — HOSPITAL ENCOUNTER (OUTPATIENT)
Dept: CARDIOLOGY | Facility: HOSPITAL | Age: 65
Setting detail: SPECIMEN
Discharge: HOME | End: 2020-10-21

## 2020-10-21 ENCOUNTER — LAB (OUTPATIENT)
Dept: LAB | Facility: HOSPITAL | Age: 65
End: 2020-10-21

## 2020-10-21 DIAGNOSIS — Z01.818 PRE-OP TESTING: ICD-10-CM

## 2020-10-21 DIAGNOSIS — G90.512 COMPLEX REGIONAL PAIN SYNDROME TYPE 1 OF LEFT UPPER EXTREMITY: ICD-10-CM

## 2020-10-21 DIAGNOSIS — Z97.8 PRESENCE OF INTRATHECAL PUMP: Primary | ICD-10-CM

## 2020-10-21 DIAGNOSIS — Z96.89 SPINAL CORD STIMULATOR STATUS: ICD-10-CM

## 2020-10-21 DIAGNOSIS — M79.2 NEUROPATHIC PAIN: ICD-10-CM

## 2020-10-21 DIAGNOSIS — G89.4 CHRONIC PAIN SYNDROME: ICD-10-CM

## 2020-10-21 LAB
ANION GAP SERPL CALCULATED.3IONS-SCNC: 6 MMOL/L (ref 5–15)
BUN SERPL-MCNC: 14 MG/DL (ref 8–23)
BUN/CREAT SERPL: 22.6 (ref 7–25)
CALCIUM SPEC-SCNC: 9.7 MG/DL (ref 8.6–10.5)
CHLORIDE SERPL-SCNC: 102 MMOL/L (ref 98–107)
CO2 SERPL-SCNC: 31 MMOL/L (ref 22–29)
CREAT SERPL-MCNC: 0.62 MG/DL (ref 0.57–1)
DEPRECATED RDW RBC AUTO: 39.4 FL (ref 37–54)
EOSINOPHIL # BLD MANUAL: 0.1 10*3/MM3 (ref 0–0.4)
EOSINOPHIL NFR BLD MANUAL: 3 % (ref 0.3–6.2)
ERYTHROCYTE [DISTWIDTH] IN BLOOD BY AUTOMATED COUNT: 12.4 % (ref 12.3–15.4)
GFR SERPL CREATININE-BSD FRML MDRD: 98 ML/MIN/1.73
GLUCOSE SERPL-MCNC: 100 MG/DL (ref 65–99)
HCT VFR BLD AUTO: 36.3 % (ref 34–46.6)
HGB BLD-MCNC: 12 G/DL (ref 12–15.9)
LYMPHOCYTES # BLD MANUAL: 0.95 10*3/MM3 (ref 0.7–3.1)
LYMPHOCYTES NFR BLD MANUAL: 11 % (ref 5–12)
LYMPHOCYTES NFR BLD MANUAL: 28 % (ref 19.6–45.3)
MCH RBC QN AUTO: 30.1 PG (ref 26.6–33)
MCHC RBC AUTO-ENTMCNC: 33.1 G/DL (ref 31.5–35.7)
MCV RBC AUTO: 90.9 FL (ref 79–97)
MONOCYTES # BLD AUTO: 0.37 10*3/MM3 (ref 0.1–0.9)
NEUTROPHILS # BLD AUTO: 1.97 10*3/MM3 (ref 1.7–7)
NEUTROPHILS NFR BLD MANUAL: 57 % (ref 42.7–76)
NEUTS BAND NFR BLD MANUAL: 1 % (ref 0–5)
PLAT MORPH BLD: NORMAL
PLATELET # BLD AUTO: 155 10*3/MM3 (ref 140–450)
PMV BLD AUTO: 10.1 FL (ref 6–12)
POTASSIUM SERPL-SCNC: 3.9 MMOL/L (ref 3.5–5.2)
RBC # BLD AUTO: 4 10*6/MM3 (ref 3.77–5.28)
RBC MORPH BLD: NORMAL
SODIUM SERPL-SCNC: 139 MMOL/L (ref 136–145)
WBC # BLD AUTO: 3.4 10*3/MM3 (ref 3.4–10.8)
WBC MORPH BLD: NORMAL

## 2020-10-21 PROCEDURE — 85007 BL SMEAR W/DIFF WBC COUNT: CPT

## 2020-10-21 PROCEDURE — 87081 CULTURE SCREEN ONLY: CPT | Performed by: NURSE PRACTITIONER

## 2020-10-21 PROCEDURE — 85027 COMPLETE CBC AUTOMATED: CPT

## 2020-10-21 PROCEDURE — 93010 ELECTROCARDIOGRAM REPORT: CPT | Performed by: INTERNAL MEDICINE

## 2020-10-21 PROCEDURE — 80048 BASIC METABOLIC PNL TOTAL CA: CPT | Performed by: ANESTHESIOLOGY

## 2020-10-21 PROCEDURE — 36592 COLLECT BLOOD FROM PICC: CPT

## 2020-10-21 PROCEDURE — U0004 COV-19 TEST NON-CDC HGH THRU: HCPCS | Performed by: ANESTHESIOLOGY

## 2020-10-21 PROCEDURE — 71046 X-RAY EXAM CHEST 2 VIEWS: CPT

## 2020-10-21 PROCEDURE — 93005 ELECTROCARDIOGRAM TRACING: CPT | Performed by: NURSE PRACTITIONER

## 2020-10-22 LAB
MRSA SPEC QL CULT: NORMAL
SARS-COV-2 RNA RESP QL NAA+PROBE: NOT DETECTED

## 2020-10-23 ENCOUNTER — OUTSIDE FACILITY SERVICE (OUTPATIENT)
Dept: PAIN MEDICINE | Facility: CLINIC | Age: 65
End: 2020-10-23

## 2020-10-23 ENCOUNTER — DOCUMENTATION (OUTPATIENT)
Dept: PAIN MEDICINE | Facility: CLINIC | Age: 65
End: 2020-10-23

## 2020-10-23 PROCEDURE — 63685 INS/RPLC SPI NPG/RCVR POCKET: CPT | Performed by: ANESTHESIOLOGY

## 2020-10-23 RX ORDER — SULFAMETHOXAZOLE AND TRIMETHOPRIM 800; 160 MG/1; MG/1
1 TABLET ORAL 2 TIMES DAILY
Qty: 20 TABLET | Refills: 0 | Status: SHIPPED | OUTPATIENT
Start: 2020-10-23 | End: 2021-09-21

## 2020-10-23 NOTE — PROGRESS NOTES
SCS Battery Replacement  [facility]      Preop Dx: End of battery life of Spinal cord stimulator pulse generator  Postop Dx: Same as preop dx    Neuromodulation System IPG removed: [employer]   IPG Battery Type Removed:   Librado mini  Location of the SCS IPG removed:   Left Lumbar Flank      Neuromodulation System IPG replaced: [employer]   IPG Battery Type Placed:   [employer]  Location of the SCS IPG Placed:    same pocket      Preprocedure Discussion with Patient:  Risks and complications were discussed with the patient prior to starting the procedure and informed consent was obtained.  We discussed various topics including but not limited to bleeding, infection, injury, allergic reactions, spinal cord and/or neural injury, implant site pain, post procedural site soreness, equipment malfunction, risk of the lack of pain relief, and risk of worsening clinical picture.      SURGEON:    Jordin Strauss MD    Reason for procedure:  Using it for spine pain and crps pain also.  EOL as above.      Anesthesia/Sedation:  General Anesthesia with ETT  Antibiotics:     Vancomycin 1g IV (for patient <80 kg)     Description of Procedure:      After obtaining informed consent, IV access had been obtained by nursing staff in the preoperative area.  The patient was transported to the operative suite and was placed in a prone position.  Appropriate padding was placed around and under the patient.  Anesthesia care and cardiopulmonary monitoring maintained by member of the anesthesiology team throughout the procedure.  Perioperative antibodies were given prior to the incision per routine as indicated in the anesthesia record and indicated above.  The patient's surgical site was cleansed appropriately with routine cleansing, and then prepped in a sterile routine fashion.  The area was then draped in sterile routine fashion.     The area overlying the IPG was identified and the midline/equator of the  device was marked on the skin.  The subcutaneous tissue and skin along this line was anesthetized with approximately 15 ml of local anesthetic.  Also another approximate 15 mL was placed in the subcutaneous tissue cephalad and caudad to this marked incision line.     Survey of scs:  There is a cervical paddle retrograde from C1-2 and also an older paddle device in the thoracic across T8    A #15 scalpel was used to dissect down to the level of the battery.  Careful dissection was utilized to release the IPG and the distal portion of the leads from the pocket capsule.  The leads were released from the IPG using the proprietary screwdriver provided.  The IPG battery was then placed on the back table and prepared for cleansing in order to return the device to the neuromodulation  for testing.      After removal of the IPG, the electrocautery was connected and then used judiciously to control any bleeding, and to release the battery pocket appropriately to place the new IPG.  Upon completion of this preparation, the electrocautery device was disconnected for safety.      The leads were connected to the new IPG in routine fashion after irrigating the battery pocket with copious amounts of irrigant solution x3.  Then the leads were coiled below the IPG and the IPG was placed into the battery pocket.      Testing of the device was completed in sterile fashion, and the device and leads appeared to be functioning appropriately with normal impedances.      The incision was closed with 2-0 Vicryl running suture for the deep layer, 3-0 Vicryl running suture for the subcutaneous layer and 4-0 Vicryl running suture for the dermal/epidermal layer and then dressed with Dermabond, Telfa and Tegaderm.      Estimated Blood Loss: minimal  Specimens:   As above  Complications:  No complications were noted.    Tolerance and discharge condition:    The patient tolerated the procedure well and was awakened from anesthesia  without incident.  The patient was transported to the recovery area without difficulties. The patient was discharged home under the care of family members in stable and satisfactory condition.      Plan of care:    -- The patient was given our standard instruction sheet and will resume all medications as per the medication reconciliation sheet.    -- The patient will return to my office on the next business day for further reprogramming and education, if necessary.   -- The patient will also present to my office in 10-14 days for a postoperative wound check.    The patient understands that there is any signs of complication, bleeding, infection, fever, chills, increasing back pain or spine pain, any neurologic deficit, or any other concerning finding, that they are family member should call my office at [phone] at any time to access the answering service.

## 2020-10-30 ENCOUNTER — HOSPITAL ENCOUNTER (OUTPATIENT)
Dept: INTERVENTIONAL RADIOLOGY/VASCULAR | Facility: HOSPITAL | Age: 65
End: 2020-10-30

## 2020-11-11 ENCOUNTER — OFFICE VISIT (OUTPATIENT)
Dept: PAIN MEDICINE | Facility: CLINIC | Age: 65
End: 2020-11-11

## 2020-11-11 VITALS
TEMPERATURE: 97.5 F | OXYGEN SATURATION: 93 % | RESPIRATION RATE: 20 BRPM | HEIGHT: 66 IN | BODY MASS INDEX: 17.36 KG/M2 | HEART RATE: 51 BPM | WEIGHT: 108 LBS | DIASTOLIC BLOOD PRESSURE: 61 MMHG | SYSTOLIC BLOOD PRESSURE: 120 MMHG

## 2020-11-11 DIAGNOSIS — Z97.8 PRESENCE OF INTRATHECAL PUMP: ICD-10-CM

## 2020-11-11 DIAGNOSIS — G89.4 CHRONIC PAIN SYNDROME: Primary | ICD-10-CM

## 2020-11-11 PROCEDURE — 62369 ANAL SP INF PMP W/REPRG&FILL: CPT | Performed by: ANESTHESIOLOGY

## 2020-11-11 RX ORDER — SUMATRIPTAN 50 MG/1
TABLET, FILM COATED ORAL
COMMUNITY
End: 2021-09-21

## 2020-11-11 RX ORDER — FLUOXETINE HYDROCHLORIDE 40 MG/1
1 CAPSULE ORAL
COMMUNITY
End: 2021-09-21

## 2020-11-11 RX ORDER — ONDANSETRON 4 MG/1
2 TABLET, FILM COATED ORAL EVERY 8 HOURS
COMMUNITY
End: 2021-09-21

## 2020-11-11 RX ORDER — OXYCODONE AND ACETAMINOPHEN 10; 325 MG/1; MG/1
1 TABLET ORAL EVERY 12 HOURS
COMMUNITY
End: 2021-07-01

## 2020-11-11 RX ORDER — ACETAMINOPHEN 500 MG
TABLET ORAL
COMMUNITY

## 2020-11-11 RX ORDER — LIDOCAINE 50 MG/G
PATCH TOPICAL
COMMUNITY
End: 2022-08-03

## 2020-11-11 RX ORDER — IMMUNE GLOBULIN INTRAVENOUS (HUMAN) 100 MG/ML
SOLUTION INTRAVENOUS
COMMUNITY
Start: 2020-10-29 | End: 2021-09-21

## 2020-11-11 RX ORDER — OMEPRAZOLE 40 MG/1
1 CAPSULE, DELAYED RELEASE ORAL
COMMUNITY

## 2020-11-11 RX ORDER — ONDANSETRON 8 MG/1
TABLET, ORALLY DISINTEGRATING ORAL
COMMUNITY
Start: 2020-09-28

## 2020-11-11 RX ORDER — ESCITALOPRAM OXALATE 10 MG/1
TABLET ORAL
COMMUNITY
End: 2021-09-21

## 2020-11-11 RX ORDER — RANITIDINE 150 MG/1
1 TABLET ORAL
COMMUNITY

## 2020-11-11 NOTE — PROGRESS NOTES
CHIEF COMPLAINT  F/u back and neck pain. Pt here for pump refill today.     Subjective   Amalia Lopez is a 64 y.o. female  who presents for follow-up.  She has a history of spine related pain and other pain.  She has had success with use of intrathecal therapy in the past and wants to continue it.  She is doing well does not want to make any changes.    Also in review, she has done well since her spinal cord stimulator battery replacement.  She finds the function of the new battery easier to use, charging is easier also, very pleased with the effect.    Patient remained masked during entire encounter. No cough present. I donned a mask and eye protection throughout entire visit. Prior to donning mask and eye protection, hand hygiene was performed, as well as when it was doffed.  I was closer than 6 feet, but not for an extended period of time. No obvious exposure to any bodily fluids.    History of Present Illness     Chronic pain no change.    PEG Assessment   What number best describes your pain on average in the past week?6  What number best describes how, during the past week, pain has interfered with your enjoyment of life?6  What number best describes how, during the past week, pain has interfered with your general activity?  6    --  The aforementioned information the Chief Complaint section and above subjective data including any HPI data, and also the Review of Systems data, has been personally reviewed and affirmed.  --        The following portions of the patient's history were reviewed and updated as appropriate: allergies, current medications, past family history, past medical history, past social history, past surgical history and problem list.    Review of Systems   Constitutional: Negative for activity change, fatigue and fever.   HENT: Negative for congestion.    Eyes: Negative for visual disturbance.   Respiratory: Negative for cough and shortness of breath.    Cardiovascular: Negative for chest  "pain.   Gastrointestinal: Negative for constipation and diarrhea.   Endocrine: Negative for polyuria.   Genitourinary: Negative for difficulty urinating.   Musculoskeletal: Positive for back pain.   Allergic/Immunologic: Negative for immunocompromised state.   Neurological: Negative for dizziness, weakness, light-headedness, numbness and headaches.   Psychiatric/Behavioral: Negative for agitation, sleep disturbance and suicidal ideas. The patient is not nervous/anxious.        Vitals:    11/11/20 1327   BP: 120/61   Pulse: 51   Resp: 20   Temp: 97.5 °F (36.4 °C)   SpO2: 93%   Weight: 49 kg (108 lb)   Height: 167.6 cm (66\")   PainSc:   6   PainLoc: Back  Comment: and neck         Objective   Physical Exam   VSS, NNR, NCAT, NMNA, NRD, AAOx3.  Prior spinal cord stimulator battery placement pocket site on the buttock is well-healed.  No irritation of the intrathecal pump site.        Assessment/Plan   Diagnoses and all orders for this visit:    1. Chronic pain syndrome (Primary)    2. Presence of intrathecal pump        --Pump refill and programming note below  --- Follow-up for the next pump refill           Intrathecal pump access and reprogramming and refill.  The pump was utilizing a morphine solution.  The expected residual volume was 4.1 mL and the actual obtained was 3.5 mL.  The pump was refilled with a solution of morphine 7.5 mg/mL with bupivacaine 1 mg/mL.  The daily dose of morphine is maintained at 1 mg/day.  This is with the corresponding sufficient quantity of bupivacaine.  Patient consented to the pump refill and reprogramming.  The pump was accessed routinely percutaneously with sterile technique and sterile precautions.  No complications.      EMR Dragon/Transcription disclaimer:   Much of this encounter note is an electronic transcription/translation of spoken language to printed text. The electronic translation of spoken language may permit erroneous, or at times, nonsensical words or phrases to be " inadvertently transcribed; Although I have reviewed the note for such errors, some may still exist.

## 2020-11-30 DIAGNOSIS — M79.18 CERVICAL MYOFASCIAL PAIN SYNDROME: ICD-10-CM

## 2020-11-30 RX ORDER — OXYCODONE AND ACETAMINOPHEN 10; 325 MG/1; MG/1
1 TABLET ORAL DAILY PRN
Qty: 30 TABLET | Refills: 0 | Status: SHIPPED | OUTPATIENT
Start: 2020-11-30 | End: 2021-01-29 | Stop reason: SDUPTHER

## 2020-11-30 RX ORDER — PREGABALIN 100 MG/1
100 CAPSULE ORAL 3 TIMES DAILY
Qty: 90 CAPSULE | Refills: 2 | Status: SHIPPED | OUTPATIENT
Start: 2020-11-30 | End: 2021-03-01

## 2020-11-30 NOTE — TELEPHONE ENCOUNTER
Medication Refill Request    Date of phone call: 20    Medication being requested: percocet 10/325mg si tab po daily prn   Qty: 30    Date of last visit: 20    Date of last refill:     SMIHT up to date?:     Next Follow up?: 3/17/20    Any new pertinent information? (i.e, new medication allergies, new use of medications, change in patient's health or condition, non-compliance or inconsistency with prescribing agreement?):     Medication Refill Request    Date of phone call: 20    Medication being requested: lyrica 100mg si caps po tid  Qty: 90    Date of last visit: 20    Date of last refill:     SMITH up to date?:     Next Follow up?: 3/17/20    Any new pertinent information? (i.e, new medication allergies, new use of medications, change in patient's health or condition, non-compliance or inconsistency with prescribing agreement?): please advise. Pt requesting today, she is out. Thank you.

## 2020-12-01 ENCOUNTER — DOCUMENTATION (OUTPATIENT)
Dept: PHYSICAL THERAPY | Facility: CLINIC | Age: 65
End: 2020-12-01

## 2020-12-01 NOTE — PROGRESS NOTES
Discharge Summary  Discharge Summary from Physical Therapy Report      Date of Initial PT visit: 08/12/2020  Number of Visits Seen: 5    Discharge Status of Patient: Pt did not return after 09/04/2020 visit.      Goals: Partially Met    Discharge Plan: Patient to return to referring/providing physician    Date of Discharge: 12/01/2020      Patricia Sheikh, PT, DPT  Physical Therapist

## 2021-01-29 DIAGNOSIS — M79.18 CERVICAL MYOFASCIAL PAIN SYNDROME: ICD-10-CM

## 2021-01-29 RX ORDER — OXYCODONE AND ACETAMINOPHEN 10; 325 MG/1; MG/1
1 TABLET ORAL DAILY PRN
Qty: 30 TABLET | Refills: 0 | Status: SHIPPED | OUTPATIENT
Start: 2021-01-29 | End: 2021-03-10 | Stop reason: SDUPTHER

## 2021-01-29 NOTE — TELEPHONE ENCOUNTER
Medication Refill Request    Date of phone call: 1/29/2021    Medication being requested: oxy/apap  mg sig: take 1 tab daily prn pain  Qty: 30    Date of last visit: 11/11/2020    Date of last refill: 11/30/2020    SMITH up to date?: yes    Next Follow up?: 3/17/2021    Any new pertinent information? (i.e, new medication allergies, new use of medications, change in patient's health or condition, non-compliance or inconsistency with prescribing agreement?): Ms. Lopez states that she needs a new order for dry needling sent to her PT. She goes to a [facility] in [place].

## 2021-02-01 ENCOUNTER — TREATMENT (OUTPATIENT)
Dept: PHYSICAL THERAPY | Facility: CLINIC | Age: 66
End: 2021-02-01

## 2021-02-01 DIAGNOSIS — G43.011 INTRACTABLE MIGRAINE WITHOUT AURA AND WITH STATUS MIGRAINOSUS: ICD-10-CM

## 2021-02-01 DIAGNOSIS — M54.2 PAIN, NECK: ICD-10-CM

## 2021-02-01 DIAGNOSIS — M79.18 MYALGIA, OTHER SITE: Primary | ICD-10-CM

## 2021-02-01 PROCEDURE — DRYNDL PR CUSTOM DRY NEEDLING SELF PAY: Performed by: PHYSICAL THERAPIST

## 2021-02-01 PROCEDURE — 97162 PT EVAL MOD COMPLEX 30 MIN: CPT | Performed by: PHYSICAL THERAPIST

## 2021-02-01 NOTE — PROGRESS NOTES
Physical Therapy Initial Evaluation and Plan of Care    Patient: Amalia Lopez   : 1955  Diagnosis/ICD-10 Code:  Intractable migraine without aura and with status migrainosus [G43.011], Myalgia, other site [M79.18], Pain, neck [M54.2]  Referring practitioner: Jordin Strauss MD     Subjective Evaluation     History of Present Illness  Mechanism of injury: Pt reports she has experienced neck pain with migraines occurring for years now. She reports that she wakes up with migraines and they will sometimes diminish slightly throughout the day, but most of the time they stay pretty consistent in intensity.  Her migraines primarily occur on the L side of her head, but will sometimes refer into her L anterior forehead. She is on meds for the migraines and they help a little bit, but they don't completely take away the pain. Pt also reports having CRPS in LUE with most pain occurring in L medial forearm. She also reported history of L ulnar nerve surgery and still has pain in her L elbow and into her medial hand. Has had PT/dry needling in past that have helped.  Feels that recently she has had another flare-up of worsening migraines.  Is getting 3-4 per week.      Quality of life: fair    Pain  Current pain ratin  At best pain ratin  At worst pain ratin  Quality: throbbing, discomfort, dull ache, radiating, pressure and tight  Relieving factors: rest and relaxation  Aggravating factors: lifting, movement, overhead activity, prolonged positioning, sleeping, repetitive movement and outstretched reach  Progression: worsening     Diagnostic Tests  No diagnostic tests performed     Treatments  Previous treatment: injection treatment, physical therapy and medication  Current treatment: physical therapy  Patient Goals  Patient goals for therapy: decreased pain, increased motion, increased strength, independence with ADLs/IADLs and return to sport/leisure activities        Objective            Palpation    Left   Tenderness of the sternocleidomastoid and upper trapezius.   Trigger point to sternocleidomastoid and upper trapezius.      Right Tenderness of the upper trapezius.   Trigger point to upper trapezius.      Tenderness     Additional Tenderness Details  L C2 TP  L C4/5 TP  C7  T1/2 hypomobility (mild) w/tenderness in supine PA  Anterior scalenes on L side.   L temporalis m.   L SCM m.     Neurological Testing      Sensation   Cervical/Thoracic   Left   Intact: light touch     Right   Diminished: light touch     Comments   Left light touch: with exception of hypersensitivty from L arm CRPS.   Right light touch: C5.      Reflexes      Right   Biceps (C5/C6): normal (2+)  Brachioradialis (C6): normal (2+)  Triceps (C7): normal (2+)     Comments   Left Deltoid (C5): LUE NT 2' CRPS     Active Range of Motion   Cervical/Thoracic Spine   Cervical     Flexion: 46 degrees   Extension: 27 degrees   Left lateral flexion: 35 degrees   Right lateral flexion: 25 degrees   Left rotation: 53 degrees   Right rotation: 47 degrees      Passive Range of Motion     Additional Passive Range of Motion Details  R rotation restricted w/pain   AA rotation slight restriction with pain bilaterally   OA restriction L>R     Strength/Myotome Testing     Additional Strength Details  Not tested due to pt reporting CRPS and sensitivity in LUE especially in medial forearm.      Assessment & Plan      Assessment  Impairments: abnormal or restricted ROM, activity intolerance, impaired physical strength, lacks appropriate home exercise program and pain with function  Assessment details: Pt is a 64 year old female reporting to the clinic with bilateral neck pain with migraines, primarily occurring on the L side, occurring at the same time. Palpation to c-spine and t-spine showed tenderness throughout bilaterally with slight massage producing moderate pain relief. She had restricted AROM in all cervical motions as well as restricted cervical PROM  and AA PROM, all accompanied by pain. Cervical distraction was able to reduce pain intensity and dry needling to c-spine and UT muscles was initiated to reduce pain and sensitivity in neck and upper thoracic region. She will benefit from skilled therapy to address impairments, increase activity tolerance, and improve quality of life.   Prognosis: fair  Functional Limitations: carrying objects, lifting, sleeping, walking, pulling, pushing, uncomfortable because of pain, reaching overhead and unable to perform repetitive tasks    Goals  Plan Goals: STG - 3 weeks:  1. Pt to be compliant and independent with HEP.  2. Pt to report pain at worse with migraine 7/10 or less to improve activity tolerance.  3. Pt to report migraine frequency to 1-2 at most per week.     LTG - 6 weeks:   1. Pt to achieve NDI score of 45% or less to show improvements in cervical function.  2. Pt be able to walk for 20 minutes without migraine rating 3/10 or higher to increase ability to exercise and improve quality of life.   3. Pt be able to achieve 40 degrees of cervical SBing motion bilaterally, and 35 degrees of extension AROM to decrease stiffness with activity and improve ability to recreate comfortably.      Plan  Therapy options: will be seen for skilled physical therapy services  Planned modality interventions: cryotherapy, electrical stimulation/[place]ian stimulation, ultrasound, traction, thermotherapy (hydrocollator packs) and TENS  Planned therapy interventions: ADL retraining, balance/weight-bearing training, body mechanics training, flexibility, functional ROM exercises, home exercise program, joint mobilization, therapeutic activities, stretching, strengthening, spinal/joint mobilization, soft tissue mobilization, neuromuscular re-education and manual therapy  Other planned therapy interventions: Dry needling   Frequency: 1-2x week  Duration in visits: 12  Treatment plan discussed with: patient         History # of Personal  Factors and/or Comorbidities: HIGH (3+)  Examination of Body System(s): # of elements: MODERATE (3)  Clinical Presentation: EVOLVING  Clinical Decision Making: MODERATE     Timed:                                                                           Manual Therapy:                 mins  90296;                      Therapeutic Exercise:         mins  96528;     Neuromuscular Patel:        mins  72166;    Therapeutic Activity:           mins  74099;     Gait Training:                      mins  39164;     Ultrasound:                          mins  42021;    Ionto                                   mins   91807  Self Care                            mins   79318           Un-Timed:  Electrical Stimulation:         mins  00734 ( );  Dry Needling                  15     mins self-pay  Traction                               mins 36993  Low Eval                             Mins  03791  Mod Eval                      30     Mins  65305  High Eval                            Mins  51777  Re-Eval                               mins  73192           Timed Treatment:   45   mins   Total Treatment:     45   mins  PT SIGNATURE: Patricia Sheikh, PT, DPT    DATE TREATMENT INITIATED: 2/1/2021    Initial Certification  Certification Period: 5/2/2021  I certify that the therapy services are furnished while this patient is under my care.  The services outlined above are required by this patient, and will be reviewed every 90 days.     PHYSICIAN: Jordin Strauss MD      DATE:     Please sign and return via fax to [phone].. Thank you, [facility] Physical Therapy.

## 2021-02-12 ENCOUNTER — TREATMENT (OUTPATIENT)
Dept: PHYSICAL THERAPY | Facility: CLINIC | Age: 66
End: 2021-02-12

## 2021-02-12 DIAGNOSIS — G43.011 INTRACTABLE MIGRAINE WITHOUT AURA AND WITH STATUS MIGRAINOSUS: ICD-10-CM

## 2021-02-12 DIAGNOSIS — M54.2 PAIN, NECK: ICD-10-CM

## 2021-02-12 DIAGNOSIS — M79.18 MYALGIA, OTHER SITE: Primary | ICD-10-CM

## 2021-02-12 PROCEDURE — DRYNDL PR CUSTOM DRY NEEDLING SELF PAY: Performed by: PHYSICAL THERAPIST

## 2021-02-12 NOTE — PROGRESS NOTES
Physical Therapy Daily Progress Note    VISIT#: 2    Subjective   Amalia Lopez reports she felt better after needling the last visit the next day.  Had a really bad day Wednesday.  Her home health nurse recommended that she give herself a bag of fluids daily due to her health issues she is going through.     Pain Rating (0/10): 6    Objective     See Exercise, Manual, and Modality Logs for complete treatment.     Assessment/Plan  Patient with good response to needling today. Had tender area over L levator scap/infraspinatus homeostatic point and at supraorbital homeostatic.        Plan  Progress per Plan of Care            Timed:         Manual Therapy:         mins  52820;     Therapeutic Exercise:         mins  73022;     Neuromuscular Patel:        mins  55183;    Therapeutic Activity:          mins  13658;     Gait Training:           mins  47307;     Ultrasound:          mins  25842;    Ionto                                   mins   18103  Self Care                            mins   20229    Un-Timed:  Electrical Stimulation:         mins  87873 ( );  Dry Needling     30     mins self-pay  Traction          mins 37617  Low Eval          Mins  58497  Mod Eval          Mins  66676  High Eval                            Mins  76897  Re-Eval                               mins  30877    Timed Treatment:   30   mins   Total Treatment:     30   mins    Patricia Sheikh, PT, DPT  Physical Therapist

## 2021-02-16 ENCOUNTER — TREATMENT (OUTPATIENT)
Dept: PHYSICAL THERAPY | Facility: CLINIC | Age: 66
End: 2021-02-16

## 2021-02-16 DIAGNOSIS — G43.011 INTRACTABLE MIGRAINE WITHOUT AURA AND WITH STATUS MIGRAINOSUS: ICD-10-CM

## 2021-02-16 DIAGNOSIS — M79.18 MYALGIA, OTHER SITE: Primary | ICD-10-CM

## 2021-02-16 DIAGNOSIS — M54.2 PAIN, NECK: ICD-10-CM

## 2021-02-16 PROCEDURE — DRYNDL PR CUSTOM DRY NEEDLING SELF PAY: Performed by: PHYSICAL THERAPIST

## 2021-02-16 PROCEDURE — 97140 MANUAL THERAPY 1/> REGIONS: CPT | Performed by: PHYSICAL THERAPIST

## 2021-02-16 NOTE — PROGRESS NOTES
Physical Therapy Daily Progress Note    VISIT#: 3 in POC.  Visit 1/4 approved by lilian.     Subjective   Amalia Lopez reports she had a very bad migraine yesterday. She was not able to get much relief.  She was really struggling to reduce her pain, and had to take a lot of pain meds and some muscle relaxers to try to get relief.     Pain Rating (0/10): 3    Objective     See Exercise, Manual, and Modality Logs for complete treatment.     Assessment/Plan   Pt with some slight relief today in SO region pain.  Increased hypomobility of OA L>R.     Plan  Progress per Plan of Care            Timed:         Manual Therapy:  8     mins  69354;     Therapeutic Exercise:         mins  08292;     Neuromuscular Patel:       mins  64195;    Therapeutic Activity:          mins  05064;     Gait Training:           mins  83677;     Ultrasound:          mins  59741;    Ionto                                   mins   84111  Self Care                            mins   29124    Un-Timed:  Electrical Stimulation:         mins  08845 ( );  Dry Needling     20     mins self-pay  Traction          mins 78671  Low Eval          Mins  27216  Mod Eval          Mins  03519  High Eval                            Mins  18560  Re-Eval                              mins  81615    Timed Treatment:   28   mins   Total Treatment:     28   mins    Patricia Sheikh, PT, DPT  Physical Therapist

## 2021-02-19 ENCOUNTER — TREATMENT (OUTPATIENT)
Dept: PHYSICAL THERAPY | Facility: CLINIC | Age: 66
End: 2021-02-19

## 2021-02-19 DIAGNOSIS — M54.2 PAIN, NECK: ICD-10-CM

## 2021-02-19 DIAGNOSIS — M79.18 MYALGIA, OTHER SITE: Primary | ICD-10-CM

## 2021-02-19 DIAGNOSIS — G43.011 INTRACTABLE MIGRAINE WITHOUT AURA AND WITH STATUS MIGRAINOSUS: ICD-10-CM

## 2021-02-19 PROCEDURE — 97140 MANUAL THERAPY 1/> REGIONS: CPT | Performed by: PHYSICAL THERAPIST

## 2021-02-19 PROCEDURE — DRYNDL PR CUSTOM DRY NEEDLING SELF PAY: Performed by: PHYSICAL THERAPIST

## 2021-02-19 NOTE — PROGRESS NOTES
Physical Therapy Daily Progress Note    VISIT#: 4    Subjective   Amalia Lopez reports she thinks she is doing a little better. Is getting some burning on her L sided forehead.     Pain Rating (0/10): 4    Objective     See Exercise, Manual, and Modality Logs for complete treatment.       Assessment/Plan  Patient with positive response today.  Focused dry needling more on L, and pt with reduction in pressure to base of skull post session.     Plan  Continue current.             Timed:         Manual Therapy:   15     mins  81336;     Therapeutic Exercise:         mins  95250;     Neuromuscular Patel:        mins  24851;    Therapeutic Activity:          mins  29193;     Gait Training:           mins  54872;     Ultrasound:          mins  26303;    Ionto                                   mins   02596  Self Care                            mins   88814    Un-Timed:  Electrical Stimulation:         mins  30048 ( );  Dry Needling     20     mins self-pay  Traction          mins 19496  Low Eval          Mins  26863  Mod Eval          Mins  37386  High Eval                            Mins  71354  Re-Eval                               mins  73688    Timed Treatment:   15   mins   Total Treatment:     35   mins    Patricia Sheikh, PT, DPT  Physical Therapist

## 2021-02-24 ENCOUNTER — TREATMENT (OUTPATIENT)
Dept: PHYSICAL THERAPY | Facility: CLINIC | Age: 66
End: 2021-02-24

## 2021-02-24 DIAGNOSIS — M79.18 MYALGIA, OTHER SITE: Primary | ICD-10-CM

## 2021-02-24 DIAGNOSIS — M54.2 PAIN, NECK: ICD-10-CM

## 2021-02-24 DIAGNOSIS — G43.011 INTRACTABLE MIGRAINE WITHOUT AURA AND WITH STATUS MIGRAINOSUS: ICD-10-CM

## 2021-02-24 PROCEDURE — 97140 MANUAL THERAPY 1/> REGIONS: CPT | Performed by: PHYSICAL THERAPIST

## 2021-02-24 PROCEDURE — DRYNDL PR CUSTOM DRY NEEDLING SELF PAY: Performed by: PHYSICAL THERAPIST

## 2021-02-24 NOTE — PROGRESS NOTES
Physical Therapy Daily Progress Note    VISIT#: 5    Subjective   Amalia Lopez reports she is doing ok today.  Feels that her headaches are worse towards the end of her day.     Pain Rating (0/10): 4    Objective     See Exercise, Manual, and Modality Logs for complete treatment.     Assessment/Plan  Patient with continued areas of soreness suboccipital region (specifically at UT and rectus capitus).  She had soreness with PA at C2 L>R.  Showing some slight lessening of headache intensity.     Plan  Progress per Plan of Care            Timed:         Manual Therapy:   15      mins  51658;     Therapeutic Exercise:         mins  09795;     Neuromuscular Patel:        mins  07382;    Therapeutic Activity:          mins  31546;     Gait Training:           mins  43112;     Ultrasound:          mins  07547;    Ionto                                   mins   15815  Self Care                            mins   41492    Un-Timed:  Electrical Stimulation:         mins  54491 ( );  Dry Needling    20    mins self-pay  Traction          mins 20709  Low Eval          Mins  80652  Mod Eval          Mins  20025  High Eval                            Mins  42293  Re-Eval                               mins  84529    Timed Treatment:  15    mins   Total Treatment:     35   mins    Patricia Sheikh, PT, DPT  Physical Therapist

## 2021-03-01 DIAGNOSIS — M79.18 CERVICAL MYOFASCIAL PAIN SYNDROME: ICD-10-CM

## 2021-03-01 RX ORDER — PREGABALIN 100 MG/1
CAPSULE ORAL
Qty: 90 CAPSULE | Refills: 2 | Status: SHIPPED | OUTPATIENT
Start: 2021-03-01 | End: 2021-06-01

## 2021-03-03 ENCOUNTER — TREATMENT (OUTPATIENT)
Dept: PHYSICAL THERAPY | Facility: CLINIC | Age: 66
End: 2021-03-03

## 2021-03-03 DIAGNOSIS — G43.011 INTRACTABLE MIGRAINE WITHOUT AURA AND WITH STATUS MIGRAINOSUS: ICD-10-CM

## 2021-03-03 DIAGNOSIS — M79.18 MYALGIA, OTHER SITE: Primary | ICD-10-CM

## 2021-03-03 DIAGNOSIS — M54.2 PAIN, NECK: ICD-10-CM

## 2021-03-03 PROCEDURE — 97140 MANUAL THERAPY 1/> REGIONS: CPT | Performed by: PHYSICAL THERAPIST

## 2021-03-03 PROCEDURE — 97110 THERAPEUTIC EXERCISES: CPT | Performed by: PHYSICAL THERAPIST

## 2021-03-03 PROCEDURE — DRYNDL PR CUSTOM DRY NEEDLING SELF PAY: Performed by: PHYSICAL THERAPIST

## 2021-03-03 NOTE — PROGRESS NOTES
Re-Assessment / Progress Note    Patient: Amalia Lopez   : 1955  Diagnosis/ICD-10 Code:  Myalgia, other site [M79.18]  Referring practitioner: Jordin Strauss MD  Date of Initial Visit: Episode Type: THERAPY  Noted: 2021    Today's Date: 3/3/2021  Patient seen for 6 sessions.      Subjective:   Amalia Lopez reports: she is doing ok.  Has had not as strong migaines since last visit.  Feels if she rests a couple times throughout the day and lays down that helps.     Subjective Questionnaire: NDI:34/50 =68% disability   Clinical Progress: improved  Treatment has included: therapeutic exercise, manual therapy and dry needling    Pain  Current pain ratin  At best pain ratin  At worst pain ratin  Quality: throbbing, discomfort, dull ache, radiating, pressure and tight  Relieving factors: rest and relaxation  Aggravating factors: lifting, movement, overhead activity, prolonged positioning, sleeping, repetitive movement and outstretched reach  Progression: worsening     Diagnostic Tests  No diagnostic tests performed     Treatments  Previous treatment: injection treatment, physical therapy and medication  Current treatment: physical therapy  Patient Goals  Patient goals for therapy: decreased pain, increased motion, increased strength, independence with ADLs/IADLs and return to sport/leisure activities        Objective            Palpation   Left   Tenderness of the sternocleidomastoid and upper trapezius.   Trigger point to sternocleidomastoid and upper trapezius.      Right Tenderness of the upper trapezius.   Trigger point to upper trapezius.      Tenderness     Additional Tenderness Details  L C2 TP  L C4/5 TP  C7  T1/2 hypomobility (mild) w/tenderness in supine PA  Anterior scalenes on L side.   L temporalis m.   L SCM m.     Neurological Testing      Sensation   Cervical/Thoracic   Left   Intact: light touch     Right   Diminished: light touch     Comments   Left light touch: with  exception of hypersensitivty from L arm CRPS.   Right light touch: C5.      Reflexes      Right   Biceps (C5/C6): normal (2+)  Brachioradialis (C6): normal (2+)  Triceps (C7): normal (2+)     Comments   Left Deltoid (C5): LUE NT 2' CRPS     Active Range of Motion   Cervical/Thoracic Spine   Cervical     Flexion: 38 degrees   Extension: 25 degrees   Left lateral flexion: 45 degrees   Right lateral flexion: 25 degrees   Left rotation: 40 degrees   Right rotation: 42 degrees     Passive Range of Motion     Additional Passive Range of Motion Details  R rotation restricted w/pain   AA rotation slight restriction with pain bilaterally   OA restriction L>R     Strength/Myotome Testing     Additional Strength Details  Not tested due to pt reporting CRPS and sensitivity in LUE especially in medial forearm.       Assessment & Plan      Assessment  Impairments: abnormal or restricted ROM, activity intolerance, impaired physical strength, lacks appropriate home exercise program and pain with function    Assessment details: Patient with some improvement in L lateral flexion.  Other ROM no significant change.  Pt having less severe/fequent headaches with dry needling.  Recommend continuing skilled PT services at this time.      Prognosis: fair  Functional Limitations: carrying objects, lifting, sleeping, walking, pulling, pushing, uncomfortable because of pain, reaching overhead and unable to perform repetitive tasks     Goals  Plan Goals: STG - 3 weeks:  1. Pt to be compliant and independent with HEP.- met  2. Pt to report pain at worse with migraine 7/10 or less to improve activity tolerance. - progressing   3. Pt to report migraine frequency to 1-2 at most per week. - progressing     LTG - 6 weeks:   1. Pt to achieve NDI score of 45% or less to show improvements in cervical function. - not met  2. Pt be able to walk for 20 minutes without migraine rating 3/10 or higher to increase ability to exercise and improve quality of  life. - met  3. Pt be able to achieve 40 degrees of cervical SBing motion bilaterally, and 35 degrees of extension AROM to decrease stiffness with activity and improve ability to recreate comfortably.  - partially met for SBing      Plan  Therapy options: will be seen for skilled physical therapy services  Planned modality interventions: cryotherapy thermotherapy (hydrocollator packs)   Planned therapy interventions: ADL retraining, balance/weight-bearing training, body mechanics training, flexibility, functional ROM exercises, home exercise program, joint mobilization, therapeutic activities, stretching, strengthening, spinal/joint mobilization, soft tissue mobilization, neuromuscular re-education and manual therapy  Other planned therapy interventions: Dry needling   Frequency: 1-2x week  Duration in visits: 6 visits left in current POC.  Treatment plan discussed with: patient       Progress toward previous goals: Partially Met   Recommendations: Continue as planned  Prognosis to achieve goals: fair    PT Signature: Patricia Sheikh, PT, DPT        Based upon review of the patient's progress and continued therapy plan, it is my medical opinion that Amalia Lopez should continue physical therapy treatment at [facility]  [facility] [address]  [place] [id number] [phone].    Signature: __________________________________  Jordin Strauss MD    Timed:         Manual Therapy:   15      mins  48350;     Therapeutic Exercise:    10     mins  28013;     Neuromuscular Patel:     mins  82760;    Therapeutic Activity:          mins  29316;     Gait Training:           mins  58462;     Ultrasound:          mins  77855;    Ionto                                   mins   53159  Self Care                            mins   77347        Un-Timed:  Electrical Stimulation:         mins  48158 ( );  Dry Needling     20    mins self-pay  Traction          mins 89125  Low  Eval          Mins  20078  Mod Eval          Mins  52368  High Eval                            Mins  23755  Re-Eval                               mins  09568        Timed Treatment:   25   mins   Total Treatment:     45   mins

## 2021-03-10 DIAGNOSIS — M79.18 CERVICAL MYOFASCIAL PAIN SYNDROME: ICD-10-CM

## 2021-03-10 NOTE — TELEPHONE ENCOUNTER
Medication Refill Request    Date of phone call: 3/10/21    Medication being requested: Percocet  mg    si tab po daily prn  Qty: 30    Date of last visit: 20    Date of last refill:     SMITH up to date?: no    Next Follow up?: 3/17/21    Any new pertinent information? (i.e, new medication allergies, new use of medications, change in patient's health or condition, non-compliance or inconsistency with prescribing agreement?):

## 2021-03-11 RX ORDER — OXYCODONE AND ACETAMINOPHEN 10; 325 MG/1; MG/1
1 TABLET ORAL DAILY PRN
Qty: 30 TABLET | Refills: 0 | Status: SHIPPED | OUTPATIENT
Start: 2021-03-11 | End: 2021-05-07 | Stop reason: SDUPTHER

## 2021-03-17 ENCOUNTER — OFFICE VISIT (OUTPATIENT)
Dept: PAIN MEDICINE | Facility: CLINIC | Age: 66
End: 2021-03-17

## 2021-03-17 VITALS
WEIGHT: 107.6 LBS | HEIGHT: 66 IN | OXYGEN SATURATION: 98 % | TEMPERATURE: 96.9 F | DIASTOLIC BLOOD PRESSURE: 74 MMHG | BODY MASS INDEX: 17.29 KG/M2 | HEART RATE: 51 BPM | RESPIRATION RATE: 16 BRPM | SYSTOLIC BLOOD PRESSURE: 129 MMHG

## 2021-03-17 DIAGNOSIS — G89.29 CHRONIC PAIN OF RIGHT KNEE: ICD-10-CM

## 2021-03-17 DIAGNOSIS — Z79.891 ENCOUNTER FOR LONG-TERM OPIATE ANALGESIC USE: ICD-10-CM

## 2021-03-17 DIAGNOSIS — G89.4 CHRONIC PAIN SYNDROME: Primary | ICD-10-CM

## 2021-03-17 DIAGNOSIS — M79.2 NEUROPATHIC PAIN: ICD-10-CM

## 2021-03-17 DIAGNOSIS — Z97.8 PRESENCE OF INTRATHECAL PUMP: ICD-10-CM

## 2021-03-17 DIAGNOSIS — Z96.89 SPINAL CORD STIMULATOR STATUS: ICD-10-CM

## 2021-03-17 DIAGNOSIS — M25.561 CHRONIC PAIN OF RIGHT KNEE: ICD-10-CM

## 2021-03-17 DIAGNOSIS — M96.1 LUMBAR POST-LAMINECTOMY SYNDROME: ICD-10-CM

## 2021-03-17 DIAGNOSIS — G44.86 CERVICOGENIC HEADACHE: ICD-10-CM

## 2021-03-17 DIAGNOSIS — M96.1 CERVICAL POST-LAMINECTOMY SYNDROME: ICD-10-CM

## 2021-03-17 DIAGNOSIS — G43.909 MIGRAINE WITHOUT STATUS MIGRAINOSUS, NOT INTRACTABLE, UNSPECIFIED MIGRAINE TYPE: ICD-10-CM

## 2021-03-17 DIAGNOSIS — G90.512 COMPLEX REGIONAL PAIN SYNDROME TYPE 1 OF LEFT UPPER EXTREMITY: ICD-10-CM

## 2021-03-17 PROCEDURE — 62369 ANAL SP INF PMP W/REPRG&FILL: CPT | Performed by: ANESTHESIOLOGY

## 2021-03-17 PROCEDURE — 99213 OFFICE O/P EST LOW 20 MIN: CPT | Performed by: ANESTHESIOLOGY

## 2021-03-17 NOTE — PROGRESS NOTES
CHIEF COMPLAINT  F/u back pain/ pump refill- patient states that her pain has worsened since her last visit.     Subjective   Amalia Lopez is a 65 y.o. female  who presents for follow-up.  She has a history of multiple painful problems including neck pain and low back pain and she has history of lumbar and cervical postlaminectomy syndromes.  She uses spinal stimulator for complex regional pain syndrome and uses intrathecal pump for the postlaminectomy syndromes, with the catheter tip at T1, especially for the neck pain.    She is having more neck pain recently with increased activity.  She has some right knee pain and leg pain.  The combination of advanced neuromodulation therapies help him manage her spine pain moderately but with some increased neck pain recently is also triggering more headaches.  She gets some occipital area pain and also upper cervical area pain and migraines flare and she has been having 7 or 8 full-blown migraine days a month with less severe headaches every day..    Intrathecal pump access and reprogramming and refill.  : The pump was utilizing a morphine solution.  The expected residual volume was 3 mL and the actual obtained was 2.5 mL.  The pump was refilled with a solution of morphine 7.5 mg/mL with bupivacaine 1 mg/mL.  The daily dose was changed... we kept the base rate of 1mg Morphine perday & this is with the corresponding sufficient quantity of bupivacaine; but changed this to a flex mode, to deliver a 0.2mg dose of Morphine with corresponding bupivacaine @ 4am over 1 hour.  Patient consented to the pump refill and reprogramming.  The pump was accessed routinely percutaneously with sterile technique and sterile precautions.  No complications.    Patient remained masked during entire encounter. No cough present. I donned a mask and eye protection throughout entire visit. Prior to donning mask and eye protection, hand hygiene was performed, as well as when it was doffed.  I was  closer than 6 feet, but not for an extended period of time. No obvious exposure to any bodily fluids.    Neck Pain   This is a chronic problem. The problem occurs 2 to 4 times per day. The problem has been gradually worsening. The pain is present in the midline, left side and right side. The quality of the pain is described as aching and burning. The pain is moderate. The pain is worse during the day. Associated symptoms include chest pain, headaches, numbness and weakness. Pertinent negatives include no fever.        PEG Assessment   What number best describes your pain on average in the past week?7  What number best describes how, during the past week, pain has interfered with your enjoyment of life?6  What number best describes how, during the past week, pain has interfered with your general activity?  5    --  The aforementioned information the Chief Complaint section and above subjective data including any HPI data, and also the Review of Systems data, has been personally reviewed and affirmed.  --        The following portions of the patient's history were reviewed and updated as appropriate: allergies, current medications, past family history, past medical history, past social history, past surgical history and problem list.    -------    The following portions of the patient's history were reviewed and updated as appropriate: allergies, current medications, past family history, past medical history, past social history, past surgical history and problem list.    Allergies   Allergen Reactions   • Codeine Itching   • Fentanyl Other (See Comments)     Dystonic movements   • Morphine Other (See Comments)     Other reaction(s): Other (See Comments)  Headaches         Current Outpatient Medications:   •  acetaminophen (TYLENOL) 500 MG tablet, as needed, Disp: , Rfl:   •  amphetamine-dextroamphetamine (ADDERALL) 10 MG tablet, Take 5 mg by mouth Daily., Disp: , Rfl:   •  Betamethasone Valerate 0.12 % foam,  BETAMETHASONE VALERATE 0.12 % FOAM, Disp: , Rfl:   •  celecoxib (CeleBREX) 200 MG capsule, Take 1 capsule by mouth Daily., Disp: 30 capsule, Rfl: 11  •  chlorhexidine (PERIDEX) 0.12 % solution, RINSE AND SPIT 15ML DAILY, Disp: , Rfl: 11  •  ciclopirox (PENLAC) 8 % solution, APPLY TO AFFECTED AREA EVERY DAY, Disp: , Rfl: 6  •  CREON 57463 units capsule delayed-release particles, TAKE 1 CAPSULE BEFORE MEALS AND 1 CAPSULE BEFORE SNACKS, Disp: , Rfl: 3  •  DEXILANT 60 MG capsule, Take 1 capsule by mouth Daily., Disp: , Rfl: 11  •  diphenhydrAMINE (BENADRYL) 50 MG/ML injection, , Disp: , Rfl:   •  escitalopram (Lexapro) 10 MG tablet, Take  by mouth., Disp: , Rfl:   •  FLUoxetine (PROzac) 40 MG capsule, Take 40 mg by mouth Daily., Disp: , Rfl: 3  •  FLUoxetine (PROzac) 40 MG capsule, Take 1 capsule by mouth., Disp: , Rfl:   •  ibuprofen (ADVIL,MOTRIN) 200 MG tablet, IBUPROFEN 200 MG TABS, Disp: , Rfl:   •  lidocaine (LIDODERM) 5 %, Place 3 patches on the skin as directed by provider Daily., Disp: 90 patch, Rfl: 11  •  lidocaine (Lidoderm) 5 %, as needed, Disp: , Rfl:   •  linaclotide (LINZESS) 145 MCG capsule capsule, LINZESS 145 MCG CAPS, Disp: , Rfl:   •  linaclotide (Linzess) 145 MCG capsule capsule, take 1 capsule by oral route  every day on an empty stomach at least 30 minutes before 1st meal of the day swallowing whole. Do not break, chew and/or open., Disp: , Rfl:   •  loratadine (CLARITIN) 10 MG tablet, Take 10 mg by mouth., Disp: , Rfl:   •  morphine 7.5 mg/mL, Bupivacaine HCl 1 mg/mL, by Intrathecal route Continuous., Disp: 20 mL, Rfl: 0  •  mupirocin (BACTROBAN) 2 % ointment, APPLY TO AFFECTED AREA OF NOSE THREE TIMES DAILY FOR 3 WEEKS, Disp: , Rfl:   •  NON FORMULARY, Pancreatic enzymes, Disp: , Rfl:   •  omeprazole (priLOSEC) 40 MG capsule, Take 1 capsule by mouth., Disp: , Rfl:   •  ondansetron (ZOFRAN) 4 MG tablet, Take 4 mg by mouth Every 8 (Eight) Hours., Disp: , Rfl: 0  •  ondansetron (Zofran) 4 MG  tablet, Take 2 tablets by mouth Every 8 (Eight) Hours., Disp: , Rfl:   •  ondansetron ODT (ZOFRAN-ODT) 8 MG disintegrating tablet, , Disp: , Rfl:   •  oxyCODONE-acetaminophen (Percocet)  MG per tablet, Take 1 tablet by mouth Every 12 (Twelve) Hours., Disp: , Rfl:   •  oxyCODONE-acetaminophen (PERCOCET)  MG per tablet, Take 1 tablet by mouth Daily As Needed for Severe Pain ., Disp: 30 tablet, Rfl: 0  •  PANZYGA 10 GM/100ML solution infusion, , Disp: , Rfl:   •  Panzyga 20 GM/200ML solution infusion, , Disp: , Rfl:   •  Polyethylene Glycol 3350 (MIRALAX PO), MIRALAX POWD, Disp: , Rfl:   •  pregabalin (LYRICA) 100 MG capsule, TAKE 1 CAPSULE BY MOUTH THREE TIMES A DAY, Disp: 90 capsule, Rfl: 2  •  raNITIdine (ZANTAC) 150 MG tablet, Take 1 tablet by mouth., Disp: , Rfl:   •  SENNOSIDES-DOCUSATE SODIUM PO, SM SENNA-S TABS, Disp: , Rfl:   •  sodium chloride 0.9 % solution, , Disp: , Rfl:   •  sulfamethoxazole-trimethoprim (BACTRIM DS,SEPTRA DS) 800-160 MG per tablet, Take 1 tablet by mouth 2 (Two) Times a Day., Disp: 20 tablet, Rfl: 0  •  SUMAtriptan (IMITREX) 100 MG tablet, TAKE 1 TABLET BY MOUTH AS NEEDED FOR HEADACHE, Disp: , Rfl: 6  •  SUMAtriptan (Imitrex) 50 MG tablet, Take  by mouth., Disp: , Rfl:   •  TiZANidine (ZANAFLEX) 4 MG capsule, TAKE 1 CAPSULE BY MOUTH 3 (THREE) TIMES A DAY AS NEEDED FOR MUSCLE SPASMS., Disp: 90 capsule, Rfl: 0    Current Outpatient Medications on File Prior to Visit   Medication Sig Dispense Refill   • acetaminophen (TYLENOL) 500 MG tablet as needed     • amphetamine-dextroamphetamine (ADDERALL) 10 MG tablet Take 5 mg by mouth Daily.     • Betamethasone Valerate 0.12 % foam BETAMETHASONE VALERATE 0.12 % FOAM     • celecoxib (CeleBREX) 200 MG capsule Take 1 capsule by mouth Daily. 30 capsule 11   • chlorhexidine (PERIDEX) 0.12 % solution RINSE AND SPIT 15ML DAILY  11   • ciclopirox (PENLAC) 8 % solution APPLY TO AFFECTED AREA EVERY DAY  6   • CREON 00355 units capsule  delayed-release particles TAKE 1 CAPSULE BEFORE MEALS AND 1 CAPSULE BEFORE SNACKS  3   • DEXILANT 60 MG capsule Take 1 capsule by mouth Daily.  11   • diphenhydrAMINE (BENADRYL) 50 MG/ML injection      • escitalopram (Lexapro) 10 MG tablet Take  by mouth.     • FLUoxetine (PROzac) 40 MG capsule Take 40 mg by mouth Daily.  3   • FLUoxetine (PROzac) 40 MG capsule Take 1 capsule by mouth.     • ibuprofen (ADVIL,MOTRIN) 200 MG tablet IBUPROFEN 200 MG TABS     • lidocaine (LIDODERM) 5 % Place 3 patches on the skin as directed by provider Daily. 90 patch 11   • lidocaine (Lidoderm) 5 % as needed     • linaclotide (LINZESS) 145 MCG capsule capsule LINZESS 145 MCG CAPS     • linaclotide (Linzess) 145 MCG capsule capsule take 1 capsule by oral route  every day on an empty stomach at least 30 minutes before 1st meal of the day swallowing whole. Do not break, chew and/or open.     • loratadine (CLARITIN) 10 MG tablet Take 10 mg by mouth.     • morphine 7.5 mg/mL, Bupivacaine HCl 1 mg/mL by Intrathecal route Continuous. 20 mL 0   • mupirocin (BACTROBAN) 2 % ointment APPLY TO AFFECTED AREA OF NOSE THREE TIMES DAILY FOR 3 WEEKS     • NON FORMULARY Pancreatic enzymes     • omeprazole (priLOSEC) 40 MG capsule Take 1 capsule by mouth.     • ondansetron (ZOFRAN) 4 MG tablet Take 4 mg by mouth Every 8 (Eight) Hours.  0   • ondansetron (Zofran) 4 MG tablet Take 2 tablets by mouth Every 8 (Eight) Hours.     • ondansetron ODT (ZOFRAN-ODT) 8 MG disintegrating tablet      • oxyCODONE-acetaminophen (Percocet)  MG per tablet Take 1 tablet by mouth Every 12 (Twelve) Hours.     • oxyCODONE-acetaminophen (PERCOCET)  MG per tablet Take 1 tablet by mouth Daily As Needed for Severe Pain . 30 tablet 0   • PANZYGA 10 GM/100ML solution infusion      • Panzyga 20 GM/200ML solution infusion      • Polyethylene Glycol 3350 (MIRALAX PO) MIRALAX POWD     • pregabalin (LYRICA) 100 MG capsule TAKE 1 CAPSULE BY MOUTH THREE TIMES A DAY 90 capsule  2   • raNITIdine (ZANTAC) 150 MG tablet Take 1 tablet by mouth.     • SENNOSIDES-DOCUSATE SODIUM PO SM SENNA-S TABS     • sodium chloride 0.9 % solution      • sulfamethoxazole-trimethoprim (BACTRIM DS,SEPTRA DS) 800-160 MG per tablet Take 1 tablet by mouth 2 (Two) Times a Day. 20 tablet 0   • SUMAtriptan (IMITREX) 100 MG tablet TAKE 1 TABLET BY MOUTH AS NEEDED FOR HEADACHE  6   • SUMAtriptan (Imitrex) 50 MG tablet Take  by mouth.     • TiZANidine (ZANAFLEX) 4 MG capsule TAKE 1 CAPSULE BY MOUTH 3 (THREE) TIMES A DAY AS NEEDED FOR MUSCLE SPASMS. 90 capsule 0     No current facility-administered medications on file prior to visit.       Patient Active Problem List   Diagnosis   • Chronic pain syndrome   • Neuropathic pain   • Complex regional pain syndrome type 1 of left upper extremity   • Presence of intrathecal pump   • Spinal cord stimulator status   • Cervical post-laminectomy syndrome   • Lumbar post-laminectomy syndrome   • Bilateral occipital neuralgia   • Cervical spondylosis without myelopathy   • Encounter for long-term opiate analgesic use   • Migraine without status migrainosus, not intractable   • Arthritis of right knee   • Chronic pain of right knee   • Chronic fatigue   • Screening for endocrine, nutritional, metabolic and immunity disorder   • Vitamin D deficiency   • Anemia, unspecified       Past Medical History:   Diagnosis Date   • Depression    • Joint pain    • Low back pain    • Migraine    • Neck pain    • Osteoarthritis    • Peripheral neuropathy        Past Surgical History:   Procedure Laterality Date   • BACK SURGERY     • CHOLECYSTECTOMY     • EPIDURAL BLOCK     • HYSTERECTOMY     • IMPLANTATION / REPLACEMENT INFUSION PUMP     • KNEE ARTHROSCOPY Right    • NECK SURGERY     • PANCREAS SURGERY     • SPINAL CORD STIMULATOR IMPLANT     • TRIGGER POINT INJECTION         History reviewed. No pertinent family history.    Social History     Socioeconomic History   • Marital status:       "Spouse name: Not on file   • Number of children: Not on file   • Years of education: Not on file   • Highest education level: Not on file   Tobacco Use   • Smoking status: Never Smoker   • Smokeless tobacco: Never Used   Substance and Sexual Activity   • Alcohol use: No   • Drug use: No       -------        Review of Systems   Constitutional: Positive for fatigue. Negative for activity change, chills and fever.   HENT: Negative for congestion.    Eyes: Negative for visual disturbance.   Respiratory: Negative for chest tightness and shortness of breath.    Cardiovascular: Positive for chest pain.   Gastrointestinal: Positive for abdominal pain, constipation and diarrhea.   Genitourinary: Negative for difficulty urinating, dyspareunia and dysuria.   Musculoskeletal: Positive for back pain and neck pain.   Neurological: Positive for dizziness, weakness, light-headedness, numbness and headaches.   Psychiatric/Behavioral: Positive for sleep disturbance. Negative for agitation. The patient is not nervous/anxious.      E-nam report is reviewed:  I reviewed the document in the electronic form under the PDMP tab in the Epic EMR...  - In this function, the report is a current report in as close to real-time as possible.  - The report was available for immediate review.    - I did eric the report as reviewed.  - There is not concern for aberrant behavior based on this ekasper review.    Vitals:    03/17/21 1314   BP: 129/74   Pulse: 51   Resp: 16   Temp: 96.9 °F (36.1 °C)   SpO2: 98%   Weight: 48.8 kg (107 lb 9.6 oz)   Height: 167.6 cm (66\")   PainSc:   7   PainLoc: Abdomen         Objective   Physical Exam  VSS, NNR, NCAT, NMNA, NRD, AAOx3.  Decr neck rom throughout.  Post spuring bilat.  No arm weakness.      Assessment/Plan   Diagnoses and all orders for this visit:    1. Chronic pain syndrome (Primary)    2. Chronic pain of right knee    3. Encounter for long-term opiate analgesic use    4. Presence of intrathecal " pump    5. Spinal cord stimulator status    6. Neuropathic pain    7. Complex regional pain syndrome type 1 of left upper extremity    8. Cervical post-laminectomy syndrome    9. Lumbar post-laminectomy syndrome    10. Migraine without status migrainosus, not intractable, unspecified migraine type    11. Cervicogenic headache        --- Follow-up for intrathecal pump refill and reprogramming at the next interval which per the report is July 11    -- If migraines continue to be an issue we could add on an ablative therapy such as Reyvow.      --Her use of the opiate is been very judicious.  Patient appears stable with current regimen. No adverse effects. Regarding continuation of opioids, there is no evidence of aberrant behavior or any red flags.  The patient continues with appropriate response to opioid therapy. ADL's remain intact by self.              SMITH REPORT  As part of the patient's treatment plan, I am prescribing controlled substances. The patient has been made aware of appropriate use of such medications, including potential risk of somnolence, limited ability to drive and/or work safely, and the potential for dependence or overdose. It has also bee made clear that these medications are for use by this patient only, without concomitant use of alcohol or other substances unless prescribed.     Patient has completed prescribing agreement detailing terms of continued prescribing of controlled substances, including monitoring SMITH reports, urine drug screening, and pill counts if necessary. The patient is aware that inappropriate use will results in cessation of prescribing such medications.    SMITH report has been reviewed and scanned into the patient's chart.    As the clinician, I personally reviewed the SMITH from as above while the patient was in the office today.    History and physical exam exhibit continued safe and appropriate use of controlled substances.        EMR Dragon/Transcription  disclaimer:   Much of this encounter note is an electronic transcription/translation of spoken language to printed text. The electronic translation of spoken language may permit erroneous, or at times, nonsensical words or phrases to be inadvertently transcribed; Although I have reviewed the note for such errors, some may still exist.      Vitals:    03/17/21 1314   PainSc:   7   PainLoc: Abdomen          Amalia Lopez reports a pain score of 7.  Given her pain assessment as noted, treatment options were discussed and the following options were decided upon as a follow-up plan to address the patient's pain: continuation of current treatment plan for pain.

## 2021-04-09 ENCOUNTER — TREATMENT (OUTPATIENT)
Dept: PHYSICAL THERAPY | Facility: CLINIC | Age: 66
End: 2021-04-09

## 2021-04-09 DIAGNOSIS — G43.011 INTRACTABLE MIGRAINE WITHOUT AURA AND WITH STATUS MIGRAINOSUS: ICD-10-CM

## 2021-04-09 DIAGNOSIS — M54.2 PAIN, NECK: ICD-10-CM

## 2021-04-09 DIAGNOSIS — M79.18 MYALGIA, OTHER SITE: Primary | ICD-10-CM

## 2021-04-09 PROCEDURE — 97164 PT RE-EVAL EST PLAN CARE: CPT | Performed by: PHYSICAL THERAPIST

## 2021-04-09 PROCEDURE — DRYNDL PR CUSTOM DRY NEEDLING SELF PAY: Performed by: PHYSICAL THERAPIST

## 2021-04-12 NOTE — PROGRESS NOTES
Physical Therapy Reassessment/Progress Note    Patient: Amalia Lopez   : 1955  Diagnosis/ICD-10 Code:  Myalgia, other site [M79.18], Intractable migraine without aura and with status migrainosus [G43.011], Pain, neck [M54.2]  Referring practitioner: Jordin Strauss MD    Subjective   Pt reports she has experienced neck pain with migraines occurring for years now. She reports that she wakes up with migraines and they will sometimes diminish slightly throughout the day, but most of the time they stay pretty consistent in intensity.  Her migraines primarily occur on the L side of her head, but will sometimes refer into her L anterior forehead. She is on meds for the migraines and they help a little bit, but they don't completely take away the pain. Pt also reports having CRPS in LUE with most pain occurring in L medial forearm. She also reported history of L ulnar nerve surgery and still has pain in her L elbow and into her medial hand. Has had PT/dry needling in the last few months here for PT.  Feels that recently she has had another flare-up of worsening migraines and is returning.  Is getting 3-4 per week. Today in clinic reports they have reduced this week. Wanting to come to PT now for dry needling only.     Quality of life: fair    Pain  Current pain ratin  At best pain ratin  At worst pain ratin  Quality: throbbing, discomfort, dull ache, radiating, pressure and tight  Relieving factors: rest and relaxation  Aggravating factors: lifting, movement, overhead activity, prolonged positioning, sleeping, repetitive movement and outstretched reach  Progression: worsening    NDI: 52% disability        Treatments  Previous treatment: injection treatment, physical therapy and medication  Current treatment: physical therapy  Patient Goals  Patient goals for therapy: decreased pain, increased motion, increased strength, independence with ADLs/IADLs and return to sport/leisure  activities        Objective      Palpation   Left   Tenderness of the sternocleidomastoid and upper trapezius.   Trigger point to sternocleidomastoid and upper trapezius.      Right Tenderness of the upper trapezius.   Trigger point to upper trapezius.      Tenderness     Additional Tenderness Details  L C2 TP  L C4/5 TP  C7  T1/2 hypomobility (mild) w/tenderness in supine PA  Anterior scalenes on L side.   L temporalis m.   L SCM m.     Neurological Testing - deferred today      Active Range of Motion   Cervical/Thoracic Spine   Cervical     Flexion: 38 degrees   Extension: 25 degrees   Left lateral flexion: 45 degrees   Right lateral flexion: 25 degrees   Left rotation: 40 degrees   Right rotation: 42 degrees      Passive Range of Motion     Additional Passive Range of Motion Details  R rotation restricted w/pain   AA rotation slight restriction with pain bilaterally   OA restriction L>R     Strength/Myotome Testing     Additional Strength Details  Not tested due to pt reporting CRPS and sensitivity in LUE especially in medial forearm.      Assessment & Plan      Assessment  Impairments: abnormal or restricted ROM, activity intolerance, impaired physical strength, lacks appropriate home exercise program and pain with function  Assessment details: Pt is a 64 year old female reporting to the clinic with bilateral neck pain with migraines, primarily occurring on the L side, occurring at the same time. Palpation to c-spine and t-spine showed tenderness throughout bilaterally with slight massage producing moderate pain relief. She had restricted AROM in all cervical motions as well as restricted cervical PROM and AA PROM, all accompanied by pain. Cervical distraction was able to reduce pain intensity and dry needling to c-spine and UT muscles was initiated to reduce pain and sensitivity in neck and upper thoracic region. She will benefit from skilled therapy to address impairments, increase activity tolerance, and  improve quality of life.   Prognosis: fair  Functional Limitations: carrying objects, lifting, sleeping, walking, pulling, pushing, uncomfortable because of pain, reaching overhead and unable to perform repetitive tasks     Goals  Plan Goals: STG - 3 weeks:  1. Pt to be compliant and independent with HEP.- met  2. Pt to report pain at worse with migraine 7/10 or less to improve activity tolerance. - progressing   3. Pt to report migraine frequency to 1-2 at most per week. - progressing     LTG - 6 weeks:   1. Pt to achieve NDI score of 45% or less to show improvements in cervical function. - not met  2. Pt be able to walk for 20 minutes without migraine rating 3/10 or higher to increase ability to exercise and improve quality of life. - met  3. Pt be able to achieve 40 degrees of cervical SBing motion bilaterally, and 35 degrees of extension AROM to decrease stiffness with activity and improve ability to recreate comfortably.  - partially met for SBing      Plan  Therapy options: will be seen for skilled physical therapy services  Planned interventions: Dry needling only   Frequency: 1-2x week  Duration in visits: 10 visits   Treatment plan discussed with: patient      Timed:         Manual Therapy:         mins  28189;     Therapeutic Exercise:         mins  91368;     Neuromuscular Patel:        mins  92564;    Therapeutic Activity:          mins  96716;     Gait Training:           mins  60489;     Ultrasound:          mins  98685;    Ionto                                   mins   30032  Self Care                            mins   67766        Un-Timed:  Electrical Stimulation:         mins  27232 ( );  Dry Needling    15      mins self-pay  Traction          mins 10244  Low Eval          Mins  93417  Mod Eval         Mins  10135  High Eval                            Mins  15220  Re-Eval                          10     mins  73594        Timed Treatment:   0   mins   Total Treatment:     25   mins  PT  SIGNATURE: Patricia Sheikh, PT, DPT    DATE TREATMENT INITIATED: 04/09/2021     PHYSICIAN: Jordin Strauss MD      DATE:     Please sign and return via fax to [phone].. Thank you, [facility].

## 2021-04-20 ENCOUNTER — TREATMENT (OUTPATIENT)
Dept: PHYSICAL THERAPY | Facility: CLINIC | Age: 66
End: 2021-04-20

## 2021-04-20 DIAGNOSIS — M54.2 PAIN, NECK: ICD-10-CM

## 2021-04-20 DIAGNOSIS — G43.011 INTRACTABLE MIGRAINE WITHOUT AURA AND WITH STATUS MIGRAINOSUS: ICD-10-CM

## 2021-04-20 DIAGNOSIS — M79.18 MYALGIA, OTHER SITE: Primary | ICD-10-CM

## 2021-04-20 PROCEDURE — DRYNDL PR CUSTOM DRY NEEDLING SELF PAY: Performed by: PHYSICAL THERAPIST

## 2021-04-20 NOTE — PROGRESS NOTES
Physical Therapy Daily Progress Note    VISIT#: 8    Subjective   Amalia Lopez reports she has not had any headaches except for 1 yesterday since she has seen me last.  She feels that it helps.     Pain Rating (0/10): 2    Objective     See Exercise, Manual, and Modality Logs for complete treatment.     Assessment/Plan  Good response to needling.  Still some restrictions in SO region with needling.      Plan  Progress per Plan of Care            Timed:         Manual Therapy:         mins  78710;     Therapeutic Exercise:         mins  03382;     Neuromuscular Patel:        mins  24299;    Therapeutic Activity:          mins  57534;     Gait Training:           mins  92670;     Ultrasound:          mins  71927;    Ionto                                   mins   63922  Self Care                            mins   83766    Un-Timed:  Electrical Stimulation:         mins  64390 ( );  Dry Needling     20     mins self-pay  Traction          mins 33038  Low Eval          Mins  67080  Mod Eval          Mins  19091  High Eval                            Mins  81376  Re-Eval                               mins  89858    Timed Treatment:  20    mins   Total Treatment:     20   mins    Patricia Sheikh, PT, DPT  Physical Therapist

## 2021-04-28 ENCOUNTER — TREATMENT (OUTPATIENT)
Dept: PHYSICAL THERAPY | Facility: CLINIC | Age: 66
End: 2021-04-28

## 2021-04-28 DIAGNOSIS — G43.011 INTRACTABLE MIGRAINE WITHOUT AURA AND WITH STATUS MIGRAINOSUS: ICD-10-CM

## 2021-04-28 DIAGNOSIS — M54.2 PAIN, NECK: ICD-10-CM

## 2021-04-28 DIAGNOSIS — M79.18 MYALGIA, OTHER SITE: Primary | ICD-10-CM

## 2021-04-28 PROCEDURE — DRYNDL PR CUSTOM DRY NEEDLING SELF PAY: Performed by: PHYSICAL THERAPIST

## 2021-04-28 NOTE — PROGRESS NOTES
Physical Therapy Daily Progress Note    VISIT#: 9    Subjective   Amalia Lopez reports she has had 2 migraines since her last visit.  She is doing ok today.      Pain Rating (0/10): 4    Objective     See Exercise, Manual, and Modality Logs for complete treatment.     Assessment/Plan  Patient with no adverse reactions to DN today.  Pt requested to focus on L side only, and she did well with treatment.     Plan  Progress per Plan of Care            Timed:         Manual Therapy:         mins  52888;     Therapeutic Exercise:         mins  45770;     Neuromuscular Patel:        mins  66699;    Therapeutic Activity:          mins  01837;     Gait Training:           mins  94694;     Ultrasound:          mins  86969;    Ionto                                   mins   53974  Self Care                            mins   82675    Un-Timed:  Electrical Stimulation:         mins  21508 ( );  Dry Needling     24     mins self-pay  Traction          mins 88264  Low Eval          Mins  33354  Mod Eval          Mins  09086  High Eval                            Mins  03566  Re-Eval                               mins  01836    Timed Treatment:   24   mins   Total Treatment:     24   mins    Patricia Sheikh, PT, DPT  Physical Therapist

## 2021-05-05 ENCOUNTER — TREATMENT (OUTPATIENT)
Dept: PHYSICAL THERAPY | Facility: CLINIC | Age: 66
End: 2021-05-05

## 2021-05-05 DIAGNOSIS — M54.2 PAIN, NECK: ICD-10-CM

## 2021-05-05 DIAGNOSIS — G43.011 INTRACTABLE MIGRAINE WITHOUT AURA AND WITH STATUS MIGRAINOSUS: ICD-10-CM

## 2021-05-05 DIAGNOSIS — M79.18 MYALGIA, OTHER SITE: Primary | ICD-10-CM

## 2021-05-05 PROCEDURE — DRYNDL PR CUSTOM DRY NEEDLING SELF PAY: Performed by: PHYSICAL THERAPIST

## 2021-05-05 NOTE — PROGRESS NOTES
Physical Therapy Daily Progress Note    VISIT#: 10    Subjective   Amalia Lopez reports she has not had a migraine since she was here last visit.     Pain Rating (0/10): 3    Objective     See Exercise, Manual, and Modality Logs for complete treatment.     Assessment/Plan  Patient with good response to needling.  Only L side still performed.     Plan  Pt to come in 2 weeks for f/u.             Timed:         Manual Therapy:         mins  49258;     Therapeutic Exercise:         mins  22456;     Neuromuscular Patel:        mins  46752;    Therapeutic Activity:          mins  63209;     Gait Training:           mins  29967;     Ultrasound:          mins  80174;    Ionto                                   mins   79239  Self Care                            mins   34963    Un-Timed:  Electrical Stimulation:         mins  32548 ( );  Dry Needling     24     mins self-pay  Traction          mins 50915  Low Eval          Mins  39999  Mod Eval          Mins  42312  High Eval                            Mins  96324  Re-Eval                               mins  42938    Timed Treatment:  24    mins   Total Treatment:     24   mins    Patricia Sheikh, PT, DPT  Physical Therapist

## 2021-05-07 DIAGNOSIS — M79.18 CERVICAL MYOFASCIAL PAIN SYNDROME: ICD-10-CM

## 2021-05-07 RX ORDER — OXYCODONE AND ACETAMINOPHEN 10; 325 MG/1; MG/1
1 TABLET ORAL DAILY PRN
Qty: 30 TABLET | Refills: 0 | Status: SHIPPED | OUTPATIENT
Start: 2021-05-07 | End: 2021-07-01 | Stop reason: SDUPTHER

## 2021-05-07 NOTE — TELEPHONE ENCOUNTER
Caller: DEANDRE FRIEDMAN    Relationship:SELF     Best call back number: [phone]    Medication needed: PERCOCET:  MG- 1 TAB DAILY  Requested Prescriptions      No prescriptions requested or ordered in this encounter       When do you need the refill by: ASAP    What additional details did the patient provide when requesting the medication: PATIENT HAS 2 TABLETS LEFT    Does the patient have less than a 3 day supply:  [x] Yes  [] No    What is the patient's preferred pharmacy: [facility]: [address]: [address]. [phone]

## 2021-05-07 NOTE — TELEPHONE ENCOUNTER
Medication Refill Request    Date of phone call: 21    Medication being requested: percocet 10/325mg si tab po daily prn   Qty: 30    Date of last visit: 3/17/21    Date of last refill:     SMITH up to date?:     Next Follow up?: 21    Any new pertinent information? (i.e, new medication allergies, new use of medications, change in patient's health or condition, non-compliance or inconsistency with prescribing agreement?):

## 2021-05-17 ENCOUNTER — HOSPITAL ENCOUNTER (OUTPATIENT)
Dept: INFUSION THERAPY | Facility: HOSPITAL | Age: 66
Discharge: HOME | End: 2021-05-17
Admitting: FAMILY MEDICINE

## 2021-05-17 VITALS — DIASTOLIC BLOOD PRESSURE: 47 MMHG | SYSTOLIC BLOOD PRESSURE: 122 MMHG | HEART RATE: 45 BPM

## 2021-05-17 DIAGNOSIS — Z13.0 SCREENING FOR ENDOCRINE, NUTRITIONAL, METABOLIC AND IMMUNITY DISORDER: Primary | ICD-10-CM

## 2021-05-17 DIAGNOSIS — Z13.29 SCREENING FOR ENDOCRINE, NUTRITIONAL, METABOLIC AND IMMUNITY DISORDER: Primary | ICD-10-CM

## 2021-05-17 DIAGNOSIS — Z13.228 SCREENING FOR ENDOCRINE, NUTRITIONAL, METABOLIC AND IMMUNITY DISORDER: Primary | ICD-10-CM

## 2021-05-17 DIAGNOSIS — E55.9 VITAMIN D DEFICIENCY: ICD-10-CM

## 2021-05-17 DIAGNOSIS — Z13.21 SCREENING FOR ENDOCRINE, NUTRITIONAL, METABOLIC AND IMMUNITY DISORDER: Primary | ICD-10-CM

## 2021-05-17 DIAGNOSIS — D64.9 ANEMIA, UNSPECIFIED TYPE: ICD-10-CM

## 2021-05-17 LAB
25(OH)D3 SERPL-MCNC: 28.7 NG/ML
ALBUMIN SERPL-MCNC: 3.8 G/DL (ref 3.5–5.2)
ALBUMIN/GLOB SERPL: 1.2 G/DL
ALP SERPL-CCNC: 58 U/L (ref 39–117)
ALT SERPL W P-5'-P-CCNC: 14 U/L (ref 1–33)
ANION GAP SERPL CALCULATED.3IONS-SCNC: 8 MMOL/L (ref 5–15)
AST SERPL-CCNC: 20 U/L (ref 1–32)
BASOPHILS # BLD AUTO: 0 10*3/MM3 (ref 0–0.2)
BASOPHILS NFR BLD AUTO: 1.2 % (ref 0–1.5)
BILIRUB SERPL-MCNC: 0.2 MG/DL (ref 0–1.2)
BUN SERPL-MCNC: 15 MG/DL (ref 8–23)
BUN/CREAT SERPL: 21.1 (ref 7–25)
CALCIUM SPEC-SCNC: 9 MG/DL (ref 8.6–10.5)
CHLORIDE SERPL-SCNC: 105 MMOL/L (ref 98–107)
CO2 SERPL-SCNC: 28 MMOL/L (ref 22–29)
CORTIS 1H P CHAL SERPL-MCNC: 27.28 MCG/DL
CORTIS 30M P ACTH SERPL-MCNC: 23.2 MCG/DL
CORTIS BS SERPL-MCNC: 7.08 MCG/DL (ref 4–22)
CREAT SERPL-MCNC: 0.71 MG/DL (ref 0.57–1)
DEPRECATED RDW RBC AUTO: 42 FL (ref 37–54)
EOSINOPHIL # BLD AUTO: 0.1 10*3/MM3 (ref 0–0.4)
EOSINOPHIL NFR BLD AUTO: 4.4 % (ref 0.3–6.2)
ERYTHROCYTE [DISTWIDTH] IN BLOOD BY AUTOMATED COUNT: 13.1 % (ref 12.3–15.4)
GFR SERPL CREATININE-BSD FRML MDRD: 83 ML/MIN/1.73
GLOBULIN UR ELPH-MCNC: 3.1 GM/DL
GLUCOSE SERPL-MCNC: 83 MG/DL (ref 65–99)
HCT VFR BLD AUTO: 34.8 % (ref 34–46.6)
HGB BLD-MCNC: 11.7 G/DL (ref 12–15.9)
IRON 24H UR-MRATE: 51 MCG/DL (ref 37–145)
IRON SATN MFR SERPL: 15 % (ref 20–50)
LYMPHOCYTES # BLD AUTO: 0.7 10*3/MM3 (ref 0.7–3.1)
LYMPHOCYTES NFR BLD AUTO: 24.2 % (ref 19.6–45.3)
MCH RBC QN AUTO: 30.8 PG (ref 26.6–33)
MCHC RBC AUTO-ENTMCNC: 33.6 G/DL (ref 31.5–35.7)
MCV RBC AUTO: 91.9 FL (ref 79–97)
MONOCYTES # BLD AUTO: 0.2 10*3/MM3 (ref 0.1–0.9)
MONOCYTES NFR BLD AUTO: 7.5 % (ref 5–12)
NEUTROPHILS NFR BLD AUTO: 1.7 10*3/MM3 (ref 1.7–7)
NEUTROPHILS NFR BLD AUTO: 62.7 % (ref 42.7–76)
NRBC BLD AUTO-RTO: 0.2 /100 WBC (ref 0–0.2)
PLATELET # BLD AUTO: 156 10*3/MM3 (ref 140–450)
PMV BLD AUTO: 9.7 FL (ref 6–12)
POTASSIUM SERPL-SCNC: 4.4 MMOL/L (ref 3.5–5.2)
PROT SERPL-MCNC: 6.9 G/DL (ref 6–8.5)
RBC # BLD AUTO: 3.79 10*6/MM3 (ref 3.77–5.28)
SODIUM SERPL-SCNC: 141 MMOL/L (ref 136–145)
TIBC SERPL-MCNC: 338 MCG/DL (ref 298–536)
TRANSFERRIN SERPL-MCNC: 227 MG/DL (ref 200–360)
WBC # BLD AUTO: 2.8 10*3/MM3 (ref 3.4–10.8)

## 2021-05-17 PROCEDURE — 80053 COMPREHEN METABOLIC PANEL: CPT | Performed by: NURSE PRACTITIONER

## 2021-05-17 PROCEDURE — 83540 ASSAY OF IRON: CPT | Performed by: NURSE PRACTITIONER

## 2021-05-17 PROCEDURE — 82533 TOTAL CORTISOL: CPT | Performed by: NURSE PRACTITIONER

## 2021-05-17 PROCEDURE — 25010000002 COSYNTROPIN PER 0.25 MG: Performed by: FAMILY MEDICINE

## 2021-05-17 PROCEDURE — G0463 HOSPITAL OUTPT CLINIC VISIT: HCPCS

## 2021-05-17 PROCEDURE — 85025 COMPLETE CBC W/AUTO DIFF WBC: CPT | Performed by: NURSE PRACTITIONER

## 2021-05-17 PROCEDURE — 82306 VITAMIN D 25 HYDROXY: CPT | Performed by: NURSE PRACTITIONER

## 2021-05-17 PROCEDURE — 36591 DRAW BLOOD OFF VENOUS DEVICE: CPT

## 2021-05-17 PROCEDURE — 84466 ASSAY OF TRANSFERRIN: CPT | Performed by: NURSE PRACTITIONER

## 2021-05-17 RX ORDER — COSYNTROPIN 0.25 MG/ML
0.25 INJECTION, POWDER, FOR SOLUTION INTRAMUSCULAR; INTRAVENOUS ONCE
Status: COMPLETED | OUTPATIENT
Start: 2021-05-17 | End: 2021-05-17

## 2021-05-17 RX ADMIN — COSYNTROPIN 0.25 MG: 0.25 INJECTION, POWDER, LYOPHILIZED, FOR SOLUTION INTRAMUSCULAR; INTRAVENOUS at 09:46

## 2021-05-18 ENCOUNTER — TREATMENT (OUTPATIENT)
Dept: PHYSICAL THERAPY | Facility: CLINIC | Age: 66
End: 2021-05-18

## 2021-05-18 DIAGNOSIS — M79.18 MYALGIA, OTHER SITE: Primary | ICD-10-CM

## 2021-05-18 DIAGNOSIS — G43.011 INTRACTABLE MIGRAINE WITHOUT AURA AND WITH STATUS MIGRAINOSUS: ICD-10-CM

## 2021-05-18 DIAGNOSIS — M54.2 PAIN, NECK: ICD-10-CM

## 2021-05-18 PROCEDURE — DRYNDL PR CUSTOM DRY NEEDLING SELF PAY: Performed by: PHYSICAL THERAPIST

## 2021-05-18 NOTE — PROGRESS NOTES
Physical Therapy Daily Progress Note    VISIT#: 11    Subjective   Amalia Lopez reports she has had to use Imitrex 3 times since she had been in PT last.  She reports she is doing ok today.  Thinks she may just wait until she has another flare-up and will call to try to get in as $30 each visit adds up.     Pain Rating (0/10): 3    Objective     See Exercise, Manual, and Modality Logs for complete treatment.     Assessment/Plan  Patient with good response to DN today. No adverse reactions/response.     Plan  Progress per Plan of Care.  Pt to be seen PRN - will plan to call in when she has a flare-up.          Timed:         Manual Therapy:         mins  43096;     Therapeutic Exercise:         mins  38821;     Neuromuscular Patel:        mins  50464;    Therapeutic Activity:          mins  14265;     Gait Training:           mins  89588;     Ultrasound:          mins  97672;    Ionto                                   mins   56429  Self Care                            mins   29549    Un-Timed:  Electrical Stimulation:         mins  95760 (MC );  Dry Needling     24     mins self-pay  Traction          mins 51287  Low Eval          Mins  07212  Mod Eval          Mins  76191  High Eval                            Mins  00807  Re-Eval                               mins  66062    Timed Treatment:   24   mins   Total Treatment:     24   mins    Patricia Sheikh, PT, DPT  Physical Therapist

## 2021-06-01 DIAGNOSIS — M79.18 CERVICAL MYOFASCIAL PAIN SYNDROME: ICD-10-CM

## 2021-06-01 RX ORDER — PREGABALIN 100 MG/1
CAPSULE ORAL
Qty: 90 CAPSULE | Refills: 2 | Status: SHIPPED | OUTPATIENT
Start: 2021-06-01 | End: 2021-09-01 | Stop reason: SDUPTHER

## 2021-06-16 RX ORDER — LIDOCAINE 50 MG/G
3 PATCH TOPICAL EVERY 24 HOURS
Qty: 90 PATCH | Refills: 11 | Status: SHIPPED | OUTPATIENT
Start: 2021-06-16 | End: 2021-09-21

## 2021-07-01 DIAGNOSIS — M79.18 CERVICAL MYOFASCIAL PAIN SYNDROME: ICD-10-CM

## 2021-07-01 RX ORDER — OXYCODONE AND ACETAMINOPHEN 10; 325 MG/1; MG/1
1 TABLET ORAL DAILY PRN
Qty: 30 TABLET | Refills: 0 | Status: SHIPPED | OUTPATIENT
Start: 2021-07-01 | End: 2021-09-08 | Stop reason: SDUPTHER

## 2021-07-01 NOTE — TELEPHONE ENCOUNTER
Caller: DEANDRE FRIEDMAN    Relationship: SELF    Best call back number: [phone]    Medication needed: OXYCODONE:- ACETAMINOPHEN:  M TAB DAILY AS NEEDED  PREGABALIN: 100 M CAP 3 TIMES A DAY  Requested Prescriptions      No prescriptions requested or ordered in this encounter       When do you need the refill by:     What additional details did the patient provide when requesting the medication:     Does the patient have less than a 3 day supply:  [x] Yes  [] No    What is the patient's preferred pharmacy:[facility]: [place], [place]- [address] [facility] [address]: [phone]

## 2021-07-07 ENCOUNTER — OFFICE VISIT (OUTPATIENT)
Dept: PAIN MEDICINE | Facility: CLINIC | Age: 66
End: 2021-07-07

## 2021-07-07 VITALS
HEIGHT: 66 IN | OXYGEN SATURATION: 100 % | RESPIRATION RATE: 16 BRPM | BODY MASS INDEX: 16.65 KG/M2 | WEIGHT: 103.6 LBS | HEART RATE: 52 BPM | SYSTOLIC BLOOD PRESSURE: 107 MMHG | TEMPERATURE: 97.7 F | DIASTOLIC BLOOD PRESSURE: 50 MMHG

## 2021-07-07 DIAGNOSIS — M96.1 LUMBAR POST-LAMINECTOMY SYNDROME: ICD-10-CM

## 2021-07-07 DIAGNOSIS — G90.512 COMPLEX REGIONAL PAIN SYNDROME TYPE 1 OF LEFT UPPER EXTREMITY: ICD-10-CM

## 2021-07-07 DIAGNOSIS — R53.82 CHRONIC FATIGUE: ICD-10-CM

## 2021-07-07 DIAGNOSIS — Z97.8 PRESENCE OF INTRATHECAL PUMP: ICD-10-CM

## 2021-07-07 DIAGNOSIS — Z79.891 ENCOUNTER FOR LONG-TERM OPIATE ANALGESIC USE: ICD-10-CM

## 2021-07-07 DIAGNOSIS — Z96.89 SPINAL CORD STIMULATOR STATUS: ICD-10-CM

## 2021-07-07 DIAGNOSIS — M96.1 CERVICAL POST-LAMINECTOMY SYNDROME: ICD-10-CM

## 2021-07-07 DIAGNOSIS — G89.4 CHRONIC PAIN SYNDROME: Primary | ICD-10-CM

## 2021-07-07 PROCEDURE — 62369 ANAL SP INF PMP W/REPRG&FILL: CPT | Performed by: ANESTHESIOLOGY

## 2021-07-07 PROCEDURE — 99214 OFFICE O/P EST MOD 30 MIN: CPT | Performed by: ANESTHESIOLOGY

## 2021-07-07 NOTE — PROGRESS NOTES
CHIEF COMPLAINT  F/U back pain- patient states that her pain has worsened since her last visit.     Subjective   Amalia Lopez is a 65 y.o. female  who presents for follow-up.  She has a history of significant chronic pain.    Intrathecal pump refill and reprogramming.  Intrathecal pump was interrogated in office today. Results are in chart.    The patient is currently at a dose of 1.15 mg of morphine per day.  This is with QS bupivacaine.  The basal dose is 0.95 mg/day with a flex of 0.2 mg at 4 AM    Under sterile technique, the pump was accessed using a [employer] proprietary refill kit. The volume was withdrawn from the pump. The expected residual volume of the pump was 2.7 ml. The actual residual volume of the pump was 2 ml.    The pump was then refilled with 18 ml of morphine with bupivacaine at a concentration of 7.5 mg morphine and 1 mg bupivacaine /ml. 2 ml of the medication was used for priming the needle, syringe and filling system. The pump was set to continue at its previous rate. No changes were made.       Patient tolerated procedure well. Minimal bleeding was noted. Pump site remains intact with no evidence of erythema or drainage.    Patient will return for pump refill when due or sooner if needed.     DASH is noted on the report.      Patient remained masked during entire encounter. No cough present. I donned a mask and eye protection throughout entire visit. Prior to donning mask and eye protection, hand hygiene was performed, as well as when it was doffed.  I was closer than 6 feet, but not for an extended period of time. No obvious exposure to any bodily fluids.    History of Present Illness     She is having more neuropathic type pain both in her legs and in her arms.  All 4 extremities.  Also increasing knee arthritis related pain but Percocet helps with that.  She is using the stimulator in addition to the pump.    She is having some episodes of bradycardia as well as hypotension and there  is concerned about vagus nerve issues that she is managing with other specialist.    PEG Assessment   What number best describes your pain on average in the past week?7  What number best describes how, during the past week, pain has interfered with your enjoyment of life?6  What number best describes how, during the past week, pain has interfered with your general activity?  7      The following portions of the patient's history were reviewed and updated as appropriate: allergies, current medications, past family history, past medical history, past social history, past surgical history and problem list.    -------    The following portions of the patient's history were reviewed and updated as appropriate: allergies, current medications, past family history, past medical history, past social history, past surgical history and problem list.    Allergies   Allergen Reactions   • Codeine Itching   • Fentanyl Other (See Comments)     Dystonic movements   • Morphine Other (See Comments)     Other reaction(s): Other (See Comments)  Headaches         Current Outpatient Medications:   •  acetaminophen (TYLENOL) 500 MG tablet, as needed, Disp: , Rfl:   •  amphetamine-dextroamphetamine (ADDERALL) 10 MG tablet, Take 5 mg by mouth Daily., Disp: , Rfl:   •  Betamethasone Valerate 0.12 % foam, BETAMETHASONE VALERATE 0.12 % FOAM, Disp: , Rfl:   •  celecoxib (CeleBREX) 200 MG capsule, Take 1 capsule by mouth Daily., Disp: 30 capsule, Rfl: 11  •  chlorhexidine (PERIDEX) 0.12 % solution, RINSE AND SPIT 15ML DAILY, Disp: , Rfl: 11  •  ciclopirox (PENLAC) 8 % solution, APPLY TO AFFECTED AREA EVERY DAY, Disp: , Rfl: 6  •  CREON 85939 units capsule delayed-release particles, TAKE 1 CAPSULE BEFORE MEALS AND 1 CAPSULE BEFORE SNACKS, Disp: , Rfl: 3  •  DEXILANT 60 MG capsule, Take 1 capsule by mouth Daily., Disp: , Rfl: 11  •  diphenhydrAMINE (BENADRYL) 50 MG/ML injection, , Disp: , Rfl:   •  escitalopram (Lexapro) 10 MG tablet, Take  by  mouth., Disp: , Rfl:   •  FLUoxetine (PROzac) 40 MG capsule, Take 40 mg by mouth Daily., Disp: , Rfl: 3  •  FLUoxetine (PROzac) 40 MG capsule, Take 1 capsule by mouth., Disp: , Rfl:   •  ibuprofen (ADVIL,MOTRIN) 200 MG tablet, IBUPROFEN 200 MG TABS, Disp: , Rfl:   •  lidocaine (Lidoderm) 5 %, as needed, Disp: , Rfl:   •  lidocaine (LIDODERM) 5 %, PLACE 3 PATCHES ON THE SKIN AS DIRECTED BY PROVIDER DAILY., Disp: 90 patch, Rfl: 11  •  linaclotide (LINZESS) 145 MCG capsule capsule, LINZESS 145 MCG CAPS, Disp: , Rfl:   •  linaclotide (Linzess) 145 MCG capsule capsule, take 1 capsule by oral route  every day on an empty stomach at least 30 minutes before 1st meal of the day swallowing whole. Do not break, chew and/or open., Disp: , Rfl:   •  loratadine (CLARITIN) 10 MG tablet, Take 10 mg by mouth., Disp: , Rfl:   •  morphine 7.5 mg/mL, Bupivacaine HCl 1 mg/mL, by Intrathecal route Continuous., Disp: 20 mL, Rfl: 0  •  mupirocin (BACTROBAN) 2 % ointment, APPLY TO AFFECTED AREA OF NOSE THREE TIMES DAILY FOR 3 WEEKS, Disp: , Rfl:   •  NON FORMULARY, Pancreatic enzymes, Disp: , Rfl:   •  omeprazole (priLOSEC) 40 MG capsule, Take 1 capsule by mouth., Disp: , Rfl:   •  ondansetron (ZOFRAN) 4 MG tablet, Take 4 mg by mouth Every 8 (Eight) Hours., Disp: , Rfl: 0  •  ondansetron (Zofran) 4 MG tablet, Take 2 tablets by mouth Every 8 (Eight) Hours., Disp: , Rfl:   •  ondansetron ODT (ZOFRAN-ODT) 8 MG disintegrating tablet, , Disp: , Rfl:   •  oxyCODONE-acetaminophen (PERCOCET)  MG per tablet, Take 1 tablet by mouth Daily As Needed for Severe Pain ., Disp: 30 tablet, Rfl: 0  •  PANZYGA 10 GM/100ML solution infusion, , Disp: , Rfl:   •  Panzyga 20 GM/200ML solution infusion, , Disp: , Rfl:   •  Polyethylene Glycol 3350 (MIRALAX PO), MIRALAX POWD, Disp: , Rfl:   •  pregabalin (LYRICA) 100 MG capsule, TAKE 1 CAPSULE BY MOUTH THREE TIMES A DAY, Disp: 90 capsule, Rfl: 2  •  raNITIdine (ZANTAC) 150 MG tablet, Take 1 tablet by mouth.,  Disp: , Rfl:   •  SENNOSIDES-DOCUSATE SODIUM PO, SM SENNA-S TABS, Disp: , Rfl:   •  sodium chloride 0.9 % solution, , Disp: , Rfl:   •  sulfamethoxazole-trimethoprim (BACTRIM DS,SEPTRA DS) 800-160 MG per tablet, Take 1 tablet by mouth 2 (Two) Times a Day., Disp: 20 tablet, Rfl: 0  •  SUMAtriptan (IMITREX) 100 MG tablet, TAKE 1 TABLET BY MOUTH AS NEEDED FOR HEADACHE, Disp: , Rfl: 6  •  SUMAtriptan (Imitrex) 50 MG tablet, Take  by mouth., Disp: , Rfl:   •  TiZANidine (ZANAFLEX) 4 MG capsule, TAKE 1 CAPSULE BY MOUTH 3 (THREE) TIMES A DAY AS NEEDED FOR MUSCLE SPASMS., Disp: 90 capsule, Rfl: 0    Current Outpatient Medications on File Prior to Visit   Medication Sig Dispense Refill   • acetaminophen (TYLENOL) 500 MG tablet as needed     • amphetamine-dextroamphetamine (ADDERALL) 10 MG tablet Take 5 mg by mouth Daily.     • Betamethasone Valerate 0.12 % foam BETAMETHASONE VALERATE 0.12 % FOAM     • celecoxib (CeleBREX) 200 MG capsule Take 1 capsule by mouth Daily. 30 capsule 11   • chlorhexidine (PERIDEX) 0.12 % solution RINSE AND SPIT 15ML DAILY  11   • ciclopirox (PENLAC) 8 % solution APPLY TO AFFECTED AREA EVERY DAY  6   • CREON 16143 units capsule delayed-release particles TAKE 1 CAPSULE BEFORE MEALS AND 1 CAPSULE BEFORE SNACKS  3   • DEXILANT 60 MG capsule Take 1 capsule by mouth Daily.  11   • diphenhydrAMINE (BENADRYL) 50 MG/ML injection      • escitalopram (Lexapro) 10 MG tablet Take  by mouth.     • FLUoxetine (PROzac) 40 MG capsule Take 40 mg by mouth Daily.  3   • FLUoxetine (PROzac) 40 MG capsule Take 1 capsule by mouth.     • ibuprofen (ADVIL,MOTRIN) 200 MG tablet IBUPROFEN 200 MG TABS     • lidocaine (Lidoderm) 5 % as needed     • lidocaine (LIDODERM) 5 % PLACE 3 PATCHES ON THE SKIN AS DIRECTED BY PROVIDER DAILY. 90 patch 11   • linaclotide (LINZESS) 145 MCG capsule capsule LINZESS 145 MCG CAPS     • linaclotide (Linzess) 145 MCG capsule capsule take 1 capsule by oral route  every day on an empty stomach at  least 30 minutes before 1st meal of the day swallowing whole. Do not break, chew and/or open.     • loratadine (CLARITIN) 10 MG tablet Take 10 mg by mouth.     • morphine 7.5 mg/mL, Bupivacaine HCl 1 mg/mL by Intrathecal route Continuous. 20 mL 0   • mupirocin (BACTROBAN) 2 % ointment APPLY TO AFFECTED AREA OF NOSE THREE TIMES DAILY FOR 3 WEEKS     • NON FORMULARY Pancreatic enzymes     • omeprazole (priLOSEC) 40 MG capsule Take 1 capsule by mouth.     • ondansetron (ZOFRAN) 4 MG tablet Take 4 mg by mouth Every 8 (Eight) Hours.  0   • ondansetron (Zofran) 4 MG tablet Take 2 tablets by mouth Every 8 (Eight) Hours.     • ondansetron ODT (ZOFRAN-ODT) 8 MG disintegrating tablet      • oxyCODONE-acetaminophen (PERCOCET)  MG per tablet Take 1 tablet by mouth Daily As Needed for Severe Pain . 30 tablet 0   • PANZYGA 10 GM/100ML solution infusion      • Panzyga 20 GM/200ML solution infusion      • Polyethylene Glycol 3350 (MIRALAX PO) MIRALAX POWD     • pregabalin (LYRICA) 100 MG capsule TAKE 1 CAPSULE BY MOUTH THREE TIMES A DAY 90 capsule 2   • raNITIdine (ZANTAC) 150 MG tablet Take 1 tablet by mouth.     • SENNOSIDES-DOCUSATE SODIUM PO SM SENNA-S TABS     • sodium chloride 0.9 % solution      • sulfamethoxazole-trimethoprim (BACTRIM DS,SEPTRA DS) 800-160 MG per tablet Take 1 tablet by mouth 2 (Two) Times a Day. 20 tablet 0   • SUMAtriptan (IMITREX) 100 MG tablet TAKE 1 TABLET BY MOUTH AS NEEDED FOR HEADACHE  6   • SUMAtriptan (Imitrex) 50 MG tablet Take  by mouth.     • TiZANidine (ZANAFLEX) 4 MG capsule TAKE 1 CAPSULE BY MOUTH 3 (THREE) TIMES A DAY AS NEEDED FOR MUSCLE SPASMS. 90 capsule 0     No current facility-administered medications on file prior to visit.       Patient Active Problem List   Diagnosis   • Chronic pain syndrome   • Neuropathic pain   • Complex regional pain syndrome type 1 of left upper extremity   • Presence of intrathecal pump   • Spinal cord stimulator status   • Cervical post-laminectomy  syndrome   • Lumbar post-laminectomy syndrome   • Bilateral occipital neuralgia   • Cervical spondylosis without myelopathy   • Encounter for long-term opiate analgesic use   • Migraine without status migrainosus, not intractable   • Arthritis of right knee   • Chronic pain of right knee   • Chronic fatigue   • Screening for endocrine, nutritional, metabolic and immunity disorder   • Vitamin D deficiency   • Anemia, unspecified       Past Medical History:   Diagnosis Date   • Biliary stenosis    • Depression    • Gastroparesis    • GERD (gastroesophageal reflux disease)    • History of ERCP    • IBS (irritable bowel syndrome)    • Joint pain    • Low back pain    • Migraine    • Neck pain    • Osteoarthritis    • Peripheral neuropathy    • Sleep apnea    • Spinal cord stimulator status        Past Surgical History:   Procedure Laterality Date   • BACK SURGERY     • CHOLECYSTECTOMY     • EPIDURAL BLOCK     • HYSTERECTOMY     • IMPLANTATION / REPLACEMENT INFUSION PUMP     • KNEE ARTHROSCOPY Right    • NECK SURGERY     • PANCREAS SURGERY     • SPINAL CORD STIMULATOR IMPLANT     • TRIGGER POINT INJECTION         History reviewed. No pertinent family history.    Social History     Socioeconomic History   • Marital status:      Spouse name: Not on file   • Number of children: Not on file   • Years of education: Not on file   • Highest education level: Not on file   Tobacco Use   • Smoking status: Never Smoker   • Smokeless tobacco: Never Used   Substance and Sexual Activity   • Alcohol use: No   • Drug use: No       -------        Review of Systems   Constitutional: Positive for fatigue. Negative for activity change, chills and fever.   HENT: Negative for congestion.    Eyes: Negative for visual disturbance.   Respiratory: Positive for chest tightness and shortness of breath.    Cardiovascular: Positive for chest pain.   Gastrointestinal: Positive for abdominal pain, constipation and diarrhea.   Genitourinary:  "Negative for difficulty urinating and dyspareunia.   Musculoskeletal: Positive for back pain and neck pain.   Neurological: Positive for dizziness, weakness, light-headedness and headaches. Negative for numbness.   Psychiatric/Behavioral: Positive for sleep disturbance. Negative for agitation. The patient is not nervous/anxious.      E-nam report is reviewed:  I reviewed the document in the electronic form under the PDMP tab in the Epic EMR...  - In this function, the report is a current report in as close to real-time as possible.  - The report was available for immediate review.  However it did not automatically run the [place] profile and that is being requested.  - I did eric the report as reviewed.  - There is not pertinent data on the last page of the report. There is not concern for aberrant behavior based on this ekasper review.    Review of some recent labs include in May 17 iron profile which had a low iron saturation at 15, metabolic panel had a BUN of 15 and creatinine of 0.71.  CBC included a hemoglobin that was slightly anemic at 11.7.  Vitamin D was 28.7.    I reviewed note from Dr. Jackson electro cardiologist, and she does not evidence of AV block.  This was from June 28 of this year.  Bradycardia was being related therefore to severe gastroparesis and a vagal mediated bradycardia.    Vitals:    07/07/21 1259   BP: 107/50   Pulse: 52   Resp: 16   Temp: 97.7 °F (36.5 °C)   SpO2: 100%   Weight: 47 kg (103 lb 9.6 oz)   Height: 167.6 cm (66\")   PainSc:   6   PainLoc: Back         Objective   Physical Exam  Vitals and nursing note reviewed.   Constitutional:       General: She is not in acute distress.     Appearance: Normal appearance. She is well-developed. She is not toxic-appearing.   HENT:      Head: Normocephalic and atraumatic.      Right Ear: Hearing and external ear normal.      Left Ear: Hearing and external ear normal.      Nose: Nose normal.   Eyes:      General: Lids are normal.      " Conjunctiva/sclera: Conjunctivae normal.      Pupils: Pupils are equal, round, and reactive to light.   Pulmonary:      Effort: Pulmonary effort is normal. No respiratory distress.   Neurological:      Mental Status: She is alert and oriented to person, place, and time.      Cranial Nerves: No cranial nerve deficit.   Psychiatric:         Behavior: Behavior normal.             Assessment/Plan   Diagnoses and all orders for this visit:    1. Chronic pain syndrome (Primary)    2. Complex regional pain syndrome type 1 of left upper extremity    3. Presence of intrathecal pump    4. Spinal cord stimulator status    5. Cervical post-laminectomy syndrome    6. Lumbar post-laminectomy syndrome    7. Encounter for long-term opiate analgesic use    So anticipated change in the intrathecal infusion therapy for this worsening problem is anticipated at the next visit and medical decision making today to perform that function when we get the new medication.  We did refill and reprogram her pump today with the currently available medication.  We are continuing Percocet for the arthritis pain.    --- Follow-up soon for a pump refill.  -- no change to the pump program today..... we will change when new med arrives, will order  -- plan to change to Morphine 6mg / ml, and change daily dose to 1.50mg per day (may adjust base and flex)       Patient appears stable with current regimen. No adverse effects. Regarding continuation of opioids, there is no evidence of aberrant behavior or any red flags.  The patient continues with appropriate response to opioid therapy. ADL's remain intact by self.       SMITH REPORT  As part of the patient's treatment plan, I am prescribing controlled substances. The patient has been made aware of appropriate use of such medications, including potential risk of somnolence, limited ability to drive and/or work safely, and the potential for dependence or overdose. It has also bee made clear that these  medications are for use by this patient only, without concomitant use of alcohol or other substances unless prescribed.     Patient has completed prescribing agreement detailing terms of continued prescribing of controlled substances, including monitoring SMITH reports, urine drug screening, and pill counts if necessary. The patient is aware that inappropriate use will results in cessation of prescribing such medications.    As the clinician, I personally reviewed the SMITH from today as above while the patient was in the office today.    History and physical exam exhibit continued safe and appropriate use of controlled substances.        EMR Dragon/Transcription disclaimer:   Much of this encounter note is an electronic transcription/translation of spoken language to printed text. The electronic translation of spoken language may permit erroneous, or at times, nonsensical words or phrases to be inadvertently transcribed; Although I have reviewed the note for such errors, some may still exist.      --    Vitals:    07/07/21 1259   PainSc:   6   PainLoc: Back          Amalia Lopez reports a pain score of 6.  Given her pain assessment as noted, treatment options were discussed and the following options were decided upon as a follow-up plan to address the patient's pain: continuation of current treatment plan for pain and prescription for opiod analgesics.

## 2021-07-22 ENCOUNTER — TELEPHONE (OUTPATIENT)
Dept: PAIN MEDICINE | Facility: CLINIC | Age: 66
End: 2021-07-22

## 2021-07-22 NOTE — TELEPHONE ENCOUNTER
HUB STAFF ATTEMPTED NON-CLINICAL WARM TRANSFER - NO ANSWER     Caller: Amalia Lopez    Relationship to patient: Self    Best call back number: [phone]     Chief complaint: RESCHEDULE NO SHOW / MISSED APPT    Type of visit: PUMP REFILL -OK PER DR. WEAVER    Requested date: ANY DAY ANY TIME EXCEPT NOT THURSDAYS    If rescheduling, when is the original appointment: WAS 07/21/21     Additional notes: PATIENT DECLINED BOTH AUTOMATED TEXT MESSAGE REMINDERS & AUTOMATED TEL CALL REMINDERS     Best call back number: [phone]     THANKS

## 2021-07-28 ENCOUNTER — OFFICE VISIT (OUTPATIENT)
Dept: PAIN MEDICINE | Facility: CLINIC | Age: 66
End: 2021-07-28

## 2021-07-28 VITALS
HEART RATE: 48 BPM | DIASTOLIC BLOOD PRESSURE: 65 MMHG | SYSTOLIC BLOOD PRESSURE: 107 MMHG | OXYGEN SATURATION: 100 % | RESPIRATION RATE: 16 BRPM | BODY MASS INDEX: 16.81 KG/M2 | HEIGHT: 66 IN | TEMPERATURE: 97.3 F | WEIGHT: 104.6 LBS

## 2021-07-28 DIAGNOSIS — Z96.89 SPINAL CORD STIMULATOR STATUS: ICD-10-CM

## 2021-07-28 DIAGNOSIS — M25.561 CHRONIC PAIN OF RIGHT KNEE: ICD-10-CM

## 2021-07-28 DIAGNOSIS — M17.11 ARTHRITIS OF RIGHT KNEE: ICD-10-CM

## 2021-07-28 DIAGNOSIS — G89.29 CHRONIC PAIN OF RIGHT KNEE: ICD-10-CM

## 2021-07-28 DIAGNOSIS — M96.1 LUMBAR POST-LAMINECTOMY SYNDROME: ICD-10-CM

## 2021-07-28 DIAGNOSIS — Z97.8 PRESENCE OF INTRATHECAL PUMP: ICD-10-CM

## 2021-07-28 DIAGNOSIS — M96.1 CERVICAL POST-LAMINECTOMY SYNDROME: ICD-10-CM

## 2021-07-28 DIAGNOSIS — R69 UNDIAGNOSED DISEASE OR SYNDROME PRESENT: Primary | ICD-10-CM

## 2021-07-28 PROCEDURE — 62369 ANAL SP INF PMP W/REPRG&FILL: CPT | Performed by: ANESTHESIOLOGY

## 2021-07-28 PROCEDURE — 20610 DRAIN/INJ JOINT/BURSA W/O US: CPT | Performed by: ANESTHESIOLOGY

## 2021-07-28 PROCEDURE — 99214 OFFICE O/P EST MOD 30 MIN: CPT | Performed by: ANESTHESIOLOGY

## 2021-07-28 RX ORDER — MIDODRINE HYDROCHLORIDE 5 MG/1
5 TABLET ORAL
COMMUNITY
End: 2021-09-21

## 2021-07-28 NOTE — PROGRESS NOTES
CHIEF COMPLAINT  F/U back pain- patient states that she is having increased pain in her right knee.     Subjective   Amalia Lopez is a 65 y.o. female  who presents for follow-up.  She has a history of several different pains including neck pain and back pain also history of complex regional pain syndrome and also overlay of arthritis.  She is complaining of some significant right knee pain flareup.    There is this history of a question of an autonomic neuropathy and she is asking if she might be able to see for opinion about management options.  While I am unsure about what direction to take I am going to refer her to our neurology group to ask for some help.    She is here today also for pump refill.  We are changing her dosing her intrathecal pump because we were removing the bupivacaine local anesthetic from the solution.  There has been some concern about autonomic neuropathy and we had tried taking out the local anesthetic in the past and while there was not noted improvement with regards to low blood pressure issues and gastroparesis issues when we previously had removed the bupivacaine the patient had a lot more painful flareup so we decided to put it back in.  These other autonomic type issues have persisted and at the request we are removing the local anesthetic once again.  We discussed how to dose the intrathecal pump at this time and the patient says overall she feels fairly stable with her pain control so she has to just leave daily morphine dose the same.    We interrogated the intrathecal pump today and refill the pump..... The pump was accessed with sterile routine precautions.  The expected residual volume was 16.8 mL.  The actual residual volume obtained was 16 mL.  The pump was refilled with a solution of morphine at 6 mg/mL.  The daily dose was left approximately the same.  The patient has an infusion of 0.95 mg of morphine per day as a basal rate and there is an additional flex dosing of  0.25 mg of morphine at 4 AM to give her some additional pain control to help in the morning.  This regimen has been helpful in the past so we will continue it.  With the flex dosing she does not have additional PTM dosing.  The pump was refilled with 20 mL of that morphine 6 mg solution.      Patient remained masked during entire encounter. No cough present. I donned a mask and eye protection throughout entire visit. Prior to donning mask and eye protection, hand hygiene was performed, as well as when it was doffed.  I was closer than 6 feet, but not for an extended period of time. No obvious exposure to any bodily fluids.    History of Present Illness     He has neuropathic type pain in all 4 extremities as well as arthritis pain.  There is axial pain in the neck and the low back and she has noted history of complex regional pain syndrome and cervical postlaminectomy syndrome and lumbar postlaminectomy syndrome.    Chronic pain management with the pump noted above.  Patient has some morning stiffness which we are managing with flex dosing.  She is having acute painful flareup of some aching and stabbing and stiffness in the right knee as noted above also.    PEG Assessment   What number best describes your pain on average in the past week?6  What number best describes how, during the past week, pain has interfered with your enjoyment of life?6  What number best describes how, during the past week, pain has interfered with your general activity?  5    --  The aforementioned information the Chief Complaint section and above subjective data including any HPI data, and also the Review of Systems data, has been personally reviewed and affirmed.  --        The following portions of the patient's history were reviewed and updated as appropriate: allergies, current medications, past family history, past medical history, past social history, past surgical history and problem list.    -------    The following portions of the  patient's history were reviewed and updated as appropriate: allergies, current medications, past family history, past medical history, past social history, past surgical history and problem list.    Allergies   Allergen Reactions   • Codeine Itching   • Fentanyl Other (See Comments)     Dystonic movements   • Morphine Other (See Comments)     Other reaction(s): Other (See Comments)  Headaches         Current Outpatient Medications:   •  acetaminophen (TYLENOL) 500 MG tablet, as needed, Disp: , Rfl:   •  amphetamine-dextroamphetamine (ADDERALL) 10 MG tablet, Take 5 mg by mouth Daily., Disp: , Rfl:   •  Betamethasone Valerate 0.12 % foam, BETAMETHASONE VALERATE 0.12 % FOAM, Disp: , Rfl:   •  celecoxib (CeleBREX) 200 MG capsule, Take 1 capsule by mouth Daily., Disp: 30 capsule, Rfl: 11  •  chlorhexidine (PERIDEX) 0.12 % solution, RINSE AND SPIT 15ML DAILY, Disp: , Rfl: 11  •  ciclopirox (PENLAC) 8 % solution, APPLY TO AFFECTED AREA EVERY DAY, Disp: , Rfl: 6  •  CREON 51419 units capsule delayed-release particles, TAKE 1 CAPSULE BEFORE MEALS AND 1 CAPSULE BEFORE SNACKS, Disp: , Rfl: 3  •  DEXILANT 60 MG capsule, Take 1 capsule by mouth Daily., Disp: , Rfl: 11  •  diphenhydrAMINE (BENADRYL) 50 MG/ML injection, , Disp: , Rfl:   •  escitalopram (Lexapro) 10 MG tablet, Take  by mouth., Disp: , Rfl:   •  FLUoxetine (PROzac) 40 MG capsule, Take 40 mg by mouth Daily., Disp: , Rfl: 3  •  FLUoxetine (PROzac) 40 MG capsule, Take 1 capsule by mouth., Disp: , Rfl:   •  ibuprofen (ADVIL,MOTRIN) 200 MG tablet, IBUPROFEN 200 MG TABS, Disp: , Rfl:   •  lidocaine (Lidoderm) 5 %, as needed, Disp: , Rfl:   •  lidocaine (LIDODERM) 5 %, PLACE 3 PATCHES ON THE SKIN AS DIRECTED BY PROVIDER DAILY., Disp: 90 patch, Rfl: 11  •  linaclotide (LINZESS) 145 MCG capsule capsule, LINZESS 145 MCG CAPS, Disp: , Rfl:   •  linaclotide (Linzess) 145 MCG capsule capsule, take 1 capsule by oral route  every day on an empty stomach at least 30 minutes before  1st meal of the day swallowing whole. Do not break, chew and/or open., Disp: , Rfl:   •  loratadine (CLARITIN) 10 MG tablet, Take 10 mg by mouth., Disp: , Rfl:   •  midodrine (PROAMATINE) 5 MG tablet, Take 5 mg by mouth 3 (Three) Times a Day Before Meals., Disp: , Rfl:   •  morphine 6 mg/mL, by Intrathecal route Continuous., Disp: 20 mL, Rfl: 0  •  morphine 7.5 mg/mL, Bupivacaine HCl 1 mg/mL, by Intrathecal route Continuous., Disp: 20 mL, Rfl: 0  •  mupirocin (BACTROBAN) 2 % ointment, APPLY TO AFFECTED AREA OF NOSE THREE TIMES DAILY FOR 3 WEEKS, Disp: , Rfl:   •  NON FORMULARY, Pancreatic enzymes, Disp: , Rfl:   •  omeprazole (priLOSEC) 40 MG capsule, Take 1 capsule by mouth., Disp: , Rfl:   •  ondansetron (ZOFRAN) 4 MG tablet, Take 4 mg by mouth Every 8 (Eight) Hours., Disp: , Rfl: 0  •  ondansetron (Zofran) 4 MG tablet, Take 2 tablets by mouth Every 8 (Eight) Hours., Disp: , Rfl:   •  ondansetron ODT (ZOFRAN-ODT) 8 MG disintegrating tablet, , Disp: , Rfl:   •  oxyCODONE-acetaminophen (PERCOCET)  MG per tablet, Take 1 tablet by mouth Daily As Needed for Severe Pain ., Disp: 30 tablet, Rfl: 0  •  PANZYGA 10 GM/100ML solution infusion, , Disp: , Rfl:   •  Panzyga 20 GM/200ML solution infusion, , Disp: , Rfl:   •  Polyethylene Glycol 3350 (MIRALAX PO), MIRALAX POWD, Disp: , Rfl:   •  pregabalin (LYRICA) 100 MG capsule, TAKE 1 CAPSULE BY MOUTH THREE TIMES A DAY, Disp: 90 capsule, Rfl: 2  •  raNITIdine (ZANTAC) 150 MG tablet, Take 1 tablet by mouth., Disp: , Rfl:   •  SENNOSIDES-DOCUSATE SODIUM PO, SM SENNA-S TABS, Disp: , Rfl:   •  sodium chloride 0.9 % solution, , Disp: , Rfl:   •  sulfamethoxazole-trimethoprim (BACTRIM DS,SEPTRA DS) 800-160 MG per tablet, Take 1 tablet by mouth 2 (Two) Times a Day., Disp: 20 tablet, Rfl: 0  •  SUMAtriptan (IMITREX) 100 MG tablet, TAKE 1 TABLET BY MOUTH AS NEEDED FOR HEADACHE, Disp: , Rfl: 6  •  SUMAtriptan (Imitrex) 50 MG tablet, Take  by mouth., Disp: , Rfl:   •  TiZANidine  (ZANAFLEX) 4 MG capsule, TAKE 1 CAPSULE BY MOUTH 3 (THREE) TIMES A DAY AS NEEDED FOR MUSCLE SPASMS., Disp: 90 capsule, Rfl: 0    Current Outpatient Medications on File Prior to Visit   Medication Sig Dispense Refill   • acetaminophen (TYLENOL) 500 MG tablet as needed     • amphetamine-dextroamphetamine (ADDERALL) 10 MG tablet Take 5 mg by mouth Daily.     • Betamethasone Valerate 0.12 % foam BETAMETHASONE VALERATE 0.12 % FOAM     • celecoxib (CeleBREX) 200 MG capsule Take 1 capsule by mouth Daily. 30 capsule 11   • chlorhexidine (PERIDEX) 0.12 % solution RINSE AND SPIT 15ML DAILY  11   • ciclopirox (PENLAC) 8 % solution APPLY TO AFFECTED AREA EVERY DAY  6   • CREON 94286 units capsule delayed-release particles TAKE 1 CAPSULE BEFORE MEALS AND 1 CAPSULE BEFORE SNACKS  3   • DEXILANT 60 MG capsule Take 1 capsule by mouth Daily.  11   • diphenhydrAMINE (BENADRYL) 50 MG/ML injection      • escitalopram (Lexapro) 10 MG tablet Take  by mouth.     • FLUoxetine (PROzac) 40 MG capsule Take 40 mg by mouth Daily.  3   • FLUoxetine (PROzac) 40 MG capsule Take 1 capsule by mouth.     • ibuprofen (ADVIL,MOTRIN) 200 MG tablet IBUPROFEN 200 MG TABS     • lidocaine (Lidoderm) 5 % as needed     • lidocaine (LIDODERM) 5 % PLACE 3 PATCHES ON THE SKIN AS DIRECTED BY PROVIDER DAILY. 90 patch 11   • linaclotide (LINZESS) 145 MCG capsule capsule LINZESS 145 MCG CAPS     • linaclotide (Linzess) 145 MCG capsule capsule take 1 capsule by oral route  every day on an empty stomach at least 30 minutes before 1st meal of the day swallowing whole. Do not break, chew and/or open.     • loratadine (CLARITIN) 10 MG tablet Take 10 mg by mouth.     • midodrine (PROAMATINE) 5 MG tablet Take 5 mg by mouth 3 (Three) Times a Day Before Meals.     • morphine 6 mg/mL by Intrathecal route Continuous. 20 mL 0   • morphine 7.5 mg/mL, Bupivacaine HCl 1 mg/mL by Intrathecal route Continuous. 20 mL 0   • mupirocin (BACTROBAN) 2 % ointment APPLY TO AFFECTED AREA OF  NOSE THREE TIMES DAILY FOR 3 WEEKS     • NON FORMULARY Pancreatic enzymes     • omeprazole (priLOSEC) 40 MG capsule Take 1 capsule by mouth.     • ondansetron (ZOFRAN) 4 MG tablet Take 4 mg by mouth Every 8 (Eight) Hours.  0   • ondansetron (Zofran) 4 MG tablet Take 2 tablets by mouth Every 8 (Eight) Hours.     • ondansetron ODT (ZOFRAN-ODT) 8 MG disintegrating tablet      • oxyCODONE-acetaminophen (PERCOCET)  MG per tablet Take 1 tablet by mouth Daily As Needed for Severe Pain . 30 tablet 0   • PANZYGA 10 GM/100ML solution infusion      • Panzyga 20 GM/200ML solution infusion      • Polyethylene Glycol 3350 (MIRALAX PO) MIRALAX POWD     • pregabalin (LYRICA) 100 MG capsule TAKE 1 CAPSULE BY MOUTH THREE TIMES A DAY 90 capsule 2   • raNITIdine (ZANTAC) 150 MG tablet Take 1 tablet by mouth.     • SENNOSIDES-DOCUSATE SODIUM PO SM SENNA-S TABS     • sodium chloride 0.9 % solution      • sulfamethoxazole-trimethoprim (BACTRIM DS,SEPTRA DS) 800-160 MG per tablet Take 1 tablet by mouth 2 (Two) Times a Day. 20 tablet 0   • SUMAtriptan (IMITREX) 100 MG tablet TAKE 1 TABLET BY MOUTH AS NEEDED FOR HEADACHE  6   • SUMAtriptan (Imitrex) 50 MG tablet Take  by mouth.     • TiZANidine (ZANAFLEX) 4 MG capsule TAKE 1 CAPSULE BY MOUTH 3 (THREE) TIMES A DAY AS NEEDED FOR MUSCLE SPASMS. 90 capsule 0     No current facility-administered medications on file prior to visit.       Patient Active Problem List   Diagnosis   • Chronic pain syndrome   • Neuropathic pain   • Complex regional pain syndrome type 1 of left upper extremity   • Presence of intrathecal pump   • Spinal cord stimulator status   • Cervical post-laminectomy syndrome   • Lumbar post-laminectomy syndrome   • Bilateral occipital neuralgia   • Cervical spondylosis without myelopathy   • Encounter for long-term opiate analgesic use   • Migraine without status migrainosus, not intractable   • Arthritis of right knee   • Chronic pain of right knee   • Chronic fatigue   •  Screening for endocrine, nutritional, metabolic and immunity disorder   • Vitamin D deficiency   • Anemia, unspecified       Past Medical History:   Diagnosis Date   • Biliary stenosis    • Depression    • Gastroparesis    • GERD (gastroesophageal reflux disease)    • History of ERCP    • IBS (irritable bowel syndrome)    • Joint pain    • Low back pain    • Migraine    • Neck pain    • Osteoarthritis    • Peripheral neuropathy    • Sleep apnea    • Spinal cord stimulator status        Past Surgical History:   Procedure Laterality Date   • BACK SURGERY     • CHOLECYSTECTOMY     • EPIDURAL BLOCK     • HYSTERECTOMY     • IMPLANTATION / REPLACEMENT INFUSION PUMP     • KNEE ARTHROSCOPY Right    • NECK SURGERY     • PANCREAS SURGERY     • SPINAL CORD STIMULATOR IMPLANT     • TRIGGER POINT INJECTION         History reviewed. No pertinent family history.    Social History     Socioeconomic History   • Marital status:      Spouse name: Not on file   • Number of children: Not on file   • Years of education: Not on file   • Highest education level: Not on file   Tobacco Use   • Smoking status: Never Smoker   • Smokeless tobacco: Never Used   Substance and Sexual Activity   • Alcohol use: No   • Drug use: No       -------        Review of Systems   Constitutional: Positive for fatigue. Negative for activity change, chills and fever.   HENT: Negative for congestion.    Eyes: Negative for visual disturbance.   Respiratory: Negative for chest tightness and shortness of breath.    Cardiovascular: Negative for chest pain.   Gastrointestinal: Positive for abdominal pain, constipation and diarrhea.   Genitourinary: Negative for difficulty urinating, dyspareunia and dysuria.   Musculoskeletal: Positive for back pain.   Neurological: Positive for dizziness, weakness, light-headedness, numbness and headaches.   Psychiatric/Behavioral: Positive for sleep disturbance. Negative for agitation. The patient is not nervous/anxious.   "    The prescription drug monitoring program is a negative study.  She lives in [place].  It was marked as read.  We are requesting an [place] inspect report.      Vitals:    07/28/21 1259   BP: 107/65   Pulse: (!) 48   Resp: 16   Temp: 97.3 °F (36.3 °C)   SpO2: 100%   Weight: 47.4 kg (104 lb 9.6 oz)   Height: 167.6 cm (66\")   PainSc:   6   PainLoc: Back         Objective   Physical Exam  VSS, NNR, NCAT, NMNA, NRD, AAOx3.  Some tenderness along the joint line of the right knee.  It does not appear swollen.  There is positive crepitus.      Assessment/Plan   Diagnoses and all orders for this visit:    1. Undiagnosed disease or syndrome present (Primary)  -     Ambulatory Referral to Neurology    2. Arthritis of right knee    3. Chronic pain of right knee    4. Presence of intrathecal pump      --Referral to neurology because of this aforementioned concern of autonomic neuropathy and she needs some help as to knowing what direction to take next.  -- She will continue multimodal prescription medication management that does include Celebrex's and Lidoderm patch is and quite judicious use of Percocet.  Also Lyrica.  --Intrathecal pump refill today as noted above  --- Follow-up in a couple months for routine pump refill  --Right knee injection… This was with Depo-Medrol 40 mg lot number [id number] with expiration August 2021.         Right Knee injection:    Informed consent was obtained.  We discussed risks including but not limited to bleeding and infection and injury and risk of site soreness.  We discussed risk of postprocedural painful flareup.  Patient identified.  Laterality confirmed.      A solution was prepared of 4ml of Marcaine 0.5% and 40mg Depomedrol.  The areas over the entirety of both knees with a wide surgical prep of chlorhexidine solution.    On the RIGHT KNEE, a point on the medial aspect of the joint line was identified to enter into the joint space, and the sterile needle was inserted parallel with a " slight posterior angle to enter easily into the joint space.  Aspiration was negative.  Then, slowly, half of the aforementioned solution was injected easily into the joint space.    The needle was removed intact on each side and bleeding was minimal. The insertion site was dressed with a Band-Aid.  There were no apparent complications.              Dictated utilizing Dragon dictation.     --    Vitals:    07/28/21 1259   PainSc:   6   PainLoc: Back          Amaliasa Lopez reports a pain score of 6.  Given her pain assessment as noted, treatment options were discussed and the following options were decided upon as a follow-up plan to address the patient's pain: continuation of current treatment plan for pain and steroid injections.

## 2021-08-13 ENCOUNTER — HOSPITAL ENCOUNTER (OUTPATIENT)
Dept: INFUSION THERAPY | Facility: HOSPITAL | Age: 66
Discharge: HOME | End: 2021-08-13
Admitting: NURSE PRACTITIONER

## 2021-08-13 DIAGNOSIS — D64.9 ANEMIA, UNSPECIFIED TYPE: Primary | ICD-10-CM

## 2021-08-13 LAB
BASOPHILS # BLD AUTO: 0 10*3/MM3 (ref 0–0.2)
BASOPHILS NFR BLD AUTO: 1.2 % (ref 0–1.5)
DEPRECATED RDW RBC AUTO: 41.6 FL (ref 37–54)
EOSINOPHIL # BLD AUTO: 0.1 10*3/MM3 (ref 0–0.4)
EOSINOPHIL NFR BLD AUTO: 2.5 % (ref 0.3–6.2)
ERYTHROCYTE [DISTWIDTH] IN BLOOD BY AUTOMATED COUNT: 13 % (ref 12.3–15.4)
ERYTHROCYTE [SEDIMENTATION RATE] IN BLOOD: 5 MM/HR (ref 0–30)
FERRITIN SERPL-MCNC: 54.26 NG/ML (ref 13–150)
HCT VFR BLD AUTO: 33.9 % (ref 34–46.6)
HGB BLD-MCNC: 11.6 G/DL (ref 12–15.9)
IRON 24H UR-MRATE: 109 MCG/DL (ref 37–145)
IRON SATN MFR SERPL: 29 % (ref 20–50)
LYMPHOCYTES # BLD AUTO: 0.8 10*3/MM3 (ref 0.7–3.1)
LYMPHOCYTES NFR BLD AUTO: 26.2 % (ref 19.6–45.3)
MCH RBC QN AUTO: 31.4 PG (ref 26.6–33)
MCHC RBC AUTO-ENTMCNC: 34.1 G/DL (ref 31.5–35.7)
MCV RBC AUTO: 91.9 FL (ref 79–97)
MONOCYTES # BLD AUTO: 0.2 10*3/MM3 (ref 0.1–0.9)
MONOCYTES NFR BLD AUTO: 7.9 % (ref 5–12)
NEUTROPHILS NFR BLD AUTO: 2 10*3/MM3 (ref 1.7–7)
NEUTROPHILS NFR BLD AUTO: 62.2 % (ref 42.7–76)
NRBC BLD AUTO-RTO: 0 /100 WBC (ref 0–0.2)
PLATELET # BLD AUTO: 136 10*3/MM3 (ref 140–450)
PMV BLD AUTO: 9.7 FL (ref 6–12)
RBC # BLD AUTO: 3.69 10*6/MM3 (ref 3.77–5.28)
TIBC SERPL-MCNC: 373 MCG/DL (ref 298–536)
TRANSFERRIN SERPL-MCNC: 250 MG/DL (ref 200–360)
WBC # BLD AUTO: 3.1 10*3/MM3 (ref 3.4–10.8)

## 2021-08-13 PROCEDURE — 85025 COMPLETE CBC W/AUTO DIFF WBC: CPT | Performed by: NURSE PRACTITIONER

## 2021-08-13 PROCEDURE — 82728 ASSAY OF FERRITIN: CPT | Performed by: NURSE PRACTITIONER

## 2021-08-13 PROCEDURE — 83540 ASSAY OF IRON: CPT | Performed by: NURSE PRACTITIONER

## 2021-08-13 PROCEDURE — 84466 ASSAY OF TRANSFERRIN: CPT | Performed by: NURSE PRACTITIONER

## 2021-08-13 PROCEDURE — 85652 RBC SED RATE AUTOMATED: CPT | Performed by: NURSE PRACTITIONER

## 2021-08-13 PROCEDURE — 36591 DRAW BLOOD OFF VENOUS DEVICE: CPT

## 2021-09-01 ENCOUNTER — TELEPHONE (OUTPATIENT)
Dept: PAIN MEDICINE | Facility: CLINIC | Age: 66
End: 2021-09-01

## 2021-09-01 ENCOUNTER — TRANSCRIBE ORDERS (OUTPATIENT)
Dept: ADMINISTRATIVE | Facility: HOSPITAL | Age: 66
End: 2021-09-01

## 2021-09-01 DIAGNOSIS — M79.18 CERVICAL MYOFASCIAL PAIN SYNDROME: ICD-10-CM

## 2021-09-01 DIAGNOSIS — M81.0 OSTEOPOROSIS, UNSPECIFIED OSTEOPOROSIS TYPE, UNSPECIFIED PATHOLOGICAL FRACTURE PRESENCE: Primary | ICD-10-CM

## 2021-09-01 RX ORDER — PREGABALIN 100 MG/1
100 CAPSULE ORAL 3 TIMES DAILY
Qty: 90 CAPSULE | Refills: 2 | Status: SHIPPED | OUTPATIENT
Start: 2021-09-01 | End: 2021-10-27

## 2021-09-01 NOTE — TELEPHONE ENCOUNTER
Caller: DEANDRE FRIEDMAN    Relationship: SELF    Best call back number: [phone]    Medication needed:   Requested Prescriptions      No prescriptions requested or ordered in this encounter   pregabalin (LYRICA) 100 MG        When do you need the refill by: ASAP      Does the patient have less than a 3 day supply:  [x] Yes  [] No    What is the patient's preferred pharmacy:    [facility] ( [facility])

## 2021-09-01 NOTE — TELEPHONE ENCOUNTER
Medication Refill Request    Date of phone call: 21    Medication being requested: lyrica 100mg si caps po tid   Qty: 90    Date of last visit: 21    Date of last refill:     SMITH up to date?:     Next Follow up?: 10/27/21    Any new pertinent information? (i.e, new medication allergies, new use of medications, change in patient's health or condition, non-compliance or inconsistency with prescribing agreement?):

## 2021-09-08 DIAGNOSIS — M79.18 CERVICAL MYOFASCIAL PAIN SYNDROME: ICD-10-CM

## 2021-09-08 RX ORDER — OXYCODONE AND ACETAMINOPHEN 10; 325 MG/1; MG/1
1 TABLET ORAL DAILY PRN
Qty: 30 TABLET | Refills: 0 | Status: SHIPPED | OUTPATIENT
Start: 2021-09-08 | End: 2021-10-27 | Stop reason: SDUPTHER

## 2021-09-08 NOTE — TELEPHONE ENCOUNTER
Medication Refill Request    Date of phone call: 21    Medication being requested: percocet 10/325mg si tab po daily prn   Qty: 30    Date of last visit: 21    Date of last refill:     SMITH up to date?:     Next Follow up?: 10/27/21    Any new pertinent information? (i.e, new medication allergies, new use of medications, change in patient's health or condition, non-compliance or inconsistency with prescribing agreement?):

## 2021-09-21 PROCEDURE — U0004 COV-19 TEST NON-CDC HGH THRU: HCPCS | Performed by: NURSE PRACTITIONER

## 2021-10-27 ENCOUNTER — OFFICE VISIT (OUTPATIENT)
Dept: PAIN MEDICINE | Facility: CLINIC | Age: 66
End: 2021-10-27

## 2021-10-27 VITALS
HEIGHT: 66 IN | SYSTOLIC BLOOD PRESSURE: 110 MMHG | BODY MASS INDEX: 16.62 KG/M2 | DIASTOLIC BLOOD PRESSURE: 56 MMHG | HEART RATE: 53 BPM | WEIGHT: 103.4 LBS | TEMPERATURE: 96.9 F | OXYGEN SATURATION: 99 %

## 2021-10-27 DIAGNOSIS — M25.561 CHRONIC PAIN OF RIGHT KNEE: ICD-10-CM

## 2021-10-27 DIAGNOSIS — M79.18 CERVICAL MYOFASCIAL PAIN SYNDROME: ICD-10-CM

## 2021-10-27 DIAGNOSIS — G90.512 COMPLEX REGIONAL PAIN SYNDROME TYPE 1 OF LEFT UPPER EXTREMITY: ICD-10-CM

## 2021-10-27 DIAGNOSIS — Z96.89 SPINAL CORD STIMULATOR STATUS: ICD-10-CM

## 2021-10-27 DIAGNOSIS — M79.2 NEUROPATHIC PAIN: Primary | ICD-10-CM

## 2021-10-27 DIAGNOSIS — G89.29 CHRONIC PAIN OF RIGHT KNEE: ICD-10-CM

## 2021-10-27 DIAGNOSIS — Z97.8 PRESENCE OF INTRATHECAL PUMP: ICD-10-CM

## 2021-10-27 DIAGNOSIS — M17.11 ARTHRITIS OF RIGHT KNEE: ICD-10-CM

## 2021-10-27 DIAGNOSIS — G43.909 MIGRAINE WITHOUT STATUS MIGRAINOSUS, NOT INTRACTABLE, UNSPECIFIED MIGRAINE TYPE: ICD-10-CM

## 2021-10-27 DIAGNOSIS — G89.4 CHRONIC PAIN SYNDROME: ICD-10-CM

## 2021-10-27 DIAGNOSIS — G43.709 CHRONIC MIGRAINE WITHOUT AURA WITHOUT STATUS MIGRAINOSUS, NOT INTRACTABLE: ICD-10-CM

## 2021-10-27 DIAGNOSIS — M96.1 LUMBAR POST-LAMINECTOMY SYNDROME: ICD-10-CM

## 2021-10-27 DIAGNOSIS — Z79.891 ENCOUNTER FOR LONG-TERM OPIATE ANALGESIC USE: ICD-10-CM

## 2021-10-27 DIAGNOSIS — M96.1 CERVICAL POST-LAMINECTOMY SYNDROME: ICD-10-CM

## 2021-10-27 PROCEDURE — 62369 ANAL SP INF PMP W/REPRG&FILL: CPT | Performed by: ANESTHESIOLOGY

## 2021-10-27 PROCEDURE — 99214 OFFICE O/P EST MOD 30 MIN: CPT | Performed by: ANESTHESIOLOGY

## 2021-10-27 RX ORDER — OXYCODONE AND ACETAMINOPHEN 10; 325 MG/1; MG/1
1 TABLET ORAL DAILY PRN
Qty: 30 TABLET | Refills: 0 | Status: SHIPPED | OUTPATIENT
Start: 2021-10-27 | End: 2022-01-06 | Stop reason: SDUPTHER

## 2021-10-27 RX ORDER — CYANOCOBALAMIN 1000 UG/ML
1000 INJECTION, SOLUTION INTRAMUSCULAR; SUBCUTANEOUS WEEKLY
COMMUNITY
Start: 2021-10-12

## 2021-10-27 RX ORDER — PREGABALIN 150 MG/1
150 CAPSULE ORAL 3 TIMES DAILY
Qty: 90 CAPSULE | Refills: 5 | Status: SHIPPED | OUTPATIENT
Start: 2021-10-27 | End: 2022-04-06 | Stop reason: SDUPTHER

## 2021-10-27 NOTE — PROGRESS NOTES
CHIEF COMPLAINT  F/U back pain- pump refill- patient states that her pain has worsened since her last visit.     Subjective   Amalia Lopez is a 65 y.o. female  who presents for follow-up.  She has a history of multiple painful problems including chronic pain syndrome and complex regional pain syndrome as well as neuropathic pain and chronic knee arthritis and cervical as well as lumbar postlaminectomy syndromes.  She does use spinal stimulation for the complex regional pain syndrome.  The intrathecal therapy is targeting the cervical postlaminectomy syndrome.  She uses pain medication for neuropathic pain also because of significant knee arthritis.  She has total body pain and her worst time of the day is early morning when she is trying to get up and move around and get the knee started.          Patient remained masked during entire encounter. No cough present. I donned a mask and eye protection throughout entire visit. Prior to donning mask and eye protection, hand hygiene was performed, as well as when it was doffed.  I was closer than 6 feet, but not for an extended period of time. No obvious exposure to any bodily fluids.    History of Present Illness     She continues to follow with other specialist, there was a question about possible validity of a history of autonomic neuropathy.  She has gastroparesis and also has had some low blood pressure issues.  We've taken local anesthetics out of her intrathecal and solution because of some of those concerns.    Worsening pain is include increasing neuropathic complaints but burning and tingling in all 4 extremities.  She does have continued axial pain in the neck and the low back, with the neck being stable overall on the low back pain moderately worsening, with morning stiffness noted.  Aching and stabbing pain as well as right knee stiffness are unchanged.    She continues to try to walk 2 to 3 miles a day.  Does stretches.    --    She has headaches 30 days  out of the month.  20 out of 30 days a month rise to the level of migraine.  These are severe and only treated by quiet and dark and sleeping and off, that is when she has exhausted her supply of sumatriptan.  These pain will be severe and will last for 8 to 10 hours and can effectively be broken with sumatriptan and, but of course there are quantity limits on that drug.  She has tried beta-blockade in the past and she cannot try that now because of low blood pressure and low heart rate.  She is currently on fluoxetine.  She has trialed duloxetine in the past and it did not have significant effect on her headaches were other painful problems, and she said better depression control on fluoxetine, so we can change SSRI.  She is currently on therapeutic dose of Lyrica for neuropathic painful problem but that does not seem to be having a significant effect on her migraine management.  She is using Imitrex and gets good relief from Imitrex but again the Imitrex therapy while cutting her migraine painful flareups by more than 50% and breaking a migraine flareup, is limited by monthly allotment of dose.  She has tried carbamazepine and oxcarbazepine and levetiracetam in the past also.  ---  Considering Medical Necessity of Botox for Headaches:    INITIAL 6 MONTH TRIAL...    - adult with chronic migraine for > 6 months  - 15 or more headache-days per month, with each lasting >4 hrs  - previous trial of at least one agent from at least two of the following classes:  1. Antidepressants  2. Antihypertensives  3. Antiepileptics    CONTINUING TREATMENT...  - migraines decreased by at least 7 days per month  -OR-  - migraine duration reduced by at least 100 hours per month  ---        --    PEG Assessment   What number best describes your pain on average in the past week?7  What number best describes how, during the past week, pain has interfered with your enjoyment of life?7  What number best describes how, during the past week,  pain has interfered with your general activity?  6    --  The aforementioned information the Chief Complaint section and above subjective data including any HPI data, and also the Review of Systems data, has been personally reviewed and affirmed.  --        The following portions of the patient's history were reviewed and updated as appropriate: allergies, current medications, past family history, past medical history, past social history, past surgical history and problem list.    -------    The following portions of the patient's history were reviewed and updated as appropriate: allergies, current medications, past family history, past medical history, past social history, past surgical history and problem list.    Allergies   Allergen Reactions   • Acetaminophen-Codeine Hives   • Fentanyl Other (See Comments) and Hives     Dystonic movements  Dystonic movements  Dystonic movements  Dystonic movements   • Morphine Other (See Comments)     Other reaction(s): Other (See Comments)  Headaches   • Codeine Itching         Current Outpatient Medications:   •  acetaminophen (TYLENOL) 500 MG tablet, as needed, Disp: , Rfl:   •  amphetamine-dextroamphetamine (ADDERALL) 10 MG tablet, Take 5 mg by mouth Daily., Disp: , Rfl:   •  BD PosiFlush 0.9 % flush, , Disp: , Rfl:   •  chlorhexidine (PERIDEX) 0.12 % solution, RINSE AND SPIT 15ML DAILY, Disp: , Rfl: 11  •  cyanocobalamin 1000 MCG/ML injection, Inject 1,000 mcg into the appropriate muscle as directed by prescriber 1 (One) Time Per Week., Disp: , Rfl:   •  DEXILANT 60 MG capsule, Take 1 capsule by mouth Daily., Disp: , Rfl: 11  •  diphenhydrAMINE (BENADRYL) 50 MG/ML injection, , Disp: , Rfl:   •  famotidine (PEPCID) 40 MG tablet, Take 40 mg by mouth Daily., Disp: , Rfl:   •  fludrocortisone 0.1 MG tablet, Take 0.1 mg by mouth., Disp: , Rfl:   •  FLUoxetine (PROzac) 40 MG capsule, Take 40 mg by mouth Daily., Disp: , Rfl: 3  •  heparin 100 UNIT/ML solution injection, , Disp:  , Rfl:   •  ibuprofen (ADVIL,MOTRIN) 200 MG tablet, IBUPROFEN 200 MG TABS, Disp: , Rfl:   •  lidocaine (Lidoderm) 5 %, as needed, Disp: , Rfl:   •  lidocaine-prilocaine (EMLA) 2.5-2.5 % cream, , Disp: , Rfl:   •  linaclotide (LINZESS) 145 MCG capsule capsule, LINZESS 145 MCG CAPS, Disp: , Rfl:   •  loratadine (CLARITIN) 10 MG tablet, Take 10 mg by mouth., Disp: , Rfl:   •  morphine 6 mg/mL, by Intrathecal route Continuous., Disp: 20 mL, Rfl: 0  •  morphine 6 mg/mL, by Intrathecal route Continuous., Disp: 20 mL, Rfl: 0  •  morphine 6 mg/mL, by Intrathecal route Continuous., Disp: 20 mL, Rfl: 0  •  morphine 7.5 mg/mL, Bupivacaine HCl 1 mg/mL, by Intrathecal route Continuous., Disp: 20 mL, Rfl: 0  •  omeprazole (priLOSEC) 40 MG capsule, Take 1 capsule by mouth., Disp: , Rfl:   •  ondansetron ODT (ZOFRAN-ODT) 8 MG disintegrating tablet, , Disp: , Rfl:   •  oxyCODONE-acetaminophen (PERCOCET)  MG per tablet, Take 1 tablet by mouth Daily As Needed for Severe Pain ., Disp: 30 tablet, Rfl: 0  •  PANZYGA 10 GM/100ML solution infusion, , Disp: , Rfl:   •  raNITIdine (ZANTAC) 150 MG tablet, Take 1 tablet by mouth., Disp: , Rfl:   •  SENNOSIDES-DOCUSATE SODIUM PO, SM SENNA-S TABS, Disp: , Rfl:   •  sodium chloride 0.9 % solution, , Disp: , Rfl:   •  SUMAtriptan (IMITREX) 100 MG tablet, TAKE 1 TABLET BY MOUTH AS NEEDED FOR HEADACHE, Disp: , Rfl: 6  •  pregabalin (LYRICA) 150 MG capsule, Take 1 capsule by mouth 3 (Three) Times a Day., Disp: 90 capsule, Rfl: 5    Current Outpatient Medications on File Prior to Visit   Medication Sig Dispense Refill   • acetaminophen (TYLENOL) 500 MG tablet as needed     • amphetamine-dextroamphetamine (ADDERALL) 10 MG tablet Take 5 mg by mouth Daily.     • BD PosiFlush 0.9 % flush      • chlorhexidine (PERIDEX) 0.12 % solution RINSE AND SPIT 15ML DAILY  11   • cyanocobalamin 1000 MCG/ML injection Inject 1,000 mcg into the appropriate muscle as directed by prescriber 1 (One) Time Per Week.      • DEXILANT 60 MG capsule Take 1 capsule by mouth Daily.  11   • diphenhydrAMINE (BENADRYL) 50 MG/ML injection      • famotidine (PEPCID) 40 MG tablet Take 40 mg by mouth Daily.     • fludrocortisone 0.1 MG tablet Take 0.1 mg by mouth.     • FLUoxetine (PROzac) 40 MG capsule Take 40 mg by mouth Daily.  3   • heparin 100 UNIT/ML solution injection      • ibuprofen (ADVIL,MOTRIN) 200 MG tablet IBUPROFEN 200 MG TABS     • lidocaine (Lidoderm) 5 % as needed     • lidocaine-prilocaine (EMLA) 2.5-2.5 % cream      • linaclotide (LINZESS) 145 MCG capsule capsule LINZESS 145 MCG CAPS     • loratadine (CLARITIN) 10 MG tablet Take 10 mg by mouth.     • morphine 6 mg/mL by Intrathecal route Continuous. 20 mL 0   • morphine 6 mg/mL by Intrathecal route Continuous. 20 mL 0   • morphine 6 mg/mL by Intrathecal route Continuous. 20 mL 0   • morphine 7.5 mg/mL, Bupivacaine HCl 1 mg/mL by Intrathecal route Continuous. 20 mL 0   • omeprazole (priLOSEC) 40 MG capsule Take 1 capsule by mouth.     • ondansetron ODT (ZOFRAN-ODT) 8 MG disintegrating tablet      • PANZYGA 10 GM/100ML solution infusion      • raNITIdine (ZANTAC) 150 MG tablet Take 1 tablet by mouth.     • SENNOSIDES-DOCUSATE SODIUM PO SM SENNA-S TABS     • sodium chloride 0.9 % solution      • SUMAtriptan (IMITREX) 100 MG tablet TAKE 1 TABLET BY MOUTH AS NEEDED FOR HEADACHE  6     No current facility-administered medications on file prior to visit.       Patient Active Problem List   Diagnosis   • Chronic pain syndrome   • Neuropathic pain   • Complex regional pain syndrome type 1 of left upper extremity   • Presence of intrathecal pump   • Spinal cord stimulator status   • Cervical post-laminectomy syndrome   • Lumbar post-laminectomy syndrome   • Bilateral occipital neuralgia   • Cervical spondylosis without myelopathy   • Encounter for long-term opiate analgesic use   • Migraine without status migrainosus, not intractable   • Arthritis of right knee   • Chronic pain of  right knee   • Chronic fatigue   • Screening for endocrine, nutritional, metabolic and immunity disorder   • Vitamin D deficiency   • Anemia, unspecified   • Chronic migraine without aura without status migrainosus, not intractable       Past Medical History:   Diagnosis Date   • Biliary stenosis    • Depression    • Gastroparesis    • GERD (gastroesophageal reflux disease)    • History of ERCP    • IBS (irritable bowel syndrome)    • Joint pain    • Low back pain    • Migraine    • Neck pain    • Osteoarthritis    • Peripheral neuropathy    • Sleep apnea    • Spinal cord stimulator status    • Vitamin B12 deficiency        Past Surgical History:   Procedure Laterality Date   • BACK SURGERY     • CHOLECYSTECTOMY     • EPIDURAL BLOCK     • HYSTERECTOMY     • IMPLANTATION / REPLACEMENT INFUSION PUMP     • KNEE ARTHROSCOPY Right    • NECK SURGERY     • PANCREAS SURGERY     • SPINAL CORD STIMULATOR IMPLANT     • TRIGGER POINT INJECTION         History reviewed. No pertinent family history.    Social History     Socioeconomic History   • Marital status:    Tobacco Use   • Smoking status: Never Smoker   • Smokeless tobacco: Never Used   Vaping Use   • Vaping Use: Never used   Substance and Sexual Activity   • Alcohol use: No   • Drug use: No       -------        Review of Systems   Constitutional: Positive for fatigue. Negative for activity change, chills and fever.   HENT: Negative for congestion.    Eyes: Negative for visual disturbance.   Respiratory: Negative for chest tightness and shortness of breath.    Cardiovascular: Negative for chest pain.   Gastrointestinal: Positive for abdominal pain. Negative for constipation and diarrhea.   Genitourinary: Positive for difficulty urinating. Negative for dyspareunia and dysuria.   Musculoskeletal: Positive for back pain.   Neurological: Positive for weakness, light-headedness, numbness and headaches. Negative for dizziness.   Psychiatric/Behavioral: Positive for  "sleep disturbance. Negative for agitation, self-injury and suicidal ideas. The patient is not nervous/anxious.      [facility] report is reviewed:  I reviewed the document in the electronic form under the PDMP tab in the Epic EMR...  - In this function, the report is a current report in as close to real-time as possible.  - The report was available for immediate review.    - I did eric the report as reviewed.  - There is not pertinent data on the last page of the report. There is not concern for aberrant behavior based on this ekasper review.  -[place] inspect requested    Vitals:    10/27/21 1259   BP: 110/56   Pulse: 53   Temp: 96.9 °F (36.1 °C)   SpO2: 99%   Weight: 46.9 kg (103 lb 6.4 oz)   Height: 167.6 cm (66\")   PainSc:   8   PainLoc: Back     intrathecal pump refill and reprogramming.  The estimated reserve volume is 2.7 mL in the actual extracted volume was 2.5 mL.  The pump is filled with morphine at 6 mg/mL.  Her base rate was maintained the same at 1 mg/day(i.e. 0.9449 mg/day).  The flex dosing is increased from 0.2 mg to now 0.3 mg and that flex doses given over 10 minutes at 4 AM.  The intrathecal pump was filled with sterile technique and precautions per routine.  It is a 20 mL pump filled with 20 mL of new medication solution, with an estimated replacement date of July 20,025.  The catheter tip is at T1.    Objective   Physical Exam  Vitals (She is underweight for height) and nursing note reviewed.   Constitutional:       General: She is not in acute distress.     Appearance: Normal appearance. She is well-developed. She is not toxic-appearing.   HENT:      Head: Normocephalic and atraumatic.      Right Ear: Hearing and external ear normal.      Left Ear: Hearing and external ear normal.      Nose: Nose normal.   Eyes:      General: Lids are normal.      Conjunctiva/sclera: Conjunctivae normal.      Pupils: Pupils are equal, round, and reactive to light.   Pulmonary:      Effort: Pulmonary effort is " normal. No respiratory distress.   Musculoskeletal:      Cervical back: Normal range of motion.      Thoracic back: Normal range of motion.      Lumbar back: Normal range of motion.      Right knee: Crepitus present. No swelling, effusion or erythema.   Neurological:      Mental Status: She is alert and oriented to person, place, and time.      Cranial Nerves: No cranial nerve deficit.      Motor: No weakness.      Coordination: Coordination is intact.      Gait: Gait is intact.      Deep Tendon Reflexes:      Reflex Scores:       Patellar reflexes are 1+ on the right side and 1+ on the left side.  Psychiatric:         Mood and Affect: Mood normal.         Behavior: Behavior normal.         Thought Content: Thought content normal.         Judgment: Judgment normal.             Assessment/Plan   Diagnoses and all orders for this visit:    1. Neuropathic pain (Primary)  -     pregabalin (LYRICA) 150 MG capsule; Take 1 capsule by mouth 3 (Three) Times a Day.  Dispense: 90 capsule; Refill: 5    2. Cervical myofascial pain syndrome  -     oxyCODONE-acetaminophen (PERCOCET)  MG per tablet; Take 1 tablet by mouth Daily As Needed for Severe Pain .  Dispense: 30 tablet; Refill: 0    3. Migraine without status migrainosus, not intractable, unspecified migraine type  -     pregabalin (LYRICA) 150 MG capsule; Take 1 capsule by mouth 3 (Three) Times a Day.  Dispense: 90 capsule; Refill: 5  -     Case Request    4. Chronic pain syndrome    5. Complex regional pain syndrome type 1 of left upper extremity    6. Spinal cord stimulator status    7. Presence of intrathecal pump    8. Cervical post-laminectomy syndrome    9. Lumbar post-laminectomy syndrome    10. Encounter for long-term opiate analgesic use    11. Chronic pain of right knee    12. Arthritis of right knee    13. Chronic migraine without aura without status migrainosus, not intractable    65-year-old woman with a very extensive history of painful problems and I am  concerned about diffuse amplified myofascial pain and we are utilizing medication that is often helpful for that condition along with her other problems.    --- Follow-up in a couple months, on schedule, see the [employer] computer report, for her next pump refill.  --recommend that he had encourage her to continue stretching in the morning, also may consider heating pad in the morning.  -The opioid oral pain medication is mainly targeted for the flareups of knee arthritis.  She has been using it very judiciously.  -More trialing and escalation of Lyrica for the bilateral neuropathic pain, that includes the left arm, and regional pain syndrome but also small fiber neuropathic pain in the feet as well as diffuse back pain     --Botox therapy from migraine prophylaxis is indicated.  See above.    SMITH REPORT  As part of the patient's treatment plan, I am prescribing controlled substances. The patient has been made aware of appropriate use of such medications, including potential risk of somnolence, limited ability to drive and/or work safely, and the potential for dependence or overdose. It has also bee made clear that these medications are for use by this patient only, without concomitant use of alcohol or other substances unless prescribed.     Patient has completed prescribing agreement detailing terms of continued prescribing of controlled substances, including monitoring SMITH reports, urine drug screening, and pill counts if necessary. The patient is aware that inappropriate use will results in cessation of prescribing such medications.    As the clinician, I personally reviewed the SMITH from as above while the patient was in the office today.    History and physical exam exhibit continued safe and appropriate use of controlled substances.    ---     Dictated utilizing Dragon dictation.   Care was taken to try to avoid voice to text errors but it is possible that some may occur.    --    Vitals:    10/27/21  1259   PainSc:   8   PainLoc: Back        Amaliasa Lopez reports a pain score of 8.  Given her pain assessment as noted, treatment options were discussed and the following options were decided upon as a follow-up plan to address the patient's pain: continuation of current treatment plan for pain, prescription for non-opiod analgesics, prescription for opiod analgesics and use of non-medical modalities (ice, heat, stretching and/or behavior modifications).

## 2021-11-01 PROBLEM — G43.709 CHRONIC MIGRAINE WITHOUT AURA WITHOUT STATUS MIGRAINOSUS, NOT INTRACTABLE: Status: ACTIVE | Noted: 2021-11-01

## 2021-11-15 ENCOUNTER — DOCUMENTATION (OUTPATIENT)
Dept: PHYSICAL THERAPY | Facility: CLINIC | Age: 66
End: 2021-11-15

## 2021-11-16 ENCOUNTER — TREATMENT (OUTPATIENT)
Dept: PHYSICAL THERAPY | Facility: CLINIC | Age: 66
End: 2021-11-16

## 2021-11-16 DIAGNOSIS — M54.2 PAIN, NECK: ICD-10-CM

## 2021-11-16 DIAGNOSIS — M79.18 MYALGIA, OTHER SITE: Primary | ICD-10-CM

## 2021-11-16 DIAGNOSIS — G43.011 INTRACTABLE MIGRAINE WITHOUT AURA AND WITH STATUS MIGRAINOSUS: ICD-10-CM

## 2021-11-16 PROCEDURE — DRYNDL PR CUSTOM DRY NEEDLING SELF PAY: Performed by: PHYSICAL THERAPIST

## 2021-11-16 NOTE — PROGRESS NOTES
Physical Therapy Initial Evaluation and Plan of Care    Patient: Amalia Lopez   : 1955  Diagnosis/ICD-10 Code:  Myalgia, other site [M79.18], Pain, neck [M54.2], Intractable migraine without aura and with status migrainosus [G43.011]  Referring practitioner: Jordin Strauss MD    Functional Outcome Measure: NDI = 72% disability     Subjective Evaluation    History of Present Illness  Mechanism of injury: Pt reports she is returning to PT for additional dry needling.  She is continuing to have migraines that have worsened since the last time she was here last.  She is having pain along B suboccipital regions as well as L sided neck down into her L shoulder blade. Is having ongoing testing for other medical issues she is having as she is now under 100 pounds due to gastroparesis.  She is scheduled for Botox soon to also help with her migraines.  The pain is affecting her day to day function as well as her ability to do her daily walk.  She has not been able to go on a walk for 3 days due to the pain.       Patient Occupation: Unable to work due to medical issues.  Quality of life: poor    Pain  Current pain ratin  At best pain ratin  At worst pain ratin  Quality: discomfort, throbbing, tight and radiating  Relieving factors: medications, relaxation, rest and heat  Aggravating factors: movement, lifting, overhead activity, standing, prolonged positioning, ambulation and repetitive movement  Progression: worsening    Social Support  Lives in: apartment    Diagnostic Tests  X-ray: abnormal  MRI studies: abnormal    Treatments  Previous treatment: physical therapy, injection treatment, massage and medication  Patient Goals  Patient goals for therapy: decreased pain and increased motion  Patient goal: Eliminate migraines            Objective          Active Range of Motion   Cervical/Thoracic Spine   Cervical    Flexion: 20 degrees with pain  Extension: 35 degrees with pain  Left lateral flexion:  17 degrees with pain  Right lateral flexion: 15 degrees with pain  Left rotation: 43 degrees with pain  Right rotation: 35 degrees with pain     Palpation   Left   Tenderness of the sternocleidomastoid and upper trapezius.   Trigger point to sternocleidomastoid and upper trapezius.      Right Tenderness of the upper trapezius.   Trigger point to upper trapezius.      Tenderness     Additional Tenderness Details  B C2 TP  L C4/5 TP  C7  T1/2 hypomobility (mild) w/tenderness in supine PA  Anterior scalenes on L side.   L temporalis m.   L SCM m.    Passive Range of Motion     Additional Passive Range of Motion Details  R rotation restricted w/pain   AA rotation slight restriction with pain bilaterally   OA restriction L>R     Strength/Myotome Testing     Additional Strength Details  Not tested due to pt reporting CRPS and sensitivity in LUE especially in medial forearm.        Assessment & Plan      Assessment  Impairments: abnormal or restricted ROM, activity intolerance, impaired physical strength, lacks appropriate home exercise program and pain with function    Assessment details: Pt is a 65 year old female reporting to the clinic with bilateral neck pain with migraines, primarily occurring on the L side, occurring at the same time. Palpation to c-spine and t-spine showed tenderness throughout bilaterally with slight massage producing moderate pain relief. She had restricted AROM in all cervical motions as well as restricted cervical PROM and AA PROM, all accompanied by pain. Dry needling was initiated to reduce pain and sensitivity in neck and upper thoracic region. She will benefit from skilled therapy to address impairments, increase activity tolerance, and improve quality of life.     Prognosis: fair  Functional Limitations: carrying objects, lifting, sleeping, walking, pulling, pushing, uncomfortable because of pain, reaching overhead and unable to perform repetitive tasks     Goals  Plan Goals: STG - 3  weeks:  1. Pt to be compliant and independent with HEP.  2. Pt to report pain at worse with migraine 6/10 or less to improve activity tolerance.   3. Pt to report migraine frequency to 1-2 at most per week.     LTG - 6 weeks:   1. Pt to achieve NDI score of 45% or less to show improvements in cervical function.   2. Pt be able to walk for 20 minutes without migraine rating 5/10 or higher to increase ability to exercise and improve quality of life.   3. Pt be able to achieve 40 degrees of cervical SBing motion bilaterally, and 35 degrees of extension AROM to decrease stiffness with activity and improve ability to recreate comfortably.       Plan  Therapy options: will be seen for skilled physical therapy services  Planned interventions: Dry needling only   Frequency: 1x per week  Duration in visits: 10 weeks   Treatment plan discussed with: patient     History # of Personal Factors and/or Comorbidities: HIGH (3+)  Examination of Body System(s): # of elements: HIGH (4+)  Clinical Presentation: EVOLVING  Clinical Decision Making: HIGH    Timed:         Manual Therapy:         mins  25460;     Therapeutic Exercise:         mins  68176;     Neuromuscular Patel:        mins  32621;    Therapeutic Activity:          mins  62100;     Gait Training:           mins  98459;     Ultrasound:          mins  79947;    Ionto                                   mins   95668  Self Care                            mins   33258        Un-Timed:  Electrical Stimulation:         mins  10455 ( );  Dry Needling     15     mins self-pay  Traction          mins 50509  Low Eval          Mins  56600  Mod Eval    25      Mins  20919  High Eval                            Mins  35714  Re-Eval                               mins  73604        Timed Treatment:   0   mins   Total Treatment:     40   mins  PT SIGNATURE: Patricia Sheikh PT, DPT    DATE TREATMENT INITIATED: 11/16/2021    Initial Certification  Certification Period: 2/14/2022  I  certify that the therapy services are furnished while this patient is under my care.  The services outlined above are required by this patient, and will be reviewed every 90 days.     PHYSICIAN:       DATE:     Please sign and return via fax to [phone].. Thank you, [facility].

## 2021-11-16 NOTE — PROGRESS NOTES
Discharge Summary  Discharge Summary from Physical Therapy Report      Date of Initial PT visit:02/01/2021  Number of Visits Seen: 11    Discharge Status of Patient: Pt did not return.     Goals: Partially Met    Discharge Plan: Patient to return to referring/providing physician    Date of Discharge: 11/15/2021      Patricia Sheikh, PT, DPT  Physical Therapist

## 2021-11-23 ENCOUNTER — TREATMENT (OUTPATIENT)
Dept: PHYSICAL THERAPY | Facility: CLINIC | Age: 66
End: 2021-11-23

## 2021-11-23 DIAGNOSIS — M54.2 PAIN, NECK: ICD-10-CM

## 2021-11-23 DIAGNOSIS — G43.011 INTRACTABLE MIGRAINE WITHOUT AURA AND WITH STATUS MIGRAINOSUS: ICD-10-CM

## 2021-11-23 DIAGNOSIS — M79.18 MYALGIA, OTHER SITE: Primary | ICD-10-CM

## 2021-11-23 PROCEDURE — 20561 NDL INSJ W/O NJX 3+ MUSC: CPT | Performed by: PHYSICAL THERAPIST

## 2021-11-23 NOTE — PROGRESS NOTES
Physical Therapy Daily Progress Note    VISIT#: 2    Subjective   Amalia Lopez reports she did well after eval.  Has only had to use Imitrex once.      Pain Rating (0/10): 4    Objective     See Exercise, Manual, and Modality Logs for complete treatment.    Assessment/Plan  Patient with good response to DN.  Reported it was a little more painful today, but otherwise no adverse reactions.     Plan  Progress per Plan of Care            Timed:         Manual Therapy:        mins  19187;     Therapeutic Exercise:         mins  48906;     Neuromuscular Patel:        mins  43115;    Therapeutic Activity:          mins  58928;     Gait Training:           mins  31337;     Ultrasound:          mins  54609;    Ionto                                   mins   49924  Self Care                            mins   99344    Un-Timed:  Electrical Stimulation:         mins  28936 ( );  Dry Needling     15     mins self-pay  Traction          mins 81557  Low Eval          Mins  47237  Mod Eval          Mins  53392  High Eval                            Mins  15242  Re-Eval                               mins  74004    Timed Treatment:   15   mins   Total Treatment:     15   mins    Patricia Sheikh, PT, DPT  Physical Therapist

## 2021-11-30 ENCOUNTER — TREATMENT (OUTPATIENT)
Dept: PHYSICAL THERAPY | Facility: CLINIC | Age: 66
End: 2021-11-30

## 2021-11-30 DIAGNOSIS — M79.18 MYALGIA, OTHER SITE: Primary | ICD-10-CM

## 2021-11-30 DIAGNOSIS — M54.2 PAIN, NECK: ICD-10-CM

## 2021-11-30 DIAGNOSIS — G43.011 INTRACTABLE MIGRAINE WITHOUT AURA AND WITH STATUS MIGRAINOSUS: ICD-10-CM

## 2021-11-30 PROCEDURE — 20561 NDL INSJ W/O NJX 3+ MUSC: CPT | Performed by: PHYSICAL THERAPIST

## 2021-11-30 NOTE — PROGRESS NOTES
Physical Therapy Daily Progress Note    VISIT#: 3    Subjective   Amalia Lopez reports she has been having a rough few days.  She reports that she is having a bad headache.     Pain Rating (0/10): 7    Objective     See Exercise, Manual, and Modality Logs for complete treatment.     Assessment/Plan  Good response to treatment with slight reduction in HA.     Plan  Progress per Plan of Care            Timed:         Manual Therapy:         mins  43415;     Therapeutic Exercise:         mins  75718;     Neuromuscular Patel:        mins  86907;    Therapeutic Activity:          mins  45770;     Gait Training:           mins  32145;     Ultrasound:          mins  79471;    Ionto                                   mins   95990  Self Care                            mins   80973    Un-Timed:  Electrical Stimulation:         mins  69196 ( );  Dry Needling     20     mins self-pay  Traction          mins 32661  Low Eval          Mins  85838  Mod Eval          Mins  96778  High Eval                            Mins  60329  Re-Eval                               mins  93650    Timed Treatment:   0   mins   Total Treatment:     20   mins    Patricia Sheikh, PT, DPT  Physical Therapist

## 2021-12-06 ENCOUNTER — PROCEDURE VISIT (OUTPATIENT)
Dept: PAIN MEDICINE | Facility: CLINIC | Age: 66
End: 2021-12-06

## 2021-12-06 VITALS
TEMPERATURE: 96.9 F | HEART RATE: 50 BPM | SYSTOLIC BLOOD PRESSURE: 111 MMHG | WEIGHT: 103 LBS | DIASTOLIC BLOOD PRESSURE: 69 MMHG | OXYGEN SATURATION: 98 % | HEIGHT: 66 IN | BODY MASS INDEX: 16.55 KG/M2

## 2021-12-06 DIAGNOSIS — G43.709 CHRONIC MIGRAINE WITHOUT AURA WITHOUT STATUS MIGRAINOSUS, NOT INTRACTABLE: Primary | ICD-10-CM

## 2021-12-06 PROCEDURE — 64615 CHEMODENERV MUSC MIGRAINE: CPT | Performed by: ANESTHESIOLOGY

## 2021-12-06 RX ORDER — FLUCONAZOLE 2 MG/ML
INJECTION, SOLUTION INTRAVENOUS
COMMUNITY
Start: 2021-12-03 | End: 2022-03-08

## 2021-12-06 NOTE — PROGRESS NOTES
"CHIEF COMPLAINT: Migraine      Amalia Lopez is a 66 y.o. female.  She is here for the following procedure: Botox for migraines.    Vitals:    12/06/21 1100   BP: 111/69   Pulse: 50   Temp: 96.9 °F (36.1 °C)   SpO2: 98%   Weight: 46.7 kg (103 lb)   Height: 167.6 cm (66\")   PainSc:   6   PainLoc: Neck       5 units were injected into midline procerus muscle.  10 units total were injected into  muscles - 5 on each side.   20 units total were injected into 4 sites of bilateral frontalis muscles.  40 units total were injected into 8 sites of bilateral temporalis muscles  30 units total were injected into 6 sites of bilateral occipitalis muscles.  10 units total were injected into 4 sites of cervical paraspinal muscle group.   30 units total were injected into 6 sites of bilateral upper trapezius muscles.  A total of [200] units of Botox was given. 0 units were unused.   Extra units were dispersed between: Extra 10 units left forehead, extra 10 units left temporal area, extra 10 units left occipital, extra 15 units left trapezius    Botox: Lot: [id number]  Exp: 06/2024 NDC#: 9384-5733-98  Normal Saline: Lot: [id number]  Exp: 03/01/2023  NDC#: 2818-3319-77    This Botox was provided by the patient's own specialty pharmacy (thus we are not billing for the J-codes).       Procedures    Botox Injection for Migraines &/or Chronic Headache, using the PREEMPT Protocol:    (CPT 78467)    AGENT:  Botox (OnabotulinumtoxinA), Total Units used 200 units    INDICATION & PREPARATION:    This patient meets ICHD criteria for chronic migraine disorder and has failed multiple medical treatments, and is therefore a candidate for Botox for Migraine Prophylaxis.  This injection paradigm is based on the PREEMPT protocol, as fixed-site fixed dose (FSFD) injection of five units OnabotulinumtoxinA in thirty-one defined locations, for a total of 155 units.  Those locations are Procerus, bilateral Corrugators, two injections in each bilateral " Frontalis, four injections in each of bilateral Temporalis, three injections in each of bilateral Occipitalis, two injections in each of bilateral Cervical Paraspinal musculature, and three injections in each of bilateral Trapezius.  The Botox solution is reconstituted with sterile technique, observation of appropriate vacuum seal of the vial, using 4mL of sterile saline per 200 units Botox, for a concentration of 5 units per 0.1mL; the solution is prepared immediately prior to its use.    DESCRIPTON OF PROCEDURE:  Informed consent was obtained, with discussion including but not limited to risk of hypersensitivity reactions, risk of bleeding, risk of infection or transmission of disease, risk of dysphagia, risk of weakness and/or ptosis, and risk of respiratory or cardiac events.  Antiseptic technique is observed throughout.      The patient was placed in supine position, and the Procerus and bilateral Corrugators (one FSFD injection each) were injected with needle angled up and away from the eyes.  Next, two FSFD injections were performed in each of the Frontalis muscles.  Next, the patient turned slightly to the left, the area was palpated for tenderness or pain, and the patient clenched their teeth to aid in palpating the temporalis muscle; four FSFD injections were performed on this side, including the anterior temporalis, a point about 0.5cm superior and 1.5cm posterior to the first injection which is into the medial aspect of the muscle, and points about 1.5cm caudad and posterior, respectively, to this second injection point.  The patient the turned the right and the same was performed at the right Temporalis.  The patient then turned to a prone position and each occipital area was palpated for tenderness or pain; the external occipital protuberance was palpated and three FSFD injections were applied on the left side, at a point about 1cm lateral to the protuberance and then points 1cm cephalad and  medial/lateral to that first injection point.  The same was performed on the right Occipitalis.  Next, two FSFD injections were performed in the left Cervical Paraspinal muscle group, with the first about 4cm inferior to the occipital protuberance and the second about 1cm lateral and superior to the first; the same was performed on the right Cervical Paraspinal Area.  Lastly, three FSFD injections were performed in left Trapezius, one in each upper, mid, and lower portion of the muscle; the same was performed on the right side.  Extra units were distributed as noted above.    ESTIMATED BLOOD LOSS:  minimal    COMPLICATIONS:    None    TOLERANCE & DISCHARGE CONDITION:    The patient tolerated the procedure well.  The patient was discharged in satisfactory condition.    PLAN OF CARE:  1. The patient returned today for Botox Therapy for Chronic Migraines.  I advise that full effect may not be seen for a couple of weeks.  Full effect is likely a decrease in frequency and severity of migraines and may not be a total cure.    2. If the effect is significant, that is a significant decrease in headache severity and frequency as well as improvement in daily function, then consideration will be given to repeating injection of Botox three months from today.    3. The patient will return in 3 months to repeat botulinum toxin treatment.  4. The patient was given our standard instruction sheet.  The patient will resume all medications as per the medication reconciliation sheet.        Visit Diagnoses:  1. Chronic migraine without aura without status migrainosus, not intractable        Jordin Strauss MD  Pain Management    --    This document is intended for medical expert use only. Reading of this document by patients and/or patient's family in any manner including the use of [employer] without participating medical staff guidance may result in misinterpretation and unintended morbidity.  Any interpretation of such data is the  responsibility of the patient and/or family member responsible for the patient in concert with their primary or specialist providers, not to be left for sources of online searches such as [employer], [employer] or similar queries. Relying on these approaches to knowledge may result in misinterpretation, misguided goals of care and even death should patients or family members try recommendations outside of the realm of professional medical care in a supervised way.    Vitals:    12/06/21 1100   PainSc:   6   PainLoc: Neck        Amaliasa Lopez reports a pain score of 6.  Given her pain assessment as noted, treatment options were discussed and the following options were decided upon as a follow-up plan to address the patient's pain: continuation of current treatment plan for pain.

## 2021-12-15 ENCOUNTER — TREATMENT (OUTPATIENT)
Dept: PHYSICAL THERAPY | Facility: CLINIC | Age: 66
End: 2021-12-15

## 2021-12-15 DIAGNOSIS — M54.2 PAIN, NECK: Primary | ICD-10-CM

## 2021-12-15 DIAGNOSIS — M79.18 MYALGIA, OTHER SITE: ICD-10-CM

## 2021-12-15 DIAGNOSIS — G43.011 INTRACTABLE MIGRAINE WITHOUT AURA AND WITH STATUS MIGRAINOSUS: ICD-10-CM

## 2021-12-15 PROCEDURE — 97162 PT EVAL MOD COMPLEX 30 MIN: CPT | Performed by: PHYSICAL THERAPIST

## 2021-12-15 PROCEDURE — 97140 MANUAL THERAPY 1/> REGIONS: CPT | Performed by: PHYSICAL THERAPIST

## 2021-12-15 NOTE — PROGRESS NOTES
Re-Assessment / Progress Note    Patient: Amalia Lopez   : 1955  Diagnosis/ICD-10 Code:  Pain ,neck [M54.2], Intractable migraine without aura and with status migrainous [G43.011], Myalgia, other site [M7.18]   Referring practitioner: Jordin Strauss MD  Date of Initial Visit: Episode Type: THERAPY  Noted: 2021    Today's Date: 12/15/2021  Patient seen for 4 sessions.      Subjective:   Amalia Lopez reports: she is wanting to continue PT, but wants to go forward with other treatment not just dry needling.  She reports that she is just continuing to have a lot of pain at the base of her skull and her neck.  She is just not having a great day today as she is hurting really band.      Subjective Questionnaire: NDI: 60%   Treatment has included: dry needling    Pain  Current pain ratin  At best pain ratin  At worst pain ratin  Quality: discomfort, throbbing, tight and radiating  Relieving factors: medications, relaxation, rest and heat  Aggravating factors: movement, lifting, overhead activity, standing, prolonged positioning, ambulation and repetitive movement  Progression: worsening     Social Support  Lives in: apartment     Diagnostic Tests  X-ray: abnormal  MRI studies: abnormal     Treatments  Previous treatment: physical therapy, injection treatment, massage and medication  Patient Goals  Patient goals for therapy: decreased pain and increased motion  Patient goal: Eliminate migraines      Objective      Active Range of Motion   Cervical/Thoracic Spine   Cervical     Flexion: 20 degrees with pain  Extension: 23 degrees with pain  Left lateral flexion: 17 degrees with pain  Right lateral flexion: 18 degrees with pain  Left rotation: 39 degrees with pain  Right rotation: 40 degrees with pain      Palpation   Left   Tenderness of the sternocleidomastoid and upper trapezius.   Trigger point to sternocleidomastoid and upper trapezius.      Right Tenderness of the upper trapezius.    Trigger point to upper trapezius.      Tenderness     Additional Tenderness Details  B C2 TP  L C4/5 TP  C7  T1/2 hypomobility (mild) w/tenderness in supine PA  Anterior scalenes on L side.   L temporalis m.   L SCM m.     Passive Range of Motion     Additional Passive Range of Motion Details  R rotation restricted w/pain   AA rotation slight restriction with pain bilaterally   OA restriction L>R     Strength/Myotome Testing   Left UE: grossly 4-/5   Right UE: grossly 4/5     Assessment & Plan      Assessment  Impairments: abnormal or restricted ROM, activity intolerance, impaired physical strength, lacks appropriate home exercise program and pain with function    Assessment details: Pt is a 65 year old female reporting to the clinic with bilateral neck pain with migraines, primarily occurring on the L side, occurring at the same time. Palpation to c-spine and t-spine showed tenderness throughout bilaterally with slight STM/release producing moderate pain relief. She had restricted AROM in all cervical motions as well as restricted cervical PROM and AA PROM, all accompanied by pain. Dry needling was initiated to reduce pain and sensitivity in neck and upper thoracic region, but pt is now returning for more formal PT treatment to further work on manual and therex for neck issues.. She will benefit from skilled therapy to address impairments, increase activity tolerance, and improve quality of life.     Prognosis: fair  Functional Limitations: carrying objects, lifting, sleeping, walking, pulling, pushing, uncomfortable because of pain, reaching overhead and unable to perform repetitive tasks     Goals  Plan Goals: STG - 3 weeks:  1. Pt to be compliant and independent with HEP. - progressing   2. Pt to report pain at worse with migraine 6/10 or less to improve activity tolerance. - not met   3. Pt to report migraine frequency to 1-2 at most per week. - not met     LTG - 6 weeks: - none met   1. Pt to achieve NDI  score of 45% or less to show improvements in cervical function.   2. Pt be able to walk for 20 minutes without migraine rating 5/10 or higher to increase ability to exercise and improve quality of life.   3. Pt be able to achieve 40 degrees of cervical SBing motion bilaterally, and 35 degrees of extension AROM to decrease stiffness with activity and improve ability to recreate comfortably.    4. Pt to improve her LUE strength to grossly 4/5 to improve lifting ability.   5. Pt to be able to hold neck flexion in supine (deep neck flexor endurance) x10 seconds or greater with min to no reports of pain.       Plan  Therapy options: will be seen for skilled physical therapy services  Planned interventions: Dry needling, modalities such as MHP, cold pack, US, therapeutic exercise, therapeutic activity, self care/home, manual therapy, joint mobilization.   Frequency: 1x per week  Duration in visits: 10 weeks   Treatment plan discussed with: patient   Recommendations: Continue as planned with addition of other intervention options.   Prognosis to achieve goals: good    PT Signature: Patricia Sheikh, PT, DPT       Based upon review of the patient's progress and continued therapy plan, it is my medical opinion that Amalia Lopez should continue physical therapy treatment at [facility]  [address]  [place] [address] [phone].    Signature: __________________________________  Jordin Strauss MD    Timed:         Manual Therapy:    25     mins  26187;     Therapeutic Exercise:         mins  85176;     Neuromuscular Patel:        mins  08483;    Therapeutic Activity:          mins  77821;     Gait Training:           mins  27073;     Ultrasound:          mins  20204;    Ionto                                   mins   59671  Self Care                            mins   76132        Un-Timed:  Electrical Stimulation:         mins  18062 ( );  Dry Needling           mins self-pay  Traction          mins 87832  Low Eval          Mins  18667  Mod Eval          Mins  20803  High Eval                            Mins  28091  Re-Eval                           15    mins  83277        Timed Treatment:   25   mins   Total Treatment:     40   mins

## 2021-12-21 ENCOUNTER — TREATMENT (OUTPATIENT)
Dept: PHYSICAL THERAPY | Facility: CLINIC | Age: 66
End: 2021-12-21

## 2021-12-21 DIAGNOSIS — M54.2 PAIN, NECK: Primary | ICD-10-CM

## 2021-12-21 DIAGNOSIS — G43.011 INTRACTABLE MIGRAINE WITHOUT AURA AND WITH STATUS MIGRAINOSUS: ICD-10-CM

## 2021-12-21 DIAGNOSIS — M79.18 MYALGIA, OTHER SITE: ICD-10-CM

## 2021-12-21 PROCEDURE — 97140 MANUAL THERAPY 1/> REGIONS: CPT | Performed by: PHYSICAL THERAPIST

## 2021-12-21 PROCEDURE — 97110 THERAPEUTIC EXERCISES: CPT | Performed by: PHYSICAL THERAPIST

## 2021-12-21 NOTE — PROGRESS NOTES
Physical Therapy Daily Progress Note    VISIT#: 5    Subjective   Amalia Lopez reports she is doing not too bad today.  Did ok after last visit.     Pain Rating (0/10): 6    Objective     See Exercise, Manual, and Modality Logs for complete treatment.     Assessment/Plan  Patient with slight increase in pain at base of skull into SO region.  Introduced pt to OA nods, and cervical retraction, but slightly irritating to neck symptoms.     Plan  Progress per Plan of Care            Timed:         Manual Therapy:    30     mins  30615;     Therapeutic Exercise:    10     mins  76694;     Neuromuscular Patel:        mins  36737;    Therapeutic Activity:          mins  66588;     Gait Training:           mins  02877;     Ultrasound:          mins  63513;    Ionto                                   mins   80299  Self Care                            mins   59636    Un-Timed:  Electrical Stimulation:         mins  90837 ( );  Dry Needling          mins self-pay  Traction          mins 10904  Low Eval          Mins  35082  Mod Eval          Mins  66091  High Eval                            Mins  08043  Re-Eval                               mins  11870    Timed Treatment:   40   mins   Total Treatment:     40   mins    Patricia Sheikh, PT, DPT  Physical Therapist

## 2022-01-04 ENCOUNTER — TREATMENT (OUTPATIENT)
Dept: PHYSICAL THERAPY | Facility: CLINIC | Age: 67
End: 2022-01-04

## 2022-01-04 DIAGNOSIS — M54.2 PAIN, NECK: Primary | ICD-10-CM

## 2022-01-04 DIAGNOSIS — G43.011 INTRACTABLE MIGRAINE WITHOUT AURA AND WITH STATUS MIGRAINOSUS: ICD-10-CM

## 2022-01-04 DIAGNOSIS — M79.18 MYALGIA, OTHER SITE: ICD-10-CM

## 2022-01-04 PROCEDURE — 97140 MANUAL THERAPY 1/> REGIONS: CPT | Performed by: PHYSICAL THERAPIST

## 2022-01-04 NOTE — PROGRESS NOTES
Physical Therapy Daily Progress Note    VISIT#: 6    Subjective   Amaliasa Lopez reports she is feels that the Botox is wearing off some.  She is having worse migraines this week.     Pain Rating (0/10): 7    Objective     See Exercise, Manual, and Modality Logs for complete treatment.     Assessment/Plan  Patient with reduction in pain/tension and headache post session.  No therex performed as pt reports she is already doing a lot of exercises at home.     Plan  Progress per Plan of Care            Timed:         Manual Therapy:    30    mins  25838;     Therapeutic Exercise:         mins  10156;     Neuromuscular Patel:        mins  69248;    Therapeutic Activity:          mins  47211;     Gait Training:           mins  73019;     Ultrasound:          mins  04092;    Ionto                                   mins   20850  Self Care                            mins   48752    Un-Timed:  Electrical Stimulation:         mins  74631 ( );  Dry Needling          mins self-pay  Traction          mins 81509  Low Eval          Mins  94541  Mod Eval          Mins  94218  High Eval                            Mins  51530  Re-Eval                               mins  50629    Timed Treatment:   30   mins   Total Treatment:     30   mins    Patricia Sheikh, PT, DPT  Physical Therapist

## 2022-01-06 ENCOUNTER — TELEPHONE (OUTPATIENT)
Dept: PAIN MEDICINE | Facility: CLINIC | Age: 67
End: 2022-01-06

## 2022-01-06 DIAGNOSIS — M79.18 CERVICAL MYOFASCIAL PAIN SYNDROME: ICD-10-CM

## 2022-01-06 RX ORDER — OXYCODONE AND ACETAMINOPHEN 10; 325 MG/1; MG/1
1 TABLET ORAL DAILY PRN
Qty: 30 TABLET | Refills: 0 | Status: SHIPPED | OUTPATIENT
Start: 2022-01-06 | End: 2022-04-06 | Stop reason: SDUPTHER

## 2022-01-06 NOTE — TELEPHONE ENCOUNTER
Caller: Amalia Lopez    Relationship: Self    Best call back number: [phone]  Requested Prescriptions:   Requested Prescriptions     Pending Prescriptions Disp Refills   • oxyCODONE-acetaminophen (PERCOCET)  MG per tablet 30 tablet 0     Sig: Take 1 tablet by mouth Daily As Needed for Severe Pain .        Pharmacy where request should be sent:  [facility] IN PATIENT'S CHART    Additional details provided by patient: PATIENT ADVISED SHE HAS LESS THAN A 3 DAY SUPPLY.     Does the patient have less than a 3 day supply:  [x] Yes  [] No    We'Paulo Patiño Rep   01/06/22 09:43 EST

## 2022-01-10 ENCOUNTER — TREATMENT (OUTPATIENT)
Dept: PHYSICAL THERAPY | Facility: CLINIC | Age: 67
End: 2022-01-10

## 2022-01-10 DIAGNOSIS — M54.2 PAIN, NECK: Primary | ICD-10-CM

## 2022-01-10 DIAGNOSIS — M79.18 MYALGIA, OTHER SITE: ICD-10-CM

## 2022-01-10 DIAGNOSIS — G43.011 INTRACTABLE MIGRAINE WITHOUT AURA AND WITH STATUS MIGRAINOSUS: ICD-10-CM

## 2022-01-10 PROCEDURE — 97140 MANUAL THERAPY 1/> REGIONS: CPT | Performed by: PHYSICAL THERAPIST

## 2022-01-10 NOTE — PROGRESS NOTES
Physical Therapy Daily Progress Note    VISIT#: 7    Subjective   Amalia Lopez reports she is doing a little better today, but she is still having a lot of issues. Had a bad migraine this weekend.     Pain Rating (0/10): 7    Objective     See Exercise, Manual, and Modality Logs for complete treatment.    Assessment/Plan  Patient with slight reduction in pain at base of skull post session.  Demonstrates continued OA and AA restrictions R>L.     Plan  Progress per Plan of Care            Timed:         Manual Therapy:    38     mins  60941;     Therapeutic Exercise:         mins  41997;     Neuromuscular Patel:        mins  79966;    Therapeutic Activity:          mins  02010;     Gait Training:           mins  57528;     Ultrasound:          mins  30735;    Ionto                                   mins   92409  Self Care                            mins   63652    Un-Timed:  Electrical Stimulation:         mins  45657 ( );  Dry Needling          mins self-pay  Traction          mins 48941  Low Eval          Mins  23929  Mod Eval          Mins  09715  High Eval                            Mins  73167  Re-Eval                               mins  60834    Timed Treatment:   38   mins   Total Treatment:     38   mins    Patricia Sheikh, PT, DPT  Physical Therapist

## 2022-01-14 ENCOUNTER — TREATMENT (OUTPATIENT)
Dept: PHYSICAL THERAPY | Facility: CLINIC | Age: 67
End: 2022-01-14

## 2022-01-14 DIAGNOSIS — M79.18 MYALGIA, OTHER SITE: ICD-10-CM

## 2022-01-14 DIAGNOSIS — G43.011 INTRACTABLE MIGRAINE WITHOUT AURA AND WITH STATUS MIGRAINOSUS: ICD-10-CM

## 2022-01-14 DIAGNOSIS — M54.2 PAIN, NECK: Primary | ICD-10-CM

## 2022-01-14 PROCEDURE — 97140 MANUAL THERAPY 1/> REGIONS: CPT | Performed by: PHYSICAL THERAPIST

## 2022-01-17 ENCOUNTER — PROCEDURE VISIT (OUTPATIENT)
Dept: PAIN MEDICINE | Facility: CLINIC | Age: 67
End: 2022-01-17

## 2022-01-17 VITALS
TEMPERATURE: 96.9 F | HEIGHT: 66 IN | DIASTOLIC BLOOD PRESSURE: 50 MMHG | HEART RATE: 48 BPM | WEIGHT: 103 LBS | BODY MASS INDEX: 16.55 KG/M2 | SYSTOLIC BLOOD PRESSURE: 113 MMHG

## 2022-01-17 DIAGNOSIS — Z97.8 PRESENCE OF INTRATHECAL PUMP: ICD-10-CM

## 2022-01-17 DIAGNOSIS — Z96.89 SPINAL CORD STIMULATOR STATUS: ICD-10-CM

## 2022-01-17 DIAGNOSIS — G89.29 CHRONIC PAIN OF RIGHT KNEE: ICD-10-CM

## 2022-01-17 DIAGNOSIS — M25.561 CHRONIC PAIN OF RIGHT KNEE: ICD-10-CM

## 2022-01-17 DIAGNOSIS — G89.4 CHRONIC PAIN SYNDROME: Primary | ICD-10-CM

## 2022-01-17 DIAGNOSIS — M96.1 LUMBAR POST-LAMINECTOMY SYNDROME: ICD-10-CM

## 2022-01-17 DIAGNOSIS — G90.512 COMPLEX REGIONAL PAIN SYNDROME TYPE 1 OF LEFT UPPER EXTREMITY: ICD-10-CM

## 2022-01-17 DIAGNOSIS — M96.1 CERVICAL POST-LAMINECTOMY SYNDROME: ICD-10-CM

## 2022-01-17 DIAGNOSIS — M17.11 ARTHRITIS OF RIGHT KNEE: ICD-10-CM

## 2022-01-17 DIAGNOSIS — G43.909 MIGRAINE WITHOUT STATUS MIGRAINOSUS, NOT INTRACTABLE, UNSPECIFIED MIGRAINE TYPE: ICD-10-CM

## 2022-01-17 PROCEDURE — 62369 ANAL SP INF PMP W/REPRG&FILL: CPT | Performed by: ANESTHESIOLOGY

## 2022-01-17 PROCEDURE — 99213 OFFICE O/P EST LOW 20 MIN: CPT | Performed by: ANESTHESIOLOGY

## 2022-01-17 NOTE — PROGRESS NOTES
CHIEF COMPLAINT  F/U back pain- patient states that her pain has worsened slightly.     Subjective   Amalia Lopez is a 66 y.o. female  who presents for follow-up.  She has a history of back pain and neck pain and complex regional pain syndrome.    There is a history of radiculopathies and the complex regional pain syndrome is type I into the left arm.  She has a spinal stimulator as well as an intrathecal pump.  She has some history of arthritis related pain especially right knee arthritis but also cervical spondylosis.  She has a history of headache disorder that includes occipital neuralgia but also significant for migraines.    INTRATHECAL PUMP REFILL AND REPROGRAMMING:  Intrathecal pump was interrogated in office today. Results are in chart. The patient is currently at a dose of 1.245 mg of morphine per day. There is no PTM bolus. Under sterile technique, the pump was accessed using a [employer] proprietary refill kit. The volume was withdrawn from the pump. The expected residual volume of the pump was 3 ml. The actual residual volume of the pump was 3 ml.  The pump was then refilled with 20 ml of morphine at a concentration of 6 mg/ml.  The pump was set to continue at its previous rate. No changes were made.   Patient tolerated procedure well. Minimal bleeding was noted. Pump site remains intact with no evidence of erythema or drainage.  Patient will return for pump refill when due or sooner if needed. DASH is 42 months.  Pump refill date is April 13, 2022.    ---    Patient remained masked during entire encounter. No cough present. I donned a mask and eye protection throughout entire visit. Prior to donning mask and eye protection, hand hygiene was performed, as well as when it was doffed.  I was closer than 6 feet, but not for an extended period of time. No obvious exposure to any bodily fluids.    History of Present Illness     She has made significant strides with 50% decrease in headaches with the use of  "Botox.  Continuation of Botox is indicated.  She does continue to use her neuromodulation devices for back pain and neck pain and also CRPS pain.  Intrathecal pump helps with neck pain and CRPS pain especially but also with back pain.  She has had more joint pain and stiffness since the weather has been colder, \"especially with it being in the 20s\" and she has been taking oxycodone daily over the past 3 to 4 days, which is an increase for her, but she is only taking half pill daily in the mornings to help with morning stiffness.  Aching chronic right knee pain is slightly worse especially with colder weather.    PEG Assessment   What number best describes your pain on average in the past week?6  What number best describes how, during the past week, pain has interfered with your enjoyment of life?7  What number best describes how, during the past week, pain has interfered with your general activity?  5    --  The aforementioned information the Chief Complaint section and above subjective data including any HPI data, and also the Review of Systems data, has been personally reviewed and affirmed.  --        The following portions of the patient's history were reviewed and updated as appropriate: allergies, current medications, past family history, past medical history, past social history, past surgical history and problem list.    -------    The following portions of the patient's history were reviewed and updated as appropriate: allergies, current medications, past family history, past medical history, past social history, past surgical history and problem list.    Allergies   Allergen Reactions   • Acetaminophen-Codeine Hives   • Fentanyl Other (See Comments) and Hives     Dystonic movements  Dystonic movements  Dystonic movements  Dystonic movements   • Morphine Other (See Comments)     Other reaction(s): Other (See Comments)  Headaches   • Codeine Itching         Current Outpatient Medications:   •  acetaminophen " (TYLENOL) 500 MG tablet, as needed, Disp: , Rfl:   •  amphetamine-dextroamphetamine (ADDERALL) 10 MG tablet, Take 5 mg by mouth Daily., Disp: , Rfl:   •  BD PosiFlush 0.9 % flush, , Disp: , Rfl:   •  chlorhexidine (PERIDEX) 0.12 % solution, RINSE AND SPIT 15ML DAILY, Disp: , Rfl: 11  •  cyanocobalamin 1000 MCG/ML injection, Inject 1,000 mcg into the appropriate muscle as directed by prescriber 1 (One) Time Per Week., Disp: , Rfl:   •  DEXILANT 60 MG capsule, Take 1 capsule by mouth Daily., Disp: , Rfl: 11  •  diphenhydrAMINE (BENADRYL) 50 MG/ML injection, , Disp: , Rfl:   •  famotidine (PEPCID) 40 MG tablet, Take 40 mg by mouth Daily., Disp: , Rfl:   •  fluconazole (DIFLUCAN) 200-0.9 MG/100ML-% IVPB, , Disp: , Rfl:   •  fludrocortisone 0.1 MG tablet, Take 0.1 mg by mouth., Disp: , Rfl:   •  FLUoxetine (PROzac) 40 MG capsule, Take 40 mg by mouth Daily., Disp: , Rfl: 3  •  heparin 100 UNIT/ML solution injection, , Disp: , Rfl:   •  ibuprofen (ADVIL,MOTRIN) 200 MG tablet, IBUPROFEN 200 MG TABS, Disp: , Rfl:   •  lidocaine (Lidoderm) 5 %, as needed, Disp: , Rfl:   •  lidocaine-prilocaine (EMLA) 2.5-2.5 % cream, , Disp: , Rfl:   •  linaclotide (LINZESS) 145 MCG capsule capsule, LINZESS 145 MCG CAPS, Disp: , Rfl:   •  loratadine (CLARITIN) 10 MG tablet, Take 10 mg by mouth., Disp: , Rfl:   •  morphine 6 mg/mL, by Intrathecal route Continuous., Disp: 20 mL, Rfl: 0  •  omeprazole (priLOSEC) 40 MG capsule, Take 1 capsule by mouth., Disp: , Rfl:   •  ondansetron ODT (ZOFRAN-ODT) 8 MG disintegrating tablet, , Disp: , Rfl:   •  oxyCODONE-acetaminophen (PERCOCET)  MG per tablet, Take 1 tablet by mouth Daily As Needed for Severe Pain ., Disp: 30 tablet, Rfl: 0  •  PANZYGA 10 GM/100ML solution infusion, , Disp: , Rfl:   •  pregabalin (LYRICA) 150 MG capsule, Take 1 capsule by mouth 3 (Three) Times a Day., Disp: 90 capsule, Rfl: 5  •  raNITIdine (ZANTAC) 150 MG tablet, Take 1 tablet by mouth., Disp: , Rfl:   •   SENNOSIDES-DOCUSATE SODIUM PO, SM SENNA-S TABS, Disp: , Rfl:   •  sodium chloride 0.9 % solution, , Disp: , Rfl:   •  SUMAtriptan (IMITREX) 100 MG tablet, TAKE 1 TABLET BY MOUTH AS NEEDED FOR HEADACHE, Disp: , Rfl: 6    Current Outpatient Medications on File Prior to Visit   Medication Sig Dispense Refill   • acetaminophen (TYLENOL) 500 MG tablet as needed     • amphetamine-dextroamphetamine (ADDERALL) 10 MG tablet Take 5 mg by mouth Daily.     • BD PosiFlush 0.9 % flush      • chlorhexidine (PERIDEX) 0.12 % solution RINSE AND SPIT 15ML DAILY  11   • cyanocobalamin 1000 MCG/ML injection Inject 1,000 mcg into the appropriate muscle as directed by prescriber 1 (One) Time Per Week.     • DEXILANT 60 MG capsule Take 1 capsule by mouth Daily.  11   • diphenhydrAMINE (BENADRYL) 50 MG/ML injection      • famotidine (PEPCID) 40 MG tablet Take 40 mg by mouth Daily.     • fluconazole (DIFLUCAN) 200-0.9 MG/100ML-% IVPB      • fludrocortisone 0.1 MG tablet Take 0.1 mg by mouth.     • FLUoxetine (PROzac) 40 MG capsule Take 40 mg by mouth Daily.  3   • heparin 100 UNIT/ML solution injection      • ibuprofen (ADVIL,MOTRIN) 200 MG tablet IBUPROFEN 200 MG TABS     • lidocaine (Lidoderm) 5 % as needed     • lidocaine-prilocaine (EMLA) 2.5-2.5 % cream      • linaclotide (LINZESS) 145 MCG capsule capsule LINZESS 145 MCG CAPS     • loratadine (CLARITIN) 10 MG tablet Take 10 mg by mouth.     • morphine 6 mg/mL by Intrathecal route Continuous. 20 mL 0   • omeprazole (priLOSEC) 40 MG capsule Take 1 capsule by mouth.     • ondansetron ODT (ZOFRAN-ODT) 8 MG disintegrating tablet      • oxyCODONE-acetaminophen (PERCOCET)  MG per tablet Take 1 tablet by mouth Daily As Needed for Severe Pain . 30 tablet 0   • PANZYGA 10 GM/100ML solution infusion      • pregabalin (LYRICA) 150 MG capsule Take 1 capsule by mouth 3 (Three) Times a Day. 90 capsule 5   • raNITIdine (ZANTAC) 150 MG tablet Take 1 tablet by mouth.     • SENNOSIDES-DOCUSATE  SODIUM PO SM SENNA-S TABS     • sodium chloride 0.9 % solution      • SUMAtriptan (IMITREX) 100 MG tablet TAKE 1 TABLET BY MOUTH AS NEEDED FOR HEADACHE  6     No current facility-administered medications on file prior to visit.       Patient Active Problem List   Diagnosis   • Chronic pain syndrome   • Neuropathic pain   • Complex regional pain syndrome type 1 of left upper extremity   • Presence of intrathecal pump   • Spinal cord stimulator status   • Cervical post-laminectomy syndrome   • Lumbar post-laminectomy syndrome   • Bilateral occipital neuralgia   • Cervical spondylosis without myelopathy   • Encounter for long-term opiate analgesic use   • Migraine without status migrainosus, not intractable   • Arthritis of right knee   • Chronic pain of right knee   • Chronic fatigue   • Screening for endocrine, nutritional, metabolic and immunity disorder   • Vitamin D deficiency   • Anemia, unspecified   • Chronic migraine without aura without status migrainosus, not intractable       Past Medical History:   Diagnosis Date   • Biliary stenosis    • Depression    • Gastroparesis    • GERD (gastroesophageal reflux disease)    • History of ERCP    • IBS (irritable bowel syndrome)    • Joint pain    • Low back pain    • Migraine    • Neck pain    • Osteoarthritis    • Peripheral neuropathy    • Sleep apnea    • Spinal cord stimulator status    • Vitamin B12 deficiency        Past Surgical History:   Procedure Laterality Date   • BACK SURGERY     • CHOLECYSTECTOMY     • EPIDURAL BLOCK     • HYSTERECTOMY     • IMPLANTATION / REPLACEMENT INFUSION PUMP     • KNEE ARTHROSCOPY Right    • NECK SURGERY     • PANCREAS SURGERY     • SPINAL CORD STIMULATOR IMPLANT     • TRIGGER POINT INJECTION         History reviewed. No pertinent family history.    Social History     Socioeconomic History   • Marital status:    Tobacco Use   • Smoking status: Never Smoker   • Smokeless tobacco: Never Used   Vaping Use   • Vaping Use:  "Never used   Substance and Sexual Activity   • Alcohol use: No   • Drug use: No       -------        Review of Systems   Constitutional: Positive for activity change (decreased) and fatigue. Negative for chills and fever.   HENT: Negative for congestion.    Respiratory: Negative for chest tightness and shortness of breath.    Cardiovascular: Negative for chest pain.   Gastrointestinal: Positive for abdominal pain, constipation and diarrhea.   Genitourinary: Negative for difficulty urinating, dyspareunia and dysuria.   Musculoskeletal: Positive for back pain.   Neurological: Positive for dizziness, weakness, light-headedness, numbness and headaches.   Psychiatric/Behavioral: Positive for sleep disturbance. Negative for agitation, self-injury and suicidal ideas. The patient is not nervous/anxious.      E-nam report is reviewed:  I reviewed the document in the electronic form under the PDMP tab in the Epic EMR...  - In this function, the report is a current report in as close to real-time as possible.  - The report was available for immediate review.    - I did eric the report as reviewed.  - There is not concern for aberrant behavior based on this ekasper review.    Vitals:    01/17/22 1056   BP: 113/50   Pulse: (!) 48   Temp: 96.9 °F (36.1 °C)   Weight: 46.7 kg (103 lb)   Height: 167.6 cm (66\")   PainSc:   7   PainLoc: Back         Objective   Physical Exam  Vitals (She is underweight for height) and nursing note reviewed.   Constitutional:       General: She is not in acute distress.     Appearance: Normal appearance. She is well-developed. She is not toxic-appearing.   HENT:      Head: Normocephalic and atraumatic.      Right Ear: Hearing normal.      Left Ear: Hearing normal.   Eyes:      General: Lids are normal. No scleral icterus.     Conjunctiva/sclera: Conjunctivae normal.      Pupils: Pupils are equal, round, and reactive to light.   Pulmonary:      Effort: Pulmonary effort is normal. No respiratory " "distress.   Musculoskeletal:      Cervical back: Tenderness and crepitus present. No deformity or rigidity. Decreased range of motion.      Thoracic back: Normal range of motion.      Lumbar back: Tenderness present. No edema or deformity. Normal range of motion.      Right knee: Crepitus present. No swelling, effusion or erythema.   Neurological:      Mental Status: She is alert and oriented to person, place, and time.      Cranial Nerves: Cranial nerves are intact. No cranial nerve deficit.      Motor: No weakness.      Coordination: Coordination is intact.      Gait: Gait is intact.   Psychiatric:         Mood and Affect: Mood normal.         Behavior: Behavior normal.         Thought Content: Thought content normal.         Judgment: Judgment normal.             Assessment/Plan   Diagnoses and all orders for this visit:    1. Chronic pain syndrome (Primary)    2. Chronic pain of right knee    3. Arthritis of right knee    4. Migraine without status migrainosus, not intractable, unspecified migraine type    5. Lumbar post-laminectomy syndrome    6. Cervical post-laminectomy syndrome    7. Presence of intrathecal pump    8. Spinal cord stimulator status    9. Complex regional pain syndrome type 1 of left upper extremity      -- Pump reprogramming and refill in the office today  --- Follow-up for next pump reill  -- Follow-up for next Botox therapy for migraines  --Continue acute intermittent use of oxycodone especially for arthritic pains  -- Requesting an [place] inspect       --------    MOTIVATING YOURSELF TO MOVE  \"Positive Self-Talk\" in managing Chronic Fatigue from Chronic Illness    Chronic illness causes chronic fatigue, and can make it feel impossible to exercise.  Do not forget that exercise is a proven energy booster.  It can be difficult to get moving, but it is definitely worth the effort to find a way to get started.  It is essential for patients with chronic illness to find that motivation to " "exercise.  Positive self talk is a behavioral and psychological technique that is proven to be effective.  I hope that you telling yourself these things can help to overcome fatigue and get moving!    \"I will feel so much better after exercise.\"  - More energy, less pain, less stiffness    It will give me more energy.\"  - Regular low intensity exercise can decrease fatigue by 65%.  This is because of direct effects on the nervous system and is not even tied to aerobic fitness.    \"Not exercising will make it worse.\"  - Function will improve, muscle loss will decrease, and pain symptoms flareup should not become worse.  - I will exercise first thing in the morning.  This does not work for everyone but can decrease morning stiffness in improve energy levels for the day.    \"I do not have to do it all at once.\"  - Small amounts, 5 minutes of walking at a time, we will add up and also avoid burnout and overexertion.    \"Anything is better than nothing.  - Just get some type of movement every day instead of staying sedentary.    - \"Slow and steady wins the race.\"    \"I just have to exercise today.\"  - No overwhelming or extended long-term goals.    - Just today.   - Just 1 day at a time.    - Have a plan as to when you'll get it in.    - Ask for help from others if needed.    - Ask others to send you a motivational message if needed !      \"I can stop if I need to.\"  - As I said above, any amount is helpful.  If you need to stop early, that is fine.    - \"I can take as many breaks as I need to take.\"    \"This will be fun !\"  - It is easy to be motivated if you like it.    - It may be time to see friends in a group activity.    - It may be an activity you enjoy.    - May be learning a new game or trying something new.    - What ever it is, needs to feel like fun.      --------    Patient appears stable with current regimen. No adverse effects. Regarding continuation of opioids, there is no evidence of aberrant behavior or " any red flags.  The patient continues with appropriate response to opioid therapy. ADL's remain intact by self.           SMITH REPORT  As part of the patient's treatment plan, I am prescribing controlled substances. The patient has been made aware of appropriate use of such medications, including potential risk of somnolence, limited ability to drive and/or work safely, and the potential for dependence or overdose. It has also bee made clear that these medications are for use by this patient only, without concomitant use of alcohol or other substances unless prescribed.     Patient has completed prescribing agreement detailing terms of continued prescribing of controlled substances, including monitoring SMITH reports, urine drug screening, and pill counts if necessary. The patient is aware that inappropriate use will results in cessation of prescribing such medications.    As the clinician, I personally reviewed the SMITH from as above while the patient was in the office today.    History and physical exam exhibit continued safe and appropriate use of controlled substances.  ---     Dictated utilizing Dragon dictation.     This document is intended for medical expert use only. Reading of this document by patients and/or patient's family without participating medical staff guidance may result in misinterpretation and unintended morbidity.   Any interpretation of such data is the responsibility of the patient and/or family member responsible for the patient in concert with their primary or specialist providers, not to be left for sources of online searches such as [employer], [employer] or similar queries. Relying on these approaches to knowledge may result in misinterpretation, misguided goals of care and even death should patients or family members try recommendations outside of the realm of professional medical care in a supervised way.    --    Vitals:    01/17/22 1056   PainSc:   7   PainLoc: Back        Amalia  Jessica reports a pain score of 7.  Given her pain assessment as noted, treatment options were discussed and the following options were decided upon as a follow-up plan to address the patient's pain: continuation of current treatment plan for pain.

## 2022-01-20 NOTE — PROGRESS NOTES
Physical Therapy Daily Progress Note    VISIT#: 8    Subjective   Amalia Lopez reports she is doing some better today.      Pain Rating (0/10): 5    Objective     See Exercise, Manual, and Modality Logs for complete treatment.     Assessment/Plan  Patient with reduction in headache/SO region pain post session today.      Plan   Progress per Plan of Care            Timed:         Manual Therapy:    38     mins  43494;     Therapeutic Exercise:         mins  72450;     Neuromuscular Patel:        mins  16272;    Therapeutic Activity:          mins  41255;     Gait Training:           mins  47589;     Ultrasound:          mins  02700;    Ionto                                   mins   16696  Self Care                            mins   31273    Un-Timed:  Electrical Stimulation:         mins  27804 ( );  Dry Needling          mins self-pay  Traction          mins 53631  Low Eval          Mins  04430  Mod Eval          Mins  91913  High Eval                            Mins  81548  Re-Eval                               mins  55525    Timed Treatment:   38   mins   Total Treatment:     38   mins    Patricia Sheikh, PT, DPT  Physical Therapist

## 2022-01-21 ENCOUNTER — TREATMENT (OUTPATIENT)
Dept: PHYSICAL THERAPY | Facility: CLINIC | Age: 67
End: 2022-01-21

## 2022-01-21 DIAGNOSIS — M54.2 PAIN, NECK: Primary | ICD-10-CM

## 2022-01-21 DIAGNOSIS — M79.18 MYALGIA, OTHER SITE: ICD-10-CM

## 2022-01-21 DIAGNOSIS — G43.011 INTRACTABLE MIGRAINE WITHOUT AURA AND WITH STATUS MIGRAINOSUS: ICD-10-CM

## 2022-01-21 PROCEDURE — 97140 MANUAL THERAPY 1/> REGIONS: CPT | Performed by: PHYSICAL THERAPIST

## 2022-01-21 NOTE — PROGRESS NOTES
Physical Therapy Daily Progress Note    VISIT#: 9    Subjective   Amalia Lopez reports she had a migraine last night and still has one lingering this morning.     Pain Rating (0/10): 6    Objective     See Exercise, Manual, and Modality Logs for complete treatment.     Assessment/Plan  Less SO region discomfort.  Improving AA rotation.     Plan  Progress per Plan of Care.  10th visit progress note next visit.             Timed:         Manual Therapy:    38     mins  35800;     Therapeutic Exercise:         mins  93569;     Neuromuscular Patel:        mins  49321;    Therapeutic Activity:          mins  43804;     Gait Training:           mins  32307;     Ultrasound:          mins  32458;    Ionto                                   mins   50067  Self Care                            mins   67500    Un-Timed:  Electrical Stimulation:         mins  92132 ( );  Dry Needling          mins self-pay  Traction          mins 65143  Low Eval          Mins  03967  Mod Eval          Mins  93127  High Eval                            Mins  50714  Re-Eval                               mins  75860    Timed Treatment:   38   mins   Total Treatment:     38   mins    Patricia Sheikh, PT, DPT  Physical Therapist

## 2022-01-24 ENCOUNTER — TRANSCRIBE ORDERS (OUTPATIENT)
Dept: ADMINISTRATIVE | Facility: HOSPITAL | Age: 67
End: 2022-01-24

## 2022-01-24 DIAGNOSIS — Z12.31 SCREENING MAMMOGRAM, ENCOUNTER FOR: Primary | ICD-10-CM

## 2022-02-21 ENCOUNTER — HOSPITAL ENCOUNTER (OUTPATIENT)
Dept: MAMMOGRAPHY | Facility: HOSPITAL | Age: 67
Discharge: HOME | End: 2022-02-21

## 2022-02-21 ENCOUNTER — HOSPITAL ENCOUNTER (OUTPATIENT)
Dept: BONE DENSITY | Facility: HOSPITAL | Age: 67
Discharge: HOME | End: 2022-02-21

## 2022-02-21 DIAGNOSIS — M81.0 OSTEOPOROSIS, UNSPECIFIED OSTEOPOROSIS TYPE, UNSPECIFIED PATHOLOGICAL FRACTURE PRESENCE: ICD-10-CM

## 2022-02-21 DIAGNOSIS — Z12.31 SCREENING MAMMOGRAM, ENCOUNTER FOR: ICD-10-CM

## 2022-02-21 PROCEDURE — 77080 DXA BONE DENSITY AXIAL: CPT

## 2022-02-21 PROCEDURE — 77063 BREAST TOMOSYNTHESIS BI: CPT

## 2022-02-21 PROCEDURE — 77067 SCR MAMMO BI INCL CAD: CPT

## 2022-02-25 ENCOUNTER — HOSPITAL ENCOUNTER (OUTPATIENT)
Dept: ULTRASOUND IMAGING | Facility: HOSPITAL | Age: 67
Discharge: HOME | End: 2022-02-25

## 2022-02-25 ENCOUNTER — HOSPITAL ENCOUNTER (OUTPATIENT)
Dept: MAMMOGRAPHY | Facility: HOSPITAL | Age: 67
Discharge: HOME | End: 2022-02-25

## 2022-02-25 DIAGNOSIS — N64.89 BREAST ASYMMETRY: ICD-10-CM

## 2022-02-25 PROCEDURE — G0279 TOMOSYNTHESIS, MAMMO: HCPCS

## 2022-02-25 PROCEDURE — 77065 DX MAMMO INCL CAD UNI: CPT

## 2022-03-07 ENCOUNTER — APPOINTMENT (OUTPATIENT)
Dept: MAMMOGRAPHY | Facility: HOSPITAL | Age: 67
End: 2022-03-07

## 2022-03-07 ENCOUNTER — APPOINTMENT (OUTPATIENT)
Dept: ULTRASOUND IMAGING | Facility: HOSPITAL | Age: 67
End: 2022-03-07

## 2022-03-07 ENCOUNTER — TELEPHONE (OUTPATIENT)
Dept: PAIN MEDICINE | Facility: CLINIC | Age: 67
End: 2022-03-07

## 2022-03-07 NOTE — TELEPHONE ENCOUNTER
Caller: DEANDRE FRIEDMAN    Relationship to patient: SELF    Best call back number: [phone]    Patient is needing: PATIENT IS WANTING TO SEE IF HER BOTOX IS READY TO SCHEDULE? UNABLE TO WARM TRANSFER PLEASE ADVISE

## 2022-03-08 ENCOUNTER — PROCEDURE VISIT (OUTPATIENT)
Dept: PAIN MEDICINE | Facility: CLINIC | Age: 67
End: 2022-03-08

## 2022-03-08 VITALS
SYSTOLIC BLOOD PRESSURE: 126 MMHG | TEMPERATURE: 97.2 F | OXYGEN SATURATION: 98 % | RESPIRATION RATE: 18 BRPM | HEIGHT: 66 IN | HEART RATE: 50 BPM | DIASTOLIC BLOOD PRESSURE: 75 MMHG | WEIGHT: 101.6 LBS | BODY MASS INDEX: 16.33 KG/M2

## 2022-03-08 DIAGNOSIS — G43.709 CHRONIC MIGRAINE WITHOUT AURA WITHOUT STATUS MIGRAINOSUS, NOT INTRACTABLE: Primary | ICD-10-CM

## 2022-03-08 PROCEDURE — 64615 CHEMODENERV MUSC MIGRAINE: CPT | Performed by: NURSE PRACTITIONER

## 2022-03-08 RX ORDER — IMMUNE GLOBULIN INTRAVENOUS (HUMAN) 100 MG/ML
SOLUTION INTRAVENOUS
COMMUNITY
Start: 2022-01-06

## 2022-03-08 NOTE — PROGRESS NOTES
"CHIEF COMPLAINT: Migraine      Amalia Lopez is a 66 y.o. female.  She is here for the following procedure: Botox for migraines.    Vitals:    03/08/22 0957   BP: 126/75   Pulse: 50   Resp: 18   Temp: 97.2 °F (36.2 °C)   SpO2: 98%   Weight: 46.1 kg (101 lb 9.6 oz)   Height: 167.6 cm (66\")       5 units were injected into midline procerus muscle.  10 units total were injected into  muscles - 5 on each side.   20 units total were injected into 4 sites of bilateral frontalis muscles.  40 units total were injected into 8 sites of bilateral temporalis muscles  30 units total were injected into 6 sites of bilateral occipitalis muscles.  10 units total were injected into 4 sites of cervical paraspinal muscle group.   30 units total were injected into 6 sites of bilateral upper trapezius muscles.  A total of [170] units of Botox was given. 30 units were unused.   Extra units were dispersed between: left trapezius and left occipital area    Botox: Lot: [id number]  Exp: 05/2024 NDC: 7733-1476-75  Normal Saline: Lot: [id number]  Exp: 01 MAR 2023 NDC: 6769-0704-42       Procedures    Botox Injection for Migraines &/or Chronic Headache, using the PREEMPT Protocol:    (CPT 39565)    AGENT:  Botox (OnabotulinumtoxinA), Total Units used 155 units    INDICATION & PREPARATION:    This patient meets ICHD criteria for chronic migraine disorder and has failed multiple medical treatments, and is therefore a candidate for Botox for Migraine Prophylaxis.  This injection paradigm is based on the PREEMPT protocol, as fixed-site fixed dose (FSFD) injection of five units OnabotulinumtoxinA in thirty-one defined locations, for a total of 155 units.  Those locations are Procerus, bilateral Corrugators, two injections in each bilateral Frontalis, four injections in each of bilateral Temporalis, three injections in each of bilateral Occipitalis, two injections in each of bilateral Cervical Paraspinal musculature, and three injections in each " of bilateral Trapezius.  The Botox solution is reconstituted with sterile technique, observation of appropriate vacuum seal of the vial, using 4mL of sterile saline per 200 units Botox, for a concentration of 5 units per 0.1mL; the solution is prepared immediately prior to its use.    DESCRIPTON OF PROCEDURE:  Informed consent was obtained, with discussion including but not limited to risk of hypersensitivity reactions, risk of bleeding, risk of infection or transmission of disease, risk of dysphagia, risk of weakness and/or ptosis, and risk of respiratory or cardiac events.  Antiseptic technique is observed throughout.      The patient was placed in supine position, and the Procerus and bilateral Corrugators (one FSFD injection each) were injected with needle angled up and away from the eyes.  Next, two FSFD injections were performed in each of the Frontalis muscles.  Next, the patient turned slightly to the left, the area was palpated for tenderness or pain, and the patient clenched their teeth to aid in palpating the temporalis muscle; four FSFD injections were performed on this side, including the anterior temporalis, a point about 0.5cm superior and 1.5cm posterior to the first injection which is into the medial aspect of the muscle, and points about 1.5cm caudad and posterior, respectively, to this second injection point.  The patient the turned the right and the same was performed at the right Temporalis.  The patient then turned to a prone position and each occipital area was palpated for tenderness or pain; the external occipital protuberance was palpated and three FSFD injections were applied on the left side, at a point about 1cm lateral to the protuberance and then points 1cm cephalad and medial/lateral to that first injection point.  The same was performed on the right Occipitalis.  Next, two FSFD injections were performed in the left Cervical Paraspinal muscle group, with the first about 4cm inferior  to the occipital protuberance and the second about 1cm lateral and superior to the first; the same was performed on the right Cervical Paraspinal Area.  Lastly, three FSFD injections were performed in left Trapezius, one in each upper, mid, and lower portion of the muscle; the same was performed on the right side.    ESTIMATED BLOOD LOSS:  minimal    COMPLICATIONS:    None    TOLERANCE & DISCHARGE CONDITION:    The patient tolerated the procedure well.  The patient was discharged in satisfactory condition.    PLAN OF CARE:  1. The patient returned today for Botox Therapy for Chronic Migraines.  I advise that full effect may not be seen for a couple of weeks.  Full effect is likely a decrease in frequency and severity of migraines and may not be a total cure.    2. If the effect is significant, that is a significant decrease in headache severity and frequency as well as improvement in daily function, then consideration will be given to repeating injection of Botox three months from today.    3. The patient will return in 3 months to repeat botulinum toxin treatment.  4. The patient was given our standard instruction sheet.  The patient will resume all medications as per the medication reconciliation sheet.        Visit Diagnoses:  1. Chronic migraine without aura without status migrainosus, not intractable        CAROLANN Ford  Pain Management

## 2022-04-06 ENCOUNTER — PROCEDURE VISIT (OUTPATIENT)
Dept: PAIN MEDICINE | Facility: CLINIC | Age: 67
End: 2022-04-06

## 2022-04-06 VITALS
HEART RATE: 45 BPM | OXYGEN SATURATION: 99 % | WEIGHT: 102.4 LBS | HEIGHT: 66 IN | BODY MASS INDEX: 16.46 KG/M2 | TEMPERATURE: 96.9 F | DIASTOLIC BLOOD PRESSURE: 70 MMHG | SYSTOLIC BLOOD PRESSURE: 115 MMHG

## 2022-04-06 DIAGNOSIS — Z97.8 PRESENCE OF INTRATHECAL PUMP: ICD-10-CM

## 2022-04-06 DIAGNOSIS — M79.18 CERVICAL MYOFASCIAL PAIN SYNDROME: ICD-10-CM

## 2022-04-06 DIAGNOSIS — Z79.891 ENCOUNTER FOR LONG-TERM OPIATE ANALGESIC USE: Primary | ICD-10-CM

## 2022-04-06 DIAGNOSIS — M79.2 NEUROPATHIC PAIN: ICD-10-CM

## 2022-04-06 DIAGNOSIS — G89.4 CHRONIC PAIN SYNDROME: ICD-10-CM

## 2022-04-06 DIAGNOSIS — M96.1 CERVICAL POST-LAMINECTOMY SYNDROME: ICD-10-CM

## 2022-04-06 DIAGNOSIS — M96.1 LUMBAR POST-LAMINECTOMY SYNDROME: ICD-10-CM

## 2022-04-06 DIAGNOSIS — G43.909 MIGRAINE WITHOUT STATUS MIGRAINOSUS, NOT INTRACTABLE, UNSPECIFIED MIGRAINE TYPE: ICD-10-CM

## 2022-04-06 PROCEDURE — 99213 OFFICE O/P EST LOW 20 MIN: CPT | Performed by: ANESTHESIOLOGY

## 2022-04-06 PROCEDURE — 62369 ANAL SP INF PMP W/REPRG&FILL: CPT | Performed by: ANESTHESIOLOGY

## 2022-04-06 RX ORDER — PREGABALIN 150 MG/1
150 CAPSULE ORAL 3 TIMES DAILY
Qty: 90 CAPSULE | Refills: 5 | Status: SHIPPED | OUTPATIENT
Start: 2022-04-06 | End: 2022-09-23

## 2022-04-06 RX ORDER — OXYCODONE AND ACETAMINOPHEN 10; 325 MG/1; MG/1
1 TABLET ORAL DAILY PRN
Qty: 30 TABLET | Refills: 0 | Status: SHIPPED | OUTPATIENT
Start: 2022-04-06 | End: 2022-06-29 | Stop reason: SDUPTHER

## 2022-04-06 NOTE — PROGRESS NOTES
CHIEF COMPLAINT  F/u back pain- pump refill- patient states that her pain has remained the same since her last visit.     Subjective   Amalia Lopez is a 66 y.o. female  who presents for follow-up.  She has a history of chronic pain syndrome, crps, neck pain, back pain, joint pain.      ---    INTRATHECAL PUMP REFILL AND REPROGRAMMING:  Intrathecal pump was interrogated in office today. Results are in chart.    The patient is currently at a dose of 1.245 mg of morphine per day. There is no additional PTM bolus.  Her daily dose is a basal rate of 0.945 mg of morphine per day plus a 0.3 mg flex dose at 4 AM.    Under sterile technique, the pump was accessed using a [employer] proprietary refill kit. The volume was withdrawn from the pump. The expected residual volume of the pump was 3.6 ml. The actual residual volume of the pump was 4 ml.    The pump was then refilled with 20 ml of morphine at a concentration of 6 mg/ml.  The pump was set to continue at its previous rate. No changes were made.       Patient tolerated procedure well. Minimal bleeding was noted. Pump site remains intact with no evidence of erythema or drainage.    Patient will return for pump refill when due or sooner if needed.     DASH is 40 months.  This correlates to replacement of July 2025    Pump refill date is July 1 2022.      --      Patient remained masked during entire encounter. No cough present. I donned a mask and eye protection throughout entire visit. Prior to donning mask and eye protection, hand hygiene was performed, as well as when it was doffed.  I was closer than 6 feet, but not for an extended period of time. No obvious exposure to any bodily fluids.    History of Present Illness     She has complex regional pain syndrome and also is a history of cervical and lumbar radiculopathies.  There is history of cervical and lumbar postlaminectomy syndromes.  Spinal stimulator for the complex regional pain syndrome he is continue to be  using status quo.  She has some significant arthritis especially right knee arthritis that aches and is stable.  Aching low back pain as well.  She uses the pregabalin for the radiating neuropathic pain in the intrathecal therapy to help especially with the cervical postlaminectomy syndrome.  Opiate for lower back pain and also for the joint pain.  Overall she is stable with the medication therapies.    PEG Assessment   What number best describes your pain on average in the past week?6  What number best describes how, during the past week, pain has interfered with your enjoyment of life?6  What number best describes how, during the past week, pain has interfered with your general activity?  7    --  The aforementioned information the Chief Complaint section and above subjective data including any HPI data, and also the Review of Systems data, has been personally reviewed and affirmed.  --        Review of Pertinent Medical Data ---  TACHO-nam report is reviewed:  I reviewed the document in the electronic form under the PDMP tab in the Epic EMR...  - In this function, the report is a current report in as close to real-time as possible.  - The report was available for immediate review.    - I did eric the report as reviewed.  - There is not concern for aberrant behavior based on this ekasper review.      The following portions of the patient's history were reviewed and updated as appropriate: allergies, current medications, past family history, past medical history, past social history, past surgical history and problem list.    -------    The following portions of the patient's history were reviewed and updated as appropriate: allergies, current medications, past family history, past medical history, past social history, past surgical history and problem list.    Allergies   Allergen Reactions   • Acetaminophen-Codeine Hives   • Fentanyl Other (See Comments) and Hives     Dystonic movements  Dystonic  movements  Dystonic movements  Dystonic movements   • Morphine Other (See Comments)     Other reaction(s): Other (See Comments)  Headaches   • Codeine Itching         Current Outpatient Medications:   •  acetaminophen (TYLENOL) 500 MG tablet, as needed, Disp: , Rfl:   •  amphetamine-dextroamphetamine (ADDERALL) 10 MG tablet, Take 5 mg by mouth Daily., Disp: , Rfl:   •  BD PosiFlush 0.9 % flush, , Disp: , Rfl:   •  chlorhexidine (PERIDEX) 0.12 % solution, RINSE AND SPIT 15ML DAILY, Disp: , Rfl: 11  •  cyanocobalamin 1000 MCG/ML injection, Inject 1,000 mcg into the appropriate muscle as directed by prescriber 1 (One) Time Per Week., Disp: , Rfl:   •  DEXILANT 60 MG capsule, Take 1 capsule by mouth Daily., Disp: , Rfl: 11  •  diphenhydrAMINE (BENADRYL) 50 MG/ML injection, , Disp: , Rfl:   •  fludrocortisone 0.1 MG tablet, Take 0.1 mg by mouth., Disp: , Rfl:   •  FLUoxetine (PROzac) 40 MG capsule, Take 40 mg by mouth Daily., Disp: , Rfl: 3  •  heparin 100 UNIT/ML solution injection, , Disp: , Rfl:   •  ibuprofen (ADVIL,MOTRIN) 200 MG tablet, IBUPROFEN 200 MG TABS, Disp: , Rfl:   •  lidocaine (LIDODERM) 5 %, as needed, Disp: , Rfl:   •  lidocaine-prilocaine (EMLA) 2.5-2.5 % cream, , Disp: , Rfl:   •  linaclotide (LINZESS) 145 MCG capsule capsule, LINZESS 145 MCG CAPS, Disp: , Rfl:   •  loratadine (CLARITIN) 10 MG tablet, Take 10 mg by mouth., Disp: , Rfl:   •  morphine 6 mg/mL, by Intrathecal route Continuous., Disp: 20 mL, Rfl: 0  •  omeprazole (priLOSEC) 40 MG capsule, Take 1 capsule by mouth., Disp: , Rfl:   •  OnabotulinumtoxinA 200 units reconstituted solution, INJECT UP  UNITS INTRAMUSCULAR INTO HEAD & NECK AREA EVERY 12 WEEKS FOR CHRONIC MIGRAINE TO BE INJECTED IN OFFICE BY PHYSICIAN, Disp: 1 each, Rfl: 3  •  ondansetron ODT (ZOFRAN-ODT) 8 MG disintegrating tablet, , Disp: , Rfl:   •  oxyCODONE-acetaminophen (PERCOCET)  MG per tablet, Take 1 tablet by mouth Daily As Needed for Severe Pain ., Disp: 30  tablet, Rfl: 0  •  Panzyga 20 GM/200ML solution infusion, , Disp: , Rfl:   •  pregabalin (LYRICA) 150 MG capsule, Take 1 capsule by mouth 3 (Three) Times a Day., Disp: 90 capsule, Rfl: 5  •  raNITIdine (ZANTAC) 150 MG tablet, Take 1 tablet by mouth., Disp: , Rfl:   •  SENNOSIDES-DOCUSATE SODIUM PO, SM SENNA-S TABS, Disp: , Rfl:   •  sodium chloride 0.9 % solution, , Disp: , Rfl:   •  SUMAtriptan (IMITREX) 100 MG tablet, TAKE 1 TABLET BY MOUTH AS NEEDED FOR HEADACHE, Disp: , Rfl: 6    Current Outpatient Medications on File Prior to Visit   Medication Sig Dispense Refill   • acetaminophen (TYLENOL) 500 MG tablet as needed     • amphetamine-dextroamphetamine (ADDERALL) 10 MG tablet Take 5 mg by mouth Daily.     • BD PosiFlush 0.9 % flush      • chlorhexidine (PERIDEX) 0.12 % solution RINSE AND SPIT 15ML DAILY  11   • cyanocobalamin 1000 MCG/ML injection Inject 1,000 mcg into the appropriate muscle as directed by prescriber 1 (One) Time Per Week.     • DEXILANT 60 MG capsule Take 1 capsule by mouth Daily.  11   • diphenhydrAMINE (BENADRYL) 50 MG/ML injection      • fludrocortisone 0.1 MG tablet Take 0.1 mg by mouth.     • FLUoxetine (PROzac) 40 MG capsule Take 40 mg by mouth Daily.  3   • heparin 100 UNIT/ML solution injection      • ibuprofen (ADVIL,MOTRIN) 200 MG tablet IBUPROFEN 200 MG TABS     • lidocaine (LIDODERM) 5 % as needed     • lidocaine-prilocaine (EMLA) 2.5-2.5 % cream      • linaclotide (LINZESS) 145 MCG capsule capsule LINZESS 145 MCG CAPS     • loratadine (CLARITIN) 10 MG tablet Take 10 mg by mouth.     • morphine 6 mg/mL by Intrathecal route Continuous. 20 mL 0   • omeprazole (priLOSEC) 40 MG capsule Take 1 capsule by mouth.     • OnabotulinumtoxinA 200 units reconstituted solution INJECT UP  UNITS INTRAMUSCULAR INTO HEAD & NECK AREA EVERY 12 WEEKS FOR CHRONIC MIGRAINE TO BE INJECTED IN OFFICE BY PHYSICIAN 1 each 3   • ondansetron ODT (ZOFRAN-ODT) 8 MG disintegrating tablet      • Panzyga 20  GM/200ML solution infusion      • raNITIdine (ZANTAC) 150 MG tablet Take 1 tablet by mouth.     • SENNOSIDES-DOCUSATE SODIUM PO SM SENNA-S TABS     • sodium chloride 0.9 % solution      • SUMAtriptan (IMITREX) 100 MG tablet TAKE 1 TABLET BY MOUTH AS NEEDED FOR HEADACHE  6     No current facility-administered medications on file prior to visit.       Patient Active Problem List   Diagnosis   • Chronic pain syndrome   • Neuropathic pain   • Complex regional pain syndrome type 1 of left upper extremity   • Presence of intrathecal pump   • Spinal cord stimulator status   • Cervical post-laminectomy syndrome   • Lumbar post-laminectomy syndrome   • Bilateral occipital neuralgia   • Cervical spondylosis without myelopathy   • Encounter for long-term opiate analgesic use   • Migraine without status migrainosus, not intractable   • Arthritis of right knee   • Chronic pain of right knee   • Chronic fatigue   • Screening for endocrine, nutritional, metabolic and immunity disorder   • Vitamin D deficiency   • Anemia, unspecified   • Chronic migraine without aura without status migrainosus, not intractable       Past Medical History:   Diagnosis Date   • Biliary stenosis    • Depression    • Gastroparesis    • GERD (gastroesophageal reflux disease)    • History of ERCP    • IBS (irritable bowel syndrome)    • Joint pain    • Low back pain    • Migraine    • Neck pain    • Osteoarthritis    • Peripheral neuropathy    • Sleep apnea    • Spinal cord stimulator status    • Vitamin B12 deficiency        Past Surgical History:   Procedure Laterality Date   • BACK SURGERY     • CHOLECYSTECTOMY     • EPIDURAL BLOCK     • HYSTERECTOMY     • IMPLANTATION / REPLACEMENT INFUSION PUMP     • KNEE ARTHROSCOPY Right    • NECK SURGERY     • PANCREAS SURGERY     • SPINAL CORD STIMULATOR IMPLANT     • TRIGGER POINT INJECTION         History reviewed. No pertinent family history.    Social History     Socioeconomic History   • Marital status:  "   Tobacco Use   • Smoking status: Never Smoker   • Smokeless tobacco: Never Used   Vaping Use   • Vaping Use: Never used   Substance and Sexual Activity   • Alcohol use: No   • Drug use: No       -------        Review of Systems   Constitutional: Positive for fatigue. Negative for activity change, chills and fever.   HENT: Negative for congestion.    Eyes: Negative for visual disturbance.   Respiratory: Negative for chest tightness and shortness of breath.    Cardiovascular: Negative for chest pain.   Gastrointestinal: Positive for abdominal pain, constipation and diarrhea.   Genitourinary: Negative for difficulty urinating, dyspareunia and dysuria.   Musculoskeletal: Positive for back pain.   Neurological: Positive for dizziness, weakness, light-headedness, numbness and headaches.   Psychiatric/Behavioral: Positive for sleep disturbance. Negative for agitation. The patient is not nervous/anxious.        Vitals:    04/06/22 1310   BP: 115/70   Pulse: (!) 45   Temp: 96.9 °F (36.1 °C)   SpO2: 99%   Weight: 46.4 kg (102 lb 6.4 oz)   Height: 167.6 cm (66\")   PainSc:   6   PainLoc: Leg         Objective   Physical Exam  VSS, NNR, NCAT, NMNA, NRD, AAOx3.        Assessment/Plan   Diagnoses and all orders for this visit:    1. Encounter for long-term opiate analgesic use (Primary)    2. Cervical myofascial pain syndrome  -     oxyCODONE-acetaminophen (PERCOCET)  MG per tablet; Take 1 tablet by mouth Daily As Needed for Severe Pain .  Dispense: 30 tablet; Refill: 0    3. Neuropathic pain  -     pregabalin (LYRICA) 150 MG capsule; Take 1 capsule by mouth 3 (Three) Times a Day.  Dispense: 90 capsule; Refill: 5    4. Migraine without status migrainosus, not intractable, unspecified migraine type  -     pregabalin (LYRICA) 150 MG capsule; Take 1 capsule by mouth 3 (Three) Times a Day.  Dispense: 90 capsule; Refill: 5    5. Presence of intrathecal pump    6. Chronic pain syndrome    7. Cervical post-laminectomy " syndrome    8. Lumbar post-laminectomy syndrome        --- Follow-up for next pump refill  --Continue the very judicious use of oxycodone for severe painful flareups as noted above especially in the low back and also joint related pain  --Continue Lyrica for the neuropathic pain and radiculopathies.           SMITH REPORT  As part of the patient's treatment plan, I am prescribing controlled substances. The patient has been made aware of appropriate use of such medications, including potential risk of somnolence, limited ability to drive and/or work safely, and the potential for dependence or overdose. It has also bee made clear that these medications are for use by this patient only, without concomitant use of alcohol or other substances unless prescribed.     Patient has completed prescribing agreement detailing terms of continued prescribing of controlled substances, including monitoring SMITH reports, urine drug screening, and pill counts if necessary. The patient is aware that inappropriate use will results in cessation of prescribing such medications.    As the clinician, I personally reviewed the SMITH from as above while the patient was in the office today.    History and physical exam exhibit continued safe and appropriate use of controlled substances.       Dictated utilizing Dragon dictation.     This document is intended for medical expert use only. Reading of this document by patients and/or patient's family without participating medical staff guidance may result in misinterpretation and unintended morbidity.   Any interpretation of such data is the responsibility of the patient and/or family member responsible for the patient in concert with their primary or specialist providers, not to be left for sources of online searches such as [employer], [employer] or similar queries. Relying on these approaches to knowledge may result in misinterpretation, misguided goals of care and even death should patients or  family members try recommendations outside of the realm of professional medical care in a supervised way.    --    Vitals:    04/06/22 1310   PainSc:   6   PainLoc: Leg          Amalia Lopez reports a pain score of 6.  Given her pain assessment as noted, treatment options were discussed and the following options were decided upon as a follow-up plan to address the patient's pain: continuation of current treatment plan for pain, prescription for non-opiod analgesics and prescription for opiod analgesics.

## 2022-05-17 ENCOUNTER — TELEPHONE (OUTPATIENT)
Dept: PAIN MEDICINE | Facility: CLINIC | Age: 67
End: 2022-05-17

## 2022-05-17 DIAGNOSIS — G43.709 CHRONIC MIGRAINE WITHOUT AURA WITHOUT STATUS MIGRAINOSUS, NOT INTRACTABLE: ICD-10-CM

## 2022-05-17 DIAGNOSIS — G43.909 MIGRAINE WITHOUT STATUS MIGRAINOSUS, NOT INTRACTABLE, UNSPECIFIED MIGRAINE TYPE: ICD-10-CM

## 2022-05-17 DIAGNOSIS — M79.18 CERVICAL MYOFASCIAL PAIN SYNDROME: Primary | ICD-10-CM

## 2022-05-17 NOTE — TELEPHONE ENCOUNTER
Caller: DEANDRE FRIEDMAN    Relationship:SELF     Best call back number: [phone]    What is the medical concern/diagnosis:MIGRAINES    What specialty or service is being requested: PT-DRY NEEDLING FOR MIGRAINES    What is the provider, practice or medical service name: [facility]    What is the office location: [place]    What is the office phone number:     Any additional details: PATIENT STATES SHE HAS HAD MIGRAINE FOR 5 DAYS- PLEASE CONTACT PATIENT WHEN REFERRAL SENT- HAD AN APPT ON 05/17/2022- HAD TO CANCEL DUE TO NO REFERRAL

## 2022-06-03 ENCOUNTER — TELEPHONE (OUTPATIENT)
Dept: PAIN MEDICINE | Facility: CLINIC | Age: 67
End: 2022-06-03

## 2022-06-03 NOTE — TELEPHONE ENCOUNTER
HUB UNSUCCESSFULLY ATTEMPTED TO WARM TRANSFER.     Caller: SAMANTHA    Relationship: [employer]    Best call back number: [phone] OPTION 2 (THREE TIMES)     What is the best time to reach you: 9AM-8PM EST ANY AGENT CAN ASSIST    Who are you requesting to speak with (clinical staff, provider,  specific staff member): SHAWN BARGER OR DR WEAVER    What was the call regarding: ATTEMPTED TO CALL THE INSURANCE COMPANY FOR BENEFIT VERIFICATION TWICE EVERY DAY FOR THE PAST 6 DAYS AND THEY WERE UNABLE TO REACH THE INSURANCE COMPANY. THIS IS THE SECOND ATTEMPT TO REACH THIS OFFICE. THE REQUEST WILL BE CLOSED AT THIS TIME.

## 2022-06-06 ENCOUNTER — PROCEDURE VISIT (OUTPATIENT)
Dept: PAIN MEDICINE | Facility: CLINIC | Age: 67
End: 2022-06-06

## 2022-06-06 VITALS
HEIGHT: 66 IN | DIASTOLIC BLOOD PRESSURE: 61 MMHG | HEART RATE: 47 BPM | BODY MASS INDEX: 16.49 KG/M2 | TEMPERATURE: 96.4 F | WEIGHT: 102.6 LBS | SYSTOLIC BLOOD PRESSURE: 105 MMHG

## 2022-06-06 DIAGNOSIS — G43.909 MIGRAINE WITHOUT STATUS MIGRAINOSUS, NOT INTRACTABLE, UNSPECIFIED MIGRAINE TYPE: Primary | ICD-10-CM

## 2022-06-06 DIAGNOSIS — G43.709 CHRONIC MIGRAINE WITHOUT AURA WITHOUT STATUS MIGRAINOSUS, NOT INTRACTABLE: ICD-10-CM

## 2022-06-06 PROCEDURE — 64615 CHEMODENERV MUSC MIGRAINE: CPT | Performed by: ANESTHESIOLOGY

## 2022-06-06 NOTE — PROGRESS NOTES
"CHIEF COMPLAINT: Migraine      Amalia Lopez is a 66 y.o. female.  She is here for the following procedure: Botox for migraines.    She has had significant success with greater than 50% relief in migraine headaches for 3-month interval after botulinum toxin therapy and continuation of the Botox therapy for migraine prophylaxis as indicated.    Vitals:    06/06/22 1035   BP: 105/61   Pulse: (!) 47   Temp: 96.4 °F (35.8 °C)   Weight: 46.5 kg (102 lb 9.6 oz)   Height: 167.6 cm (66\")   PainSc:   5   PainLoc: Leg       5 units were injected into midline procerus muscle.  10 units total were injected into  muscles - 5 on each side.   20 units total were injected into 4 sites of bilateral frontalis muscles.  40 units total were injected into 8 sites of bilateral temporalis muscles  30 units total were injected into 6 sites of bilateral occipitalis muscles.  10 units total were injected into 4 sites of cervical paraspinal muscle group.   30 units total were injected into 6 sites of bilateral upper trapezius muscles.  A total of [155] units of Botox was given. 45 units were unused.   Extra units were dispersed between: extra units were wasted    Botox: Lot: [id number]  Exp: 12/2024  Normal Saline: Lot: [id number]  Exp: 03/01/2023       Procedures    Botox Injection for Migraines &/or Chronic Headache, using the PREEMPT Protocol:    (CPT 34056)    AGENT:  Botox (OnabotulinumtoxinA), Total Units used 155 units    INDICATION & PREPARATION:    This patient meets ICHD criteria for chronic migraine disorder and has failed multiple medical treatments, and is therefore a candidate for Botox for Migraine Prophylaxis.  This injection paradigm is based on the PREEMPT protocol, as fixed-site fixed dose (FSFD) injection of five units OnabotulinumtoxinA in thirty-one defined locations, for a total of 155 units.  Those locations are Procerus, bilateral Corrugators, two injections in each bilateral Frontalis, four injections in each " of bilateral Temporalis, three injections in each of bilateral Occipitalis, two injections in each of bilateral Cervical Paraspinal musculature, and three injections in each of bilateral Trapezius.  The Botox solution is reconstituted with sterile technique, observation of appropriate vacuum seal of the vial, using 4mL of sterile saline per 200 units Botox, for a concentration of 5 units per 0.1mL; the solution is prepared immediately prior to its use.    DESCRIPTON OF PROCEDURE:  Informed consent was obtained, with discussion including but not limited to risk of hypersensitivity reactions, risk of bleeding, risk of infection or transmission of disease, risk of dysphagia, risk of weakness and/or ptosis, and risk of respiratory or cardiac events.  Antiseptic technique is observed throughout.      The patient was placed in supine position, and the Procerus and bilateral Corrugators (one FSFD injection each) were injected with needle angled up and away from the eyes.  Next, two FSFD injections were performed in each of the Frontalis muscles.  Next, the patient turned slightly to the left, the area was palpated for tenderness or pain, and the patient clenched their teeth to aid in palpating the temporalis muscle; four FSFD injections were performed on this side, including the anterior temporalis, a point about 0.5cm superior and 1.5cm posterior to the first injection which is into the medial aspect of the muscle, and points about 1.5cm caudad and posterior, respectively, to this second injection point.  The patient the turned the right and the same was performed at the right Temporalis.  The patient then turned to a prone position and each occipital area was palpated for tenderness or pain; the external occipital protuberance was palpated and three FSFD injections were applied on the left side, at a point about 1cm lateral to the protuberance and then points 1cm cephalad and medial/lateral to that first injection point.   The same was performed on the right Occipitalis.  Next, two FSFD injections were performed in the left Cervical Paraspinal muscle group, with the first about 4cm inferior to the occipital protuberance and the second about 1cm lateral and superior to the first; the same was performed on the right Cervical Paraspinal Area.  Lastly, three FSFD injections were performed in left Trapezius, one in each upper, mid, and lower portion of the muscle; the same was performed on the right side.    ESTIMATED BLOOD LOSS:  minimal    COMPLICATIONS:    None    TOLERANCE & DISCHARGE CONDITION:    The patient tolerated the procedure well.  The patient was discharged in satisfactory condition.    PLAN OF CARE:  1. The patient returned today for Botox Therapy for Chronic Migraines.  I advise that full effect may not be seen for a couple of weeks.  Full effect is likely a decrease in frequency and severity of migraines and may not be a total cure.    2. If the effect is significant, that is a significant decrease in headache severity and frequency as well as improvement in daily function, then consideration will be given to repeating injection of Botox three months from today.    3. The patient will plan to return every 3 months for botulinum toxin therapy as the patient has demonstrated repeated significant results and plan for continuation of botulinum toxin therapy for this chronic headache disorder on a quarterly schedule for as long as the therapy continues to succeed.  4. The patient was given our standard instruction sheet.  The patient will resume all medications as per the medication reconciliation sheet.        Visit Diagnoses:  1. Migraine without status migrainosus, not intractable, unspecified migraine type    2. Chronic migraine without aura without status migrainosus, not intractable        Jordin Strauss MD  Pain Management    --      Vitals:    06/06/22 1035   PainSc:   5   PainLoc: Lana Lopez  reports a pain score of 5.  Given her pain assessment as noted, treatment options were discussed and the following options were decided upon as a follow-up plan to address the patient's pain: continuation of current treatment plan for pain.

## 2022-06-28 ENCOUNTER — DOCUMENTATION (OUTPATIENT)
Dept: PHYSICAL THERAPY | Facility: CLINIC | Age: 67
End: 2022-06-28

## 2022-06-28 NOTE — PROGRESS NOTES
Discharge Summary  Discharge Summary from Physical Therapy Report    Date of Initial PT visit: 11/16/2021  Number of Visits Seen:  9    Discharge Status of Patient:Pt did not return after visit on 1/21/22.  D/C at this time.     Goals: Partially Met    Discharge Plan: Patient to return to referring/providing physician      Date of Discharge: 06/28/2022        Patricia Sheikh, PT, DPT  Physical Therapist

## 2022-06-29 ENCOUNTER — PROCEDURE VISIT (OUTPATIENT)
Dept: PAIN MEDICINE | Facility: CLINIC | Age: 67
End: 2022-06-29

## 2022-06-29 VITALS
HEIGHT: 66 IN | TEMPERATURE: 97.7 F | HEART RATE: 45 BPM | DIASTOLIC BLOOD PRESSURE: 72 MMHG | OXYGEN SATURATION: 97 % | BODY MASS INDEX: 15.75 KG/M2 | WEIGHT: 98 LBS | SYSTOLIC BLOOD PRESSURE: 124 MMHG | RESPIRATION RATE: 20 BRPM

## 2022-06-29 DIAGNOSIS — G43.709 CHRONIC MIGRAINE WITHOUT AURA WITHOUT STATUS MIGRAINOSUS, NOT INTRACTABLE: ICD-10-CM

## 2022-06-29 DIAGNOSIS — M96.1 CERVICAL POST-LAMINECTOMY SYNDROME: ICD-10-CM

## 2022-06-29 DIAGNOSIS — G90.512 COMPLEX REGIONAL PAIN SYNDROME TYPE 1 OF LEFT UPPER EXTREMITY: ICD-10-CM

## 2022-06-29 DIAGNOSIS — M17.11 ARTHRITIS OF RIGHT KNEE: ICD-10-CM

## 2022-06-29 DIAGNOSIS — Z97.8 PRESENCE OF INTRATHECAL PUMP: ICD-10-CM

## 2022-06-29 DIAGNOSIS — M79.18 CERVICAL MYOFASCIAL PAIN SYNDROME: ICD-10-CM

## 2022-06-29 DIAGNOSIS — M96.1 LUMBAR POST-LAMINECTOMY SYNDROME: ICD-10-CM

## 2022-06-29 DIAGNOSIS — M79.2 NEUROPATHIC PAIN: ICD-10-CM

## 2022-06-29 DIAGNOSIS — Z79.891 ENCOUNTER FOR LONG-TERM OPIATE ANALGESIC USE: ICD-10-CM

## 2022-06-29 DIAGNOSIS — G89.4 CHRONIC PAIN SYNDROME: Primary | ICD-10-CM

## 2022-06-29 PROCEDURE — 62369 ANAL SP INF PMP W/REPRG&FILL: CPT | Performed by: ANESTHESIOLOGY

## 2022-06-29 PROCEDURE — 99214 OFFICE O/P EST MOD 30 MIN: CPT | Performed by: ANESTHESIOLOGY

## 2022-06-29 RX ORDER — MOXIFLOXACIN 5 MG/ML
SOLUTION/ DROPS OPHTHALMIC
COMMUNITY
Start: 2022-03-11

## 2022-06-29 RX ORDER — OXYCODONE AND ACETAMINOPHEN 10; 325 MG/1; MG/1
1 TABLET ORAL DAILY PRN
Qty: 30 TABLET | Refills: 0 | Status: SHIPPED | OUTPATIENT
Start: 2022-06-29 | End: 2022-09-23 | Stop reason: SDUPTHER

## 2022-06-29 RX ORDER — ONDANSETRON 2 MG/ML
INJECTION INTRAMUSCULAR; INTRAVENOUS
COMMUNITY
Start: 2022-03-25

## 2022-06-29 RX ORDER — PROMETHAZINE HYDROCHLORIDE 25 MG/ML
INJECTION, SOLUTION INTRAMUSCULAR; INTRAVENOUS
COMMUNITY
Start: 2022-06-16

## 2022-06-29 RX ORDER — BROMFENAC 0.76 MG/ML
SOLUTION/ DROPS OPHTHALMIC
COMMUNITY
Start: 2022-03-11

## 2022-06-29 RX ORDER — SODIUM CHLORIDE
POWDER (GRAM) MISCELLANEOUS
COMMUNITY
Start: 2022-04-04

## 2022-06-29 RX ORDER — PREGABALIN 150 MG/1
1 CAPSULE ORAL 3 TIMES DAILY
COMMUNITY
Start: 2022-03-03 | End: 2022-10-07 | Stop reason: SDUPTHER

## 2022-06-29 NOTE — PROGRESS NOTES
CHIEF COMPLAINT  F/u back pain. Pt here for pump refill today.     Subjective   Amalia Lopez is a 66 y.o. female  who presents for follow-up.  She has a history of spine pain among other painful problems.  She is using intrathecal therapy effectively.  It keeps her pain averaging and moderate low and manageable levels.  She is diligent about maintaining physical activity and is walking a few miles every day.    Medical comorbidities include gastroparesis.  She frequently has abdominal pain associated with nausea.      Patient remained masked during entire encounter. No cough present. I donned a mask and eye protection throughout entire visit. Prior to donning mask and eye protection, hand hygiene was performed, as well as when it was doffed.  I was closer than 6 feet, but not for an extended period of time. No obvious exposure to any bodily fluids.    Back Pain  This is a chronic problem. The current episode started more than 1 year ago. The problem is unchanged. The pain is present in the lumbar spine. Associated symptoms include headaches (migraines). Pertinent negatives include no chest pain, fever, numbness or weakness.   Neck Pain   This is a chronic problem. The problem occurs daily. The problem has been unchanged. The pain is moderate. Associated symptoms include headaches (migraines). Pertinent negatives include no chest pain, fever, numbness or weakness. The treatment provided moderate relief.   Knee Pain   The pain is present in the right knee. The quality of the pain is described as aching and stabbing. The pain is moderate. The pain has been fluctuating since onset. Pertinent negatives include no numbness. The symptoms are aggravated by movement. She has tried heat, ice, immobilization, rest and acetaminophen for the symptoms. The treatment provided mild relief.   Arm Pain   The pain is present in the upper left arm, left wrist, left elbow and left forearm. The quality of the pain is described as  burning. The pain radiates to the left arm. The pain is moderate. Pertinent negatives include no chest pain or numbness. The treatment provided moderate relief.        She does continue to be pleased with intrathecal infusion therapy.    She does have a chronic migraine disorder.  She had Botox injection about 3 weeks ago.  She does have some occasional migraines perhaps a couple per week but this is a great improvement over her previous experiences where she was having 28-30 headache days per month.  She is pleased with the my pain management and wants to continue to Botox therapy.  Botox therapy is definitely indicated in an on label for this indication.    --    Medtronic intrathecal pump refill and reprogramming.  The refill low reservoir alarm has been reset to September 23, 2022 with this refill and reprogramming.  She has a SynchroMed 2 Medtronic device that is a 20 mL pump.  The estimated reservoir volume was 2.6 mL and the actual reserve volume obtained was 2.5 mL.  The plan of has been running with morphine 6 mg/mL and was refilled with the same, 20 mL.  She is running a simple infusion of 0.95 mg per 24 hours and she also has a flex dose of 0.3 mg is infused over 10 minutes at 4 AM.  This equates to a 24-hour dose of 1.245 mg/day.  The estimated pump replacement date is July 2025.  Tip at T1.    ---    PEG Assessment   What number best describes your pain on average in the past week?5  What number best describes how, during the past week, pain has interfered with your enjoyment of life?5  What number best describes how, during the past week, pain has interfered with your general activity?  5    --  The aforementioned information the Chief Complaint section and above subjective data including any HPI data, and also the Review of Systems data, has been personally reviewed and affirmed.  --        Review of Pertinent Medical Data ---  E-nam report is reviewed:  I reviewed the document in the electronic  form under the PDMP tab in the Epic EMR...  - In this function, the report is a current report in as close to real-time as possible.  - The report was available for immediate review.    - I did eric the report as reviewed.  - There is not concern for aberrant behavior based on this ekasper review.      The following portions of the patient's history were reviewed and updated as appropriate: allergies, current medications, past family history, past medical history, past social history, past surgical history and problem list.    -------    The following portions of the patient's history were reviewed and updated as appropriate: allergies, current medications, past family history, past medical history, past social history, past surgical history and problem list.    Allergies   Allergen Reactions   • Acetaminophen-Codeine Hives   • Fentanyl Other (See Comments) and Hives     Dystonic movements  Dystonic movements  Dystonic movements  Dystonic movements   • Morphine Other (See Comments)     Other reaction(s): Other (See Comments)  Headaches   • Codeine Itching         Current Outpatient Medications:   •  acetaminophen (TYLENOL) 500 MG tablet, as needed, Disp: , Rfl:   •  amphetamine-dextroamphetamine (ADDERALL) 10 MG tablet, Take 5 mg by mouth Daily., Disp: , Rfl:   •  BD PosiFlush 0.9 % flush, , Disp: , Rfl:   •  Bromfenac Sodium (BromSite) 0.075 % solution, Compounded Drop, also includes Prednisilone Sodium Phosphate 1% and Moxifloxacin 0.5%, Disp: , Rfl:   •  chlorhexidine (PERIDEX) 0.12 % solution, RINSE AND SPIT 15ML DAILY, Disp: , Rfl: 11  •  cyanocobalamin 1000 MCG/ML injection, Inject 1,000 mcg into the appropriate muscle as directed by prescriber 1 (One) Time Per Week., Disp: , Rfl:   •  DEXILANT 60 MG capsule, Take 1 capsule by mouth Daily., Disp: , Rfl: 11  •  diphenhydrAMINE (BENADRYL) 50 MG/ML injection, , Disp: , Rfl:   •  fludrocortisone 0.1 MG tablet, Take 0.1 mg by mouth., Disp: , Rfl:   •  FLUoxetine  (PROzac) 40 MG capsule, Take 40 mg by mouth Daily., Disp: , Rfl: 3  •  heparin 100 UNIT/ML solution injection, , Disp: , Rfl:   •  ibuprofen (ADVIL,MOTRIN) 200 MG tablet, IBUPROFEN 200 MG TABS, Disp: , Rfl:   •  lidocaine (LIDODERM) 5 %, as needed, Disp: , Rfl:   •  lidocaine-prilocaine (EMLA) 2.5-2.5 % cream, , Disp: , Rfl:   •  linaclotide (LINZESS) 145 MCG capsule capsule, LINZESS 145 MCG CAPS, Disp: , Rfl:   •  loratadine (CLARITIN) 10 MG tablet, Take 10 mg by mouth., Disp: , Rfl:   •  morphine 6 mg/mL, by Intrathecal route Continuous., Disp: 20 mL, Rfl: 0  •  moxifloxacin (VIGAMOX) 0.5 % ophthalmic solution, Compounded drop, also includes Prednisilone Sodium Phosphate 1% and Bromfenac 0.075%, Disp: , Rfl:   •  omeprazole (priLOSEC) 40 MG capsule, Take 1 capsule by mouth., Disp: , Rfl:   •  OnabotulinumtoxinA 200 units reconstituted solution, INJECT UP  UNITS INTRAMUSCULAR INTO HEAD & NECK AREA EVERY 12 WEEKS FOR CHRONIC MIGRAINE TO BE INJECTED IN OFFICE BY PHYSICIAN, Disp: 1 each, Rfl: 2  •  ondansetron (ZOFRAN) 2 mg/mL injection, , Disp: , Rfl:   •  ondansetron ODT (ZOFRAN-ODT) 8 MG disintegrating tablet, , Disp: , Rfl:   •  oxyCODONE-acetaminophen (PERCOCET)  MG per tablet, Take 1 tablet by mouth Daily As Needed for Severe Pain ., Disp: 30 tablet, Rfl: 0  •  Panzyga 20 GM/200ML solution infusion, , Disp: , Rfl:   •  pregabalin (LYRICA) 150 MG capsule, Take 1 capsule by mouth 3 (Three) Times a Day., Disp: 90 capsule, Rfl: 5  •  pregabalin (LYRICA) 150 MG capsule, Take 1 capsule by mouth 3 (Three) Times a Day., Disp: , Rfl:   •  promethazine (PHENERGAN) 25 MG/ML injection, , Disp: , Rfl:   •  raNITIdine (ZANTAC) 150 MG tablet, Take 1 tablet by mouth., Disp: , Rfl:   •  SENNOSIDES-DOCUSATE SODIUM PO, SM SENNA-S TABS, Disp: , Rfl:   •  sodium chloride 0.9 % solution, , Disp: , Rfl:   •  Sodium Chloride powder, , Disp: , Rfl:   •  SUMAtriptan (IMITREX) 100 MG tablet, TAKE 1 TABLET BY MOUTH AS NEEDED  FOR HEADACHE, Disp: , Rfl: 6    Current Outpatient Medications on File Prior to Visit   Medication Sig Dispense Refill   • acetaminophen (TYLENOL) 500 MG tablet as needed     • amphetamine-dextroamphetamine (ADDERALL) 10 MG tablet Take 5 mg by mouth Daily.     • BD PosiFlush 0.9 % flush      • Bromfenac Sodium (BromSite) 0.075 % solution Compounded Drop, also includes Prednisilone Sodium Phosphate 1% and Moxifloxacin 0.5%     • chlorhexidine (PERIDEX) 0.12 % solution RINSE AND SPIT 15ML DAILY  11   • cyanocobalamin 1000 MCG/ML injection Inject 1,000 mcg into the appropriate muscle as directed by prescriber 1 (One) Time Per Week.     • DEXILANT 60 MG capsule Take 1 capsule by mouth Daily.  11   • diphenhydrAMINE (BENADRYL) 50 MG/ML injection      • fludrocortisone 0.1 MG tablet Take 0.1 mg by mouth.     • FLUoxetine (PROzac) 40 MG capsule Take 40 mg by mouth Daily.  3   • heparin 100 UNIT/ML solution injection      • ibuprofen (ADVIL,MOTRIN) 200 MG tablet IBUPROFEN 200 MG TABS     • lidocaine (LIDODERM) 5 % as needed     • lidocaine-prilocaine (EMLA) 2.5-2.5 % cream      • linaclotide (LINZESS) 145 MCG capsule capsule LINZESS 145 MCG CAPS     • loratadine (CLARITIN) 10 MG tablet Take 10 mg by mouth.     • morphine 6 mg/mL by Intrathecal route Continuous. 20 mL 0   • moxifloxacin (VIGAMOX) 0.5 % ophthalmic solution Compounded drop, also includes Prednisilone Sodium Phosphate 1% and Bromfenac 0.075%     • omeprazole (priLOSEC) 40 MG capsule Take 1 capsule by mouth.     • OnabotulinumtoxinA 200 units reconstituted solution INJECT UP  UNITS INTRAMUSCULAR INTO HEAD & NECK AREA EVERY 12 WEEKS FOR CHRONIC MIGRAINE TO BE INJECTED IN OFFICE BY PHYSICIAN 1 each 2   • ondansetron (ZOFRAN) 2 mg/mL injection      • ondansetron ODT (ZOFRAN-ODT) 8 MG disintegrating tablet      • Panzyga 20 GM/200ML solution infusion      • pregabalin (LYRICA) 150 MG capsule Take 1 capsule by mouth 3 (Three) Times a Day. 90 capsule 5   •  pregabalin (LYRICA) 150 MG capsule Take 1 capsule by mouth 3 (Three) Times a Day.     • promethazine (PHENERGAN) 25 MG/ML injection      • raNITIdine (ZANTAC) 150 MG tablet Take 1 tablet by mouth.     • SENNOSIDES-DOCUSATE SODIUM PO SM SENNA-S TABS     • sodium chloride 0.9 % solution      • Sodium Chloride powder      • SUMAtriptan (IMITREX) 100 MG tablet TAKE 1 TABLET BY MOUTH AS NEEDED FOR HEADACHE  6     No current facility-administered medications on file prior to visit.       Patient Active Problem List   Diagnosis   • Chronic pain syndrome   • Neuropathic pain   • Complex regional pain syndrome type 1 of left upper extremity   • Presence of intrathecal pump   • Spinal cord stimulator status   • Cervical post-laminectomy syndrome   • Lumbar post-laminectomy syndrome   • Bilateral occipital neuralgia   • Cervical spondylosis without myelopathy   • Encounter for long-term opiate analgesic use   • Migraine without status migrainosus, not intractable   • Arthritis of right knee   • Chronic pain of right knee   • Chronic fatigue   • Screening for endocrine, nutritional, metabolic and immunity disorder   • Vitamin D deficiency   • Anemia, unspecified   • Chronic migraine without aura without status migrainosus, not intractable       Past Medical History:   Diagnosis Date   • Biliary stenosis    • Depression    • Gastroparesis    • GERD (gastroesophageal reflux disease)    • History of ERCP    • IBS (irritable bowel syndrome)    • Joint pain    • Low back pain    • Migraine    • Neck pain    • Osteoarthritis    • Peripheral neuropathy    • Sleep apnea    • Spinal cord stimulator status    • Vitamin B12 deficiency        Past Surgical History:   Procedure Laterality Date   • BACK SURGERY     • CHOLECYSTECTOMY     • EPIDURAL BLOCK     • HYSTERECTOMY     • IMPLANTATION / REPLACEMENT INFUSION PUMP     • KNEE ARTHROSCOPY Right    • NECK SURGERY     • PANCREAS SURGERY     • SPINAL CORD STIMULATOR IMPLANT     • TRIGGER  "POINT INJECTION         No family history on file.    Social History     Socioeconomic History   • Marital status:    Tobacco Use   • Smoking status: Never Smoker   • Smokeless tobacco: Never Used   Vaping Use   • Vaping Use: Never used   Substance and Sexual Activity   • Alcohol use: No   • Drug use: No       -------        Review of Systems   Constitutional: Positive for fatigue (occ). Negative for activity change and fever.   HENT: Negative for congestion.    Eyes: Negative for visual disturbance.   Respiratory: Negative for cough and shortness of breath.    Cardiovascular: Negative for chest pain.   Gastrointestinal: Positive for nausea (hx gastroparesis) and vomiting. Negative for constipation and diarrhea.   Genitourinary: Negative for difficulty urinating.   Musculoskeletal: Positive for back pain and neck pain.   Neurological: Positive for headaches (migraines). Negative for dizziness, weakness, light-headedness and numbness.   Psychiatric/Behavioral: Negative for agitation, sleep disturbance and suicidal ideas. The patient is not nervous/anxious.        Vitals:    06/29/22 1347   BP: 124/72   Pulse: (!) 45   Resp: 20   Temp: 97.7 °F (36.5 °C)   SpO2: 97%   Weight: 44.5 kg (98 lb)   Height: 167.6 cm (66\")   PainSc:   5   PainLoc: Back         Objective   Physical Exam  VSS, NNR, NCAT, NMNA, NRD, AAOx3.  Pump pocket site is without induration or irritation.  Stigmata of arthritis right knee.  Crepitus noted.      Assessment & Plan   Diagnoses and all orders for this visit:    1. Chronic pain syndrome (Primary)    2. Cervical myofascial pain syndrome  -     oxyCODONE-acetaminophen (PERCOCET)  MG per tablet; Take 1 tablet by mouth Daily As Needed for Severe Pain .  Dispense: 30 tablet; Refill: 0    3. Neuropathic pain    4. Presence of intrathecal pump    5. Cervical post-laminectomy syndrome    6. Lumbar post-laminectomy syndrome    7. Encounter for long-term opiate analgesic use    8. Chronic " migraine without aura without status migrainosus, not intractable    9. Arthritis of right knee    10. Complex regional pain syndrome type 1 of left upper extremity      Sign summary she uses intrathecal therapy for chronic pain.  Also for postlaminectomy syndrome.  She has complex regional pain syndrome left upper extremity and is using Lyrica for neuropathic pain.  Oxycodone helps with lumbar postlaminectomy symptoms and also right knee pain also.  Therefore had pump management as noted with she also has more than a few stable chronic diagnoses with medication management noted.    --- Follow-up for next pump refill    -- also she plans to schedule next Botox therapy visit    -- Continue use of Lyrica for neuropathic pain & upper extremity CRPS, also judicious use of oxycodone for arthritic type painful flareups           SMITH REPORT  As part of the patient's treatment plan, I am prescribing controlled substances. The patient has been made aware of appropriate use of such medications, including potential risk of somnolence, limited ability to drive and/or work safely, and the potential for dependence or overdose. It has also been made clear that these medications are for use by this patient only, without concomitant use of alcohol or other substances unless prescribed.     Patient has completed prescribing agreement detailing terms of continued prescribing of controlled substances, including monitoring SMITH reports, urine drug screening, and pill counts if necessary. The patient is aware that inappropriate use will results in cessation of prescribing such medications.    As the clinician, I personally reviewed the SMITH from as above while the patient was in the office today.    History and physical exam exhibit continued safe and appropriate use of controlled substances.       Dictated utilizing Dragon dictation.     This document is intended for medical expert use only. Reading of this document by patients  and/or patient's family without participating medical staff guidance may result in misinterpretation and unintended morbidity.   Any interpretation of such data is the responsibility of the patient and/or family member responsible for the patient in concert with their primary or specialist providers, not to be left for sources of online searches such as [employer], [employer] or similar queries. Relying on these approaches to knowledge may result in misinterpretation, misguided goals of care and even death should patients or family members try recommendations outside of the realm of professional medical care in a supervised way.    Vitals:    06/29/22 1347   PainSc:   5   PainLoc: Back          Amaliasa Lopez reports a pain score of 5.  Given her pain assessment as noted, treatment options were discussed and the following options were decided upon as a follow-up plan to address the patient's pain: continuation of current treatment plan for pain.

## 2022-08-03 ENCOUNTER — PRIOR AUTHORIZATION (OUTPATIENT)
Dept: PAIN MEDICINE | Facility: CLINIC | Age: 67
End: 2022-08-03

## 2022-08-03 RX ORDER — LIDOCAINE 50 MG/G
PATCH TOPICAL
Qty: 90 PATCH | Refills: 11 | Status: SHIPPED | OUTPATIENT
Start: 2022-08-03

## 2022-09-14 ENCOUNTER — PROCEDURE VISIT (OUTPATIENT)
Dept: PAIN MEDICINE | Facility: CLINIC | Age: 67
End: 2022-09-14

## 2022-09-14 VITALS
HEART RATE: 57 BPM | HEIGHT: 66 IN | WEIGHT: 100 LBS | BODY MASS INDEX: 16.07 KG/M2 | SYSTOLIC BLOOD PRESSURE: 113 MMHG | TEMPERATURE: 97.7 F | DIASTOLIC BLOOD PRESSURE: 58 MMHG | OXYGEN SATURATION: 99 %

## 2022-09-14 DIAGNOSIS — M96.1 LUMBAR POST-LAMINECTOMY SYNDROME: ICD-10-CM

## 2022-09-14 DIAGNOSIS — G89.4 CHRONIC PAIN SYNDROME: Primary | ICD-10-CM

## 2022-09-14 DIAGNOSIS — G43.909 MIGRAINE WITHOUT STATUS MIGRAINOSUS, NOT INTRACTABLE, UNSPECIFIED MIGRAINE TYPE: ICD-10-CM

## 2022-09-14 DIAGNOSIS — Z97.8 PRESENCE OF INTRATHECAL PUMP: ICD-10-CM

## 2022-09-14 DIAGNOSIS — M79.18 CERVICAL MYOFASCIAL PAIN SYNDROME: ICD-10-CM

## 2022-09-14 DIAGNOSIS — Z79.891 ENCOUNTER FOR LONG-TERM OPIATE ANALGESIC USE: ICD-10-CM

## 2022-09-14 LAB
POC AMPHETAMINES: POSITIVE
POC BARBITURATES: NEGATIVE
POC BENZODIAZEPHINES: NEGATIVE
POC COCAINE: NEGATIVE
POC METHADONE: NEGATIVE
POC METHAMPHETAMINE SCREEN URINE: NEGATIVE
POC OPIATES: NEGATIVE
POC OXYCODONE: POSITIVE
POC PHENCYCLIDINE: NEGATIVE
POC PROPOXYPHENE: NEGATIVE
POC THC: NEGATIVE
POC TRICYCLIC ANTIDEPRESSANTS: NEGATIVE

## 2022-09-14 PROCEDURE — 99214 OFFICE O/P EST MOD 30 MIN: CPT | Performed by: NURSE PRACTITIONER

## 2022-09-14 PROCEDURE — 80305 DRUG TEST PRSMV DIR OPT OBS: CPT | Performed by: NURSE PRACTITIONER

## 2022-09-14 PROCEDURE — 62369 ANAL SP INF PMP W/REPRG&FILL: CPT | Performed by: NURSE PRACTITIONER

## 2022-09-14 NOTE — PROGRESS NOTES
CHIEF COMPLAINT  F/U back pain/pump refill- patient states that her pain has remained the same since her last visit.     Xu Lopez is a 66 y.o. female  who presents for follow-up.  She has a history of chronic pain of the neck, back, migraine headaches, joint pain.  She presents today for both routine f/u as well as IT pump refill.  Pain today is rated as a 6/10 VAS.  She is currently utilizing intrathecal morphine, total daily dose is 1.25 mg.  She does utilize occasional oral oxycodone 10-3 25 for severe breakthrough pain.  This regimen does help to keep her pain at manageable levels.  She remains physically active and continues walking a few miles every day.  She reports no adverse reactions.    Patient has been treated with Botox for migraines.  Last treatment was 6/6/2022.  She has had 6 significant success with this treatment reporting greater than 50% relief in both severity and frequency of migraine headaches for 3-month interval after Botox therapy.  Currently she is overdue for her Botox.  She is having a daily migraine.  She is relying on Imitrex which does help.    Neck Pain   This is a chronic problem. The current episode started more than 1 year ago. The problem occurs constantly. The problem has been waxing and waning. The quality of the pain is described as aching and burning. The pain is at a severity of 6/10. The symptoms are aggravated by position and stress (activity). Associated symptoms include headaches and weakness. Pertinent negatives include no chest pain, fever or numbness. She has tried NSAIDs, oral narcotics and muscle relaxants (IT Morphine) for the symptoms. The treatment provided moderate relief.   Back Pain  This is a chronic problem. The current episode started more than 1 year ago. The problem occurs constantly. The problem has been waxing and waning since onset. The pain is present in the lumbar spine. The quality of the pain is described as aching, burning and  shooting. The pain radiates to the left foot and right foot. The pain is at a severity of 6/10. The symptoms are aggravated by sitting and standing (activity). Associated symptoms include abdominal pain, dysuria, headaches and weakness. Pertinent negatives include no bladder incontinence, bowel incontinence, chest pain, fever or numbness. She has tried NSAIDs and analgesics (IT Morphine) for the symptoms. The treatment provided moderate relief.     PEG Assessment   What number best describes your pain on average in the past week?7  What number best describes how, during the past week, pain has interfered with your enjoyment of life?6  What number best describes how, during the past week, pain has interfered with your general activity?  6    The following portions of the patient's history were reviewed and updated as appropriate: allergies, current medications, past family history, past medical history, past social history, past surgical history and problem list.    Review of Systems   Constitutional: Positive for fatigue. Negative for activity change, chills and fever.   HENT: Negative for congestion.    Eyes: Negative for visual disturbance.   Respiratory: Negative for chest tightness and shortness of breath.    Cardiovascular: Negative for chest pain.   Gastrointestinal: Positive for abdominal pain, constipation and diarrhea. Negative for bowel incontinence.   Genitourinary: Positive for difficulty urinating and dysuria. Negative for bladder incontinence.   Musculoskeletal: Positive for back pain and neck pain.   Neurological: Positive for dizziness, weakness, light-headedness and headaches. Negative for numbness.   Psychiatric/Behavioral: Negative for agitation and sleep disturbance. The patient is not nervous/anxious.      I have reviewed and confirmed the accuracy of the ROS as documented by the MA/EVANS/RN CAROLANN Ford    Vitals:    09/14/22 1110   BP: 113/58   Pulse: 57   Temp: 97.7 °F (36.5 °C)  "  SpO2: 99%   Weight: 45.4 kg (100 lb)   Height: 167.6 cm (66\")   PainSc:   6   PainLoc: Leg         Objective   Physical Exam  Vitals and nursing note reviewed.   Constitutional:       General: She is not in acute distress.     Appearance: Normal appearance. She is not ill-appearing.   Pulmonary:      Effort: Pulmonary effort is normal. No respiratory distress.   Abdominal:      Tenderness: There is generalized abdominal tenderness.   Musculoskeletal:      Cervical back: Tenderness and bony tenderness present.      Lumbar back: Tenderness and bony tenderness present.   Skin:     General: Skin is warm and dry.   Neurological:      Mental Status: She is alert and oriented to person, place, and time.      Motor: Motor function is intact. No weakness.      Gait: Gait normal.   Psychiatric:         Mood and Affect: Mood normal.         Behavior: Behavior normal.         Intrathecal pump was interrogated in office today. Results are in chart.    The basal rate is 0.95 mg/24 hours with a flex dose of 0.3 mg at 04:00 daily. Total daily dose is 1.25 mg of Morphine.       Under sterile technique, the pump was accessed using a [employer] proprietary refill kit. The volume was withdrawn from the pump. The expected residual volume of the pump was 4.6 ml. The actual residual volume of the pump was 4 ml.    The pump was then refilled with 20 ml of Morphine at a concentration of 6 mg/ml.  The pump was set to continue at its previous rate. No changes were made.       Patient tolerated procedure well. Minimal bleeding was noted. Pump site remains intact with no evidence of erythema or drainage.    Patient will return for pump refill when due or sooner if needed.     DASH is July 2025     Pump refill date is 12/9/2022.      Assessment & Plan   Diagnoses and all orders for this visit:    1. Chronic pain syndrome (Primary)    2. Cervical myofascial pain syndrome    3. Migraine without status migrainosus, not intractable, unspecified " migraine type    4. Presence of intrathecal pump    5. Lumbar post-laminectomy syndrome    6. Encounter for long-term opiate analgesic use      --- Schedule Botox for Migraines  --- IT pump refill as documented   --- Continue Percocet, refill not needed today. Patient appears stable with current regimen. No adverse effects. Regarding continuation of opioids, there is no evidence of aberrant behavior or any red flags.  The patient continues with appropriate response to opioid therapy. ADL's remain intact by self.   --- Routine UDS in office today as part of monitoring requirements for controlled substances.  The specimen was viewed and the immunoassay result reviewed and is +OXY, AMPH.  This specimen will be sent to [facility] for confirmation.     --- The patient signed an updated copy of the controlled substance agreement on 9/14/2022  --- Follow-up IT pump refill  AND a separate visit for Botox for Migraine            SMITH REPORT (With IN search)  As part of the patient's treatment plan, I am prescribing controlled substances. The patient has been made aware of appropriate use of such medications, including potential risk of somnolence, limited ability to drive and/or work safely, and the potential for dependence or overdose. It has also been made clear that these medications are for use by this patient only, without concomitant use of alcohol or other substances unless prescribed.     Patient has completed prescribing agreement detailing terms of continued prescribing of controlled substances, including monitoring SMITH reports, urine drug screening, and pill counts if necessary. The patient is aware that inappropriate use will results in cessation of prescribing such medications.    As the clinician, I personally reviewed the SMITH from 9/14/2022 while the patient was in the office today.    History and physical exam exhibit continued safe and appropriate use of controlled substances.     Dictated  utilizing Dragon dictation.     This document is intended for medical expert use only. Reading of this document by patients and/or patient's family without participating medical staff guidance may result in misinterpretation and unintended morbidity.   Any interpretation of such data is the responsibility of the patient and/or family member responsible for the patient in concert with their primary or specialist providers, not to be left for sources of online searches such as [employer], [employer] or similar queries. Relying on these approaches to knowledge may result in misinterpretation, misguided goals of care and even death should patients or family members try recommendations outside of the realm of professional medical care in a supervised way.    Patient remained masked during entire encounter. No cough present. I donned a mask and eye protection throughout entire visit. Prior to donning mask and eye protection, hand hygiene was performed, as well as when it was doffed.  I was closer than 6 feet, but not for an extended period of time. No obvious exposure to any bodily fluids.

## 2022-09-23 ENCOUNTER — PROCEDURE VISIT (OUTPATIENT)
Dept: PAIN MEDICINE | Facility: CLINIC | Age: 67
End: 2022-09-23

## 2022-09-23 VITALS
SYSTOLIC BLOOD PRESSURE: 103 MMHG | BODY MASS INDEX: 16.65 KG/M2 | HEIGHT: 66 IN | HEART RATE: 44 BPM | WEIGHT: 103.6 LBS | OXYGEN SATURATION: 96 % | TEMPERATURE: 97.8 F | RESPIRATION RATE: 18 BRPM | DIASTOLIC BLOOD PRESSURE: 62 MMHG

## 2022-09-23 DIAGNOSIS — M79.18 CERVICAL MYOFASCIAL PAIN SYNDROME: ICD-10-CM

## 2022-09-23 DIAGNOSIS — G43.909 MIGRAINE WITHOUT STATUS MIGRAINOSUS, NOT INTRACTABLE, UNSPECIFIED MIGRAINE TYPE: Primary | ICD-10-CM

## 2022-09-23 PROCEDURE — 64615 CHEMODENERV MUSC MIGRAINE: CPT | Performed by: NURSE PRACTITIONER

## 2022-09-23 RX ORDER — OXYCODONE AND ACETAMINOPHEN 10; 325 MG/1; MG/1
1 TABLET ORAL DAILY PRN
Qty: 30 TABLET | Refills: 0 | Status: SHIPPED | OUTPATIENT
Start: 2022-09-23 | End: 2022-12-02 | Stop reason: SDUPTHER

## 2022-09-23 NOTE — PROGRESS NOTES
"CHIEF COMPLAINT: Migraine      Amalia Lopez is a 66 y.o. female.  She is here for the following procedure: Botox for migraines.  Stable with Botox. Reduce headaches by 75%.     ---  Considering Medical Necessity of Botox for Headaches:    CONTINUING TREATMENT...  - migraines decreased by at least 7 days per month  - migraine duration reduced by at least 100 hours per month  ---    Vitals:    09/23/22 1048   BP: 103/62   Pulse: (!) 44   Resp: 18   Temp: 97.8 °F (36.6 °C)   SpO2: 96%   Weight: 47 kg (103 lb 9.6 oz)   Height: 167.6 cm (66\")   PainSc:   6   PainLoc: Comment: migraine     Botox Injection for Migraines &/or Chronic Headache, using the PREEMPT Protocol:    (CPT 40412)    AGENT:  Botox (OnabotulinumtoxinA), Total Units used 155 units    INDICATION & PREPARATION:    This patient meets ICHD criteria for chronic migraine disorder and has failed multiple medical treatments, and is therefore a candidate for Botox for Migraine Prophylaxis.  This injection paradigm is based on the PREEMPT protocol, as fixed-site fixed dose (FSFD) injection of five units OnabotulinumtoxinA in thirty-one defined locations, for a total of 155 units.  The Botox solution is reconstituted with sterile technique, observation of appropriate vacuum seal of the vial, using 4mL of sterile saline per 200 units Botox, for a concentration of 5 units per 0.1mL; the solution is prepared immediately prior to its use.    DESCRIPTON OF PROCEDURE:  Informed consent was obtained, with discussion including but not limited to risk of hypersensitivity reactions, risk of bleeding, risk of infection or transmission of disease, risk of dysphagia, risk of weakness and/or ptosis, and risk of respiratory or cardiac events.  Antiseptic technique is observed throughout.      5 units were injected into midline procerus muscle.  10 units total were injected into  muscles - 5 on each side.   20 units total were injected into 4 sites of bilateral " frontalis muscles.  40 units total were injected into 8 sites of bilateral temporalis muscles  30 units total were injected into 6 sites of bilateral occipitalis muscles.  10 units total were injected into 4 sites of cervical paraspinal muscle group.   30 units total were injected into 6 sites of bilateral upper trapezius muscles.  A total of [155] units of Botox was given. 45 units were unused.   Extra units were dispersed between: extra units were wasted    Botox: Lot: [id number]  Exp: 2025/03 NDC: 5519-4357-09  Normal Saline: Lot: [id number]  Exp: 01 MAR 2024    ESTIMATED BLOOD LOSS:  minimal    COMPLICATIONS:    None    TOLERANCE & DISCHARGE CONDITION:    The patient tolerated the procedure well.  The patient was discharged in satisfactory condition.    PLAN OF CARE:  1. The patient returned today for Botox Therapy for Chronic Migraines.  I advise that full effect may not be seen for a couple of weeks.  Full effect is likely a decrease in frequency and severity of migraines and may not be a total cure.    2. If the effect is significant, that is a significant decrease in headache severity and frequency as well as improvement in daily function, then consideration will be given to repeating injection of Botox three months from today.    3. The patient will return in 3 months to repeat botulinum toxin treatment.  4. The patient was given our standard instruction sheet.  The patient will resume all medications as per the medication reconciliation sheet.         Procedures    Visit Diagnoses:  1. Migraine without status migrainosus, not intractable, unspecified migraine type      F/u 90 days to repeat Botox for Migraines     CAROLANN Ford  Pain Management

## 2022-10-07 ENCOUNTER — TELEPHONE (OUTPATIENT)
Dept: PAIN MEDICINE | Facility: CLINIC | Age: 67
End: 2022-10-07

## 2022-10-07 DIAGNOSIS — M79.18 CERVICAL MYOFASCIAL PAIN SYNDROME: ICD-10-CM

## 2022-10-07 RX ORDER — PREGABALIN 150 MG/1
150 CAPSULE ORAL 3 TIMES DAILY
Qty: 90 CAPSULE | Refills: 5 | Status: SHIPPED | OUTPATIENT
Start: 2022-10-07

## 2022-10-07 NOTE — TELEPHONE ENCOUNTER
Relationship: SELF    Best call back number: [phone]      Requested Prescriptions:    LYRICA 150 MG.     Pharmacy where request should be sent:  [facility]- [phone]     Additional details provided by patient: PT. WILL RUN OUT OF MEDICATION TODAY.   ASKING IF REFILL CAN PLEASE BE SENT TODAY.  PLEASE CALL PT. TO LET HER KNOW.      Does the patient have less than a 3 day supply:  [x] Yes  [] No

## 2022-10-07 NOTE — TELEPHONE ENCOUNTER
Medication Refill Request    Date of phone call: 10/07/2022    Medication being requested: Pregabalin 150 mg

## 2022-11-15 ENCOUNTER — TELEPHONE (OUTPATIENT)
Dept: PAIN MEDICINE | Facility: CLINIC | Age: 67
End: 2022-11-15

## 2022-11-15 NOTE — TELEPHONE ENCOUNTER
Caller: DEANDRE   Relationship to Patient: SELF   Phone Number: [phone]   Reason for Call: PATIENT WANTING TO KNOW IF DR WEAVER CAN DO HER BOTOX INJECTIONS

## 2022-12-02 ENCOUNTER — OFFICE VISIT (OUTPATIENT)
Dept: PAIN MEDICINE | Facility: CLINIC | Age: 67
End: 2022-12-02

## 2022-12-02 VITALS
HEIGHT: 66 IN | TEMPERATURE: 98 F | BODY MASS INDEX: 15.75 KG/M2 | SYSTOLIC BLOOD PRESSURE: 115 MMHG | WEIGHT: 98 LBS | RESPIRATION RATE: 20 BRPM | OXYGEN SATURATION: 98 % | HEART RATE: 53 BPM | DIASTOLIC BLOOD PRESSURE: 61 MMHG

## 2022-12-02 DIAGNOSIS — Z97.8 PRESENCE OF INTRATHECAL PUMP: ICD-10-CM

## 2022-12-02 DIAGNOSIS — M79.2 NEUROPATHIC PAIN: ICD-10-CM

## 2022-12-02 DIAGNOSIS — G43.909 MIGRAINE WITHOUT STATUS MIGRAINOSUS, NOT INTRACTABLE, UNSPECIFIED MIGRAINE TYPE: ICD-10-CM

## 2022-12-02 DIAGNOSIS — M96.1 LUMBAR POST-LAMINECTOMY SYNDROME: ICD-10-CM

## 2022-12-02 DIAGNOSIS — G89.4 CHRONIC PAIN SYNDROME: Primary | ICD-10-CM

## 2022-12-02 DIAGNOSIS — M79.18 CERVICAL MYOFASCIAL PAIN SYNDROME: ICD-10-CM

## 2022-12-02 DIAGNOSIS — Z79.891 ENCOUNTER FOR LONG-TERM OPIATE ANALGESIC USE: ICD-10-CM

## 2022-12-02 PROCEDURE — 99214 OFFICE O/P EST MOD 30 MIN: CPT | Performed by: NURSE PRACTITIONER

## 2022-12-02 PROCEDURE — 62369 ANAL SP INF PMP W/REPRG&FILL: CPT | Performed by: NURSE PRACTITIONER

## 2022-12-02 RX ORDER — ONDANSETRON HYDROCHLORIDE 8 MG/1
TABLET, FILM COATED ORAL
COMMUNITY
Start: 2022-09-19

## 2022-12-02 RX ORDER — LATANOPROST 50 UG/ML
SOLUTION/ DROPS OPHTHALMIC
COMMUNITY
Start: 2022-11-01

## 2022-12-02 RX ORDER — OXYCODONE AND ACETAMINOPHEN 10; 325 MG/1; MG/1
1 TABLET ORAL DAILY PRN
Qty: 30 TABLET | Refills: 0 | Status: SHIPPED | OUTPATIENT
Start: 2022-12-02 | End: 2023-02-15 | Stop reason: SDUPTHER

## 2022-12-02 NOTE — PROGRESS NOTES
CHIEF COMPLAINT  F/u back and bilat leg pain. Pt here for pump refill. Pt sts pain has worsened since last ov.     Xu Lopez is a 67 y.o. female  who presents for follow-up.  She has a history of chronic back pain.  She presents today for IT pump refill.  The IT pump manages her low back pain. She has additional painful pathologies including chronic migraine headaches, neuropathic pain.  Pain today is rated as a 6/10 VAS.  She is currently utilizing intrathecal morphine, total daily dose is 1.25 mg.  She does utilize occasional oral oxycodone 10-3 25 for severe breakthrough pain and other chronic pain issues.  This regimen does help to keep her pain at manageable levels. She remains physically active and continues walking a few miles every day.  She reports no adverse reactions.    Back Pain  This is a chronic problem. The current episode started more than 1 year ago. The problem occurs constantly. The problem is unchanged. The pain is present in the lumbar spine. The quality of the pain is described as aching, burning and shooting. The pain radiates to the left foot and right foot. The pain is at a severity of 7/10. The symptoms are aggravated by sitting and standing (activity). Associated symptoms include abdominal pain and dysuria. Pertinent negatives include no bladder incontinence, bowel incontinence, chest pain, fever, headaches, numbness or weakness. She has tried NSAIDs and analgesics (IT Morphine) for the symptoms. The treatment provided moderate relief.      PEG Assessment   What number best describes your pain on average in the past week?7  What number best describes how, during the past week, pain has interfered with your enjoyment of life?7  What number best describes how, during the past week, pain has interfered with your general activity?  7    The following portions of the patient's history were reviewed and updated as appropriate: allergies, current medications, past family  "history, past medical history, past social history, past surgical history and problem list.    Review of Systems   Constitutional: Negative for activity change, fatigue and fever.   HENT: Negative for congestion.    Eyes: Negative for visual disturbance.   Respiratory: Negative for cough and shortness of breath.    Cardiovascular: Negative for chest pain.   Gastrointestinal: Positive for abdominal pain. Negative for bowel incontinence, constipation and diarrhea.   Genitourinary: Positive for dysuria. Negative for bladder incontinence and difficulty urinating.   Musculoskeletal: Positive for arthralgias (bilat leg), back pain and neck pain.   Neurological: Negative for dizziness, weakness, light-headedness, numbness and headaches.   Psychiatric/Behavioral: Negative for agitation, sleep disturbance and suicidal ideas. The patient is not nervous/anxious.      I have reviewed and confirmed the accuracy of the ROS as documented by the MA/LPN/RN CAROLANN Ford    Vitals:    12/02/22 1129   BP: 115/61   Pulse: 53   Resp: 20   Temp: 98 °F (36.7 °C)   SpO2: 98%   Weight: 44.5 kg (98 lb)   Height: 167.6 cm (66\")   PainSc:   7   PainLoc: Leg  Comment: bilat     Objective   Physical Exam  Vitals and nursing note reviewed.   Constitutional:       General: She is not in acute distress.     Appearance: Normal appearance. She is not ill-appearing.   Pulmonary:      Effort: Pulmonary effort is normal. No respiratory distress.   Abdominal:      Tenderness: There is generalized abdominal tenderness.   Musculoskeletal:      Cervical back: Tenderness and bony tenderness present.      Lumbar back: Tenderness and bony tenderness present.   Neurological:      Mental Status: She is alert and oriented to person, place, and time.      Motor: Motor function is intact. No weakness.      Gait: Gait normal.   Psychiatric:         Mood and Affect: Mood normal.         Behavior: Behavior normal.       Intrathecal pump was interrogated in " office today. Results are in chart.    The basal rate is 0.95 mg/24 hours with a flex dose of 0.3 mg at 04:00 daily. Total daily dose is 1.25 mg of Morphine.       Under sterile technique, the pump was accessed using a [employer] proprietary refill kit. The volume was withdrawn from the pump. The expected residual volume of the pump was 4.6 ml. The actual residual volume of the pump was 4 ml.    The pump was then refilled with 20 ml of Morphine at a concentration of 6 mg/ml.  The pump was set to continue at its previous rate. No changes were made.     Patient tolerated procedure well. Minimal bleeding was noted. Pump site remains intact with no evidence of erythema or drainage.    Patient will return for pump refill when due or sooner if needed.     DASH is Jul 2025    Pump refill date is 2/26/2023    Assessment & Plan   Diagnoses and all orders for this visit:    1. Chronic pain syndrome (Primary)    2. Presence of intrathecal pump    3. Lumbar post-laminectomy syndrome    4. Migraine without status migrainosus, not intractable, unspecified migraine type    5. Neuropathic pain    6. Encounter for long-term opiate analgesic use      --- IT pump refill as documented above  --- Refill Percocet. Patient appears stable with current regimen. No adverse effects. Regarding continuation of opioids, there is no evidence of aberrant behavior or any red flags.  The patient continues with appropriate response to opioid therapy. ADL's remain intact by self.   --- The urine drug screen confirmation from 9/14/2022 has been reviewed and the result is appropriate based on patient history and SMITH report  --- The patient signed an updated copy of the controlled substance agreement on 9/14/2022  --- Follow-up for next IT pump refill        SMITH REPORT  As part of the patient's treatment plan, I am prescribing controlled substances. The patient has been made aware of appropriate use of such medications, including potential risk of  somnolence, limited ability to drive and/or work safely, and the potential for dependence or overdose. It has also been made clear that these medications are for use by this patient only, without concomitant use of alcohol or other substances unless prescribed.     Patient has completed prescribing agreement detailing terms of continued prescribing of controlled substances, including monitoring SMITH reports, urine drug screening, and pill counts if necessary. The patient is aware that inappropriate use will results in cessation of prescribing such medications.    As the clinician, I personally reviewed the SMITH from 12/2/2022 while the patient was in the office today.    History and physical exam exhibit continued safe and appropriate use of controlled substances.    Dictated utilizing Dragon dictation.     Patient remained masked during entire encounter. No cough present. I donned a mask and eye protection throughout entire visit. Prior to donning mask and eye protection, hand hygiene was performed, as well as when it was doffed.  I was closer than 6 feet, but not for an extended period of time. No obvious exposure to any bodily fluids.

## 2023-01-04 ENCOUNTER — PROCEDURE VISIT (OUTPATIENT)
Dept: PAIN MEDICINE | Facility: CLINIC | Age: 68
End: 2023-01-04
Payer: MEDICARE

## 2023-01-04 VITALS
HEART RATE: 65 BPM | HEIGHT: 66 IN | TEMPERATURE: 97.5 F | DIASTOLIC BLOOD PRESSURE: 70 MMHG | BODY MASS INDEX: 15.69 KG/M2 | RESPIRATION RATE: 18 BRPM | SYSTOLIC BLOOD PRESSURE: 124 MMHG | WEIGHT: 97.6 LBS

## 2023-01-04 DIAGNOSIS — G43.709 CHRONIC MIGRAINE WITHOUT AURA WITHOUT STATUS MIGRAINOSUS, NOT INTRACTABLE: ICD-10-CM

## 2023-01-04 DIAGNOSIS — G43.909 MIGRAINE WITHOUT STATUS MIGRAINOSUS, NOT INTRACTABLE, UNSPECIFIED MIGRAINE TYPE: Primary | ICD-10-CM

## 2023-01-04 PROCEDURE — 64615 CHEMODENERV MUSC MIGRAINE: CPT | Performed by: ANESTHESIOLOGY

## 2023-01-04 NOTE — PROGRESS NOTES
CHIEF COMPLAINT: Migraine      Amalia Lopez is a 67 y.o. female.  She is here for the following procedure: Botox for migraines.    Vitals:    01/04/23 0851   BP: 124/70   Pulse: 65   Resp: 18   Temp: 97.5 °F (36.4 °C)   Weight: 44.3 kg (97 lb 9.6 oz)   Height: 167.6 cm (66\")   PainSc:   5   PainLoc: Head       5 units were injected into midline procerus muscle.  10 units total were injected into  muscles - 5 on each side.   20 units total were injected into 4 sites of bilateral frontalis muscles.  40 units total were injected into 8 sites of bilateral temporalis muscles  30 units total were injected into 6 sites of bilateral occipitalis muscles.  10 units total were injected into 4 sites of cervical paraspinal muscle group.   30 units total were injected into 6 sites of bilateral upper trapezius muscles.  A total of [155] units of Botox was given. 45 units were unused.   Extra units were dispersed between: extra units were wasted    Botox: Lot: [id number]  Exp: 2025/03 NDC: 7792-7760-01  Normal Saline: Lot: [id number]  Exp: 01 MAR 2024 NDC: 8616-1406-06    This Botox was provided by the patient's own specialty pharmacy (thus we are not billing for the J-codes).         Procedures    Botox Injection for Migraines &/or Chronic Headache, using the PREEMPT Protocol:    (CPT 17298)    AGENT:  Botox (OnabotulinumtoxinA), Total Units used 155 units    INDICATION & PREPARATION:    This patient meets ICHD criteria for chronic migraine disorder and has failed multiple medical treatments, and is therefore a candidate for Botox for Migraine Prophylaxis.  This injection paradigm is based on the PREEMPT protocol, as fixed-site fixed dose (FSFD) injection of five units OnabotulinumtoxinA in thirty-one defined locations, for a total of 155 units.  Those locations are Procerus, bilateral Corrugators, two injections in each bilateral Frontalis, four injections in each of bilateral Temporalis, three injections in each of  bilateral Occipitalis, two injections in each of bilateral Cervical Paraspinal musculature, and three injections in each of bilateral Trapezius.  The Botox solution is reconstituted with sterile technique, observation of appropriate vacuum seal of the vial, using 4mL of sterile saline per 200 units Botox, for a concentration of 5 units per 0.1mL; the solution is prepared immediately prior to its use.    DESCRIPTON OF PROCEDURE:  Informed consent was obtained, with discussion including but not limited to risk of hypersensitivity reactions, risk of bleeding, risk of infection or transmission of disease, risk of dysphagia, risk of weakness and/or ptosis, and risk of respiratory or cardiac events.  Antiseptic technique is observed throughout.      The patient was placed in supine position, and the Procerus and bilateral Corrugators (one FSFD injection each) were injected with needle angled up and away from the eyes.  Next, two FSFD injections were performed in each of the Frontalis muscles.  Next, the patient turned slightly to the left, the area was palpated for tenderness or pain, and the patient clenched their teeth to aid in palpating the temporalis muscle; four FSFD injections were performed on this side, including the anterior temporalis, a point about 0.5cm superior and 1.5cm posterior to the first injection which is into the medial aspect of the muscle, and points about 1.5cm caudad and posterior, respectively, to this second injection point.  The patient the turned the right and the same was performed at the right Temporalis.  The patient then turned to a prone position and each occipital area was palpated for tenderness or pain; the external occipital protuberance was palpated and three FSFD injections were applied on the left side, at a point about 1cm lateral to the protuberance and then points 1cm cephalad and medial/lateral to that first injection point.  The same was performed on the right Occipitalis.   Next, two FSFD injections were performed in the left Cervical Paraspinal muscle group, with the first about 4cm inferior to the occipital protuberance and the second about 1cm lateral and superior to the first; the same was performed on the right Cervical Paraspinal Area.  Lastly, three FSFD injections were performed in left Trapezius, one in each upper, mid, and lower portion of the muscle; the same was performed on the right side.    ESTIMATED BLOOD LOSS:  minimal    COMPLICATIONS:    None    TOLERANCE & DISCHARGE CONDITION:    The patient tolerated the procedure well.  The patient was discharged in satisfactory condition.    PLAN OF CARE:  1. The patient returned today for Botox Therapy for Chronic Migraines.  I advise that full effect may not be seen for a couple of weeks.  Full effect is likely a decrease in frequency and severity of migraines and may not be a total cure.    2. If the effect is significant, that is a significant decrease in headache severity and frequency as well as improvement in daily function, then consideration will be given to repeating injection of Botox three months from today.    3. The patient will plan to return every 3 months for botulinum toxin therapy as the patient has demonstrated repeated significant results  4. The patient was given our standard instruction sheet.  The patient will resume all medications as per the medication reconciliation sheet.        Visit Diagnoses:  1. Migraine without status migrainosus, not intractable, unspecified migraine type    2. Chronic migraine without aura without status migrainosus, not intractable        Jordin Strauss MD  Pain Management    --    Vitals:    01/04/23 0851   PainSc:   5   PainLoc: Tam Lopez reports a pain score of 5.  Given her pain assessment as noted, treatment options were discussed and the following options were decided upon as a follow-up plan to address the patient's pain: continuation of current  treatment plan for pain.

## 2023-02-13 ENCOUNTER — TRANSCRIBE ORDERS (OUTPATIENT)
Dept: ADMINISTRATIVE | Facility: HOSPITAL | Age: 68
End: 2023-02-13
Payer: MEDICARE

## 2023-02-13 DIAGNOSIS — Z12.39 BREAST SCREENING: ICD-10-CM

## 2023-02-13 DIAGNOSIS — M81.0 SENILE OSTEOPOROSIS: ICD-10-CM

## 2023-02-15 ENCOUNTER — OFFICE VISIT (OUTPATIENT)
Dept: PAIN MEDICINE | Facility: CLINIC | Age: 68
End: 2023-02-15
Payer: MEDICARE

## 2023-02-15 VITALS
DIASTOLIC BLOOD PRESSURE: 61 MMHG | SYSTOLIC BLOOD PRESSURE: 108 MMHG | HEART RATE: 51 BPM | TEMPERATURE: 98.6 F | HEIGHT: 66 IN | BODY MASS INDEX: 16.23 KG/M2 | WEIGHT: 101 LBS | OXYGEN SATURATION: 98 %

## 2023-02-15 DIAGNOSIS — Z79.891 ENCOUNTER FOR LONG-TERM OPIATE ANALGESIC USE: ICD-10-CM

## 2023-02-15 DIAGNOSIS — M96.1 CERVICAL POST-LAMINECTOMY SYNDROME: ICD-10-CM

## 2023-02-15 DIAGNOSIS — M96.1 LUMBAR POST-LAMINECTOMY SYNDROME: ICD-10-CM

## 2023-02-15 DIAGNOSIS — G90.512 COMPLEX REGIONAL PAIN SYNDROME TYPE 1 OF LEFT UPPER EXTREMITY: ICD-10-CM

## 2023-02-15 DIAGNOSIS — Z96.89 SPINAL CORD STIMULATOR STATUS: ICD-10-CM

## 2023-02-15 DIAGNOSIS — Z97.8 PRESENCE OF INTRATHECAL PUMP: ICD-10-CM

## 2023-02-15 DIAGNOSIS — M79.2 NEUROPATHIC PAIN: ICD-10-CM

## 2023-02-15 DIAGNOSIS — M79.18 CERVICAL MYOFASCIAL PAIN SYNDROME: ICD-10-CM

## 2023-02-15 DIAGNOSIS — M17.11 ARTHRITIS OF RIGHT KNEE: ICD-10-CM

## 2023-02-15 DIAGNOSIS — G89.4 CHRONIC PAIN SYNDROME: Primary | ICD-10-CM

## 2023-02-15 PROCEDURE — 99214 OFFICE O/P EST MOD 30 MIN: CPT | Performed by: ANESTHESIOLOGY

## 2023-02-15 PROCEDURE — 62369 ANAL SP INF PMP W/REPRG&FILL: CPT | Performed by: ANESTHESIOLOGY

## 2023-02-15 RX ORDER — OXYCODONE AND ACETAMINOPHEN 10; 325 MG/1; MG/1
1 TABLET ORAL DAILY PRN
Qty: 30 TABLET | Refills: 0 | Status: SHIPPED | OUTPATIENT
Start: 2023-02-15

## 2023-02-15 NOTE — PROGRESS NOTES
CHIEF COMPLAINT  F/U back pain- patient states that her pain has worsened since her last visit.     Subjective   Amalia Lopez is a 67 y.o. female  who presents for follow-up.  She has a history of multiple pain items including laminectomy syndromes of the neck and back, history of complex regional pain syndrome type I history of neck pain with myofascial painful elements, diffuse arthritis that includes knee arthritis and intermittent use of oral opiates for the joint related pain but not well covered by the intrathecal infusion..    She wants to tolerate at this time expresses a desire to avoid escalation of intrathecal therapy.    Significant tightness in the neck and pain and spasm.  Neck pain with bending and twisting and position.  Morning stiffness in morning spasm quite bothersome.    The single dose opioid therapy allows her to be able to get us around her day and deal with the pain with morning stiffness.  She does not want to escalate this either.    History of Present Illness     Some mild increase in pain generally.  More neck pain in the mornings plus spasm.  No change in CRPS related elements.    PEG Assessment   What number best describes your pain on average in the past week?7  What number best describes how, during the past week, pain has interfered with your enjoyment of life?7  What number best describes how, during the past week, pain has interfered with your general activity?  7    --  The aforementioned information the Chief Complaint section and above subjective data including any HPI data, and also the Review of Systems data, has been personally reviewed and affirmed.  --        Review of Pertinent Medical Data ---  TACHO-nam report is reviewed:  I reviewed the document in the electronic form under the PDMP tab in the Epic EMR...  - In this function, the report is a current report in as close to real-time as possible.  - The report was available for immediate review.    - I did eric the  report as reviewed.  - There is not pertinent data on the last page of the report. There is not concern for aberrant behavior based on this ekasper review.  Per routine requesting in [place] inspect report and that is in manual process.      The following portions of the patient's history were reviewed and updated as appropriate: allergies, current medications, past family history, past medical history, past social history, past surgical history and problem list.    -------    The following portions of the patient's history were reviewed and updated as appropriate: allergies, current medications, past family history, past medical history, past social history, past surgical history and problem list.    Allergies   Allergen Reactions   • Acetaminophen-Codeine Hives   • Fentanyl Other (See Comments) and Hives     Dystonic movements  Dystonic movements  Dystonic movements  Dystonic movements   • Morphine Other (See Comments)     Other reaction(s): Other (See Comments)  Headaches   • Codeine Itching         Current Outpatient Medications:   •  acetaminophen (TYLENOL) 500 MG tablet, as needed, Disp: , Rfl:   •  amphetamine-dextroamphetamine (ADDERALL) 10 MG tablet, Take 5 mg by mouth Daily., Disp: , Rfl:   •  BD PosiFlush 0.9 % flush, , Disp: , Rfl:   •  Bromfenac Sodium (BromSite) 0.075 % solution, Compounded Drop, also includes Prednisilone Sodium Phosphate 1% and Moxifloxacin 0.5%, Disp: , Rfl:   •  chlorhexidine (PERIDEX) 0.12 % solution, RINSE AND SPIT 15ML DAILY, Disp: , Rfl: 11  •  cyanocobalamin 1000 MCG/ML injection, Inject 1,000 mcg into the appropriate muscle as directed by prescriber 1 (One) Time Per Week., Disp: , Rfl:   •  DEXILANT 60 MG capsule, Take 1 capsule by mouth Daily., Disp: , Rfl: 11  •  diphenhydrAMINE (BENADRYL) 50 MG/ML injection, , Disp: , Rfl:   •  fludrocortisone 0.1 MG tablet, Take 0.1 mg by mouth., Disp: , Rfl:   •  FLUoxetine (PROzac) 40 MG capsule, Take 40 mg by mouth Daily., Disp: , Rfl:  3  •  heparin 100 UNIT/ML solution injection, , Disp: , Rfl:   •  ibuprofen (ADVIL,MOTRIN) 200 MG tablet, IBUPROFEN 200 MG TABS, Disp: , Rfl:   •  latanoprost (XALATAN) 0.005 % ophthalmic solution, INSTILL 1 DROP INTO BOTH EYES EVERY DAY IN THE EVENING, Disp: , Rfl:   •  lidocaine (LIDODERM) 5 %, PLACE 3 PATCHES ON THE SKIN AS DIRECTED BY PROVIDER DAILY., Disp: 90 patch, Rfl: 11  •  lidocaine-prilocaine (EMLA) 2.5-2.5 % cream, , Disp: , Rfl:   •  linaclotide (LINZESS) 145 MCG capsule capsule, LINZESS 145 MCG CAPS, Disp: , Rfl:   •  loratadine (CLARITIN) 10 MG tablet, Take 10 mg by mouth., Disp: , Rfl:   •  morphine 6 mg/mL, by Intrathecal route Continuous., Disp: 20 mL, Rfl: 0  •  moxifloxacin (VIGAMOX) 0.5 % ophthalmic solution, Compounded drop, also includes Prednisilone Sodium Phosphate 1% and Bromfenac 0.075%, Disp: , Rfl:   •  omeprazole (priLOSEC) 40 MG capsule, Take 1 capsule by mouth., Disp: , Rfl:   •  OnabotulinumtoxinA 200 units reconstituted solution, INJECT UP  UNITS INTRAMUSCULAR INTO HEAD & NECK AREA EVERY 12 WEEKS FOR CHRONIC MIGRAINE TO BE INJECTED IN OFFICE BY PHYSICIAN, Disp: 1 each, Rfl: 2  •  ondansetron (ZOFRAN) 2 mg/mL injection, , Disp: , Rfl:   •  ondansetron (ZOFRAN) 8 MG tablet, TAKE 1 TABLET (8 MG TOTAL) BY MOUTH 3 (THREE) TIMES A DAY., Disp: , Rfl:   •  ondansetron ODT (ZOFRAN-ODT) 8 MG disintegrating tablet, , Disp: , Rfl:   •  oxyCODONE-acetaminophen (PERCOCET)  MG per tablet, Take 1 tablet by mouth Daily As Needed for Severe Pain., Disp: 30 tablet, Rfl: 0  •  Panzyga 20 GM/200ML solution infusion, , Disp: , Rfl:   •  pregabalin (LYRICA) 150 MG capsule, Take 1 capsule by mouth 3 (Three) Times a Day., Disp: 90 capsule, Rfl: 5  •  promethazine (PHENERGAN) 25 MG/ML injection, , Disp: , Rfl:   •  raNITIdine (ZANTAC) 150 MG tablet, Take 1 tablet by mouth., Disp: , Rfl:   •  SENNOSIDES-DOCUSATE SODIUM PO, SM SENNA-S TABS, Disp: , Rfl:   •  sodium chloride 0.9 % solution, , Disp:  , Rfl:   •  Sodium Chloride powder, , Disp: , Rfl:   •  SUMAtriptan (IMITREX) 100 MG tablet, TAKE 1 TABLET BY MOUTH AS NEEDED FOR HEADACHE, Disp: , Rfl: 6    Current Outpatient Medications on File Prior to Visit   Medication Sig Dispense Refill   • acetaminophen (TYLENOL) 500 MG tablet as needed     • amphetamine-dextroamphetamine (ADDERALL) 10 MG tablet Take 5 mg by mouth Daily.     • BD PosiFlush 0.9 % flush      • Bromfenac Sodium (BromSite) 0.075 % solution Compounded Drop, also includes Prednisilone Sodium Phosphate 1% and Moxifloxacin 0.5%     • chlorhexidine (PERIDEX) 0.12 % solution RINSE AND SPIT 15ML DAILY  11   • cyanocobalamin 1000 MCG/ML injection Inject 1,000 mcg into the appropriate muscle as directed by prescriber 1 (One) Time Per Week.     • DEXILANT 60 MG capsule Take 1 capsule by mouth Daily.  11   • diphenhydrAMINE (BENADRYL) 50 MG/ML injection      • fludrocortisone 0.1 MG tablet Take 0.1 mg by mouth.     • FLUoxetine (PROzac) 40 MG capsule Take 40 mg by mouth Daily.  3   • heparin 100 UNIT/ML solution injection      • ibuprofen (ADVIL,MOTRIN) 200 MG tablet IBUPROFEN 200 MG TABS     • latanoprost (XALATAN) 0.005 % ophthalmic solution INSTILL 1 DROP INTO BOTH EYES EVERY DAY IN THE EVENING     • lidocaine (LIDODERM) 5 % PLACE 3 PATCHES ON THE SKIN AS DIRECTED BY PROVIDER DAILY. 90 patch 11   • lidocaine-prilocaine (EMLA) 2.5-2.5 % cream      • linaclotide (LINZESS) 145 MCG capsule capsule LINZESS 145 MCG CAPS     • loratadine (CLARITIN) 10 MG tablet Take 10 mg by mouth.     • morphine 6 mg/mL by Intrathecal route Continuous. 20 mL 0   • moxifloxacin (VIGAMOX) 0.5 % ophthalmic solution Compounded drop, also includes Prednisilone Sodium Phosphate 1% and Bromfenac 0.075%     • omeprazole (priLOSEC) 40 MG capsule Take 1 capsule by mouth.     • OnabotulinumtoxinA 200 units reconstituted solution INJECT UP  UNITS INTRAMUSCULAR INTO HEAD & NECK AREA EVERY 12 WEEKS FOR CHRONIC MIGRAINE TO BE  INJECTED IN OFFICE BY PHYSICIAN 1 each 2   • ondansetron (ZOFRAN) 2 mg/mL injection      • ondansetron (ZOFRAN) 8 MG tablet TAKE 1 TABLET (8 MG TOTAL) BY MOUTH 3 (THREE) TIMES A DAY.     • ondansetron ODT (ZOFRAN-ODT) 8 MG disintegrating tablet      • Panzyga 20 GM/200ML solution infusion      • pregabalin (LYRICA) 150 MG capsule Take 1 capsule by mouth 3 (Three) Times a Day. 90 capsule 5   • promethazine (PHENERGAN) 25 MG/ML injection      • raNITIdine (ZANTAC) 150 MG tablet Take 1 tablet by mouth.     • SENNOSIDES-DOCUSATE SODIUM PO SM SENNA-S TABS     • sodium chloride 0.9 % solution      • Sodium Chloride powder      • SUMAtriptan (IMITREX) 100 MG tablet TAKE 1 TABLET BY MOUTH AS NEEDED FOR HEADACHE  6     No current facility-administered medications on file prior to visit.       Patient Active Problem List   Diagnosis   • Chronic pain syndrome   • Neuropathic pain   • Complex regional pain syndrome type 1 of left upper extremity   • Presence of intrathecal pump   • Spinal cord stimulator status   • Cervical post-laminectomy syndrome   • Lumbar post-laminectomy syndrome   • Bilateral occipital neuralgia   • Cervical spondylosis without myelopathy   • Encounter for long-term opiate analgesic use   • Migraine without status migrainosus, not intractable   • Arthritis of right knee   • Chronic pain of right knee   • Chronic fatigue   • Screening for endocrine, nutritional, metabolic and immunity disorder   • Vitamin D deficiency   • Anemia, unspecified   • Chronic migraine without aura without status migrainosus, not intractable       Past Medical History:   Diagnosis Date   • Biliary stenosis    • Depression    • Gastroparesis    • GERD (gastroesophageal reflux disease)    • History of ERCP    • IBS (irritable bowel syndrome)    • Joint pain    • Low back pain    • Migraine    • Neck pain    • Osteoarthritis    • Peripheral neuropathy    • Sleep apnea    • Spinal cord stimulator status    • Vitamin B12 deficiency   "      Past Surgical History:   Procedure Laterality Date   • BACK SURGERY     • CHOLECYSTECTOMY     • EPIDURAL BLOCK     • HYSTERECTOMY     • IMPLANTATION / REPLACEMENT INFUSION PUMP     • KNEE ARTHROSCOPY Right    • NECK SURGERY     • PANCREAS SURGERY     • SPINAL CORD STIMULATOR IMPLANT     • TRIGGER POINT INJECTION         History reviewed. No pertinent family history.    Social History     Socioeconomic History   • Marital status:    Tobacco Use   • Smoking status: Never   • Smokeless tobacco: Never   Vaping Use   • Vaping Use: Never used   Substance and Sexual Activity   • Alcohol use: No   • Drug use: No       -------        Review of Systems   Constitutional: Positive for activity change (decreased) and fatigue. Negative for chills and fever.   HENT: Negative for congestion.    Eyes: Negative for visual disturbance.   Respiratory: Negative for chest tightness and shortness of breath.    Cardiovascular: Negative for chest pain.   Gastrointestinal: Positive for abdominal pain, constipation and diarrhea.   Genitourinary: Positive for difficulty urinating. Negative for dyspareunia and dysuria.   Musculoskeletal: Positive for back pain.   Neurological: Positive for dizziness, weakness, light-headedness, numbness and headaches.   Psychiatric/Behavioral: Positive for sleep disturbance. Negative for agitation. The patient is not nervous/anxious.        Vitals:    02/15/23 1023   BP: 108/61   Pulse: 51   Temp: 98.6 °F (37 °C)   SpO2: 98%   Weight: 45.8 kg (101 lb)   Height: 167.6 cm (66\")   PainSc:   7   PainLoc: Back         Objective   Physical Exam  VSS, NNR, NCAT, NMNA, NRD, AAOx3. -No swelling or irritation of the pump site.  Bilateral cervical spasm    ---    Intrathecal pump was interrogated in office today. Results are in chart.    The patient is currently at a dose of 1.24 mg of morphine per day. There is no PTM bolus but there is a base rate and there is also flex doses given early in the morning.  " See [employer] notes.     Under sterile technique, the pump was accessed using a [employer] proprietary refill kit. The volume was withdrawn from the pump. The expected residual volume of the pump was 4.5 ml. The actual residual volume of the pump was 4.5 ml.    The pump was then refilled with 20 ml of morphine at a concentration of 6 mg/ml.  The pump was set to continue at its previous rate. No changes were made.       Patient tolerated procedure well. Minimal bleeding was noted. Pump site remains intact with no evidence of erythema or drainage.    Patient will return for pump refill when due or sooner if needed.     DASH is 30 months.  -July 2025    Pump refill date is May 12.    ---    Assessment & Plan   Diagnoses and all orders for this visit:    1. Chronic pain syndrome (Primary)    2. Cervical myofascial pain syndrome  -     oxyCODONE-acetaminophen (PERCOCET)  MG per tablet; Take 1 tablet by mouth Daily As Needed for Severe Pain.  Dispense: 30 tablet; Refill: 0    3. Encounter for long-term opiate analgesic use    4. Lumbar post-laminectomy syndrome    5. Cervical post-laminectomy syndrome    6. Complex regional pain syndrome type 1 of left upper extremity    7. Presence of intrathecal pump    8. Spinal cord stimulator status    9. Neuropathic pain    10. Arthritis of right knee      -- Intrathecal pump refill program refresh today.  No intrathecal therapeutic change today  --- Follow-up intrathecal refill in 3 months, please see [employer] report and nursing notes for exact date.  -- Continue multimodal analgesics including acetaminophen supplementation for neck pain  --Continue the judicious use of Percocet no more than 1 pill/day specially for joint related pain not covered by the intrathecal infusion therapy as noted above         SMITH REPORT  As part of the patient's treatment plan, I am prescribing controlled substances. The patient has been made aware of appropriate use of such medications,  including potential risk of somnolence, limited ability to drive and/or work safely, and the potential for dependence or overdose. It has also been made clear that these medications are for use by this patient only, without concomitant use of alcohol or other substances unless prescribed.     Patient has completed prescribing agreement detailing terms of continued prescribing of controlled substances, including monitoring SMITH reports, urine drug screening, and pill counts if necessary. The patient is aware that inappropriate use will results in cessation of prescribing such medications.    As the clinician, I personally reviewed the SMITH from as above while the patient was in the office today.    History and physical exam exhibit continued safe and appropriate use of controlled substances.       Dictated utilizing Dragon dictation.       Patient remained masked during entire encounter. No cough present. I donned a mask and eye protection throughout entire visit. Prior to donning mask and eye protection, hand hygiene was performed, as well as when it was doffed.  I was closer than 6 feet, but not for an extended period of time. No obvious exposure to any bodily fluids.    --    Vitals:    02/15/23 1023   PainSc:   7   PainLoc: Back          Amalia Lopez reports a pain score of 7.  Given her pain assessment as noted, treatment options were discussed and the following options were decided upon as a follow-up plan to address the patient's pain: continuation of current treatment plan for pain.

## 2023-03-06 ENCOUNTER — LAB (OUTPATIENT)
Dept: LAB | Facility: HOSPITAL | Age: 68
End: 2023-03-06
Payer: MEDICARE

## 2023-03-06 ENCOUNTER — TRANSCRIBE ORDERS (OUTPATIENT)
Dept: ADMINISTRATIVE | Facility: HOSPITAL | Age: 68
End: 2023-03-06
Payer: MEDICARE

## 2023-03-06 ENCOUNTER — HOSPITAL ENCOUNTER (OUTPATIENT)
Dept: MAMMOGRAPHY | Facility: HOSPITAL | Age: 68
Discharge: HOME | End: 2023-03-06
Payer: MEDICARE

## 2023-03-06 DIAGNOSIS — R63.4 LOSS OF WEIGHT: ICD-10-CM

## 2023-03-06 DIAGNOSIS — R63.4 LOSS OF WEIGHT: Primary | ICD-10-CM

## 2023-03-06 DIAGNOSIS — M79.10 MYALGIA: ICD-10-CM

## 2023-03-06 DIAGNOSIS — R53.83 TIREDNESS: ICD-10-CM

## 2023-03-06 DIAGNOSIS — Z12.39 BREAST SCREENING: ICD-10-CM

## 2023-03-06 LAB
CORTIS SERPL-MCNC: 7.38 MCG/DL
CRP SERPL-MCNC: 3.87 MG/DL (ref 0–0.5)
ERYTHROCYTE [SEDIMENTATION RATE] IN BLOOD: 28 MM/HR (ref 0–30)
TSH SERPL DL<=0.05 MIU/L-ACNC: 3.84 UIU/ML (ref 0.27–4.2)

## 2023-03-06 PROCEDURE — 85652 RBC SED RATE AUTOMATED: CPT

## 2023-03-06 PROCEDURE — 86038 ANTINUCLEAR ANTIBODIES: CPT

## 2023-03-06 PROCEDURE — 36415 COLL VENOUS BLD VENIPUNCTURE: CPT

## 2023-03-06 PROCEDURE — 82533 TOTAL CORTISOL: CPT

## 2023-03-06 PROCEDURE — 77063 BREAST TOMOSYNTHESIS BI: CPT

## 2023-03-06 PROCEDURE — 86140 C-REACTIVE PROTEIN: CPT

## 2023-03-06 PROCEDURE — 77067 SCR MAMMO BI INCL CAD: CPT

## 2023-03-06 PROCEDURE — 84443 ASSAY THYROID STIM HORMONE: CPT

## 2023-03-08 LAB
ANA HOMOGEN TITR SER: ABNORMAL {TITER}
ANA SER QL IF: POSITIVE
Lab: ABNORMAL

## 2023-03-27 ENCOUNTER — PRIOR AUTHORIZATION (OUTPATIENT)
Dept: PAIN MEDICINE | Facility: CLINIC | Age: 68
End: 2023-03-27
Payer: MEDICARE

## 2023-03-27 NOTE — TELEPHONE ENCOUNTER
Per Cover My Meds:  Available without authorization. The member is able to fill the requested drug at the pharmacy. If coverage is still needed or requesting prior to the expiration of a current authorization, a request can be made by sending a fax or calling the number on the back of the member's ID card.

## 2023-04-12 ENCOUNTER — TELEPHONE (OUTPATIENT)
Dept: PAIN MEDICINE | Facility: CLINIC | Age: 68
End: 2023-04-12

## 2023-04-12 NOTE — TELEPHONE ENCOUNTER
Caller: PATIENT     Relationship: SELF    Best call back number [phone]      Requested Prescriptions:   LYRICA 150 MG.      Pharmacy where request should be sent:  [facility]- [phone]    Last office visit with prescribing clinician: 2/15/2023   Last telemedicine visit with prescribing clinician: 4/26/2023   Next office visit with prescribing clinician: 4/26/2023     Additional details provided by patient:  PT. HAS ONE PILL LEFT.     Does the patient have less than a 3 day supply:  [x] Yes  [] No    Would you like a call back once the refill request has been completed: [x] Yes [] No    If the office needs to give you a call back, can they leave a voicemail: [x] Yes [] No

## 2023-04-13 RX ORDER — PREGABALIN 150 MG/1
CAPSULE ORAL
Qty: 90 CAPSULE | Refills: 0 | Status: SHIPPED | OUTPATIENT
Start: 2023-04-13

## 2023-04-21 DIAGNOSIS — G89.4 CHRONIC PAIN SYNDROME: Primary | ICD-10-CM

## 2023-04-21 DIAGNOSIS — Z79.891 ENCOUNTER FOR LONG-TERM OPIATE ANALGESIC USE: ICD-10-CM

## 2023-04-21 DIAGNOSIS — M79.18 CERVICAL MYOFASCIAL PAIN SYNDROME: ICD-10-CM

## 2023-04-21 RX ORDER — OXYCODONE AND ACETAMINOPHEN 10; 325 MG/1; MG/1
1 TABLET ORAL DAILY PRN
Qty: 30 TABLET | Refills: 0 | Status: CANCELLED | OUTPATIENT
Start: 2023-04-21

## 2023-04-21 RX ORDER — OXYCODONE HYDROCHLORIDE 10 MG/1
10 TABLET ORAL DAILY PRN
Qty: 30 TABLET | Refills: 0 | Status: SHIPPED | OUTPATIENT
Start: 2023-04-21

## 2023-04-21 NOTE — TELEPHONE ENCOUNTER
Medication Refill Request    Date of phone call: 4-21-23    Medication being requested: oxycodone  mg sig: Take 1 tablet by mouth Daily As Needed for Severe Pain.,   Qty: 30    Date of last visit: 2-15-23    Date of last refill:     SMITH up to date?:     Next Follow up?: 4-26-23    Any new pertinent information? (i.e, new medication allergies, new use of medications, change in patient's health or condition, non-compliance or inconsistency with prescribing agreement?):

## 2023-04-21 NOTE — TELEPHONE ENCOUNTER
Caller: DEANDRE FRIEDMAN    Relationship: SELF    Best call back number: [phone]    Requested Prescriptions:   OXYCODONE 10 MG    Pharmacy where request should be sent:  [facility]  [place]    Last office visit with prescribing clinician: 2/15/2023   Last telemedicine visit with prescribing clinician: 4/26/2023   Next office visit with prescribing clinician: 4/26/2023     Additional details provided by patient: PHARMACY DOES HAVE PERCOCET    Does the patient have less than a 3 day supply:  [x] Yes  [] No    Would you like a call back once the refill request has been completed: [x] Yes [] No    If the office needs to give you a call back, can they leave a voicemail: [x] Yes [] No    Paulo Ambriz Rep   04/21/23 08:24 EDT

## 2023-04-21 NOTE — TELEPHONE ENCOUNTER
Her pharmacy does not have the percocet. They only have the Oxycodone 10 mg IR. Please let us know how you would like to proceed.

## 2023-04-26 ENCOUNTER — PROCEDURE VISIT (OUTPATIENT)
Dept: PAIN MEDICINE | Facility: CLINIC | Age: 68
End: 2023-04-26
Payer: MEDICARE

## 2023-04-26 VITALS
SYSTOLIC BLOOD PRESSURE: 133 MMHG | TEMPERATURE: 98.4 F | HEIGHT: 66 IN | WEIGHT: 101.6 LBS | OXYGEN SATURATION: 100 % | HEART RATE: 55 BPM | DIASTOLIC BLOOD PRESSURE: 76 MMHG | BODY MASS INDEX: 16.33 KG/M2

## 2023-04-26 DIAGNOSIS — G43.709 CHRONIC MIGRAINE WITHOUT AURA WITHOUT STATUS MIGRAINOSUS, NOT INTRACTABLE: ICD-10-CM

## 2023-04-26 DIAGNOSIS — G43.909 MIGRAINE WITHOUT STATUS MIGRAINOSUS, NOT INTRACTABLE, UNSPECIFIED MIGRAINE TYPE: Primary | ICD-10-CM

## 2023-04-26 NOTE — PROGRESS NOTES
CHIEF COMPLAINT: No chief complaint on file.      Amalia Lopez is a 67 y.o. female.  She is here for the following procedure: Botox for {Blank multiple:69789}.    There were no vitals filed for this visit.    5 units were injected into midline procerus muscle.  10 units total were injected into  muscles - 5 on each side.   20 units total were injected into 4 sites of bilateral frontalis muscles.  40 units total were injected into 8 sites of bilateral temporalis muscles  30 units total were injected into 6 sites of bilateral occipitalis muscles.  10 units total were injected into 4 sites of cervical paraspinal muscle group.   30 units total were injected into 6 sites of bilateral upper trapezius muscles.  A total of [{#:65024}] units of Botox was given. {#:86118} units were unused.   Extra units were dispersed between: {#:47880}    Botox: Lot: ***  Exp: ***  Normal Saline: Lot: ***  Exp: ***       Procedures    Visit Diagnoses:  No diagnosis found.    Robby Morelos MA  Pain Management

## 2023-04-26 NOTE — PROGRESS NOTES
"CHIEF COMPLAINT: Headache      Amalia Lopez is a 67 y.o. female.  She is here for the following procedure: Botox for migraines.    Vitals:    04/26/23 0831   BP: 133/76   BP Location: Right arm   Patient Position: Sitting   Pulse: 55   Temp: 98.4 °F (36.9 °C)   TempSrc: Temporal   SpO2: 100%   Weight: 46.1 kg (101 lb 9.6 oz)   Height: 167.6 cm (66\")   PainSc:   6   PainLoc: Head       5 units were injected into midline procerus muscle.  10 units total were injected into  muscles - 5 on each side.   20 units total were injected into 4 sites of bilateral frontalis muscles.  40 units total were injected into 8 sites of bilateral temporalis muscles  30 units total were injected into 6 sites of bilateral occipitalis muscles.  10 units total were injected into 4 sites of cervical paraspinal muscle group.   30 units total were injected into 6 sites of bilateral upper trapezius muscles.  A total of [155] units of Botox was given. 0 units were unused.   Extra units were dispersed between: extra units were wasted    Botox: Lot: [id number]  Exp: 9/2025  Normal Saline: Lot: [id number]  Exp: 3/1/2024    This Botox was provided by the patient's own specialty pharmacy (thus we are not billing for the J-codes).         Procedures    Botox Injection for Migraines &/or Chronic Headache, using the PREEMPT Protocol:    (CPT 46845)    AGENT:  Botox (OnabotulinumtoxinA), Total Units used 155 units    INDICATION & PREPARATION:    This patient meets ICHD criteria for chronic migraine disorder and has failed multiple medical treatments, and is therefore a candidate for Botox for Migraine Prophylaxis.  This injection paradigm is based on the PREEMPT protocol, as fixed-site fixed dose (FSFD) injection of five units OnabotulinumtoxinA in thirty-one defined locations, for a total of 155 units.  Those locations are Procerus, bilateral Corrugators, two injections in each bilateral Frontalis, four injections in each of bilateral Temporalis, " three injections in each of bilateral Occipitalis, two injections in each of bilateral Cervical Paraspinal musculature, and three injections in each of bilateral Trapezius.  The Botox solution is reconstituted with sterile technique, observation of appropriate vacuum seal of the vial, using 4mL of sterile saline per 200 units Botox, for a concentration of 5 units per 0.1mL; the solution is prepared immediately prior to its use.    DESCRIPTON OF PROCEDURE:  Informed consent was obtained, with discussion including but not limited to risk of hypersensitivity reactions, risk of bleeding, risk of infection or transmission of disease, risk of dysphagia, risk of weakness and/or ptosis, and risk of respiratory or cardiac events.  Antiseptic technique is observed throughout.      The patient was placed in supine position, and the Procerus and bilateral Corrugators (one FSFD injection each) were injected with needle angled up and away from the eyes.  Next, two FSFD injections were performed in each of the Frontalis muscles.  Next, the patient turned slightly to the left, the area was palpated for tenderness or pain, and the patient clenched their teeth to aid in palpating the temporalis muscle; four FSFD injections were performed on this side, including the anterior temporalis, a point about 0.5cm superior and 1.5cm posterior to the first injection which is into the medial aspect of the muscle, and points about 1.5cm caudad and posterior, respectively, to this second injection point.  The patient the turned the right and the same was performed at the right Temporalis.  The patient then turned to a prone position and each occipital area was palpated for tenderness or pain; the external occipital protuberance was palpated and three FSFD injections were applied on the left side, at a point about 1cm lateral to the protuberance and then points 1cm cephalad and medial/lateral to that first injection point.  The same was performed  on the right Occipitalis.  Next, two FSFD injections were performed in the left Cervical Paraspinal muscle group, with the first about 4cm inferior to the occipital protuberance and the second about 1cm lateral and superior to the first; the same was performed on the right Cervical Paraspinal Area.  Lastly, three FSFD injections were performed in left Trapezius, one in each upper, mid, and lower portion of the muscle; the same was performed on the right side.    ESTIMATED BLOOD LOSS:  minimal    COMPLICATIONS:    None    TOLERANCE & DISCHARGE CONDITION:    The patient tolerated the procedure well.  The patient was discharged in satisfactory condition.    PLAN OF CARE:  1. The patient returned today for Botox Therapy for Chronic Migraines.  I advise that full effect may not be seen for a couple of weeks.  Full effect is likely a decrease in frequency and severity of migraines and may not be a total cure.    2. If the effect is significant, that is a significant decrease in headache severity and frequency as well as improvement in daily function, then consideration will be given to repeating injection of Botox three months from today.    3. The patient will plan to return every 3 months for botulinum toxin therapy as the patient has demonstrated repeated significant results and plan for continuation of botulinum toxin therapy for this chronic headache disorder on a quarterly schedule for as long as the therapy continues to succeed.  4. The patient was given our standard instruction sheet.  The patient will resume all medications as per the medication reconciliation sheet.        Visit Diagnoses:  1. Migraine without status migrainosus, not intractable, unspecified migraine type    2. Chronic migraine without aura without status migrainosus, not intractable        Jordin Strauss MD  Pain Management    --      Vitals:    04/26/23 0831   PainSc:   6   PainLoc: Head          Amaliasa Lopez reports a pain score of 6.   Given her pain assessment as noted, treatment options were discussed and the following options were decided upon as a follow-up plan to address the patient's pain: continuation of current treatment plan for pain.    --    TACHO-nam report is reviewed:  I reviewed the document in the electronic form under the PDMP tab in the Epic EMR...  - In this function, the report is a current report in as close to real-time as possible.  - The report was available for immediate review.    - I did eric the report as reviewed.  - There is not concern for aberrant behavior based on this ekasper review.

## 2023-05-03 ENCOUNTER — OFFICE VISIT (OUTPATIENT)
Dept: PAIN MEDICINE | Facility: CLINIC | Age: 68
End: 2023-05-03
Payer: MEDICARE

## 2023-05-03 VITALS
BODY MASS INDEX: 16.36 KG/M2 | HEIGHT: 66 IN | SYSTOLIC BLOOD PRESSURE: 117 MMHG | WEIGHT: 101.8 LBS | TEMPERATURE: 97.7 F | HEART RATE: 51 BPM | DIASTOLIC BLOOD PRESSURE: 61 MMHG

## 2023-05-03 DIAGNOSIS — Z97.8 PRESENCE OF INTRATHECAL PUMP: Primary | ICD-10-CM

## 2023-05-03 DIAGNOSIS — G89.4 CHRONIC PAIN SYNDROME: ICD-10-CM

## 2023-05-03 NOTE — PROGRESS NOTES
CHIEF COMPLAINT  F/U Back pain/pump refill- patient states that her pain has worsened since her last visit.     Xu Lopez is a 67 y.o. female  who presents for follow-up.  She has a history of back pain and neck pain, other painful problems    ---    She has had less control of her spine related pain.  She presents today requesting discussion about considering a dose escalation of intrathecal therapy.    Regarding the flex dosing she does note that she is usually waking up about the time of the flex dosing and unfortunately she has more pain in the morning which is the reason we trialed it, and she does not see any immediate therapeutic effect from that flex dosing.    ---      Intrathecal pump was interrogated in office today. Results are in chart.    The patient is currently at a flex dosing with 0.95 mg of morphine per day with a 0.25 mg flex bolus at 4am.      Under sterile technique, the pump was accessed using a [employer] proprietary refill kit. The volume was withdrawn from the pump. The expected residual volume of the pump was 4.1 ml. The actual residual volume of the pump was 5.2 ml.    The pump was then refilled with 20 ml of morphine at a concentration of 6 mg/ml.  The pump was set with change to simple continuous dosing. The changes were made were to d/c the flex dose and make continuous rate @ 1.5 mg per day       Patient tolerated procedure well. Minimal bleeding was noted. Pump site remains intact with no evidence of erythema or drainage.    Patient will return for pump refill when due or sooner if needed.     Cath tip is at T1.  DASH is Aug 2025.      Pump refill date is 7-11-23.      History of Present Illness     PEG Assessment   What number best describes your pain on average in the past week?7  What number best describes how, during the past week, pain has interfered with your enjoyment of life?7  What number best describes how, during the past week, pain has interfered with  your general activity?  7    --  The aforementioned information the Chief Complaint section and above subjective data including any HPI data, and also the Review of Systems data, has been personally reviewed and affirmed.  --        Review of Pertinent Medical Data ---  Melia report is reviewed:  I reviewed the document in the electronic form under the PDMP tab in the Epic EMR...  - In this function, the report is a current report in as close to real-time as possible.  - The report was not available for immediate review.    - I did eric the report as reviewed.  - There is not pertinent data on the last page of the report. There is not concern for aberrant behavior based on this ekasper review. Requested a manual EKasper with [place] parameters.      The following portions of the patient's history were reviewed and updated as appropriate: allergies, current medications, past family history, past medical history, past social history, past surgical history and problem list.    -------    The following portions of the patient's history were reviewed and updated as appropriate: allergies, current medications, past family history, past medical history, past social history, past surgical history and problem list.    Allergies   Allergen Reactions   • Acetaminophen-Codeine Hives   • Fentanyl Other (See Comments) and Hives     Dystonic movements  Dystonic movements  Dystonic movements  Dystonic movements   • Morphine Other (See Comments)     Other reaction(s): Other (See Comments)  Headaches   • Codeine Itching         Current Outpatient Medications:   •  acetaminophen (TYLENOL) 500 MG tablet, as needed, Disp: , Rfl:   •  amphetamine-dextroamphetamine (ADDERALL) 10 MG tablet, Take 5 mg by mouth Daily., Disp: , Rfl:   •  BD PosiFlush 0.9 % flush, , Disp: , Rfl:   •  Bromfenac Sodium (BromSite) 0.075 % solution, Compounded Drop, also includes Prednisilone Sodium Phosphate 1% and Moxifloxacin 0.5%, Disp: , Rfl:   •   chlorhexidine (PERIDEX) 0.12 % solution, RINSE AND SPIT 15ML DAILY, Disp: , Rfl: 11  •  cyanocobalamin 1000 MCG/ML injection, Inject 1 mL into the appropriate muscle as directed by prescriber 1 (One) Time Per Week., Disp: , Rfl:   •  DEXILANT 60 MG capsule, Take 1 capsule by mouth Daily., Disp: , Rfl: 11  •  diphenhydrAMINE (BENADRYL) 50 MG/ML injection, , Disp: , Rfl:   •  fludrocortisone 0.1 MG tablet, Take 1 tablet by mouth., Disp: , Rfl:   •  FLUoxetine (PROzac) 40 MG capsule, Take 1 capsule by mouth Daily., Disp: , Rfl: 3  •  heparin 100 UNIT/ML solution injection, , Disp: , Rfl:   •  ibuprofen (ADVIL,MOTRIN) 200 MG tablet, IBUPROFEN 200 MG TABS, Disp: , Rfl:   •  latanoprost (XALATAN) 0.005 % ophthalmic solution, INSTILL 1 DROP INTO BOTH EYES EVERY DAY IN THE EVENING, Disp: , Rfl:   •  lidocaine (LIDODERM) 5 %, PLACE 3 PATCHES ON THE SKIN AS DIRECTED BY PROVIDER DAILY., Disp: 90 patch, Rfl: 11  •  lidocaine-prilocaine (EMLA) 2.5-2.5 % cream, , Disp: , Rfl:   •  linaclotide (LINZESS) 145 MCG capsule capsule, LINZESS 145 MCG CAPS, Disp: , Rfl:   •  loratadine (CLARITIN) 10 MG tablet, Take 1 tablet by mouth., Disp: , Rfl:   •  morphine 6 mg/mL, by Intrathecal route Continuous., Disp: 20 mL, Rfl: 0  •  moxifloxacin (VIGAMOX) 0.5 % ophthalmic solution, Compounded drop, also includes Prednisilone Sodium Phosphate 1% and Bromfenac 0.075%, Disp: , Rfl:   •  omeprazole (priLOSEC) 40 MG capsule, Take 1 capsule by mouth., Disp: , Rfl:   •  OnabotulinumtoxinA 200 units reconstituted solution, INJECT UP  UNITS INTRAMUSCULAR INTO HEAD & NECK AREA EVERY 12 WEEKS FOR CHRONIC MIGRAINE TO BE INJECTED IN OFFICE BY PHYSICIAN, Disp: 1 each, Rfl: 2  •  ondansetron (ZOFRAN) 2 mg/mL injection, , Disp: , Rfl:   •  ondansetron (ZOFRAN) 8 MG tablet, TAKE 1 TABLET (8 MG TOTAL) BY MOUTH 3 (THREE) TIMES A DAY., Disp: , Rfl:   •  ondansetron ODT (ZOFRAN-ODT) 8 MG disintegrating tablet, , Disp: , Rfl:   •  oxyCODONE (ROXICODONE) 10 MG  tablet, Take 1 tablet by mouth Daily As Needed for Severe Pain. - take Acetaminophen 325 mg with each dose, Disp: 30 tablet, Rfl: 0  •  Panzyga 20 GM/200ML solution infusion, , Disp: , Rfl:   •  pregabalin (LYRICA) 150 MG capsule, TAKE 1 CAPSULE BY MOUTH THREE TIMES A DAY, Disp: 90 capsule, Rfl: 0  •  promethazine (PHENERGAN) 25 MG/ML injection, , Disp: , Rfl:   •  raNITIdine (ZANTAC) 150 MG tablet, Take 1 tablet by mouth., Disp: , Rfl:   •  SENNOSIDES-DOCUSATE SODIUM PO, SM SENNA-S TABS, Disp: , Rfl:   •  sodium chloride 0.9 % solution, , Disp: , Rfl:   •  Sodium Chloride powder, , Disp: , Rfl:   •  SUMAtriptan (IMITREX) 100 MG tablet, TAKE 1 TABLET BY MOUTH AS NEEDED FOR HEADACHE, Disp: , Rfl: 6    Current Outpatient Medications on File Prior to Visit   Medication Sig Dispense Refill   • acetaminophen (TYLENOL) 500 MG tablet as needed     • amphetamine-dextroamphetamine (ADDERALL) 10 MG tablet Take 5 mg by mouth Daily.     • BD PosiFlush 0.9 % flush      • Bromfenac Sodium (BromSite) 0.075 % solution Compounded Drop, also includes Prednisilone Sodium Phosphate 1% and Moxifloxacin 0.5%     • chlorhexidine (PERIDEX) 0.12 % solution RINSE AND SPIT 15ML DAILY  11   • cyanocobalamin 1000 MCG/ML injection Inject 1 mL into the appropriate muscle as directed by prescriber 1 (One) Time Per Week.     • DEXILANT 60 MG capsule Take 1 capsule by mouth Daily.  11   • diphenhydrAMINE (BENADRYL) 50 MG/ML injection      • fludrocortisone 0.1 MG tablet Take 1 tablet by mouth.     • FLUoxetine (PROzac) 40 MG capsule Take 1 capsule by mouth Daily.  3   • heparin 100 UNIT/ML solution injection      • ibuprofen (ADVIL,MOTRIN) 200 MG tablet IBUPROFEN 200 MG TABS     • latanoprost (XALATAN) 0.005 % ophthalmic solution INSTILL 1 DROP INTO BOTH EYES EVERY DAY IN THE EVENING     • lidocaine (LIDODERM) 5 % PLACE 3 PATCHES ON THE SKIN AS DIRECTED BY PROVIDER DAILY. 90 patch 11   • lidocaine-prilocaine (EMLA) 2.5-2.5 % cream      • linaclotide  (LINZESS) 145 MCG capsule capsule LINZESS 145 MCG CAPS     • loratadine (CLARITIN) 10 MG tablet Take 1 tablet by mouth.     • morphine 6 mg/mL by Intrathecal route Continuous. 20 mL 0   • moxifloxacin (VIGAMOX) 0.5 % ophthalmic solution Compounded drop, also includes Prednisilone Sodium Phosphate 1% and Bromfenac 0.075%     • omeprazole (priLOSEC) 40 MG capsule Take 1 capsule by mouth.     • OnabotulinumtoxinA 200 units reconstituted solution INJECT UP  UNITS INTRAMUSCULAR INTO HEAD & NECK AREA EVERY 12 WEEKS FOR CHRONIC MIGRAINE TO BE INJECTED IN OFFICE BY PHYSICIAN 1 each 2   • ondansetron (ZOFRAN) 2 mg/mL injection      • ondansetron (ZOFRAN) 8 MG tablet TAKE 1 TABLET (8 MG TOTAL) BY MOUTH 3 (THREE) TIMES A DAY.     • ondansetron ODT (ZOFRAN-ODT) 8 MG disintegrating tablet      • oxyCODONE (ROXICODONE) 10 MG tablet Take 1 tablet by mouth Daily As Needed for Severe Pain. - take Acetaminophen 325 mg with each dose 30 tablet 0   • Panzyga 20 GM/200ML solution infusion      • pregabalin (LYRICA) 150 MG capsule TAKE 1 CAPSULE BY MOUTH THREE TIMES A DAY 90 capsule 0   • promethazine (PHENERGAN) 25 MG/ML injection      • raNITIdine (ZANTAC) 150 MG tablet Take 1 tablet by mouth.     • SENNOSIDES-DOCUSATE SODIUM PO SM SENNA-S TABS     • sodium chloride 0.9 % solution      • Sodium Chloride powder      • SUMAtriptan (IMITREX) 100 MG tablet TAKE 1 TABLET BY MOUTH AS NEEDED FOR HEADACHE  6     No current facility-administered medications on file prior to visit.       Patient Active Problem List   Diagnosis   • Chronic pain syndrome   • Neuropathic pain   • Complex regional pain syndrome type 1 of left upper extremity   • Presence of intrathecal pump   • Spinal cord stimulator status   • Cervical post-laminectomy syndrome   • Lumbar post-laminectomy syndrome   • Bilateral occipital neuralgia   • Cervical spondylosis without myelopathy   • Encounter for long-term opiate analgesic use   • Migraine without status  migrainosus, not intractable   • Arthritis of right knee   • Chronic pain of right knee   • Chronic fatigue   • Screening for endocrine, nutritional, metabolic and immunity disorder   • Vitamin D deficiency   • Anemia, unspecified   • Chronic migraine without aura without status migrainosus, not intractable       Past Medical History:   Diagnosis Date   • Biliary stenosis    • Depression    • Gastroparesis    • GERD (gastroesophageal reflux disease)    • History of ERCP    • IBS (irritable bowel syndrome)    • Joint pain    • Low back pain    • Migraine    • Neck pain    • Osteoarthritis    • Peripheral neuropathy    • Sleep apnea    • Spinal cord stimulator status    • Vitamin B12 deficiency        Past Surgical History:   Procedure Laterality Date   • BACK SURGERY     • CHOLECYSTECTOMY     • EPIDURAL BLOCK     • HYSTERECTOMY     • IMPLANTATION / REPLACEMENT INFUSION PUMP     • KNEE ARTHROSCOPY Right    • NECK SURGERY     • PANCREAS SURGERY     • SPINAL CORD STIMULATOR IMPLANT     • TRIGGER POINT INJECTION         History reviewed. No pertinent family history.    Social History     Socioeconomic History   • Marital status:    Tobacco Use   • Smoking status: Never   • Smokeless tobacco: Never   Vaping Use   • Vaping Use: Never used   Substance and Sexual Activity   • Alcohol use: No   • Drug use: No       -------        Review of Systems   Constitutional: Positive for activity change (decreased) and fatigue. Negative for chills and fever.   HENT: Negative for congestion.    Eyes: Negative for visual disturbance.   Respiratory: Negative for chest tightness and shortness of breath.    Cardiovascular: Negative for chest pain.   Gastrointestinal: Positive for abdominal pain and constipation. Negative for diarrhea.   Genitourinary: Positive for difficulty urinating. Negative for dyspareunia and dysuria.   Musculoskeletal: Positive for back pain.   Neurological: Positive for dizziness, weakness, light-headedness,  "numbness and headaches.   Psychiatric/Behavioral: Positive for sleep disturbance. Negative for agitation. The patient is not nervous/anxious.        Vitals:    05/03/23 1115   BP: 117/61   Pulse: 51   Temp: 97.7 °F (36.5 °C)   Weight: 46.2 kg (101 lb 12.8 oz)   Height: 167.6 cm (66\")   PainSc:   6   PainLoc: Back         Objective   Physical Exam  VSS, NNR, NCAT, NMNA, NRD, AAOx3.  No erythema or irritation or other issues at the pump site      Assessment & Plan   Diagnoses and all orders for this visit:    1. Presence of intrathecal pump (Primary)    2. Chronic pain syndrome      We discussed her overall care in detail.  As she has not escalated opioid therapy but overall feeling that her daily management is difficult because of constant distraction from pain we decided to escalate her total daily dose.  We will be escalating the total daily dose by approximately 20%.      --- Follow-up for next pump refill  -- change in pump program.... discontinue basal + flex.... change to basal @ 1.5mg per day           SMITH REPORT  As part of the patient's treatment plan, I am prescribing controlled substances. The patient has been made aware of appropriate use of such medications, including potential risk of somnolence, limited ability to drive and/or work safely, and the potential for dependence or overdose. It has also been made clear that these medications are for use by this patient only, without concomitant use of alcohol or other substances unless prescribed.     Patient has completed prescribing agreement detailing terms of continued prescribing of controlled substances, including monitoring SMITH reports, urine drug screening, and pill counts if necessary. The patient is aware that inappropriate use will results in cessation of prescribing such medications.    As the clinician, I personally plan to review the SMITH as stated above; the ekasper was not showing [place] information and the manual is " pending.    History and physical exam exhibit continued safe and appropriate use of controlled substances.       Dictated utilizing Dragon dictation.     ---      Vitals:    05/03/23 1115   PainSc:   6   PainLoc: Back          Amalia Lopez reports a pain score of 6.  Given her pain assessment as noted, treatment options were discussed and the following options were decided upon as a follow-up plan to address the patient's pain: continuation of current treatment plan for pain.

## 2023-05-12 ENCOUNTER — TELEPHONE (OUTPATIENT)
Dept: PAIN MEDICINE | Facility: CLINIC | Age: 68
End: 2023-05-12
Payer: MEDICARE

## 2023-05-12 NOTE — TELEPHONE ENCOUNTER
Patient states that she has had a burning in the left side of her forehead for the past week. It radiates all the way back into her occipital area. She has been in the hospital for an infection in her port for the past week so they have removed it. But she wanted to know if you could squeeze her in for a OCNB next week. Please advise.

## 2023-05-14 DIAGNOSIS — G89.4 CHRONIC PAIN SYNDROME: Primary | ICD-10-CM

## 2023-05-14 DIAGNOSIS — M96.1 LUMBAR POST-LAMINECTOMY SYNDROME: ICD-10-CM

## 2023-05-15 RX ORDER — PREGABALIN 150 MG/1
CAPSULE ORAL
Qty: 90 CAPSULE | Refills: 0 | Status: SHIPPED | OUTPATIENT
Start: 2023-05-15

## 2023-05-16 ENCOUNTER — TELEPHONE (OUTPATIENT)
Dept: PAIN MEDICINE | Facility: CLINIC | Age: 68
End: 2023-05-16

## 2023-05-16 NOTE — TELEPHONE ENCOUNTER
Caller: DEANDRE    Relationship to patient: SELF    Best call back number: [phone]    Type of visit: PUMP REFILL     Additional notes: PATIENT WANTS TO RESCHEDULE APPT ON 07/05/23, ATTEMPTED TO WARM TRANSFER

## 2023-05-30 ENCOUNTER — TELEPHONE (OUTPATIENT)
Dept: PAIN MEDICINE | Facility: CLINIC | Age: 68
End: 2023-05-30

## 2023-05-30 NOTE — TELEPHONE ENCOUNTER
Caller: DEANDRE    Relationship to patient: SELF    Best call back number: [phone]    Chief complaint: SEVERE HEADACHES/BURNING - HAD A HOSPITAL STAY - PATIENT STATES THIS HAS BEEN AN OCCURRING THING.     Type of visit: FOLLOW UP     Requested date: ASAP

## 2023-06-16 ENCOUNTER — TELEPHONE (OUTPATIENT)
Dept: PAIN MEDICINE | Facility: CLINIC | Age: 68
End: 2023-06-16
Payer: MEDICARE

## 2023-06-16 DIAGNOSIS — Z79.891 ENCOUNTER FOR LONG-TERM OPIATE ANALGESIC USE: ICD-10-CM

## 2023-06-16 DIAGNOSIS — G89.4 CHRONIC PAIN SYNDROME: ICD-10-CM

## 2023-06-16 DIAGNOSIS — M96.1 LUMBAR POST-LAMINECTOMY SYNDROME: ICD-10-CM

## 2023-06-16 RX ORDER — PREGABALIN 150 MG/1
150 CAPSULE ORAL 3 TIMES DAILY
Qty: 90 CAPSULE | Refills: 2 | Status: SHIPPED | OUTPATIENT
Start: 2023-06-16

## 2023-06-16 RX ORDER — OXYCODONE HYDROCHLORIDE 10 MG/1
10 TABLET ORAL DAILY PRN
Qty: 30 TABLET | Refills: 0 | Status: SHIPPED | OUTPATIENT
Start: 2023-06-16

## 2023-06-16 NOTE — TELEPHONE ENCOUNTER
Caller: Jessica Amalia    Relationship: Self    Best call back number: [phone]    Requested Prescriptions:   Requested Prescriptions     Pending Prescriptions Disp Refills    pregabalin (LYRICA) 150 MG capsule 90 capsule 0     Sig: Take 1 capsule by mouth 3 (Three) Times a Day.    oxyCODONE (ROXICODONE) 10 MG tablet 30 tablet 0     Sig: Take 1 tablet by mouth Daily As Needed for Severe Pain. - take Acetaminophen 325 mg with each dose        Pharmacy where request should be sent:  [facility]    Last office visit with prescribing clinician: 5/3/2023   Last telemedicine visit with prescribing clinician: Visit date not found   Next office visit with prescribing clinician: 7/10/2023       Does the patient have less than a 3 day supply:  [x] Yes  [] No    Would you like a call back once the refill request has been completed: [x] Yes [] No    If the office needs to give you a call back, can they leave a voicemail: [x] Yes [] No    Paulo Collins Rep   06/16/23 08:08 EDT

## 2023-08-02 DIAGNOSIS — Z79.891 ENCOUNTER FOR LONG-TERM OPIATE ANALGESIC USE: ICD-10-CM

## 2023-08-02 RX ORDER — OXYCODONE HYDROCHLORIDE 10 MG/1
10 TABLET ORAL DAILY PRN
Qty: 30 TABLET | Refills: 0 | Status: SHIPPED | OUTPATIENT
Start: 2023-08-02

## 2023-08-07 ENCOUNTER — PROCEDURE VISIT (OUTPATIENT)
Dept: PAIN MEDICINE | Facility: CLINIC | Age: 68
End: 2023-08-07
Payer: MEDICARE

## 2023-08-07 VITALS
DIASTOLIC BLOOD PRESSURE: 78 MMHG | OXYGEN SATURATION: 100 % | WEIGHT: 102.4 LBS | RESPIRATION RATE: 18 BRPM | TEMPERATURE: 97.5 F | HEART RATE: 48 BPM | HEIGHT: 66 IN | SYSTOLIC BLOOD PRESSURE: 120 MMHG | BODY MASS INDEX: 16.46 KG/M2

## 2023-08-07 DIAGNOSIS — G43.709 CHRONIC MIGRAINE WITHOUT AURA WITHOUT STATUS MIGRAINOSUS, NOT INTRACTABLE: ICD-10-CM

## 2023-08-07 DIAGNOSIS — G43.909 MIGRAINE WITHOUT STATUS MIGRAINOSUS, NOT INTRACTABLE, UNSPECIFIED MIGRAINE TYPE: Primary | ICD-10-CM

## 2023-08-07 NOTE — PROGRESS NOTES
"CHIEF COMPLAINT: Migraine      Amalia Lopez is a 67 y.o. female.  She is here for the following procedure: Botox for migraines.    Vitals:    08/07/23 1133   BP: 120/78   Pulse: (!) 48   Resp: 18   Temp: 97.5 øF (36.4 øC)   SpO2: 100%   Weight: 46.4 kg (102 lb 6.4 oz)   Height: 167.6 cm (66\")   PainSc:   7   PainLoc: Head       5 units were injected into midline procerus muscle.  10 units total were injected into  muscles - 5 on each side.   20 units total were injected into 4 sites of bilateral frontalis muscles.  40 units total were injected into 8 sites of bilateral temporalis muscles  30 units total were injected into 6 sites of bilateral occipitalis muscles.  10 units total were injected into 4 sites of cervical paraspinal muscle group.   30 units total were injected into 6 sites of bilateral upper trapezius muscles.  A total of [155] units of Botox was given. 45 units were unused.   Extra units were dispersed between: extra units were wasted    Botox: Lot: [id number]  Exp: 2026/02 NDC: 0780-3815-83  Normal Saline: Lot: [id number]  Exp: [id number] NDC: 3682-8334-23       Procedures    Botox Injection for Migraines &/or Chronic Headache, using the PREEMPT Protocol:    (CPT 10232)    AGENT:  Botox (OnabotulinumtoxinA), Total Units used 155 units    INDICATION & PREPARATION:    This patient meets ICHD criteria for chronic migraine disorder and has failed multiple medical treatments, and is therefore a candidate for Botox for Migraine Prophylaxis.  This injection paradigm is based on the PREEMPT protocol, as fixed-site fixed dose (FSFD) injection of five units OnabotulinumtoxinA in thirty-one defined locations, for a total of 155 units.  Those locations are Procerus, bilateral Corrugators, two injections in each bilateral Frontalis, four injections in each of bilateral Temporalis, three injections in each of bilateral Occipitalis, two injections in each of bilateral Cervical Paraspinal musculature, and three " injections in each of bilateral Trapezius.  The Botox solution is reconstituted with sterile technique, observation of appropriate vacuum seal of the vial, using 4mL of sterile saline per 200 units Botox, for a concentration of 5 units per 0.1mL; the solution is prepared immediately prior to its use.    DESCRIPTON OF PROCEDURE:  Informed consent was obtained, with discussion including but not limited to risk of hypersensitivity reactions, risk of bleeding, risk of infection or transmission of disease, risk of dysphagia, risk of weakness and/or ptosis, and risk of respiratory or cardiac events.  Antiseptic technique is observed throughout.      The patient was placed in supine position, and the Procerus and bilateral Corrugators (one FSFD injection each) were injected with needle angled up and away from the eyes.  Next, two FSFD injections were performed in each of the Frontalis muscles.  Next, the patient turned slightly to the left, the area was palpated for tenderness or pain, and the patient clenched their teeth to aid in palpating the temporalis muscle; four FSFD injections were performed on this side, including the anterior temporalis, a point about 0.5cm superior and 1.5cm posterior to the first injection which is into the medial aspect of the muscle, and points about 1.5cm caudad and posterior, respectively, to this second injection point.  The patient the turned the right and the same was performed at the right Temporalis.  The patient then turned to a prone position and each occipital area was palpated for tenderness or pain; the external occipital protuberance was palpated and three FSFD injections were applied on the left side, at a point about 1cm lateral to the protuberance and then points 1cm cephalad and medial/lateral to that first injection point.  The same was performed on the right Occipitalis.  Next, two FSFD injections were performed in the left Cervical Paraspinal muscle group, with the first  about 4cm inferior to the occipital protuberance and the second about 1cm lateral and superior to the first; the same was performed on the right Cervical Paraspinal Area.  Lastly, three FSFD injections were performed in left Trapezius, one in each upper, mid, and lower portion of the muscle; the same was performed on the right side.    ESTIMATED BLOOD LOSS:  minimal    COMPLICATIONS:    None    TOLERANCE & DISCHARGE CONDITION:    The patient tolerated the procedure well.  The patient was discharged in satisfactory condition.    PLAN OF CARE:  1. The patient returned today for Botox Therapy for Chronic Migraines.  I advise that full effect may not be seen for a couple of weeks.  Full effect is likely a decrease in frequency and severity of migraines and may not be a total cure.    2. If the effect is significant, that is a significant decrease in headache severity and frequency as well as improvement in daily function, then consideration will be given to repeating injection of Botox three months from today.    3. The patient will plan to return every 3 months for botulinum toxin therapy as the patient has demonstrated repeated significant results and plan for continuation of botulinum toxin therapy for this chronic headache disorder on a quarterly schedule for as long as the therapy continues to succeed.  4. The patient was given our standard instruction sheet.  The patient will resume all medications as per the medication reconciliation sheet.      Visit Diagnoses:  1. Migraine without status migrainosus, not intractable, unspecified migraine type    2. Chronic migraine without aura without status migrainosus, not intractable        Jordin Strauss MD  Pain Management     --      Vitals:    08/07/23 1133   PainSc:   7   PainLoc: Head          Amalia Lopez reports a pain score of 7.  Given her pain assessment as noted, treatment options were discussed and the following options were decided upon as a follow-up  plan to address the patient's pain: continuation of current treatment plan for pain.

## 2023-08-14 RX ORDER — LIDOCAINE 50 MG/G
PATCH TOPICAL
Qty: 90 PATCH | Refills: 11 | Status: SHIPPED | OUTPATIENT
Start: 2023-08-14

## 2023-09-01 DIAGNOSIS — Z97.8 PRESENCE OF INTRATHECAL PUMP: ICD-10-CM

## 2023-09-13 ENCOUNTER — PROCEDURE VISIT (OUTPATIENT)
Dept: PAIN MEDICINE | Facility: CLINIC | Age: 68
End: 2023-09-13
Payer: MEDICARE

## 2023-09-13 VITALS
TEMPERATURE: 97 F | HEIGHT: 66 IN | OXYGEN SATURATION: 92 % | HEART RATE: 44 BPM | DIASTOLIC BLOOD PRESSURE: 72 MMHG | BODY MASS INDEX: 16.1 KG/M2 | SYSTOLIC BLOOD PRESSURE: 108 MMHG | WEIGHT: 100.2 LBS

## 2023-09-13 DIAGNOSIS — M79.2 NEUROPATHIC PAIN: ICD-10-CM

## 2023-09-13 DIAGNOSIS — Z79.891 ENCOUNTER FOR LONG-TERM OPIATE ANALGESIC USE: ICD-10-CM

## 2023-09-13 DIAGNOSIS — M25.561 CHRONIC PAIN OF RIGHT KNEE: ICD-10-CM

## 2023-09-13 DIAGNOSIS — M96.1 LUMBAR POST-LAMINECTOMY SYNDROME: ICD-10-CM

## 2023-09-13 DIAGNOSIS — G90.512 COMPLEX REGIONAL PAIN SYNDROME TYPE 1 OF LEFT UPPER EXTREMITY: ICD-10-CM

## 2023-09-13 DIAGNOSIS — G89.29 CHRONIC PAIN OF RIGHT KNEE: ICD-10-CM

## 2023-09-13 DIAGNOSIS — G89.4 CHRONIC PAIN SYNDROME: Primary | ICD-10-CM

## 2023-09-13 DIAGNOSIS — M17.11 ARTHRITIS OF RIGHT KNEE: ICD-10-CM

## 2023-09-13 DIAGNOSIS — G43.909 MIGRAINE WITHOUT STATUS MIGRAINOSUS, NOT INTRACTABLE, UNSPECIFIED MIGRAINE TYPE: ICD-10-CM

## 2023-09-13 DIAGNOSIS — Z97.8 PRESENCE OF INTRATHECAL PUMP: ICD-10-CM

## 2023-09-13 DIAGNOSIS — M96.1 CERVICAL POST-LAMINECTOMY SYNDROME: ICD-10-CM

## 2023-09-13 RX ORDER — RIMEGEPANT SULFATE 75 MG/75MG
75 TABLET, ORALLY DISINTEGRATING ORAL EVERY OTHER DAY
COMMUNITY

## 2023-09-13 NOTE — PROGRESS NOTES
CHIEF COMPLAINT  F/U back pain- patient states that her pain has remained the same since her last visit.     Subjective   Amalia Lopez is a 67 y.o. female  who presents for follow-up.  She has a history of spine related pain including neck pain and low back pain and she also has complex regional pain syndrome type I and neuropathic pain.    Also other independent problem addressed today is ongoing management of migraines.  She feels that she is getting diminished benefit from Botox for migraines.  She feels that this last treatment was minimally effective if any.  She continues to follow with neurologist and just last week was started on Nurtec.  She feels like it may be helping some and is eager to continue to monitor her progress with it.    She does use some opioid occasionally for severe painful flareups of arthritis related pain, especially arthritis related pain on the right knee.      Back Pain  This is a chronic problem. The problem occurs daily. The problem is unchanged. The pain is present in the lumbar spine. The pain is moderate. Associated symptoms include abdominal pain, headaches and weakness. Pertinent negatives include no chest pain, dysuria, fever or numbness. The treatment provided moderate relief.   Neck Pain   This is a chronic problem. The problem has been unchanged. The pain is moderate. Associated symptoms include headaches and weakness. Pertinent negatives include no chest pain, fever or numbness. The treatment provided moderate relief.   Knee Pain   The pain is present in the right knee. The quality of the pain is described as aching. The pain is moderate. The pain has been Intermittent since onset. Pertinent negatives include no numbness. The symptoms are aggravated by movement. The treatment provided significant relief.      PEG Assessment   What number best describes your pain on average in the past week?6  What number best describes how, during the past week, pain has interfered with  your enjoyment of life?6  What number best describes how, during the past week, pain has interfered with your general activity?  5    --  The aforementioned information the Chief Complaint section and above subjective data including any HPI data, and also the Review of Systems data, has been personally reviewed and affirmed.  --      Review of Pertinent Medical Data ---  Melia report is reviewed:  I reviewed the document in the electronic form under the PDMP tab in the Epic EMR...  - In this function, the report is a current report in as close to real-time as possible.  - The report was available for immediate review.    - I did eric the report as reviewed.  - There is not concern for aberrant behavior based on this ekasper review.      ---  Intrathecal pump interrogation and refill and reprogramming.  Intrathecal pump was interrogated in office today. Results are in chart.    The patient is currently at a dose of 1.5mg of Morphine per day. No PTM bolus.     Under sterile technique, the pump was accessed using a [employer] proprietary refill kit. The volume was withdrawn from the pump. The expected residual volume of the pump was 3.8 ml. The actual residual volume of the pump was 3 ml.    The pump was then refilled with 20 ml of Morphine at a concentration of 6 mg/ml.  The pump was set to continue at its previous rate. No changes were made.       Patient tolerated procedure well. Minimal bleeding was noted. Pump site remains intact with no evidence of erythema or drainage.    Patient will return for pump refill when due or sooner if needed.     DASH is 23 months.    Pump refill date is 11-.        ---    The following portions of the patient's history were reviewed and updated as appropriate: allergies, current medications, past family history, past medical history, past social history, past surgical history, and problem list.    -------    The following portions of the patient's history were reviewed and  updated as appropriate: allergies, current medications, past family history, past medical history, past social history, past surgical history and problem list.    Allergies   Allergen Reactions    Acetaminophen-Codeine Hives    Fentanyl Other (See Comments) and Hives     Dystonic movements  Dystonic movements  Dystonic movements  Dystonic movements    Morphine Other (See Comments)     Other reaction(s): Other (See Comments)  Headaches    Codeine Itching         Current Outpatient Medications:     acetaminophen (TYLENOL) 500 MG tablet, as needed, Disp: , Rfl:     amphetamine-dextroamphetamine (ADDERALL) 10 MG tablet, Take 0.5 tablets by mouth Daily., Disp: , Rfl:     BD PosiFlush 0.9 % flush, , Disp: , Rfl:     Bromfenac Sodium (BromSite) 0.075 % solution, Compounded Drop, also includes Prednisilone Sodium Phosphate 1% and Moxifloxacin 0.5%, Disp: , Rfl:     chlorhexidine (PERIDEX) 0.12 % solution, RINSE AND SPIT 15ML DAILY, Disp: , Rfl: 11    cyanocobalamin 1000 MCG/ML injection, Inject 1 mL into the appropriate muscle as directed by prescriber 1 (One) Time Per Week., Disp: , Rfl:     DEXILANT 60 MG capsule, Take 1 capsule by mouth Daily., Disp: , Rfl: 11    diphenhydrAMINE (BENADRYL) 50 MG/ML injection, , Disp: , Rfl:     fludrocortisone 0.1 MG tablet, Take 1 tablet by mouth., Disp: , Rfl:     FLUoxetine (PROzac) 40 MG capsule, Take 1 capsule by mouth Daily., Disp: , Rfl: 3    heparin 100 UNIT/ML solution injection, , Disp: , Rfl:     ibuprofen (ADVIL,MOTRIN) 200 MG tablet, IBUPROFEN 200 MG TABS, Disp: , Rfl:     latanoprost (XALATAN) 0.005 % ophthalmic solution, INSTILL 1 DROP INTO BOTH EYES EVERY DAY IN THE EVENING, Disp: , Rfl:     lidocaine (LIDODERM) 5 %, PLACE 3 PATCHES ON THE SKIN AS DIRECTED BY PROVIDER DAILY., Disp: 90 patch, Rfl: 11    lidocaine-prilocaine (EMLA) 2.5-2.5 % cream, , Disp: , Rfl:     linaclotide (LINZESS) 145 MCG capsule capsule, LINZESS 145 MCG CAPS, Disp: , Rfl:     loratadine (CLARITIN)  10 MG tablet, Take 1 tablet by mouth., Disp: , Rfl:     morphine 6 mg/mL, by Intrathecal route Continuous., Disp: 20 mL, Rfl: 0    moxifloxacin (VIGAMOX) 0.5 % ophthalmic solution, Compounded drop, also includes Prednisilone Sodium Phosphate 1% and Bromfenac 0.075%, Disp: , Rfl:     Nurtec 75 MG dispersible tablet, Take 1 tablet by mouth Every Other Day., Disp: , Rfl:     omeprazole (priLOSEC) 40 MG capsule, Take 1 capsule by mouth., Disp: , Rfl:     OnabotulinumtoxinA 200 units reconstituted solution, INJECT UP  UNITS INTRAMUSCULAR INTO HEAD & NECK AREA EVERY 12 WEEKS FOR CHRONIC MIGRAINE TO BE INJECTED IN OFFICE BY PHYSICIAN, Disp: 1 each, Rfl: 2    ondansetron (ZOFRAN) 2 mg/mL injection, , Disp: , Rfl:     ondansetron (ZOFRAN) 8 MG tablet, TAKE 1 TABLET (8 MG TOTAL) BY MOUTH 3 (THREE) TIMES A DAY., Disp: , Rfl:     ondansetron ODT (ZOFRAN-ODT) 8 MG disintegrating tablet, , Disp: , Rfl:     oxyCODONE (ROXICODONE) 10 MG tablet, Take 1 tablet by mouth Daily As Needed for Severe Pain. - take Acetaminophen 325 mg with each dose, Disp: 30 tablet, Rfl: 0    Panzyga 20 GM/200ML solution infusion, , Disp: , Rfl:     pregabalin (LYRICA) 150 MG capsule, Take 1 capsule by mouth 3 (Three) Times a Day., Disp: 90 capsule, Rfl: 2    promethazine (PHENERGAN) 25 MG/ML injection, , Disp: , Rfl:     raNITIdine (ZANTAC) 150 MG tablet, Take 1 tablet by mouth., Disp: , Rfl:     SENNOSIDES-DOCUSATE SODIUM PO, SM SENNA-S TABS, Disp: , Rfl:     sodium chloride 0.9 % solution, , Disp: , Rfl:     Sodium Chloride powder, , Disp: , Rfl:     SUMAtriptan (IMITREX) 100 MG tablet, TAKE 1 TABLET BY MOUTH AS NEEDED FOR HEADACHE, Disp: , Rfl: 6    Current Outpatient Medications on File Prior to Visit   Medication Sig Dispense Refill    acetaminophen (TYLENOL) 500 MG tablet as needed      amphetamine-dextroamphetamine (ADDERALL) 10 MG tablet Take 0.5 tablets by mouth Daily.      BD PosiFlush 0.9 % flush       Bromfenac Sodium (BromSite) 0.075 %  solution Compounded Drop, also includes Prednisilone Sodium Phosphate 1% and Moxifloxacin 0.5%      chlorhexidine (PERIDEX) 0.12 % solution RINSE AND SPIT 15ML DAILY  11    cyanocobalamin 1000 MCG/ML injection Inject 1 mL into the appropriate muscle as directed by prescriber 1 (One) Time Per Week.      DEXILANT 60 MG capsule Take 1 capsule by mouth Daily.  11    diphenhydrAMINE (BENADRYL) 50 MG/ML injection       fludrocortisone 0.1 MG tablet Take 1 tablet by mouth.      FLUoxetine (PROzac) 40 MG capsule Take 1 capsule by mouth Daily.  3    heparin 100 UNIT/ML solution injection       ibuprofen (ADVIL,MOTRIN) 200 MG tablet IBUPROFEN 200 MG TABS      latanoprost (XALATAN) 0.005 % ophthalmic solution INSTILL 1 DROP INTO BOTH EYES EVERY DAY IN THE EVENING      lidocaine (LIDODERM) 5 % PLACE 3 PATCHES ON THE SKIN AS DIRECTED BY PROVIDER DAILY. 90 patch 11    lidocaine-prilocaine (EMLA) 2.5-2.5 % cream       linaclotide (LINZESS) 145 MCG capsule capsule LINZESS 145 MCG CAPS      loratadine (CLARITIN) 10 MG tablet Take 1 tablet by mouth.      morphine 6 mg/mL by Intrathecal route Continuous. 20 mL 0    moxifloxacin (VIGAMOX) 0.5 % ophthalmic solution Compounded drop, also includes Prednisilone Sodium Phosphate 1% and Bromfenac 0.075%      Nurtec 75 MG dispersible tablet Take 1 tablet by mouth Every Other Day.      omeprazole (priLOSEC) 40 MG capsule Take 1 capsule by mouth.      OnabotulinumtoxinA 200 units reconstituted solution INJECT UP  UNITS INTRAMUSCULAR INTO HEAD & NECK AREA EVERY 12 WEEKS FOR CHRONIC MIGRAINE TO BE INJECTED IN OFFICE BY PHYSICIAN 1 each 2    ondansetron (ZOFRAN) 2 mg/mL injection       ondansetron (ZOFRAN) 8 MG tablet TAKE 1 TABLET (8 MG TOTAL) BY MOUTH 3 (THREE) TIMES A DAY.      ondansetron ODT (ZOFRAN-ODT) 8 MG disintegrating tablet       oxyCODONE (ROXICODONE) 10 MG tablet Take 1 tablet by mouth Daily As Needed for Severe Pain. - take Acetaminophen 325 mg with each dose 30 tablet 0     Panzyga 20 GM/200ML solution infusion       pregabalin (LYRICA) 150 MG capsule Take 1 capsule by mouth 3 (Three) Times a Day. 90 capsule 2    promethazine (PHENERGAN) 25 MG/ML injection       raNITIdine (ZANTAC) 150 MG tablet Take 1 tablet by mouth.      SENNOSIDES-DOCUSATE SODIUM PO SM SENNA-S TABS      sodium chloride 0.9 % solution       Sodium Chloride powder       SUMAtriptan (IMITREX) 100 MG tablet TAKE 1 TABLET BY MOUTH AS NEEDED FOR HEADACHE  6     No current facility-administered medications on file prior to visit.       Patient Active Problem List   Diagnosis    Chronic pain syndrome    Neuropathic pain    Complex regional pain syndrome type 1 of left upper extremity    Presence of intrathecal pump    Spinal cord stimulator status    Cervical post-laminectomy syndrome    Lumbar post-laminectomy syndrome    Bilateral occipital neuralgia    Cervical spondylosis without myelopathy    Encounter for long-term opiate analgesic use    Migraine without status migrainosus, not intractable    Arthritis of right knee    Chronic pain of right knee    Chronic fatigue    Screening for endocrine, nutritional, metabolic and immunity disorder    Vitamin D deficiency    Anemia, unspecified    Chronic migraine without aura without status migrainosus, not intractable       Past Medical History:   Diagnosis Date    Biliary stenosis     Depression     Gastroparesis     GERD (gastroesophageal reflux disease)     History of ERCP     IBS (irritable bowel syndrome)     Joint pain     Low back pain     Migraine     Neck pain     Osteoarthritis     Peripheral neuropathy     Port or reservoir infection 05/2023    Sleep apnea     Spinal cord stimulator status     Vitamin B12 deficiency        Past Surgical History:   Procedure Laterality Date    BACK SURGERY      CHOLECYSTECTOMY      EPIDURAL BLOCK      HYSTERECTOMY      IMPLANTATION / REPLACEMENT INFUSION PUMP      KNEE ARTHROSCOPY Right     NECK SURGERY      PANCREAS SURGERY       "SPINAL CORD STIMULATOR IMPLANT      TRIGGER POINT INJECTION         History reviewed. No pertinent family history.    Social History     Socioeconomic History    Marital status:    Tobacco Use    Smoking status: Never    Smokeless tobacco: Never   Vaping Use    Vaping Use: Never used   Substance and Sexual Activity    Alcohol use: No    Drug use: No       -------        Review of Systems   Constitutional:  Positive for fatigue. Negative for activity change, chills and fever.   HENT:  Negative for congestion.    Eyes:  Negative for visual disturbance.   Respiratory:  Negative for chest tightness and shortness of breath.    Cardiovascular:  Negative for chest pain.   Gastrointestinal:  Positive for abdominal pain, constipation and diarrhea.   Genitourinary:  Positive for difficulty urinating. Negative for dyspareunia and dysuria.   Musculoskeletal:  Positive for back pain and neck pain.   Neurological:  Positive for dizziness, weakness, light-headedness and headaches. Negative for numbness.   Psychiatric/Behavioral:  Positive for sleep disturbance. Negative for agitation. The patient is not nervous/anxious.      Vitals:    09/13/23 1015   BP: 108/72   Pulse: (!) 44   Temp: 97 °F (36.1 °C)   SpO2: 92%   Weight: 45.5 kg (100 lb 3.2 oz)   Height: 167.6 cm (66\")   PainSc:   4   PainLoc: Back         Objective   Physical Exam  Vitals and nursing note reviewed.   Constitutional:       General: She is not in acute distress.     Appearance: Normal appearance. She is well-developed. She is not toxic-appearing.   HENT:      Head: Normocephalic and atraumatic.      Right Ear: Hearing and external ear normal.      Left Ear: Hearing and external ear normal.      Nose: Nose normal.   Eyes:      General: Lids are normal.      Conjunctiva/sclera: Conjunctivae normal.      Pupils: Pupils are equal, round, and reactive to light.   Pulmonary:      Effort: Pulmonary effort is normal. No respiratory distress.   Abdominal:      " Comments: No lesions of the pump pocket site   Neurological:      Mental Status: She is alert and oriented to person, place, and time.      Cranial Nerves: No cranial nerve deficit.   Psychiatric:         Behavior: Behavior normal.           Assessment & Plan   Diagnoses and all orders for this visit:    1. Chronic pain syndrome (Primary)    2. Presence of intrathecal pump    3. Cervical post-laminectomy syndrome    4. Lumbar post-laminectomy syndrome    5. Migraine without status migrainosus, not intractable, unspecified migraine type    6. Encounter for long-term opiate analgesic use    7. Complex regional pain syndrome type 1 of left upper extremity    8. Neuropathic pain    9. Chronic pain of right knee    10. Arthritis of right knee        Amalia Lopez reports a pain score of 4.  Given her pain assessment as noted, treatment options were discussed and the following options were decided upon as a follow-up plan to address the patient's pain: continuation of current treatment plan for pain, prescription for non-opiod analgesics, and prescription for opiod analgesics.      --- Follow-up in 2 months for routine pump refill    --On the next pump refill, we will plan to order the medication, which is morphine, at 10 mg/mL, to see if we can get more time between refill dates    --Continue judicious use of oxycodone for arthritic painful flareups which are not really covered by intrathecal therapy    --Continue use of Lyrica for neuropathic painful complaint as above    --She will trial Nurtec and we will not plan to repeat Botox therapy at this time.  Again in review, the previous Botox was applied on August 7, 2023 and she feels like she has been having headaches on a daily basis and does not seem to be very helpful at this time.  She has had moderate significant benefit from Botox in the past.  She will continue trial of Nurtec.      -- We could consider trialing another botulinum toxin agent other than Botox as  it is possible that she could have developed an antibody against the Botox molecule; we did not discuss this potential plan of care in the office visit today however.      -- Opioid risk is moderate based on dose and comorbidity           SMITH REPORT  As part of the patient's treatment plan, I am prescribing controlled substances. The patient has been made aware of appropriate use of such medications, including potential risk of somnolence, limited ability to drive and/or work safely, and the potential for dependence or overdose. It has also been made clear that these medications are for use by this patient only, without concomitant use of alcohol or other substances unless prescribed.     Patient has completed prescribing agreement detailing terms of continued prescribing of controlled substances, including monitoring SMITH reports, urine drug screening, and pill counts if necessary. The patient is aware that inappropriate use will results in cessation of prescribing such medications.    As the clinician, I personally reviewed the SMITH from as above while the patient was in the office today.    History and physical exam exhibit continued safe and appropriate use of controlled substances.       Dictated utilizing Dragon dictation.     --    Vitals:    09/13/23 1015   PainSc:   4   PainLoc: Back          Amaliasa Lopez reports a pain score of 4.  Given her pain assessment as noted, treatment options were discussed and the following options were decided upon as a follow-up plan to address the patient's pain: continuation of current treatment plan for pain.

## 2023-09-18 DIAGNOSIS — M96.1 LUMBAR POST-LAMINECTOMY SYNDROME: ICD-10-CM

## 2023-09-18 DIAGNOSIS — G89.4 CHRONIC PAIN SYNDROME: ICD-10-CM

## 2023-09-19 RX ORDER — PREGABALIN 150 MG/1
CAPSULE ORAL
Qty: 90 CAPSULE | Refills: 2 | Status: SHIPPED | OUTPATIENT
Start: 2023-09-19

## 2023-10-10 ENCOUNTER — TELEPHONE (OUTPATIENT)
Dept: PAIN MEDICINE | Facility: CLINIC | Age: 68
End: 2023-10-10
Payer: MEDICARE

## 2023-10-10 DIAGNOSIS — Z79.891 ENCOUNTER FOR LONG-TERM OPIATE ANALGESIC USE: ICD-10-CM

## 2023-10-10 NOTE — TELEPHONE ENCOUNTER
Caller: Amalia Lopez    Relationship: Self    Best call back number: [phone]    Requested Prescriptions:   Requested Prescriptions     Pending Prescriptions Disp Refills    oxyCODONE (ROXICODONE) 10 MG tablet 30 tablet 0     Sig: Take 1 tablet by mouth Daily As Needed for Severe Pain. - take Acetaminophen 325 mg with each dose        Pharmacy where request should be sent:  [facility] [place]    Last office visit with prescribing clinician: 5/3/2023   Last telemedicine visit with prescribing clinician: Visit date not found   Next office visit with prescribing clinician: 11/13/2023     Does the patient have less than a 3 day supply:  [x] Yes  [] No    Would you like a call back once the refill request has been completed: [] Yes [x] No    If the office needs to give you a call back, can they leave a voicemail: [] Yes [x] No    Paulo Cadena Rep   10/10/23 11:15 EDT

## 2023-10-13 RX ORDER — OXYCODONE HYDROCHLORIDE 10 MG/1
10 TABLET ORAL DAILY PRN
Qty: 30 TABLET | Refills: 0 | Status: SHIPPED | OUTPATIENT
Start: 2023-10-13

## 2023-10-13 NOTE — TELEPHONE ENCOUNTER
SMITH reviewed and appropriate.  Last drug screen 9/14/22 appropriate.     This patient is under the care of my colleague and I am covering patient care for him at this time.  I have reviewed pertinent information/documentation as necessary and will continue the plan of care as previously directed to the best of my ability.

## 2023-10-30 ENCOUNTER — TELEPHONE (OUTPATIENT)
Dept: PAIN MEDICINE | Facility: CLINIC | Age: 68
End: 2023-10-30

## 2023-10-31 DIAGNOSIS — Z97.8 PRESENCE OF INTRATHECAL PUMP: ICD-10-CM

## 2023-11-15 ENCOUNTER — PROCEDURE VISIT (OUTPATIENT)
Dept: PAIN MEDICINE | Facility: CLINIC | Age: 68
End: 2023-11-15
Payer: MEDICARE

## 2023-11-15 VITALS
TEMPERATURE: 97.1 F | DIASTOLIC BLOOD PRESSURE: 72 MMHG | OXYGEN SATURATION: 100 % | SYSTOLIC BLOOD PRESSURE: 122 MMHG | HEART RATE: 47 BPM | WEIGHT: 97.6 LBS | BODY MASS INDEX: 15.69 KG/M2 | HEIGHT: 66 IN

## 2023-11-15 DIAGNOSIS — M96.1 LUMBAR POST-LAMINECTOMY SYNDROME: ICD-10-CM

## 2023-11-15 DIAGNOSIS — G90.512 COMPLEX REGIONAL PAIN SYNDROME TYPE 1 OF LEFT UPPER EXTREMITY: ICD-10-CM

## 2023-11-15 DIAGNOSIS — Z79.891 ENCOUNTER FOR LONG-TERM OPIATE ANALGESIC USE: ICD-10-CM

## 2023-11-15 DIAGNOSIS — G43.909 MIGRAINE WITHOUT STATUS MIGRAINOSUS, NOT INTRACTABLE, UNSPECIFIED MIGRAINE TYPE: ICD-10-CM

## 2023-11-15 DIAGNOSIS — M96.1 CERVICAL POST-LAMINECTOMY SYNDROME: ICD-10-CM

## 2023-11-15 DIAGNOSIS — Z97.8 PRESENCE OF INTRATHECAL PUMP: ICD-10-CM

## 2023-11-15 DIAGNOSIS — M50.30 DDD (DEGENERATIVE DISC DISEASE), CERVICAL: ICD-10-CM

## 2023-11-15 DIAGNOSIS — G89.4 CHRONIC PAIN SYNDROME: Primary | ICD-10-CM

## 2023-11-15 LAB
POC AMPHETAMINES: POSITIVE
POC BARBITURATES: NEGATIVE
POC BENZODIAZEPHINES: NEGATIVE
POC COCAINE: NEGATIVE
POC METHADONE: NEGATIVE
POC METHAMPHETAMINE SCREEN URINE: NEGATIVE
POC OPIATES: POSITIVE
POC OXYCODONE: NEGATIVE
POC PHENCYCLIDINE: NEGATIVE
POC PROPOXYPHENE: NEGATIVE
POC THC: NEGATIVE
POC TRICYCLIC ANTIDEPRESSANTS: NEGATIVE

## 2023-11-15 NOTE — PROGRESS NOTES
CHIEF COMPLAINT  Follow up back pain.    Xu Lopez is a 67 y.o. female  who presents for follow-up.  She has a history of chronic back pain, neck pain, migraine headaches, RUE pain.  She presents today for IT pump refill.  The IT pump manages her low back pain. She has additional painful pathologies including chronic migraine headaches, neuropathic pain which require oral analgesia.  Pain today is rated as a 7/10 VAS.  She is currently utilizing intrathecal morphine, total daily dose is 1.25 mg.  She does utilize occasional oral oxycodone 10-3 25 for severe breakthrough pain and other chronic pain issues.  This regimen does help to keep her pain at manageable levels. She remains physically active and continues walking a few miles every day.  She reports no adverse reactions.     Back Pain  This is a chronic problem. The current episode started more than 1 year ago. The problem occurs constantly. The problem has been waxing and waning since onset. The pain is present in the lumbar spine. The quality of the pain is described as aching, burning and shooting. The pain radiates to the left foot and right foot. The pain is at a severity of 7/10. The symptoms are aggravated by sitting and standing (activity). Associated symptoms include abdominal pain, numbness (right foot) and weakness (bilateral leg). Pertinent negatives include no bladder incontinence, bowel incontinence, chest pain, dysuria, fever or headaches. She has tried NSAIDs and analgesics (IT Morphine) for the symptoms. The treatment provided moderate relief.      PEG Assessment   What number best describes your pain on average in the past week?6  What number best describes how, during the past week, pain has interfered with your enjoyment of life?5  What number best describes how, during the past week, pain has interfered with your general activity?  5    Review of Pertinent Medical Data ---    The following portions of the patient's history  "were reviewed and updated as appropriate: allergies, current medications, past family history, past medical history, past social history, past surgical history, and problem list.    Review of Systems   Constitutional:  Positive for fatigue. Negative for chills and fever.   Respiratory:  Negative for cough and shortness of breath.    Cardiovascular:  Negative for chest pain.   Gastrointestinal:  Positive for abdominal pain and constipation. Negative for bowel incontinence and diarrhea.   Genitourinary:  Negative for bladder incontinence, difficulty urinating and dysuria.   Musculoskeletal:  Positive for back pain.   Neurological:  Positive for dizziness, weakness (bilateral leg), light-headedness and numbness (right foot). Negative for headaches.   Psychiatric/Behavioral:  Positive for sleep disturbance.      I have reviewed and confirmed the accuracy of the ROS as documented by the MA/EVANS/RN CAROLANN Ford    Vitals:    11/15/23 0925   BP: 122/72   BP Location: Right arm   Patient Position: Sitting   Pulse: (!) 47   Temp: 97.1 °F (36.2 °C)   TempSrc: Temporal   SpO2: 100%   Weight: 44.3 kg (97 lb 9.6 oz)   Height: 167.6 cm (66\")   PainSc:   7   PainLoc: Back         Objective   Physical Exam  Vitals and nursing note reviewed.   Constitutional:       General: She is not in acute distress.     Appearance: Normal appearance. She is not ill-appearing.   Pulmonary:      Effort: Pulmonary effort is normal. No respiratory distress.   Musculoskeletal:      Cervical back: Tenderness and bony tenderness present.      Lumbar back: Tenderness and bony tenderness present.   Neurological:      Mental Status: She is alert and oriented to person, place, and time.      Motor: Motor function is intact. No weakness.      Gait: Gait normal.   Psychiatric:         Mood and Affect: Mood normal.         Behavior: Behavior normal.     Intrathecal pump was interrogated in office today. Results are in chart.    The patient is " currently at a dose of 1.5 mg of Morphine per day. There is no addition of PTM bolus.     Under sterile technique, the pump was accessed using a [employer] proprietary refill kit. The volume was withdrawn from the pump. The expected residual volume of the pump was 4.2 ml. The actual residual volume of the pump was 4 ml.    The pump was then refilled with 20 ml of Morphine at a concentration of 10 mg/ml.  The pump was set to continue at its previous rate. No changes were made.       Patient tolerated procedure well. Minimal bleeding was noted. Pump site remains intact with no evidence of erythema or drainage.    Patient will return for pump refill when due or sooner if needed.     DASH is 21 months.      Assessment & Plan   Diagnoses and all orders for this visit:    1. Chronic pain syndrome (Primary)    2. Presence of intrathecal pump    3. Lumbar post-laminectomy syndrome    4. Cervical post-laminectomy syndrome  -     CT Cervical Spine Without Contrast; Future    5. Migraine without status migrainosus, not intractable, unspecified migraine type    6. Complex regional pain syndrome type 1 of left upper extremity    7. Encounter for long-term opiate analgesic use    8. DDD (degenerative disc disease), cervical  -     CT Cervical Spine Without Contrast; Future      --- No changes made to IT pump regimen  --- Updating cervical CT --- worsening neck pain, migraines, numbness/pain of both hands  --- Continue Oxycodone, Lyrica   --- Routine UDS in office today as part of monitoring requirements for controlled substances.  The specimen was viewed and the immunoassay result reviewed and is +OPI, AMPH.  This specimen will be sent to [facility] laboratory for confirmation.     --- The patient signed an updated copy of the controlled substance agreement on 11/15/2023  --- Next IT pump refill date 3/12/2024      Amalia Lopez reports a pain score of 7.  Given her pain assessment as noted, treatment options were discussed and  the following options were decided upon as a follow-up plan to address the patient's pain:  see plan above .      --- Follow-up for IT pump refill              SMITH REPORT  As part of the patient's treatment plan, I am prescribing controlled substances. The patient has been made aware of appropriate use of such medications, including potential risk of somnolence, limited ability to drive and/or work safely, and the potential for dependence or overdose. It has also been made clear that these medications are for use by this patient only, without concomitant use of alcohol or other substances unless prescribed.     Patient has completed prescribing agreement detailing terms of continued prescribing of controlled substances, including monitoring SMITH reports, urine drug screening, and pill counts if necessary. The patient is aware that inappropriate use will results in cessation of prescribing such medications.    As the clinician, I personally reviewed the SMITH from 11/15/2023 while the patient was in the office today.    History and physical exam exhibit continued safe and appropriate use of controlled substances.       Dictated utilizing Dragon dictation.

## 2023-12-06 ENCOUNTER — TELEPHONE (OUTPATIENT)
Dept: PAIN MEDICINE | Facility: CLINIC | Age: 68
End: 2023-12-06

## 2023-12-06 NOTE — TELEPHONE ENCOUNTER
Caller: Amalia Lopez    Relationship: Self    Best call back number: [phone]    Requested Prescriptions: PERCOCET 10 MGS   Requested Prescriptions      No prescriptions requested or ordered in this encounter        [facility]     Last office visit with prescribing clinician: 5/3/2023   Last telemedicine visit with prescribing clinician: Visit date not found   Next office visit with prescribing clinician: 1/15/2024     Additional details provided by patient: PATIENT IS CURRENTLY TAKING OXYCODONE 10MG DUE TO PHARMACY BEING OUT OF THE PERCOCET. SHE PREFERS TO HAVE THE PERCOCET REFILLED.    Does the patient have less than a 3 day supply:  [] Yes  [x] No    Would you like a call back once the refill request has been completed: [] Yes [x] No    If the office needs to give you a call back, can they leave a voicemail: [x] Yes [] No    Paulo Valdez Rep   12/06/23 11:31 EST

## 2023-12-07 DIAGNOSIS — Z79.891 ENCOUNTER FOR LONG-TERM OPIATE ANALGESIC USE: ICD-10-CM

## 2023-12-07 RX ORDER — OXYCODONE HYDROCHLORIDE 10 MG/1
10 TABLET ORAL DAILY PRN
Qty: 30 TABLET | Refills: 0 | Status: SHIPPED | OUTPATIENT
Start: 2023-12-07

## 2023-12-07 NOTE — TELEPHONE ENCOUNTER
I spoke with Ms. Lopez and asked if she was sure her pharmacy had the oxy/apap  mg back in stock. She states that she wasn't sure and asked that the oxycodone 10 mg just be sent instead.

## 2023-12-22 DIAGNOSIS — M96.1 LUMBAR POST-LAMINECTOMY SYNDROME: ICD-10-CM

## 2023-12-22 DIAGNOSIS — G89.4 CHRONIC PAIN SYNDROME: ICD-10-CM

## 2023-12-22 RX ORDER — PREGABALIN 150 MG/1
CAPSULE ORAL
Qty: 90 CAPSULE | Refills: 1 | Status: SHIPPED | OUTPATIENT
Start: 2023-12-22

## 2024-01-01 ENCOUNTER — APPOINTMENT (OUTPATIENT)
Dept: CT IMAGING | Facility: HOSPITAL | Age: 69
End: 2024-01-01
Payer: MEDICARE

## 2024-01-01 ENCOUNTER — APPOINTMENT (OUTPATIENT)
Dept: ULTRASOUND IMAGING | Facility: HOSPITAL | Age: 69
End: 2024-01-01
Payer: MEDICARE

## 2024-01-01 ENCOUNTER — APPOINTMENT (OUTPATIENT)
Dept: GENERAL RADIOLOGY | Facility: HOSPITAL | Age: 69
End: 2024-01-01
Payer: MEDICARE

## 2024-01-01 ENCOUNTER — HOSPITAL ENCOUNTER (INPATIENT)
Facility: HOSPITAL | Age: 69
LOS: 1 days | End: 2024-07-23
Attending: EMERGENCY MEDICINE | Admitting: INTERNAL MEDICINE
Payer: MEDICARE

## 2024-01-01 VITALS
DIASTOLIC BLOOD PRESSURE: 41 MMHG | RESPIRATION RATE: 23 BRPM | BODY MASS INDEX: 17.36 KG/M2 | OXYGEN SATURATION: 79 % | SYSTOLIC BLOOD PRESSURE: 80 MMHG | TEMPERATURE: 97.6 F | HEIGHT: 66 IN | WEIGHT: 108.03 LBS | HEART RATE: 97 BPM

## 2024-01-01 DIAGNOSIS — N39.0 URINARY TRACT INFECTION WITHOUT HEMATURIA, SITE UNSPECIFIED: ICD-10-CM

## 2024-01-01 DIAGNOSIS — R65.21 SEPTIC SHOCK: ICD-10-CM

## 2024-01-01 DIAGNOSIS — N17.9 ACUTE KIDNEY INJURY: ICD-10-CM

## 2024-01-01 DIAGNOSIS — A41.9 SEPTIC SHOCK: ICD-10-CM

## 2024-01-01 DIAGNOSIS — J96.01 ACUTE RESPIRATORY FAILURE WITH HYPOXIA: ICD-10-CM

## 2024-01-01 DIAGNOSIS — T79.6XXA TRAUMATIC RHABDOMYOLYSIS, INITIAL ENCOUNTER: ICD-10-CM

## 2024-01-01 DIAGNOSIS — A41.9 SEPTIC SHOCK: Primary | ICD-10-CM

## 2024-01-01 DIAGNOSIS — D69.6 THROMBOCYTOPENIA: ICD-10-CM

## 2024-01-01 DIAGNOSIS — R65.21 SEPTIC SHOCK: Primary | ICD-10-CM

## 2024-01-01 LAB
ABO GROUP BLD: NORMAL
ALBUMIN SERPL-MCNC: 2.4 G/DL (ref 3.5–5.2)
ALBUMIN SERPL-MCNC: 2.5 G/DL (ref 3.5–5.2)
ALBUMIN SERPL-MCNC: 3.1 G/DL (ref 3.5–5.2)
ALBUMIN SERPL-MCNC: 3.1 G/DL (ref 3.5–5.2)
ALBUMIN/GLOB SERPL: 1.1 G/DL
ALBUMIN/GLOB SERPL: 1.1 G/DL
ALBUMIN/GLOB SERPL: 2.1 G/DL
ALBUMIN/GLOB SERPL: 2.4 G/DL
ALP SERPL-CCNC: 117 U/L (ref 39–117)
ALP SERPL-CCNC: 132 U/L (ref 39–117)
ALP SERPL-CCNC: 198 U/L (ref 39–117)
ALP SERPL-CCNC: 237 U/L (ref 39–117)
ALT SERPL W P-5'-P-CCNC: 183 U/L (ref 1–33)
ALT SERPL W P-5'-P-CCNC: 79 U/L (ref 1–33)
ALT SERPL W P-5'-P-CCNC: 83 U/L (ref 1–33)
ALT SERPL W P-5'-P-CCNC: 83 U/L (ref 1–33)
AMORPH URATE CRY URNS QL MICRO: ABNORMAL /HPF
AMPHET+METHAMPHET UR QL: POSITIVE
ANION GAP SERPL CALCULATED.3IONS-SCNC: 13 MMOL/L (ref 5–15)
ANION GAP SERPL CALCULATED.3IONS-SCNC: 15.8 MMOL/L (ref 5–15)
ANION GAP SERPL CALCULATED.3IONS-SCNC: 25.6 MMOL/L (ref 5–15)
ANION GAP SERPL CALCULATED.3IONS-SCNC: 30.7 MMOL/L (ref 5–15)
ANISOCYTOSIS BLD QL: ABNORMAL
ANISOCYTOSIS BLD QL: ABNORMAL
APTT PPP: 40.7 SECONDS (ref 24–31)
ARTERIAL PATENCY WRIST A: ABNORMAL
ARTERIAL PATENCY WRIST A: POSITIVE
AST SERPL-CCNC: 216 U/L (ref 1–32)
AST SERPL-CCNC: 253 U/L (ref 1–32)
AST SERPL-CCNC: 294 U/L (ref 1–32)
AST SERPL-CCNC: 602 U/L (ref 1–32)
ATMOSPHERIC PRESS: ABNORMAL MM[HG]
B BURGDOR IGG SER QL: NEGATIVE
BACTERIA BLD CULT: ABNORMAL
BACTERIA SPEC AEROBE CULT: NO GROWTH
BACTERIA UR QL AUTO: ABNORMAL /HPF
BARBITURATES UR QL SCN: NEGATIVE
BASE EXCESS BLDA CALC-SCNC: -23.9 MMOL/L (ref 0–3)
BASE EXCESS BLDA CALC-SCNC: -26.7 MMOL/L (ref 0–3)
BASE EXCESS BLDA CALC-SCNC: -27.4 MMOL/L (ref 0–3)
BASE EXCESS BLDA CALC-SCNC: -5.7 MMOL/L (ref 0–3)
BASE EXCESS BLDA CALC-SCNC: -5.9 MMOL/L (ref 0–3)
BDY SITE: ABNORMAL
BENZODIAZ UR QL SCN: NEGATIVE
BH BB BLOOD EXPIRATION DATE: NORMAL
BH BB BLOOD TYPE BARCODE: 6200
BH BB DISPENSE STATUS: NORMAL
BH BB PRODUCT CODE: NORMAL
BH BB UNIT NUMBER: NORMAL
BILIRUB SERPL-MCNC: 0.4 MG/DL (ref 0–1.2)
BILIRUB SERPL-MCNC: 0.6 MG/DL (ref 0–1.2)
BILIRUB UR QL STRIP: ABNORMAL
BLD GP AB SCN SERPL QL: NEGATIVE
BOTTLE TYPE: ABNORMAL
BUN SERPL-MCNC: 43 MG/DL (ref 8–23)
BUN SERPL-MCNC: 51 MG/DL (ref 8–23)
BUN SERPL-MCNC: 52 MG/DL (ref 8–23)
BUN SERPL-MCNC: 53 MG/DL (ref 8–23)
BUN/CREAT SERPL: 17.7 (ref 7–25)
BUN/CREAT SERPL: 19 (ref 7–25)
BUN/CREAT SERPL: 19 (ref 7–25)
BUN/CREAT SERPL: 21.8 (ref 7–25)
BURR CELLS BLD QL SMEAR: ABNORMAL
CA-I BLDA-SCNC: 1.05 MMOL/L (ref 1.15–1.33)
CA-I BLDA-SCNC: 1.11 MMOL/L (ref 1.15–1.33)
CA-I BLDA-SCNC: 1.18 MMOL/L (ref 1.15–1.33)
CALCIUM SPEC-SCNC: 6.8 MG/DL (ref 8.6–10.5)
CALCIUM SPEC-SCNC: 7.1 MG/DL (ref 8.6–10.5)
CALCIUM SPEC-SCNC: 7.6 MG/DL (ref 8.6–10.5)
CALCIUM SPEC-SCNC: 8.4 MG/DL (ref 8.6–10.5)
CANNABINOIDS SERPL QL: NEGATIVE
CHLORIDE SERPL-SCNC: 103 MMOL/L (ref 98–107)
CHLORIDE SERPL-SCNC: 104 MMOL/L (ref 98–107)
CHLORIDE SERPL-SCNC: 106 MMOL/L (ref 98–107)
CHLORIDE SERPL-SCNC: 108 MMOL/L (ref 98–107)
CK SERPL-CCNC: 3988 U/L (ref 20–180)
CK SERPL-CCNC: 6633 U/L (ref 20–180)
CK SERPL-CCNC: ABNORMAL U/L (ref 20–180)
CLARITY UR: ABNORMAL
CO2 BLDA-SCNC: 10.1 MMOL/L (ref 22–29)
CO2 BLDA-SCNC: 6.6 MMOL/L (ref 22–29)
CO2 SERPL-SCNC: 18.2 MMOL/L (ref 22–29)
CO2 SERPL-SCNC: 19 MMOL/L (ref 22–29)
CO2 SERPL-SCNC: 6.3 MMOL/L (ref 22–29)
CO2 SERPL-SCNC: 6.4 MMOL/L (ref 22–29)
COCAINE UR QL: NEGATIVE
COLOR UR: ABNORMAL
CREAT BLDA-MCNC: 1.82 MG/DL (ref 0.6–1.3)
CREAT BLDA-MCNC: 2.05 MG/DL (ref 0.6–1.3)
CREAT BLDA-MCNC: 2.58 MG/DL (ref 0.6–1.3)
CREAT SERPL-MCNC: 2.26 MG/DL (ref 0.57–1)
CREAT SERPL-MCNC: 2.34 MG/DL (ref 0.57–1)
CREAT SERPL-MCNC: 2.79 MG/DL (ref 0.57–1)
CREAT SERPL-MCNC: 2.93 MG/DL (ref 0.57–1)
D-LACTATE SERPL-SCNC: 15.5 MMOL/L (ref 0.2–2)
D-LACTATE SERPL-SCNC: 2.8 MMOL/L (ref 0.2–2)
D-LACTATE SERPL-SCNC: 23.7 MMOL/L (ref 0.5–2)
D-LACTATE SERPL-SCNC: 3 MMOL/L (ref 0.5–2)
D-LACTATE SERPL-SCNC: 3.3 MMOL/L (ref 0.5–2)
D-LACTATE SERPL-SCNC: 3.5 MMOL/L (ref 0.5–2)
D-LACTATE SERPL-SCNC: 3.9 MMOL/L (ref 0.3–2)
D-LACTATE SERPL-SCNC: 4.3 MMOL/L (ref 0.5–2)
D-LACTATE SERPL-SCNC: 4.4 MMOL/L (ref 0.2–2)
DACRYOCYTES BLD QL SMEAR: ABNORMAL
DACRYOCYTES BLD QL SMEAR: ABNORMAL
DEPRECATED RDW RBC AUTO: 43.3 FL (ref 37–54)
DEPRECATED RDW RBC AUTO: 44.8 FL (ref 37–54)
DEPRECATED RDW RBC AUTO: 51.4 FL (ref 37–54)
DEPRECATED RDW RBC AUTO: 54.4 FL (ref 37–54)
EGFRCR SERPLBLD CKD-EPI 2021: 17.1 ML/MIN/1.73
EGFRCR SERPLBLD CKD-EPI 2021: 18.2 ML/MIN/1.73
EGFRCR SERPLBLD CKD-EPI 2021: 20 ML/MIN/1.73
EGFRCR SERPLBLD CKD-EPI 2021: 22.4 ML/MIN/1.73
EGFRCR SERPLBLD CKD-EPI 2021: 23.4 ML/MIN/1.73
EGFRCR SERPLBLD CKD-EPI 2021: 26.3 ML/MIN/1.73
EGFRCR SERPLBLD CKD-EPI 2021: 30.3 ML/MIN/1.73
EOSINOPHIL # BLD MANUAL: 0.06 10*3/MM3 (ref 0–0.4)
EOSINOPHIL # BLD MANUAL: 0.12 10*3/MM3 (ref 0–0.4)
EOSINOPHIL NFR BLD MANUAL: 1 % (ref 0.3–6.2)
EOSINOPHIL NFR BLD MANUAL: 3 % (ref 0.3–6.2)
ERYTHROCYTE [DISTWIDTH] IN BLOOD BY AUTOMATED COUNT: 13.6 % (ref 12.3–15.4)
ERYTHROCYTE [DISTWIDTH] IN BLOOD BY AUTOMATED COUNT: 13.8 % (ref 12.3–15.4)
ERYTHROCYTE [DISTWIDTH] IN BLOOD BY AUTOMATED COUNT: 14.6 % (ref 12.3–15.4)
ERYTHROCYTE [DISTWIDTH] IN BLOOD BY AUTOMATED COUNT: 15.2 % (ref 12.3–15.4)
ETHANOL UR QL: <0.01 %
FIBRINOGEN PPP-MCNC: 110 MG/DL (ref 210–450)
FIBRINOGEN PPP-MCNC: 406 MG/DL (ref 210–450)
GLOBULIN UR ELPH-MCNC: 1 GM/DL
GLOBULIN UR ELPH-MCNC: 1.5 GM/DL
GLOBULIN UR ELPH-MCNC: 2.3 GM/DL
GLOBULIN UR ELPH-MCNC: 2.8 GM/DL
GLUCOSE BLDC GLUCOMTR-MCNC: 142 MG/DL (ref 74–100)
GLUCOSE BLDC GLUCOMTR-MCNC: 142 MG/DL (ref 74–100)
GLUCOSE BLDC GLUCOMTR-MCNC: 151 MG/DL (ref 70–105)
GLUCOSE BLDC GLUCOMTR-MCNC: 255 MG/DL (ref 70–105)
GLUCOSE BLDC GLUCOMTR-MCNC: 63 MG/DL (ref 70–105)
GLUCOSE BLDC GLUCOMTR-MCNC: 87 MG/DL (ref 74–100)
GLUCOSE BLDC GLUCOMTR-MCNC: 87 MG/DL (ref 74–100)
GLUCOSE BLDC GLUCOMTR-MCNC: 94 MG/DL (ref 74–100)
GLUCOSE BLDC GLUCOMTR-MCNC: 94 MG/DL (ref 74–100)
GLUCOSE SERPL-MCNC: 128 MG/DL (ref 65–99)
GLUCOSE SERPL-MCNC: 129 MG/DL (ref 65–99)
GLUCOSE SERPL-MCNC: 74 MG/DL (ref 65–99)
GLUCOSE SERPL-MCNC: 82 MG/DL (ref 65–99)
GLUCOSE UR STRIP-MCNC: NEGATIVE MG/DL
HCO3 BLDA-SCNC: 19.2 MMOL/L (ref 21–28)
HCO3 BLDA-SCNC: 19.5 MMOL/L (ref 21–28)
HCO3 BLDA-SCNC: 5.5 MMOL/L (ref 21–28)
HCO3 BLDA-SCNC: 6.1 MMOL/L (ref 21–28)
HCO3 BLDA-SCNC: 8.4 MMOL/L (ref 21–28)
HCT VFR BLD AUTO: 22 % (ref 34–46.6)
HCT VFR BLD AUTO: 26.1 % (ref 34–46.6)
HCT VFR BLD AUTO: 31 % (ref 34–46.6)
HCT VFR BLD AUTO: 33.4 % (ref 34–46.6)
HCT VFR BLDA CALC: 23 % (ref 38–51)
HCT VFR BLDA CALC: 26 % (ref 38–51)
HCT VFR BLDA CALC: 30 % (ref 38–51)
HEMODILUTION: NO
HGB BLD-MCNC: 10.2 G/DL (ref 12–15.9)
HGB BLD-MCNC: 11.4 G/DL (ref 12–15.9)
HGB BLD-MCNC: 6.7 G/DL (ref 12–15.9)
HGB BLD-MCNC: 8.1 G/DL (ref 12–15.9)
HGB BLDA-MCNC: 10 G/DL (ref 12–17)
HGB BLDA-MCNC: 8 G/DL (ref 12–17)
HGB BLDA-MCNC: 8.9 G/DL (ref 12–17)
HGB UR QL STRIP.AUTO: ABNORMAL
HOLD SPECIMEN: NORMAL
HYALINE CASTS UR QL AUTO: ABNORMAL /LPF
INHALED O2 CONCENTRATION: 100 %
INHALED O2 CONCENTRATION: 48 %
INR PPP: 1.06 (ref 0.93–1.1)
INR PPP: 2.13 (ref 0.93–1.1)
KETONES UR QL STRIP: ABNORMAL
LAB AP CASE REPORT: NORMAL
LAB AP CASE REPORT: NORMAL
LEUKOCYTE ESTERASE UR QL STRIP.AUTO: ABNORMAL
LIPASE SERPL-CCNC: 7 U/L (ref 13–60)
LYMPHOCYTES # BLD MANUAL: 0.16 10*3/MM3 (ref 0.7–3.1)
LYMPHOCYTES # BLD MANUAL: 0.31 10*3/MM3 (ref 0.7–3.1)
LYMPHOCYTES # BLD MANUAL: 1.39 10*3/MM3 (ref 0.7–3.1)
LYMPHOCYTES # BLD MANUAL: 1.76 10*3/MM3 (ref 0.7–3.1)
LYMPHOCYTES NFR BLD MANUAL: 1 % (ref 5–12)
LYMPHOCYTES NFR BLD MANUAL: 3 % (ref 5–12)
LYMPHOCYTES NFR BLD MANUAL: 4 % (ref 5–12)
LYMPHOCYTES NFR BLD MANUAL: 6 % (ref 5–12)
Lab: ABNORMAL
MAGNESIUM SERPL-MCNC: 1.6 MG/DL (ref 1.6–2.4)
MAGNESIUM SERPL-MCNC: 2.1 MG/DL (ref 1.6–2.4)
MAGNESIUM SERPL-MCNC: 2.6 MG/DL (ref 1.6–2.4)
MCH RBC QN AUTO: 29.2 PG (ref 26.6–33)
MCH RBC QN AUTO: 29.6 PG (ref 26.6–33)
MCH RBC QN AUTO: 29.8 PG (ref 26.6–33)
MCH RBC QN AUTO: 30.2 PG (ref 26.6–33)
MCHC RBC AUTO-ENTMCNC: 30.5 G/DL (ref 31.5–35.7)
MCHC RBC AUTO-ENTMCNC: 31 G/DL (ref 31.5–35.7)
MCHC RBC AUTO-ENTMCNC: 32.9 G/DL (ref 31.5–35.7)
MCHC RBC AUTO-ENTMCNC: 34.1 G/DL (ref 31.5–35.7)
MCV RBC AUTO: 87.2 FL (ref 79–97)
MCV RBC AUTO: 88.8 FL (ref 79–97)
MCV RBC AUTO: 95.3 FL (ref 79–97)
MCV RBC AUTO: 99.1 FL (ref 79–97)
METAMYELOCYTES NFR BLD MANUAL: 10 % (ref 0–0)
METAMYELOCYTES NFR BLD MANUAL: 22 % (ref 0–0)
METAMYELOCYTES NFR BLD MANUAL: 36 % (ref 0–0)
METAMYELOCYTES NFR BLD MANUAL: 9 % (ref 0–0)
METHADONE UR QL SCN: NEGATIVE
MICROCYTES BLD QL: ABNORMAL
MODALITY: ABNORMAL
MONOCYTES # BLD: 0.07 10*3/MM3 (ref 0.1–0.9)
MONOCYTES # BLD: 0.08 10*3/MM3 (ref 0.1–0.9)
MONOCYTES # BLD: 0.23 10*3/MM3 (ref 0.1–0.9)
MONOCYTES # BLD: 0.23 10*3/MM3 (ref 0.1–0.9)
MRSA DNA SPEC QL NAA+PROBE: NORMAL
MYELOCYTES NFR BLD MANUAL: 2 % (ref 0–0)
MYELOCYTES NFR BLD MANUAL: 3 % (ref 0–0)
NEUTROPHILS # BLD AUTO: 1.85 10*3/MM3 (ref 1.7–7)
NEUTROPHILS # BLD AUTO: 2.85 10*3/MM3 (ref 1.7–7)
NEUTROPHILS # BLD AUTO: 2.88 10*3/MM3 (ref 1.7–7)
NEUTROPHILS # BLD AUTO: 3.05 10*3/MM3 (ref 1.7–7)
NEUTROPHILS NFR BLD MANUAL: 21 % (ref 42.7–76)
NEUTROPHILS NFR BLD MANUAL: 28 % (ref 42.7–76)
NEUTROPHILS NFR BLD MANUAL: 49 % (ref 42.7–76)
NEUTROPHILS NFR BLD MANUAL: 6 % (ref 42.7–76)
NEUTS BAND NFR BLD MANUAL: 20 % (ref 0–5)
NEUTS BAND NFR BLD MANUAL: 24 % (ref 0–5)
NEUTS BAND NFR BLD MANUAL: 35 % (ref 0–5)
NEUTS BAND NFR BLD MANUAL: 53 % (ref 0–5)
NEUTS VAC BLD QL SMEAR: ABNORMAL
NITRITE UR QL STRIP: POSITIVE
NOTIFIED WHO: ABNORMAL
NRBC SPEC MANUAL: 1 /100 WBC (ref 0–0.2)
OPIATES UR QL: POSITIVE
OXYCODONE UR QL SCN: POSITIVE
PATH REPORT.FINAL DX SPEC: NORMAL
PATH REPORT.FINAL DX SPEC: NORMAL
PATHOLOGY REVIEW: YES
PATHOLOGY REVIEW: YES
PCO2 BLDA: 35.4 MM HG (ref 35–48)
PCO2 BLDA: 36.1 MM HG (ref 35–48)
PCO2 BLDA: 37.2 MM HG (ref 35–48)
PCO2 BLDA: 42.3 MM HG (ref 35–48)
PCO2 BLDA: 54.6 MM HG (ref 35–48)
PEEP RESPIRATORY: 5 CM[H2O]
PH BLDA: 6.77 PH UNITS (ref 7.35–7.45)
PH BLDA: 6.78 PH UNITS (ref 7.35–7.45)
PH BLDA: 6.79 PH UNITS (ref 7.35–7.45)
PH BLDA: 7.34 PH UNITS (ref 7.35–7.45)
PH BLDA: 7.34 PH UNITS (ref 7.35–7.45)
PH UR STRIP.AUTO: <=5 [PH] (ref 5–8)
PHOSPHATE SERPL-MCNC: 12.1 MG/DL (ref 2.5–4.5)
PHOSPHATE SERPL-MCNC: 7.2 MG/DL (ref 2.5–4.5)
PLATELET # BLD AUTO: 12 10*3/MM3 (ref 140–450)
PLATELET # BLD AUTO: 21 10*3/MM3 (ref 140–450)
PLATELET # BLD AUTO: 27 10*3/MM3 (ref 140–450)
PLATELET # BLD AUTO: 29 10*3/MM3 (ref 140–450)
PMV BLD AUTO: ABNORMAL FL
PO2 BLD: 101 MM[HG] (ref 0–500)
PO2 BLD: 301 MM[HG] (ref 0–500)
PO2 BLD: 77 MM[HG] (ref 0–500)
PO2 BLD: 98 MM[HG] (ref 0–500)
PO2 BLDA: 100.5 MM HG (ref 83–108)
PO2 BLDA: 144.3 MM HG (ref 83–108)
PO2 BLDA: 77.3 MM HG (ref 83–108)
PO2 BLDA: 82.6 MM HG (ref 83–108)
PO2 BLDA: 98 MM HG (ref 83–108)
POIKILOCYTOSIS BLD QL SMEAR: ABNORMAL
POIKILOCYTOSIS BLD QL SMEAR: ABNORMAL
POTASSIUM BLDA-SCNC: 3.4 MMOL/L (ref 3.5–4.5)
POTASSIUM BLDA-SCNC: 3.4 MMOL/L (ref 3.5–4.5)
POTASSIUM BLDA-SCNC: 5.6 MMOL/L (ref 3.5–4.5)
POTASSIUM SERPL-SCNC: 3.6 MMOL/L (ref 3.5–5.2)
POTASSIUM SERPL-SCNC: 3.9 MMOL/L (ref 3.5–5.2)
POTASSIUM SERPL-SCNC: 5.6 MMOL/L (ref 3.5–5.2)
POTASSIUM SERPL-SCNC: 7.3 MMOL/L (ref 3.5–5.2)
PROT SERPL-MCNC: 3.4 G/DL (ref 6–8.5)
PROT SERPL-MCNC: 4.6 G/DL (ref 6–8.5)
PROT SERPL-MCNC: 4.8 G/DL (ref 6–8.5)
PROT SERPL-MCNC: 5.9 G/DL (ref 6–8.5)
PROT UR QL STRIP: ABNORMAL
PROTHROMBIN TIME: 11.5 SECONDS (ref 9.6–11.7)
PROTHROMBIN TIME: 22 SECONDS (ref 9.6–11.7)
QT INTERVAL: 363 MS
QTC INTERVAL: 467 MS
RBC # BLD AUTO: 2.22 10*6/MM3 (ref 3.77–5.28)
RBC # BLD AUTO: 2.74 10*6/MM3 (ref 3.77–5.28)
RBC # BLD AUTO: 3.49 10*6/MM3 (ref 3.77–5.28)
RBC # BLD AUTO: 3.83 10*6/MM3 (ref 3.77–5.28)
RBC # UR STRIP: ABNORMAL /HPF
READ BACK: ABNORMAL
READ BACK: YES
REF LAB TEST METHOD: ABNORMAL
RESPIRATORY RATE: 22
RH BLD: POSITIVE
SAO2 % BLDCOA: 77 % (ref 94–98)
SAO2 % BLDCOA: 86 % (ref 94–98)
SAO2 % BLDCOA: 87.4 % (ref 94–98)
SAO2 % BLDCOA: 95.6 % (ref 94–98)
SAO2 % BLDCOA: 99.1 % (ref 94–98)
SCAN SLIDE: NORMAL
SMALL PLATELETS BLD QL SMEAR: ABNORMAL
SODIUM BLD-SCNC: 138 MMOL/L (ref 138–146)
SODIUM BLD-SCNC: 140 MMOL/L (ref 138–146)
SODIUM BLD-SCNC: 141 MMOL/L (ref 138–146)
SODIUM SERPL-SCNC: 137 MMOL/L (ref 136–145)
SODIUM SERPL-SCNC: 138 MMOL/L (ref 136–145)
SODIUM SERPL-SCNC: 140 MMOL/L (ref 136–145)
SODIUM SERPL-SCNC: 141 MMOL/L (ref 136–145)
SP GR UR STRIP: 1.02 (ref 1–1.03)
SQUAMOUS #/AREA URNS HPF: ABNORMAL /HPF
T&S EXPIRATION DATE: NORMAL
TARGETS BLD QL SMEAR: ABNORMAL
TOXIC GRANULATION: ABNORMAL
TROPONIN T SERPL HS-MCNC: 38 NG/L
UNIT  ABO: NORMAL
UNIT  RH: NORMAL
UROBILINOGEN UR QL STRIP: ABNORMAL
VANCOMYCIN SERPL-MCNC: 17.9 MCG/ML (ref 5–40)
VARIANT LYMPHS NFR BLD MANUAL: 1 % (ref 0–5)
VARIANT LYMPHS NFR BLD MANUAL: 2 % (ref 0–5)
VARIANT LYMPHS NFR BLD MANUAL: 20 % (ref 19.6–45.3)
VARIANT LYMPHS NFR BLD MANUAL: 25 % (ref 19.6–45.3)
VARIANT LYMPHS NFR BLD MANUAL: 5 % (ref 0–5)
VARIANT LYMPHS NFR BLD MANUAL: 5 % (ref 19.6–45.3)
VARIANT LYMPHS NFR BLD MANUAL: 6 % (ref 19.6–45.3)
VENTILATOR MODE: AC
VT ON VENT VENT: 450 ML
VT ON VENT VENT: 450 ML
VT ON VENT VENT: 500 ML
WBC # UR STRIP: ABNORMAL /HPF
WBC MORPH BLD: NORMAL
WBC NRBC COR # BLD AUTO: 2.68 10*3/MM3 (ref 3.4–10.8)
WBC NRBC COR # BLD AUTO: 3.89 10*3/MM3 (ref 3.4–10.8)
WBC NRBC COR # BLD AUTO: 5.87 10*3/MM3 (ref 3.4–10.8)
WBC NRBC COR # BLD AUTO: 6.96 10*3/MM3 (ref 3.4–10.8)

## 2024-01-01 PROCEDURE — 82803 BLOOD GASES ANY COMBINATION: CPT | Performed by: NURSE PRACTITIONER

## 2024-01-01 PROCEDURE — 74176 CT ABD & PELVIS W/O CONTRAST: CPT

## 2024-01-01 PROCEDURE — 94002 VENT MGMT INPAT INIT DAY: CPT

## 2024-01-01 PROCEDURE — 25010000002 EPINEPHRINE 1 MG/ML SOLUTION: Performed by: STUDENT IN AN ORGANIZED HEALTH CARE EDUCATION/TRAINING PROGRAM

## 2024-01-01 PROCEDURE — 87086 URINE CULTURE/COLONY COUNT: CPT | Performed by: EMERGENCY MEDICINE

## 2024-01-01 PROCEDURE — 82803 BLOOD GASES ANY COMBINATION: CPT

## 2024-01-01 PROCEDURE — 25010000002 ONDANSETRON PER 1 MG: Performed by: RADIOLOGY

## 2024-01-01 PROCEDURE — 25010000002 MIDAZOLAM PER 1 MG: Performed by: STUDENT IN AN ORGANIZED HEALTH CARE EDUCATION/TRAINING PROGRAM

## 2024-01-01 PROCEDURE — 85730 THROMBOPLASTIN TIME PARTIAL: CPT | Performed by: INTERNAL MEDICINE

## 2024-01-01 PROCEDURE — 0 DEXTROSE 5 % SOLUTION 1,000 ML FLEX CONT: Performed by: STUDENT IN AN ORGANIZED HEALTH CARE EDUCATION/TRAINING PROGRAM

## 2024-01-01 PROCEDURE — 83605 ASSAY OF LACTIC ACID: CPT

## 2024-01-01 PROCEDURE — 80053 COMPREHEN METABOLIC PANEL: CPT | Performed by: INTERNAL MEDICINE

## 2024-01-01 PROCEDURE — 25810000003 DEXTROSE 5% IN LACTATED RINGERS PER 1000 ML: Performed by: INTERNAL MEDICINE

## 2024-01-01 PROCEDURE — 83690 ASSAY OF LIPASE: CPT | Performed by: EMERGENCY MEDICINE

## 2024-01-01 PROCEDURE — 25810000003 SODIUM CHLORIDE 0.9 % SOLUTION 250 ML FLEX CONT: Performed by: INTERNAL MEDICINE

## 2024-01-01 PROCEDURE — 85384 FIBRINOGEN ACTIVITY: CPT | Performed by: STUDENT IN AN ORGANIZED HEALTH CARE EDUCATION/TRAINING PROGRAM

## 2024-01-01 PROCEDURE — 36415 COLL VENOUS BLD VENIPUNCTURE: CPT

## 2024-01-01 PROCEDURE — 25810000003 SODIUM CHLORIDE 0.9 % SOLUTION: Performed by: STUDENT IN AN ORGANIZED HEALTH CARE EDUCATION/TRAINING PROGRAM

## 2024-01-01 PROCEDURE — 86900 BLOOD TYPING SEROLOGIC ABO: CPT | Performed by: RADIOLOGY

## 2024-01-01 PROCEDURE — 80053 COMPREHEN METABOLIC PANEL: CPT | Performed by: STUDENT IN AN ORGANIZED HEALTH CARE EDUCATION/TRAINING PROGRAM

## 2024-01-01 PROCEDURE — 86900 BLOOD TYPING SEROLOGIC ABO: CPT

## 2024-01-01 PROCEDURE — 93005 ELECTROCARDIOGRAM TRACING: CPT | Performed by: EMERGENCY MEDICINE

## 2024-01-01 PROCEDURE — 63710000001 INSULIN REGULAR HUMAN PER 5 UNITS: Performed by: STUDENT IN AN ORGANIZED HEALTH CARE EDUCATION/TRAINING PROGRAM

## 2024-01-01 PROCEDURE — 94799 UNLISTED PULMONARY SVC/PX: CPT

## 2024-01-01 PROCEDURE — 80307 DRUG TEST PRSMV CHEM ANLYZR: CPT | Performed by: NURSE PRACTITIONER

## 2024-01-01 PROCEDURE — 85007 BL SMEAR W/DIFF WBC COUNT: CPT | Performed by: EMERGENCY MEDICINE

## 2024-01-01 PROCEDURE — C1887 CATHETER, GUIDING: HCPCS

## 2024-01-01 PROCEDURE — 82565 ASSAY OF CREATININE: CPT

## 2024-01-01 PROCEDURE — 87484 EHRLICHA CHAFFEENSIS AMP PRB: CPT | Performed by: NURSE PRACTITIONER

## 2024-01-01 PROCEDURE — 87468 ANAPLSMA PHGCYTOPHLM AMP PRB: CPT | Performed by: NURSE PRACTITIONER

## 2024-01-01 PROCEDURE — 99152 MOD SED SAME PHYS/QHP 5/>YRS: CPT

## 2024-01-01 PROCEDURE — 0F9430Z DRAINAGE OF GALLBLADDER WITH DRAINAGE DEVICE, PERCUTANEOUS APPROACH: ICD-10-PCS | Performed by: RADIOLOGY

## 2024-01-01 PROCEDURE — 4A133J1 MONITORING OF ARTERIAL PULSE, PERIPHERAL, PERCUTANEOUS APPROACH: ICD-10-PCS | Performed by: STUDENT IN AN ORGANIZED HEALTH CARE EDUCATION/TRAINING PROGRAM

## 2024-01-01 PROCEDURE — 94003 VENT MGMT INPAT SUBQ DAY: CPT

## 2024-01-01 PROCEDURE — 25010000002 CEFTRIAXONE PER 250 MG: Performed by: INTERNAL MEDICINE

## 2024-01-01 PROCEDURE — C1729 CATH, DRAINAGE: HCPCS

## 2024-01-01 PROCEDURE — 87077 CULTURE AEROBIC IDENTIFY: CPT | Performed by: RADIOLOGY

## 2024-01-01 PROCEDURE — 25810000003 SEPSIS FLUID NS 0.9 % SOLUTION: Performed by: EMERGENCY MEDICINE

## 2024-01-01 PROCEDURE — 92950 HEART/LUNG RESUSCITATION CPR: CPT

## 2024-01-01 PROCEDURE — 25010000002 HYDROCORTISONE SOD SUC (PF) 100 MG RECONSTITUTED SOLUTION: Performed by: INTERNAL MEDICINE

## 2024-01-01 PROCEDURE — 25810000003 SODIUM CHLORIDE 0.9 % SOLUTION: Performed by: SURGERY

## 2024-01-01 PROCEDURE — 87070 CULTURE OTHR SPECIMN AEROBIC: CPT | Performed by: RADIOLOGY

## 2024-01-01 PROCEDURE — 02HV33Z INSERTION OF INFUSION DEVICE INTO SUPERIOR VENA CAVA, PERCUTANEOUS APPROACH: ICD-10-PCS | Performed by: STUDENT IN AN ORGANIZED HEALTH CARE EDUCATION/TRAINING PROGRAM

## 2024-01-01 PROCEDURE — 82948 REAGENT STRIP/BLOOD GLUCOSE: CPT

## 2024-01-01 PROCEDURE — 25010000002 CALCIUM GLUCONATE-NACL 1-0.675 GM/50ML-% SOLUTION: Performed by: STUDENT IN AN ORGANIZED HEALTH CARE EDUCATION/TRAINING PROGRAM

## 2024-01-01 PROCEDURE — 84100 ASSAY OF PHOSPHORUS: CPT | Performed by: NURSE PRACTITIONER

## 2024-01-01 PROCEDURE — P9016 RBC LEUKOCYTES REDUCED: HCPCS

## 2024-01-01 PROCEDURE — 81001 URINALYSIS AUTO W/SCOPE: CPT | Performed by: EMERGENCY MEDICINE

## 2024-01-01 PROCEDURE — 0BH17EZ INSERTION OF ENDOTRACHEAL AIRWAY INTO TRACHEA, VIA NATURAL OR ARTIFICIAL OPENING: ICD-10-PCS | Performed by: STUDENT IN AN ORGANIZED HEALTH CARE EDUCATION/TRAINING PROGRAM

## 2024-01-01 PROCEDURE — 86901 BLOOD TYPING SEROLOGIC RH(D): CPT | Performed by: RADIOLOGY

## 2024-01-01 PROCEDURE — 82948 REAGENT STRIP/BLOOD GLUCOSE: CPT | Performed by: STUDENT IN AN ORGANIZED HEALTH CARE EDUCATION/TRAINING PROGRAM

## 2024-01-01 PROCEDURE — 83735 ASSAY OF MAGNESIUM: CPT | Performed by: STUDENT IN AN ORGANIZED HEALTH CARE EDUCATION/TRAINING PROGRAM

## 2024-01-01 PROCEDURE — 76705 ECHO EXAM OF ABDOMEN: CPT

## 2024-01-01 PROCEDURE — 87154 CUL TYP ID BLD PTHGN 6+ TRGT: CPT | Performed by: EMERGENCY MEDICINE

## 2024-01-01 PROCEDURE — 87186 SC STD MICRODIL/AGAR DIL: CPT | Performed by: RADIOLOGY

## 2024-01-01 PROCEDURE — 83735 ASSAY OF MAGNESIUM: CPT | Performed by: NURSE PRACTITIONER

## 2024-01-01 PROCEDURE — 25010000002 PHENYLEPHRINE 10 MG/ML SOLUTION 5 ML VIAL: Performed by: STUDENT IN AN ORGANIZED HEALTH CARE EDUCATION/TRAINING PROGRAM

## 2024-01-01 PROCEDURE — 82330 ASSAY OF CALCIUM: CPT

## 2024-01-01 PROCEDURE — 80202 ASSAY OF VANCOMYCIN: CPT | Performed by: INTERNAL MEDICINE

## 2024-01-01 PROCEDURE — 83735 ASSAY OF MAGNESIUM: CPT | Performed by: EMERGENCY MEDICINE

## 2024-01-01 PROCEDURE — 76937 US GUIDE VASCULAR ACCESS: CPT

## 2024-01-01 PROCEDURE — 85018 HEMOGLOBIN: CPT

## 2024-01-01 PROCEDURE — 25010000002 ALBUMIN HUMAN 25% PER 50 ML: Performed by: STUDENT IN AN ORGANIZED HEALTH CARE EDUCATION/TRAINING PROGRAM

## 2024-01-01 PROCEDURE — 25010000002 VANCOMYCIN HCL 1.25 G RECONSTITUTED SOLUTION 1 EACH VIAL: Performed by: INTERNAL MEDICINE

## 2024-01-01 PROCEDURE — 36430 TRANSFUSION BLD/BLD COMPNT: CPT

## 2024-01-01 PROCEDURE — 85007 BL SMEAR W/DIFF WBC COUNT: CPT | Performed by: STUDENT IN AN ORGANIZED HEALTH CARE EDUCATION/TRAINING PROGRAM

## 2024-01-01 PROCEDURE — B548ZZA ULTRASONOGRAPHY OF SUPERIOR VENA CAVA, GUIDANCE: ICD-10-PCS | Performed by: STUDENT IN AN ORGANIZED HEALTH CARE EDUCATION/TRAINING PROGRAM

## 2024-01-01 PROCEDURE — 82550 ASSAY OF CK (CPK): CPT | Performed by: STUDENT IN AN ORGANIZED HEALTH CARE EDUCATION/TRAINING PROGRAM

## 2024-01-01 PROCEDURE — 99291 CRITICAL CARE FIRST HOUR: CPT

## 2024-01-01 PROCEDURE — 71045 X-RAY EXAM CHEST 1 VIEW: CPT

## 2024-01-01 PROCEDURE — 25810000003 SODIUM CHLORIDE 0.9 % SOLUTION 250 ML FLEX CONT: Performed by: STUDENT IN AN ORGANIZED HEALTH CARE EDUCATION/TRAINING PROGRAM

## 2024-01-01 PROCEDURE — 04HY32Z INSERTION OF MONITORING DEVICE INTO LOWER ARTERY, PERCUTANEOUS APPROACH: ICD-10-PCS | Performed by: STUDENT IN AN ORGANIZED HEALTH CARE EDUCATION/TRAINING PROGRAM

## 2024-01-01 PROCEDURE — 25010000002 LIDOCAINE 1 % SOLUTION: Performed by: RADIOLOGY

## 2024-01-01 PROCEDURE — 87186 SC STD MICRODIL/AGAR DIL: CPT | Performed by: EMERGENCY MEDICINE

## 2024-01-01 PROCEDURE — 82550 ASSAY OF CK (CPK): CPT | Performed by: NURSE PRACTITIONER

## 2024-01-01 PROCEDURE — 86850 RBC ANTIBODY SCREEN: CPT | Performed by: RADIOLOGY

## 2024-01-01 PROCEDURE — 87641 MR-STAPH DNA AMP PROBE: CPT | Performed by: NURSE PRACTITIONER

## 2024-01-01 PROCEDURE — 85025 COMPLETE CBC W/AUTO DIFF WBC: CPT | Performed by: STUDENT IN AN ORGANIZED HEALTH CARE EDUCATION/TRAINING PROGRAM

## 2024-01-01 PROCEDURE — 86618 LYME DISEASE ANTIBODY: CPT | Performed by: NURSE PRACTITIONER

## 2024-01-01 PROCEDURE — 70450 CT HEAD/BRAIN W/O DYE: CPT

## 2024-01-01 PROCEDURE — 82550 ASSAY OF CK (CPK): CPT | Performed by: INTERNAL MEDICINE

## 2024-01-01 PROCEDURE — 86923 COMPATIBILITY TEST ELECTRIC: CPT

## 2024-01-01 PROCEDURE — 85610 PROTHROMBIN TIME: CPT | Performed by: INTERNAL MEDICINE

## 2024-01-01 PROCEDURE — 85384 FIBRINOGEN ACTIVITY: CPT | Performed by: INTERNAL MEDICINE

## 2024-01-01 PROCEDURE — 82077 ASSAY SPEC XCP UR&BREATH IA: CPT | Performed by: EMERGENCY MEDICINE

## 2024-01-01 PROCEDURE — 85610 PROTHROMBIN TIME: CPT | Performed by: STUDENT IN AN ORGANIZED HEALTH CARE EDUCATION/TRAINING PROGRAM

## 2024-01-01 PROCEDURE — 0 DEXTROSE 5 % SOLUTION 1,000 ML FLEX CONT: Performed by: NURSE PRACTITIONER

## 2024-01-01 PROCEDURE — 25010000002 METRONIDAZOLE 500 MG/100ML SOLUTION: Performed by: INTERNAL MEDICINE

## 2024-01-01 PROCEDURE — 87147 CULTURE TYPE IMMUNOLOGIC: CPT | Performed by: EMERGENCY MEDICINE

## 2024-01-01 PROCEDURE — C1819 TISSUE LOCALIZATION-EXCISION: HCPCS

## 2024-01-01 PROCEDURE — 25810000003 LACTATED RINGERS SOLUTION: Performed by: SURGERY

## 2024-01-01 PROCEDURE — 80051 ELECTROLYTE PANEL: CPT

## 2024-01-01 PROCEDURE — 87075 CULTR BACTERIA EXCEPT BLOOD: CPT | Performed by: RADIOLOGY

## 2024-01-01 PROCEDURE — 87798 DETECT AGENT NOS DNA AMP: CPT | Performed by: NURSE PRACTITIONER

## 2024-01-01 PROCEDURE — 31500 INSERT EMERGENCY AIRWAY: CPT | Performed by: STUDENT IN AN ORGANIZED HEALTH CARE EDUCATION/TRAINING PROGRAM

## 2024-01-01 PROCEDURE — 25010000002 CEFEPIME PER 500 MG: Performed by: EMERGENCY MEDICINE

## 2024-01-01 PROCEDURE — P9035 PLATELET PHERES LEUKOREDUCED: HCPCS

## 2024-01-01 PROCEDURE — 25010000002 DOPAMINE PER 40 MG: Performed by: STUDENT IN AN ORGANIZED HEALTH CARE EDUCATION/TRAINING PROGRAM

## 2024-01-01 PROCEDURE — 84100 ASSAY OF PHOSPHORUS: CPT | Performed by: STUDENT IN AN ORGANIZED HEALTH CARE EDUCATION/TRAINING PROGRAM

## 2024-01-01 PROCEDURE — 74018 RADEX ABDOMEN 1 VIEW: CPT

## 2024-01-01 PROCEDURE — 87040 BLOOD CULTURE FOR BACTERIA: CPT | Performed by: EMERGENCY MEDICINE

## 2024-01-01 PROCEDURE — 4A133B1 MONITORING OF ARTERIAL PRESSURE, PERIPHERAL, PERCUTANEOUS APPROACH: ICD-10-PCS | Performed by: STUDENT IN AN ORGANIZED HEALTH CARE EDUCATION/TRAINING PROGRAM

## 2024-01-01 PROCEDURE — P9047 ALBUMIN (HUMAN), 25%, 50ML: HCPCS | Performed by: STUDENT IN AN ORGANIZED HEALTH CARE EDUCATION/TRAINING PROGRAM

## 2024-01-01 PROCEDURE — 25810000003 LACTATED RINGERS SOLUTION: Performed by: INTERNAL MEDICINE

## 2024-01-01 PROCEDURE — 86901 BLOOD TYPING SEROLOGIC RH(D): CPT

## 2024-01-01 PROCEDURE — 25010000002 MIDAZOLAM PER 1 MG: Performed by: RADIOLOGY

## 2024-01-01 PROCEDURE — 99222 1ST HOSP IP/OBS MODERATE 55: CPT | Performed by: SURGERY

## 2024-01-01 PROCEDURE — 83605 ASSAY OF LACTIC ACID: CPT | Performed by: STUDENT IN AN ORGANIZED HEALTH CARE EDUCATION/TRAINING PROGRAM

## 2024-01-01 PROCEDURE — 82803 BLOOD GASES ANY COMBINATION: CPT | Performed by: STUDENT IN AN ORGANIZED HEALTH CARE EDUCATION/TRAINING PROGRAM

## 2024-01-01 PROCEDURE — 85025 COMPLETE CBC W/AUTO DIFF WBC: CPT | Performed by: INTERNAL MEDICINE

## 2024-01-01 PROCEDURE — P9612 CATHETERIZE FOR URINE SPEC: HCPCS

## 2024-01-01 PROCEDURE — 36600 WITHDRAWAL OF ARTERIAL BLOOD: CPT | Performed by: NURSE PRACTITIONER

## 2024-01-01 PROCEDURE — 85025 COMPLETE CBC W/AUTO DIFF WBC: CPT | Performed by: EMERGENCY MEDICINE

## 2024-01-01 PROCEDURE — 5A1935Z RESPIRATORY VENTILATION, LESS THAN 24 CONSECUTIVE HOURS: ICD-10-PCS | Performed by: STUDENT IN AN ORGANIZED HEALTH CARE EDUCATION/TRAINING PROGRAM

## 2024-01-01 PROCEDURE — 87205 SMEAR GRAM STAIN: CPT | Performed by: RADIOLOGY

## 2024-01-01 PROCEDURE — 84484 ASSAY OF TROPONIN QUANT: CPT | Performed by: EMERGENCY MEDICINE

## 2024-01-01 PROCEDURE — 85007 BL SMEAR W/DIFF WBC COUNT: CPT | Performed by: INTERNAL MEDICINE

## 2024-01-01 PROCEDURE — 25010000002 LORAZEPAM PER 2 MG: Performed by: STUDENT IN AN ORGANIZED HEALTH CARE EDUCATION/TRAINING PROGRAM

## 2024-01-01 PROCEDURE — P9100 PATHOGEN TEST FOR PLATELETS: HCPCS

## 2024-01-01 PROCEDURE — 80053 COMPREHEN METABOLIC PANEL: CPT | Performed by: EMERGENCY MEDICINE

## 2024-01-01 PROCEDURE — C1769 GUIDE WIRE: HCPCS

## 2024-01-01 RX ORDER — CALCIUM CHLORIDE, MAGNESIUM CHLORIDE, SODIUM CHLORIDE, SODIUM BICARBONATE, POTASSIUM CHLORIDE AND SODIUM PHOSPHATE DIBASIC DIHYDRATE 3.68; 3.05; 6.34; 3.09; .314; .187 G/L; G/L; G/L; G/L; G/L; G/L
2000 INJECTION INTRAVENOUS CONTINUOUS
Status: DISCONTINUED | OUTPATIENT
Start: 2024-01-01 | End: 2024-01-01 | Stop reason: HOSPADM

## 2024-01-01 RX ORDER — NOREPINEPHRINE BITARTRATE 0.03 MG/ML
.02-.5 INJECTION, SOLUTION INTRAVENOUS
Status: DISCONTINUED | OUTPATIENT
Start: 2024-01-01 | End: 2024-01-01 | Stop reason: HOSPADM

## 2024-01-01 RX ORDER — CALCIUM GLUCONATE 20 MG/ML
1 INJECTION, SOLUTION INTRAVENOUS ONCE
Status: COMPLETED | OUTPATIENT
Start: 2024-01-01 | End: 2024-01-01

## 2024-01-01 RX ORDER — ONDANSETRON 2 MG/ML
INJECTION INTRAMUSCULAR; INTRAVENOUS AS NEEDED
Status: COMPLETED | OUTPATIENT
Start: 2024-01-01 | End: 2024-01-01

## 2024-01-01 RX ORDER — CALCIUM GLUCONATE 20 MG/ML
1000 INJECTION, SOLUTION INTRAVENOUS ONCE
Status: COMPLETED | OUTPATIENT
Start: 2024-01-01 | End: 2024-01-01

## 2024-01-01 RX ORDER — LORAZEPAM 2 MG/ML
2 INJECTION INTRAMUSCULAR EVERY 4 HOURS PRN
Status: DISCONTINUED | OUTPATIENT
Start: 2024-01-01 | End: 2024-01-01

## 2024-01-01 RX ORDER — ONDANSETRON 4 MG/1
4 TABLET, ORALLY DISINTEGRATING ORAL EVERY 6 HOURS PRN
Status: DISCONTINUED | OUTPATIENT
Start: 2024-01-01 | End: 2024-01-01 | Stop reason: HOSPADM

## 2024-01-01 RX ORDER — LIDOCAINE HYDROCHLORIDE 10 MG/ML
INJECTION, SOLUTION INFILTRATION; PERINEURAL AS NEEDED
Status: COMPLETED | OUTPATIENT
Start: 2024-01-01 | End: 2024-01-01

## 2024-01-01 RX ORDER — CHLORHEXIDINE GLUCONATE ORAL RINSE 1.2 MG/ML
15 SOLUTION DENTAL EVERY 12 HOURS SCHEDULED
Status: DISCONTINUED | OUTPATIENT
Start: 2024-01-01 | End: 2024-01-01 | Stop reason: HOSPADM

## 2024-01-01 RX ORDER — SODIUM CHLORIDE 9 MG/ML
INJECTION, SOLUTION INTRAVENOUS
Status: ACTIVE
Start: 2024-01-01 | End: 2024-01-01

## 2024-01-01 RX ORDER — SODIUM CHLORIDE 0.9 % (FLUSH) 0.9 %
10 SYRINGE (ML) INJECTION AS NEEDED
Status: DISCONTINUED | OUTPATIENT
Start: 2024-01-01 | End: 2024-01-01 | Stop reason: HOSPADM

## 2024-01-01 RX ORDER — MIDAZOLAM HYDROCHLORIDE 1 MG/ML
2 INJECTION INTRAMUSCULAR; INTRAVENOUS
Status: DISCONTINUED | OUTPATIENT
Start: 2024-01-01 | End: 2024-01-01 | Stop reason: HOSPADM

## 2024-01-01 RX ORDER — MIDAZOLAM HYDROCHLORIDE 1 MG/ML
INJECTION INTRAMUSCULAR; INTRAVENOUS AS NEEDED
Status: COMPLETED | OUTPATIENT
Start: 2024-01-01 | End: 2024-01-01

## 2024-01-01 RX ORDER — SODIUM CHLORIDE 0.9 % (FLUSH) 0.9 %
10 SYRINGE (ML) INJECTION EVERY 12 HOURS SCHEDULED
Status: DISCONTINUED | OUTPATIENT
Start: 2024-01-01 | End: 2024-01-01 | Stop reason: HOSPADM

## 2024-01-01 RX ORDER — HEPARIN SODIUM 1000 [USP'U]/ML
INJECTION, SOLUTION INTRAVENOUS; SUBCUTANEOUS AS NEEDED
Status: DISCONTINUED | OUTPATIENT
Start: 2024-01-01 | End: 2024-01-01 | Stop reason: HOSPADM

## 2024-01-01 RX ORDER — DEXTROSE, SODIUM CHLORIDE, SODIUM LACTATE, POTASSIUM CHLORIDE, AND CALCIUM CHLORIDE 5; .6; .31; .03; .02 G/100ML; G/100ML; G/100ML; G/100ML; G/100ML
100 INJECTION, SOLUTION INTRAVENOUS CONTINUOUS
Status: DISCONTINUED | OUTPATIENT
Start: 2024-01-01 | End: 2024-01-01

## 2024-01-01 RX ORDER — PHENYLEPHRINE HYDROCHLORIDE 10 MG/ML
INJECTION INTRAVENOUS
Status: ACTIVE
Start: 2024-01-01 | End: 2024-01-01

## 2024-01-01 RX ORDER — DEXTROSE MONOHYDRATE 25 G/50ML
25 INJECTION, SOLUTION INTRAVENOUS ONCE
Status: COMPLETED | OUTPATIENT
Start: 2024-01-01 | End: 2024-01-01

## 2024-01-01 RX ORDER — SODIUM CHLORIDE, SODIUM LACTATE, POTASSIUM CHLORIDE, CALCIUM CHLORIDE 600; 310; 30; 20 MG/100ML; MG/100ML; MG/100ML; MG/100ML
100 INJECTION, SOLUTION INTRAVENOUS CONTINUOUS
Status: DISCONTINUED | OUTPATIENT
Start: 2024-01-01 | End: 2024-01-01

## 2024-01-01 RX ORDER — HEPARIN SODIUM 5000 [USP'U]/ML
5000 INJECTION, SOLUTION INTRAVENOUS; SUBCUTANEOUS EVERY 8 HOURS SCHEDULED
Status: DISCONTINUED | OUTPATIENT
Start: 2024-01-01 | End: 2024-01-01 | Stop reason: HOSPADM

## 2024-01-01 RX ORDER — ALBUMIN (HUMAN) 12.5 G/50ML
50 SOLUTION INTRAVENOUS ONCE
Status: COMPLETED | OUTPATIENT
Start: 2024-01-01 | End: 2024-01-01

## 2024-01-01 RX ORDER — DEXTROSE MONOHYDRATE 25 G/50ML
25 INJECTION, SOLUTION INTRAVENOUS ONCE
Status: DISCONTINUED | OUTPATIENT
Start: 2024-01-01 | End: 2024-01-01 | Stop reason: HOSPADM

## 2024-01-01 RX ORDER — METRONIDAZOLE 500 MG/100ML
500 INJECTION, SOLUTION INTRAVENOUS EVERY 8 HOURS
Qty: 2100 ML | Refills: 0 | Status: DISCONTINUED | OUTPATIENT
Start: 2024-01-01 | End: 2024-01-01 | Stop reason: HOSPADM

## 2024-01-01 RX ORDER — NITROGLYCERIN 0.4 MG/1
0.4 TABLET SUBLINGUAL
Status: DISCONTINUED | OUTPATIENT
Start: 2024-01-01 | End: 2024-01-01 | Stop reason: HOSPADM

## 2024-01-01 RX ORDER — DEXTROSE MONOHYDRATE 25 G/50ML
25 INJECTION, SOLUTION INTRAVENOUS
Status: DISCONTINUED | OUTPATIENT
Start: 2024-01-01 | End: 2024-01-01 | Stop reason: HOSPADM

## 2024-01-01 RX ORDER — NICOTINE POLACRILEX 4 MG
15 LOZENGE BUCCAL
Status: DISCONTINUED | OUTPATIENT
Start: 2024-01-01 | End: 2024-01-01 | Stop reason: HOSPADM

## 2024-01-01 RX ORDER — PANTOPRAZOLE SODIUM 40 MG/10ML
40 INJECTION, POWDER, LYOPHILIZED, FOR SOLUTION INTRAVENOUS
Status: DISCONTINUED | OUTPATIENT
Start: 2024-01-01 | End: 2024-01-01 | Stop reason: HOSPADM

## 2024-01-01 RX ORDER — IBUPROFEN 600 MG/1
1 TABLET ORAL
Status: DISCONTINUED | OUTPATIENT
Start: 2024-01-01 | End: 2024-01-01 | Stop reason: HOSPADM

## 2024-01-01 RX ORDER — DOPAMINE HYDROCHLORIDE 160 MG/100ML
INJECTION, SOLUTION INTRAVENOUS
Status: DISCONTINUED
Start: 2024-01-01 | End: 2024-01-01 | Stop reason: HOSPADM

## 2024-01-01 RX ORDER — SODIUM CHLORIDE 9 MG/ML
40 INJECTION, SOLUTION INTRAVENOUS AS NEEDED
Status: DISCONTINUED | OUTPATIENT
Start: 2024-01-01 | End: 2024-01-01 | Stop reason: HOSPADM

## 2024-01-01 RX ORDER — DOPAMINE HYDROCHLORIDE 160 MG/100ML
2-20 INJECTION, SOLUTION INTRAVENOUS
Status: DISCONTINUED | OUTPATIENT
Start: 2024-01-01 | End: 2024-01-01 | Stop reason: HOSPADM

## 2024-01-01 RX ORDER — ONDANSETRON 2 MG/ML
4 INJECTION INTRAMUSCULAR; INTRAVENOUS EVERY 6 HOURS PRN
Status: DISCONTINUED | OUTPATIENT
Start: 2024-01-01 | End: 2024-01-01 | Stop reason: HOSPADM

## 2024-01-01 RX ADMIN — DEXTROSE MONOHYDRATE 25 G: 25 INJECTION, SOLUTION INTRAVENOUS at 00:40

## 2024-01-01 RX ADMIN — SODIUM CHLORIDE, SODIUM LACTATE, POTASSIUM CHLORIDE, CALCIUM CHLORIDE AND DEXTROSE MONOHYDRATE 100 ML/HR: 5; 600; 310; 30; 20 INJECTION, SOLUTION INTRAVENOUS at 20:40

## 2024-01-01 RX ADMIN — SODIUM CHLORIDE 1410 ML: 9 INJECTION, SOLUTION INTRAVENOUS at 22:14

## 2024-01-01 RX ADMIN — VASOPRESSIN 0.03 UNITS/MIN: 0.2 INJECTION INTRAVENOUS at 00:26

## 2024-01-01 RX ADMIN — PANTOPRAZOLE SODIUM 40 MG: 40 INJECTION, POWDER, FOR SOLUTION INTRAVENOUS at 09:14

## 2024-01-01 RX ADMIN — METRONIDAZOLE 500 MG: 500 INJECTION, SOLUTION INTRAVENOUS at 09:14

## 2024-01-01 RX ADMIN — CALCIUM GLUCONATE 1000 MG: 20 INJECTION, SOLUTION INTRAVENOUS at 00:41

## 2024-01-01 RX ADMIN — CEFEPIME 2000 MG: 2 INJECTION, POWDER, FOR SOLUTION INTRAVENOUS at 22:14

## 2024-01-01 RX ADMIN — MIDAZOLAM 0.5 MG: 1 INJECTION INTRAMUSCULAR; INTRAVENOUS at 12:16

## 2024-01-01 RX ADMIN — LIDOCAINE HYDROCHLORIDE 10 ML: 10 INJECTION, SOLUTION INFILTRATION; PERINEURAL at 12:21

## 2024-01-01 RX ADMIN — EPINEPHRINE 0.03 MCG/KG/MIN: 30 INJECTION INTRAMUSCULAR; INTRAVENOUS; SUBCUTANEOUS at 23:13

## 2024-01-01 RX ADMIN — SODIUM BICARBONATE 50 MEQ: 84 INJECTION INTRAVENOUS at 23:41

## 2024-01-01 RX ADMIN — NOREPINEPHRINE BITARTRATE 0.5 MCG/KG/MIN: 0.03 INJECTION, SOLUTION INTRAVENOUS at 01:16

## 2024-01-01 RX ADMIN — INSULIN HUMAN 10 UNITS: 100 INJECTION, SOLUTION PARENTERAL at 06:16

## 2024-01-01 RX ADMIN — HYDROCORTISONE SODIUM SUCCINATE 50 MG: 100 INJECTION, POWDER, FOR SOLUTION INTRAMUSCULAR; INTRAVENOUS at 14:28

## 2024-01-01 RX ADMIN — ONDANSETRON 4 MG: 2 INJECTION INTRAMUSCULAR; INTRAVENOUS at 12:20

## 2024-01-01 RX ADMIN — DOXYCYCLINE 100 MG: 100 INJECTION, POWDER, LYOPHILIZED, FOR SOLUTION INTRAVENOUS at 21:59

## 2024-01-01 RX ADMIN — DOXYCYCLINE 100 MG: 100 INJECTION, POWDER, LYOPHILIZED, FOR SOLUTION INTRAVENOUS at 09:31

## 2024-01-01 RX ADMIN — HYDROCORTISONE SODIUM SUCCINATE 50 MG: 100 INJECTION, POWDER, FOR SOLUTION INTRAMUSCULAR; INTRAVENOUS at 09:14

## 2024-01-01 RX ADMIN — SODIUM BICARBONATE 50 MEQ: 84 INJECTION INTRAVENOUS at 00:41

## 2024-01-01 RX ADMIN — CEFTRIAXONE 1000 MG: 1 INJECTION, POWDER, FOR SOLUTION INTRAMUSCULAR; INTRAVENOUS at 17:44

## 2024-01-01 RX ADMIN — PHENYLEPHRINE HYDROCHLORIDE 6 MCG/KG/MIN: 10 INJECTION INTRAVENOUS at 03:07

## 2024-01-01 RX ADMIN — MIDAZOLAM 2 MG: 1 INJECTION INTRAMUSCULAR; INTRAVENOUS at 03:46

## 2024-01-01 RX ADMIN — DEXTROSE MONOHYDRATE 25 G: 25 INJECTION, SOLUTION INTRAVENOUS at 06:22

## 2024-01-01 RX ADMIN — SODIUM BICARBONATE 50 MEQ: 84 INJECTION INTRAVENOUS at 23:42

## 2024-01-01 RX ADMIN — INSULIN HUMAN 5 UNITS: 100 INJECTION, SOLUTION PARENTERAL at 00:41

## 2024-01-01 RX ADMIN — SODIUM CHLORIDE, POTASSIUM CHLORIDE, SODIUM LACTATE AND CALCIUM CHLORIDE 500 ML: 600; 310; 30; 20 INJECTION, SOLUTION INTRAVENOUS at 14:29

## 2024-01-01 RX ADMIN — Medication 10 ML: at 22:00

## 2024-01-01 RX ADMIN — DOPAMINE HYDROCHLORIDE 15 MCG/KG/MIN: 160 INJECTION, SOLUTION INTRAVENOUS at 01:19

## 2024-01-01 RX ADMIN — Medication 50 MEQ: at 23:41

## 2024-01-01 RX ADMIN — VASOPRESSIN 0.03 UNITS/MIN: 0.2 INJECTION INTRAVENOUS at 17:34

## 2024-01-01 RX ADMIN — CEFTRIAXONE 1000 MG: 1 INJECTION, POWDER, FOR SOLUTION INTRAMUSCULAR; INTRAVENOUS at 09:31

## 2024-01-01 RX ADMIN — NOREPINEPHRINE BITARTRATE 0.5 MCG/KG/MIN: 0.03 INJECTION, SOLUTION INTRAVENOUS at 19:31

## 2024-01-01 RX ADMIN — PHENYLEPHRINE HYDROCHLORIDE 6 MCG/KG/MIN: 10 INJECTION INTRAVENOUS at 23:43

## 2024-01-01 RX ADMIN — SODIUM BICARBONATE 150 MEQ: 84 INJECTION INTRAVENOUS at 00:41

## 2024-01-01 RX ADMIN — HYDROCORTISONE SODIUM SUCCINATE 50 MG: 100 INJECTION, POWDER, FOR SOLUTION INTRAMUSCULAR; INTRAVENOUS at 21:59

## 2024-01-01 RX ADMIN — NOREPINEPHRINE BITARTRATE 0.1 MCG/KG/MIN: 0.03 INJECTION, SOLUTION INTRAVENOUS at 00:02

## 2024-01-01 RX ADMIN — DEXTROSE MONOHYDRATE 25 G: 25 INJECTION, SOLUTION INTRAVENOUS at 03:05

## 2024-01-01 RX ADMIN — EPINEPHRINE 0.5 MCG/KG/MIN: 30 INJECTION INTRAMUSCULAR; INTRAVENOUS; SUBCUTANEOUS at 03:06

## 2024-01-01 RX ADMIN — SODIUM CHLORIDE 1250 MG: 9 INJECTION, SOLUTION INTRAVENOUS at 17:44

## 2024-01-01 RX ADMIN — LORAZEPAM 2 MG: 2 INJECTION INTRAMUSCULAR; INTRAVENOUS at 22:05

## 2024-01-01 RX ADMIN — HYDROCORTISONE SODIUM SUCCINATE 50 MG: 100 INJECTION, POWDER, FOR SOLUTION INTRAMUSCULAR; INTRAVENOUS at 03:06

## 2024-01-01 RX ADMIN — PHENYLEPHRINE HYDROCHLORIDE 0.5 MCG/KG/MIN: 10 INJECTION INTRAVENOUS at 19:58

## 2024-01-01 RX ADMIN — Medication 10 ML: at 09:35

## 2024-01-01 RX ADMIN — METRONIDAZOLE 500 MG: 500 INJECTION, SOLUTION INTRAVENOUS at 17:45

## 2024-01-01 RX ADMIN — SODIUM BICARBONATE 125 MEQ: 84 INJECTION INTRAVENOUS at 04:58

## 2024-01-01 RX ADMIN — SODIUM CHLORIDE, POTASSIUM CHLORIDE, SODIUM LACTATE AND CALCIUM CHLORIDE 1000 ML: 600; 310; 30; 20 INJECTION, SOLUTION INTRAVENOUS at 09:35

## 2024-01-01 RX ADMIN — METRONIDAZOLE 500 MG: 500 INJECTION, SOLUTION INTRAVENOUS at 03:06

## 2024-01-01 RX ADMIN — SODIUM CHLORIDE, SODIUM LACTATE, POTASSIUM CHLORIDE, CALCIUM CHLORIDE AND DEXTROSE MONOHYDRATE 100 ML/HR: 5; 600; 310; 30; 20 INJECTION, SOLUTION INTRAVENOUS at 09:15

## 2024-01-01 RX ADMIN — ALBUMIN (HUMAN) 50 G: 0.25 INJECTION, SOLUTION INTRAVENOUS at 21:59

## 2024-01-01 RX ADMIN — SODIUM CHLORIDE 1000 ML: 9 INJECTION, SOLUTION INTRAVENOUS at 17:46

## 2024-01-01 RX ADMIN — CHLORHEXIDINE GLUCONATE 15 ML: 1.2 RINSE ORAL at 02:39

## 2024-01-01 RX ADMIN — CALCIUM GLUCONATE 1 G: 20 INJECTION, SOLUTION INTRAVENOUS at 06:16

## 2024-01-01 RX ADMIN — SODIUM BICARBONATE 100 MEQ: 84 INJECTION INTRAVENOUS at 06:16

## 2024-01-01 RX ADMIN — NOREPINEPHRINE BITARTRATE 0.32 MCG/KG/MIN: 0.03 INJECTION, SOLUTION INTRAVENOUS at 11:47

## 2024-01-29 ENCOUNTER — TELEPHONE (OUTPATIENT)
Dept: PAIN MEDICINE | Facility: CLINIC | Age: 69
End: 2024-01-29

## 2024-01-29 NOTE — TELEPHONE ENCOUNTER
Caller: Pradip Lopezsa    Relationship: Self    Best call back number:     Requested Prescriptions:   oxyCODONE (ROXICODONE) 10 MG tablet     Pharmacy where request should be sent:  [facility]/pharmacy #[id number]  [place], [place]  [id number] [facility] [address] - [phone]  - [phone]     Last office visit with prescribing clinician: 5/3/2023   Last telemedicine visit with prescribing clinician: Visit date not found   Next office visit with prescribing clinician: 3/4/2024     Additional details provided by patient: HAS 4 PILLS LEFT TAKES HALF A DAY.  ALSO PATIENT HAS TWO APPOINTMENTS ON 3/4 AND 3/5 .  IS WONDERING CAN WE COMBINE THOSE INTO ONE DAY ON 4TH IF POSSIBLE .       Does the patient have less than a 3 day supply:   Yes  [x] No    Would you like a call back once the refill request has been completed: [x] Yes [] No    If the office needs to give you a call back, can they leave a voicemail: [x] Yes [] No    Paulo Bran Rep   01/29/24 13:43 EST         DELETE AFTER READING TO PATIENT: “Thank you for sharing this information with me. I will send a message to the clinical team. Please allow 48 hours for the clinical staff to follow up on this request.”

## 2024-01-30 DIAGNOSIS — Z79.891 ENCOUNTER FOR LONG-TERM OPIATE ANALGESIC USE: ICD-10-CM

## 2024-01-30 RX ORDER — OXYCODONE HYDROCHLORIDE 10 MG/1
10 TABLET ORAL DAILY PRN
Qty: 30 TABLET | Refills: 0 | Status: SHIPPED | OUTPATIENT
Start: 2024-01-30

## 2024-01-30 NOTE — TELEPHONE ENCOUNTER
Medication Refill Request    Date of phone call: 24    Medication being requested: Oxycodone 10 mg si tab po daily prn   Qty: 30    Date of last visit: 24    Date of last refill:     SMITH up to date?:     Next Follow up?: 3/4/24    Any new pertinent information? (i.e, new medication allergies, new use of medications, change in patient's health or condition, non-compliance or inconsistency with prescribing agreement?):

## 2024-01-30 NOTE — TELEPHONE ENCOUNTER
Is one of these for Botox?  Please clarify. Because if yes, then they must be separate.  Otherwise sure.

## 2024-02-22 DIAGNOSIS — G89.4 CHRONIC PAIN SYNDROME: ICD-10-CM

## 2024-02-22 DIAGNOSIS — M96.1 LUMBAR POST-LAMINECTOMY SYNDROME: ICD-10-CM

## 2024-02-22 RX ORDER — PREGABALIN 150 MG/1
CAPSULE ORAL
Qty: 90 CAPSULE | Refills: 4 | Status: SHIPPED | OUTPATIENT
Start: 2024-02-22

## 2024-02-29 DIAGNOSIS — Z97.8 PRESENCE OF INTRATHECAL PUMP: ICD-10-CM

## 2024-03-04 ENCOUNTER — OFFICE VISIT (OUTPATIENT)
Dept: PAIN MEDICINE | Facility: CLINIC | Age: 69
End: 2024-03-04
Payer: MEDICARE

## 2024-03-04 VITALS
HEIGHT: 66 IN | SYSTOLIC BLOOD PRESSURE: 122 MMHG | TEMPERATURE: 97.1 F | BODY MASS INDEX: 16.1 KG/M2 | DIASTOLIC BLOOD PRESSURE: 74 MMHG | WEIGHT: 100.2 LBS

## 2024-03-04 DIAGNOSIS — Z96.89 SPINAL CORD STIMULATOR STATUS: ICD-10-CM

## 2024-03-04 DIAGNOSIS — M54.12 RIGHT CERVICAL RADICULOPATHY: ICD-10-CM

## 2024-03-04 DIAGNOSIS — M96.1 LUMBAR POST-LAMINECTOMY SYNDROME: ICD-10-CM

## 2024-03-04 DIAGNOSIS — R51.9 OCCIPITAL HEADACHE: Primary | ICD-10-CM

## 2024-03-04 DIAGNOSIS — M47.812 CERVICAL SPONDYLOSIS WITHOUT MYELOPATHY: ICD-10-CM

## 2024-03-04 DIAGNOSIS — Z97.8 PRESENCE OF INTRATHECAL PUMP: ICD-10-CM

## 2024-03-04 DIAGNOSIS — M96.1 CERVICAL POST-LAMINECTOMY SYNDROME: ICD-10-CM

## 2024-03-04 DIAGNOSIS — Z79.891 ENCOUNTER FOR LONG-TERM OPIATE ANALGESIC USE: ICD-10-CM

## 2024-03-04 DIAGNOSIS — M25.561 CHRONIC PAIN OF RIGHT KNEE: ICD-10-CM

## 2024-03-04 DIAGNOSIS — G89.4 CHRONIC PAIN SYNDROME: ICD-10-CM

## 2024-03-04 DIAGNOSIS — G90.512 COMPLEX REGIONAL PAIN SYNDROME TYPE 1 OF LEFT UPPER EXTREMITY: ICD-10-CM

## 2024-03-04 DIAGNOSIS — M17.11 ARTHRITIS OF RIGHT KNEE: ICD-10-CM

## 2024-03-04 DIAGNOSIS — G89.29 CHRONIC PAIN OF RIGHT KNEE: ICD-10-CM

## 2024-03-04 DIAGNOSIS — M54.16 RIGHT LUMBAR RADICULOPATHY: ICD-10-CM

## 2024-03-04 DIAGNOSIS — M54.81 BILATERAL OCCIPITAL NEURALGIA: ICD-10-CM

## 2024-03-04 RX ORDER — LOTEPREDNOL ETABONATE 3.8 MG/G
GEL OPHTHALMIC
COMMUNITY
Start: 2024-02-15

## 2024-03-04 RX ORDER — OXYCODONE HYDROCHLORIDE 10 MG/1
10 TABLET ORAL EVERY 12 HOURS PRN
Qty: 50 TABLET | Refills: 0 | Status: SHIPPED | OUTPATIENT
Start: 2024-03-04

## 2024-03-04 RX ORDER — FAMOTIDINE 40 MG/1
40 TABLET, FILM COATED ORAL 2 TIMES DAILY
COMMUNITY
Start: 2023-11-20 | End: 2024-11-19

## 2024-03-04 RX ORDER — PREGABALIN 150 MG/1
150 CAPSULE ORAL 3 TIMES DAILY
Qty: 90 CAPSULE | Refills: 4 | Status: SHIPPED | OUTPATIENT
Start: 2024-03-04

## 2024-03-04 RX ORDER — LIFITEGRAST 50 MG/ML
1 SOLUTION/ DROPS OPHTHALMIC ONCE
COMMUNITY

## 2024-03-04 NOTE — PROGRESS NOTES
CHIEF COMPLAINT    Follow up headaches, which are worsening. Pain pump refill.    Subjective   Amalia Lopez is a 68 y.o. female  who presents for follow-up.  She has a history of neck pain and also headaches, also history of back pain.    Bilateral upper neck pain has been increasing and it radiates to the occiput and also triggers headaches.  She has chronic headaches with the history of migraines and we have treated with Botox in the past.  At her headaches are more occipital to cervical occipital area and it seems that there is an element of cervicogenic component to her headaches as the when the neck pain increases the headaches triggered also.    She has expressed desire to consider a weaning relief from intrathecal therapy.  She knows that her end-of-life on this intrathecal infusion device will be a little over a year from now and she would like to take and not be dependent on it if possible.  She does feel that intrathecal therapy helps moderately with her thoracic and lumbar area pain but it seems a part of this consideration of potentially weaning from the therapy is that she has other pains which are worse on overriding this pain.    She notes that she does not utilize her spinal stimulator device regularly.  She has been using high amount of energy delivery and frequent recharge is leading to a recharge burden and that has been annoying to her.  Sometimes the device may be aggravating his symptoms unhelpful.  She previously found it mildly helpful with her lower back and leg symptoms.    In review, I replaced her battery with a [employer] TORCH.sh MRI IPG on on October 23, 2020.  She has a couple of older paddle leads in place, with 1 minutes that passed retrograde from C1-C2 and then another around T8.      She has been very judicious with use of oral analgesic, and antibiotic may have been prescribed for joint pain, and history of known arthritis, especially for right knee arthritis.  She  does not take oral opiates every day and is not using them to manage spine related pain nor headaches.    Back Pain  This is a chronic problem. The problem has been worse since onset. The pain is present in the lumbar spine. The quality of the pain is described as aching, burning and shooting. The pain radiates to the right thigh and right buttock. The pain is moderate. Associated symptoms include abdominal pain, headaches, numbness (right foot) and weakness (bilateral leg, right hand). Pertinent negatives include no dysuria or fever. The treatment provided mild relief.   Migraine  Headache pattern:  Some headache always there, and the pain level varies  Neck Pain   This is a chronic problem. The problem has been gradually worsening. The pain is present in the left side, midline, right side and occipital region. The quality of the pain is described as aching, burning and stabbing. The pain is severe. Associated symptoms include headaches, numbness (right foot) and weakness (bilateral leg, right hand). Pertinent negatives include no fever, photophobia or trouble swallowing. She has tried ice, NSAIDs and oral narcotics for the symptoms.        PEG Assessment   What number best describes your pain on average in the past week?7  What number best describes how, during the past week, pain has interfered with your enjoyment of life?6  What number best describes how, during the past week, pain has interfered with your general activity?  6    --  The aforementioned information the Chief Complaint section and above subjective data including any HPI data, and also the Review of Systems data, has been personally reviewed and affirmed.  --      Review of Pertinent Medical Data ---  TACHO-nam report is reviewed:  I reviewed the document in the electronic form under the PDMP tab in the Epic EMR...  - In this function, the report is a current report in as close to real-time as possible.  - The report was available for immediate  review.    - I did eric the report as reviewed.  - There is not concern for aberrant behavior based on this ekasper review.      The following portions of the patient's history were reviewed and updated as appropriate: allergies, current medications, past family history, past medical history, past social history, past surgical history, and problem list.    -------    The following portions of the patient's history were reviewed and updated as appropriate: allergies, current medications, past family history, past medical history, past social history, past surgical history and problem list.    Allergies   Allergen Reactions    Acetaminophen-Codeine Hives    Fentanyl Other (See Comments) and Hives     Dystonic movements  Dystonic movements  Dystonic movements  Dystonic movements    Morphine Other (See Comments)     Other reaction(s): Other (See Comments)  Headaches    Codeine Itching         Current Outpatient Medications:     acetaminophen (TYLENOL) 500 MG tablet, as needed, Disp: , Rfl:     amphetamine-dextroamphetamine (ADDERALL) 10 MG tablet, Take 0.5 tablets by mouth Daily., Disp: , Rfl:     BD PosiFlush 0.9 % flush, , Disp: , Rfl:     Bromfenac Sodium (BromSite) 0.075 % solution, Compounded Drop, also includes Prednisilone Sodium Phosphate 1% and Moxifloxacin 0.5%, Disp: , Rfl:     chlorhexidine (PERIDEX) 0.12 % solution, RINSE AND SPIT 15ML DAILY, Disp: , Rfl: 11    cyanocobalamin 1000 MCG/ML injection, Inject 1 mL into the appropriate muscle as directed by prescriber 1 (One) Time Per Week., Disp: , Rfl:     DEXILANT 60 MG capsule, Take 1 capsule by mouth Daily., Disp: , Rfl: 11    diphenhydrAMINE (BENADRYL) 50 MG/ML injection, , Disp: , Rfl:     famotidine (PEPCID) 40 MG tablet, Take 1 tablet by mouth 2 (Two) Times a Day., Disp: , Rfl:     fludrocortisone 0.1 MG tablet, Take 1 tablet by mouth., Disp: , Rfl:     FLUoxetine (PROzac) 40 MG capsule, Take 1 capsule by mouth Daily., Disp: , Rfl: 3    heparin 100  UNIT/ML solution injection, , Disp: , Rfl:     ibuprofen (ADVIL,MOTRIN) 200 MG tablet, IBUPROFEN 200 MG TABS, Disp: , Rfl:     latanoprost (XALATAN) 0.005 % ophthalmic solution, INSTILL 1 DROP INTO BOTH EYES EVERY DAY IN THE EVENING, Disp: , Rfl:     lidocaine (LIDODERM) 5 %, PLACE 3 PATCHES ON THE SKIN AS DIRECTED BY PROVIDER DAILY., Disp: 90 patch, Rfl: 11    lidocaine-prilocaine (EMLA) 2.5-2.5 % cream, , Disp: , Rfl:     linaclotide (LINZESS) 145 MCG capsule capsule, LINZESS 145 MCG CAPS, Disp: , Rfl:     loratadine (CLARITIN) 10 MG tablet, Take 1 tablet by mouth., Disp: , Rfl:     Lotemax SM 0.38 % gel, Please see attached for detailed directions, Disp: , Rfl:     morphine 10 mg/mL, by Intrathecal route Continuous., Disp: 20 mL, Rfl: 0    moxifloxacin (VIGAMOX) 0.5 % ophthalmic solution, Compounded drop, also includes Prednisilone Sodium Phosphate 1% and Bromfenac 0.075%, Disp: , Rfl:     Nurtec 75 MG dispersible tablet, Take 1 tablet by mouth Every Other Day., Disp: , Rfl:     omeprazole (priLOSEC) 40 MG capsule, Take 1 capsule by mouth., Disp: , Rfl:     OnabotulinumtoxinA 200 units reconstituted solution, INJECT UP  UNITS INTRAMUSCULAR INTO HEAD & NECK AREA EVERY 12 WEEKS FOR CHRONIC MIGRAINE TO BE INJECTED IN OFFICE BY PHYSICIAN, Disp: 1 each, Rfl: 2    ondansetron (ZOFRAN) 2 mg/mL injection, , Disp: , Rfl:     ondansetron (ZOFRAN) 8 MG tablet, TAKE 1 TABLET (8 MG TOTAL) BY MOUTH 3 (THREE) TIMES A DAY., Disp: , Rfl:     ondansetron ODT (ZOFRAN-ODT) 8 MG disintegrating tablet, , Disp: , Rfl:     oxyCODONE (ROXICODONE) 10 MG tablet, Take 1 tablet by mouth Every 12 (Twelve) Hours As Needed for Severe Pain. - take Acetaminophen 325 mg with each dose, Disp: 50 tablet, Rfl: 0    Panzyga 20 GM/200ML solution infusion, , Disp: , Rfl:     pregabalin (LYRICA) 150 MG capsule, Take 1 capsule by mouth 3 (Three) Times a Day., Disp: 90 capsule, Rfl: 4    promethazine (PHENERGAN) 25 MG/ML injection, , Disp: , Rfl:      SENNOSIDES-DOCUSATE SODIUM PO, SM SENNA-S TABS, Disp: , Rfl:     sodium chloride 0.9 % solution, , Disp: , Rfl:     Sodium Chloride powder, , Disp: , Rfl:     SUMAtriptan (IMITREX) 100 MG tablet, TAKE 1 TABLET BY MOUTH AS NEEDED FOR HEADACHE, Disp: , Rfl: 6    Xiidra 5 % ophthalmic solution, Administer 1 drop to both eyes 1 (One) Time., Disp: , Rfl:     Current Outpatient Medications on File Prior to Visit   Medication Sig Dispense Refill    acetaminophen (TYLENOL) 500 MG tablet as needed      amphetamine-dextroamphetamine (ADDERALL) 10 MG tablet Take 0.5 tablets by mouth Daily.      BD PosiFlush 0.9 % flush       Bromfenac Sodium (BromSite) 0.075 % solution Compounded Drop, also includes Prednisilone Sodium Phosphate 1% and Moxifloxacin 0.5%      chlorhexidine (PERIDEX) 0.12 % solution RINSE AND SPIT 15ML DAILY  11    cyanocobalamin 1000 MCG/ML injection Inject 1 mL into the appropriate muscle as directed by prescriber 1 (One) Time Per Week.      DEXILANT 60 MG capsule Take 1 capsule by mouth Daily.  11    diphenhydrAMINE (BENADRYL) 50 MG/ML injection       famotidine (PEPCID) 40 MG tablet Take 1 tablet by mouth 2 (Two) Times a Day.      fludrocortisone 0.1 MG tablet Take 1 tablet by mouth.      FLUoxetine (PROzac) 40 MG capsule Take 1 capsule by mouth Daily.  3    heparin 100 UNIT/ML solution injection       ibuprofen (ADVIL,MOTRIN) 200 MG tablet IBUPROFEN 200 MG TABS      latanoprost (XALATAN) 0.005 % ophthalmic solution INSTILL 1 DROP INTO BOTH EYES EVERY DAY IN THE EVENING      lidocaine (LIDODERM) 5 % PLACE 3 PATCHES ON THE SKIN AS DIRECTED BY PROVIDER DAILY. 90 patch 11    lidocaine-prilocaine (EMLA) 2.5-2.5 % cream       linaclotide (LINZESS) 145 MCG capsule capsule LINZESS 145 MCG CAPS      loratadine (CLARITIN) 10 MG tablet Take 1 tablet by mouth.      Lotemax SM 0.38 % gel Please see attached for detailed directions      morphine 10 mg/mL by Intrathecal route Continuous. 20 mL 0    moxifloxacin (VIGAMOX)  0.5 % ophthalmic solution Compounded drop, also includes Prednisilone Sodium Phosphate 1% and Bromfenac 0.075%      Nurtec 75 MG dispersible tablet Take 1 tablet by mouth Every Other Day.      omeprazole (priLOSEC) 40 MG capsule Take 1 capsule by mouth.      OnabotulinumtoxinA 200 units reconstituted solution INJECT UP  UNITS INTRAMUSCULAR INTO HEAD & NECK AREA EVERY 12 WEEKS FOR CHRONIC MIGRAINE TO BE INJECTED IN OFFICE BY PHYSICIAN 1 each 2    ondansetron (ZOFRAN) 2 mg/mL injection       ondansetron (ZOFRAN) 8 MG tablet TAKE 1 TABLET (8 MG TOTAL) BY MOUTH 3 (THREE) TIMES A DAY.      ondansetron ODT (ZOFRAN-ODT) 8 MG disintegrating tablet       Panzyga 20 GM/200ML solution infusion       promethazine (PHENERGAN) 25 MG/ML injection       SENNOSIDES-DOCUSATE SODIUM PO SM SENNA-S TABS      sodium chloride 0.9 % solution       Sodium Chloride powder       SUMAtriptan (IMITREX) 100 MG tablet TAKE 1 TABLET BY MOUTH AS NEEDED FOR HEADACHE  6    Xiidra 5 % ophthalmic solution Administer 1 drop to both eyes 1 (One) Time.       No current facility-administered medications on file prior to visit.       Patient Active Problem List   Diagnosis    Chronic pain syndrome    Neuropathic pain    Complex regional pain syndrome type 1 of left upper extremity    Presence of intrathecal pump    Spinal cord stimulator status    Cervical post-laminectomy syndrome    Lumbar post-laminectomy syndrome    Bilateral occipital neuralgia    Cervical spondylosis without myelopathy    Encounter for long-term opiate analgesic use    Migraine without status migrainosus, not intractable    Arthritis of right knee    Chronic pain of right knee    Chronic fatigue    Screening for endocrine, nutritional, metabolic and immunity disorder    Vitamin D deficiency    Anemia, unspecified    Chronic migraine without aura without status migrainosus, not intractable       Past Medical History:   Diagnosis Date    Biliary stenosis     Depression      "Gastroparesis     GERD (gastroesophageal reflux disease)     History of ERCP     IBS (irritable bowel syndrome)     Joint pain     Low back pain     Migraine     Neck pain     Osteoarthritis     Peripheral neuropathy     Port or reservoir infection 05/2023    Sleep apnea     resolved 3/4/24    Spinal cord stimulator status     Vitamin B12 deficiency        Past Surgical History:   Procedure Laterality Date    BACK SURGERY      CHOLECYSTECTOMY      EPIDURAL BLOCK      HYSTERECTOMY      IMPLANTATION / REPLACEMENT INFUSION PUMP      KNEE ARTHROSCOPY Right     NECK SURGERY      PANCREAS SURGERY      SPINAL CORD STIMULATOR IMPLANT      TRIGGER POINT INJECTION         History reviewed. No pertinent family history.    Social History     Socioeconomic History    Marital status:    Tobacco Use    Smoking status: Never    Smokeless tobacco: Never   Vaping Use    Vaping status: Never Used   Substance and Sexual Activity    Alcohol use: No    Drug use: No       -------       Review of Systems   Constitutional:  Positive for fatigue. Negative for chills and fever.   HENT:  Negative for trouble swallowing.    Eyes:  Negative for photophobia.   Respiratory:  Negative for cough and shortness of breath.    Gastrointestinal:  Positive for abdominal pain. Negative for constipation and diarrhea.   Genitourinary:  Negative for difficulty urinating and dysuria.   Musculoskeletal:  Positive for back pain and neck pain.   Neurological:  Positive for dizziness, weakness (bilateral leg, right hand), light-headedness, numbness (right foot) and headaches.       Vitals:    03/04/24 0949   BP: 122/74   BP Location: Left arm   Patient Position: Sitting   Temp: 97.1 °F (36.2 °C)   TempSrc: Temporal   Weight: 45.5 kg (100 lb 3.2 oz)   Height: 167.6 cm (66\")   PainSc:   7   PainLoc: Head         Objective   Physical Exam  Vitals and nursing note reviewed.   Constitutional:       General: She is not in acute distress.     Appearance: Normal " appearance. She is not ill-appearing.   HENT:      Head: Normocephalic.      Right Ear: External ear normal.      Left Ear: External ear normal.      Nose: Nose normal.   Eyes:      General: No scleral icterus.     Conjunctiva/sclera: Conjunctivae normal.   Neck:      Thyroid: No thyroid mass.      Trachea: Trachea normal.      Meningeal: Brudzinski's sign and Kernig's sign absent.   Pulmonary:      Effort: Pulmonary effort is normal. No respiratory distress.   Musculoskeletal:      Cervical back: Torticollis, tenderness, bony tenderness and crepitus present. No edema, erythema or rigidity. Decreased range of motion.      Lumbar back: Tenderness and bony tenderness present. Positive right straight leg raise test. Negative left straight leg raise test.   Lymphadenopathy:      Cervical: No cervical adenopathy.   Neurological:      Mental Status: She is alert and oriented to person, place, and time.      Motor: Motor function is intact. No weakness.      Gait: Gait normal.   Psychiatric:         Mood and Affect: Mood normal.         Behavior: Behavior normal.             Assessment & Plan   Diagnoses and all orders for this visit:    1. Occipital headache (Primary)  -     Occipital; Future  -     CT Cervical Spine Without Contrast; Future    2. Right lumbar radiculopathy  -     CT Lumbar Spine Without Contrast; Future    3. Right cervical radiculopathy  -     CT Cervical Spine Without Contrast; Future    4. Chronic pain syndrome  -     pregabalin (LYRICA) 150 MG capsule; Take 1 capsule by mouth 3 (Three) Times a Day.  Dispense: 90 capsule; Refill: 4    5. Lumbar post-laminectomy syndrome  -     pregabalin (LYRICA) 150 MG capsule; Take 1 capsule by mouth 3 (Three) Times a Day.  Dispense: 90 capsule; Refill: 4    6. Encounter for long-term opiate analgesic use  -     oxyCODONE (ROXICODONE) 10 MG tablet; Take 1 tablet by mouth Every 12 (Twelve) Hours As Needed for Severe Pain. - take Acetaminophen 325 mg with each dose   Dispense: 50 tablet; Refill: 0    7. Cervical post-laminectomy syndrome    8. Spinal cord stimulator status    9. Presence of intrathecal pump    10. Complex regional pain syndrome type 1 of left upper extremity    11. Arthritis of right knee    12. Chronic pain of right knee    13. Bilateral occipital neuralgia    14. Cervical spondylosis without myelopathy        Amalia Lopez reports a pain score of 7.  Given her pain assessment as noted, treatment options were discussed and the following options were decided upon as a follow-up plan to address the patient's pain: educational materials on pain management, prescription for non-opiod analgesics, and prescription for opiod analgesics.    Because of increasing pain and some updated imaging is reasonable and necessary.  I think that CT scan would provide us some information and would be more efficient and also avoid complexity of managing implanted devices and MRI.    --- Follow-up for next pump refill  -- CT cervical because of increasing upper neck pain with occipital neuralgia type symptoms  -- CT lumbar because of some increasing right leg radicular symptoms and also parasthesia (right toes numb)  -- occipital nerve block (bilateral), but after the imaging, so that the injectate does not show up on the scan.  -- pump refill    --She is also given some information about the [place] stimulator system.  If when images are reviewed if there is not a surgically repairable problem we may want to consider a change in spinal stimulator therapy especially for the back pain in the right leg radicular symptoms and paresthesias.  I am concerned from her complaints that she may be having episodes of both overstimulation and under stimulation, and knowing her history with complex regional pain syndrome and multiple medical comorbidities and multiple painful conditions, she is a patient of 1 high sensitivity which does raise suspicion about a narrow therapeutic window for  spinal stimulation, which has the potential to be much better managed with the [employer] Evoke system ability to sense and adjust based on evoked compound action potentials in the context of a mapped therapeutic window, which is a feature that no other device can offer.  Potential limitations to the therapy could be consideration about trialing with the current device in place and whether we would be able to work around it.    -- in any event, better management of back and leg symptoms could help her to decrease need for pain medication, and therefore aid in weaning of IDDS therapy     ----  Intrathecal pump was interrogated in office today. Results are in chart.    The patient is currently at a dose of 1.5mg of morphine per day. Under sterile technique, the pump was accessed using a [facility] proprietary refill kit. The volume was withdrawn from the pump. The expected residual volume of the pump was 3.3 ml. The actual residual volume of the pump was 3 ml.    The pump was then refilled with 20 ml of Morphine at a concentration of 10 mg/ml.  The pump was set to continue at its previous rate. No changes were made.  Patient tolerated procedure well. Minimal bleeding was noted. Pump site remains intact with no evidence of erythema or drainage.    Patient will return for pump refill when due or sooner if needed.     DASH is 17 months.  Pump refill date is 7-1-2024.      ----    SMITH REPORT  As part of the patient's treatment plan, I am prescribing controlled substances. The patient has been made aware of appropriate use of such medications, including potential risk of somnolence, limited ability to drive and/or work safely, and the potential for dependence or overdose. It has also been made clear that these medications are for use by this patient only, without concomitant use of alcohol or other substances unless prescribed.     Patient has completed prescribing agreement detailing terms of continued prescribing of  controlled substances, including monitoring SMITH reports, urine drug screening, and pill counts if necessary. The patient is aware that inappropriate use will results in cessation of prescribing such medications.    As the clinician, I personally reviewed the SMITH from as above while the patient was in the office today.    History and physical exam exhibit continued safe and appropriate use of controlled substances.    Patient appears stable with current regimen. No adverse effects. Regarding continuation of opioids, there is no evidence of aberrant behavior or any red flags.  The patient continues with appropriate response to opioid therapy. ADL's remain intact by self.     --     Dictated utilizing Dragon dictation.  EMR Dragon/Transcription disclaimer:   Much of this encounter note is an electronic transcription/translation of spoken language to printed text. The electronic translation of spoken language may permit erroneous, or at times, nonsensical words or phrases to be inadvertently transcribed; Although I have reviewed the note for such errors, some may still exist.      --    Vitals:    03/04/24 0949   PainSc:   7   PainLoc: Head

## 2024-03-22 ENCOUNTER — TELEPHONE (OUTPATIENT)
Dept: PAIN MEDICINE | Facility: CLINIC | Age: 69
End: 2024-03-22

## 2024-03-22 NOTE — TELEPHONE ENCOUNTER
Provider: MEG    Caller: PATIENT    Phone Number: [phone]    Reason for Call: PATIENT STATES SHE CALLED TO SCHEDULE HER CT SCANS, BUT THEY TOLD HER THAT DR. WEAVER NEEDS TO PUT IN NEW ORDERS BECAUSE THEY HAD BEEN CLOSED. PLEASE CALL HER WITH ANY QUESTIONS.

## 2024-04-24 ENCOUNTER — HOSPITAL ENCOUNTER (OUTPATIENT)
Dept: CT IMAGING | Facility: HOSPITAL | Age: 69
Discharge: HOME | End: 2024-04-24
Admitting: ANESTHESIOLOGY
Payer: MEDICARE

## 2024-04-24 DIAGNOSIS — M54.12 RIGHT CERVICAL RADICULOPATHY: ICD-10-CM

## 2024-04-24 DIAGNOSIS — R51.9 OCCIPITAL HEADACHE: ICD-10-CM

## 2024-04-24 DIAGNOSIS — M54.16 RIGHT LUMBAR RADICULOPATHY: ICD-10-CM

## 2024-04-24 PROCEDURE — 72131 CT LUMBAR SPINE W/O DYE: CPT

## 2024-04-24 PROCEDURE — 72125 CT NECK SPINE W/O DYE: CPT

## 2024-05-02 ENCOUNTER — TELEPHONE (OUTPATIENT)
Dept: PAIN MEDICINE | Facility: CLINIC | Age: 69
End: 2024-05-02
Payer: MEDICARE

## 2024-05-16 DIAGNOSIS — Z79.891 ENCOUNTER FOR LONG-TERM OPIATE ANALGESIC USE: ICD-10-CM

## 2024-05-16 RX ORDER — OXYCODONE HYDROCHLORIDE 10 MG/1
10 TABLET ORAL EVERY 12 HOURS PRN
Qty: 50 TABLET | Refills: 0 | Status: SHIPPED | OUTPATIENT
Start: 2024-05-16

## 2024-05-16 NOTE — TELEPHONE ENCOUNTER
Caller: PATIENT    Relationship: SELF     Best call back number: [phone]      Requested Prescriptions     Pending Prescriptions Disp Refills    oxyCODONE (ROXICODONE) 10 MG tablet 50 tablet 0     Sig: Take 1 tablet by mouth Every 12 (Twelve) Hours As Needed for Severe Pain. - take Acetaminophen 325 mg with each dose        Pharmacy where request should be sent:  [facility] [place], [place] ([id number]    Last office visit with prescribing clinician: 3/4/2024   Last telemedicine visit with prescribing clinician: Visit date not found   Next office visit with prescribing clinician: 6/3/2024     Additional details provided by patient: PATIENT HAS A THREE DAY SUPPLY LEFT.    Does the patient have less than a 3 day supply:  [] Yes  [x] No    Would you like a call back once the refill request has been completed: [] Yes [x] No    If the office needs to give you a call back, can they leave a voicemail: [] Yes [x] No

## 2024-05-16 NOTE — TELEPHONE ENCOUNTER
SMITH reviewed and appropriate.  Last drug screen 11/15/23 appropriate.     This patient is under the care of my colleague and I am covering patient care for him at this time.  I have reviewed pertinent information/documentation as necessary and will continue the plan of care as previously directed to the best of my ability.

## 2024-05-23 ENCOUNTER — TELEPHONE (OUTPATIENT)
Dept: PAIN MEDICINE | Facility: CLINIC | Age: 69
End: 2024-05-23
Payer: MEDICARE

## 2024-05-23 NOTE — TELEPHONE ENCOUNTER
Caller: Amalia Lopez    Relationship to patient: Self    Best call back number:     Type of visit: PUMP REFILL    Requested date: ANY DAY EXCEPT THURSDAY    If rescheduling, when is the original appointment: 6/27/24

## 2024-05-24 NOTE — TELEPHONE ENCOUNTER
Can you get her scheduled any day before 6/30/24. Both Barbara and GEORGINA will be on vacation that first week of July.

## 2024-06-03 ENCOUNTER — OFFICE VISIT (OUTPATIENT)
Dept: PAIN MEDICINE | Facility: CLINIC | Age: 69
End: 2024-06-03
Payer: MEDICARE

## 2024-06-03 VITALS
HEIGHT: 66 IN | DIASTOLIC BLOOD PRESSURE: 77 MMHG | RESPIRATION RATE: 12 BRPM | SYSTOLIC BLOOD PRESSURE: 127 MMHG | TEMPERATURE: 96.4 F | BODY MASS INDEX: 16.43 KG/M2 | HEART RATE: 42 BPM | WEIGHT: 102.2 LBS | OXYGEN SATURATION: 100 %

## 2024-06-03 DIAGNOSIS — Z97.8 PRESENCE OF INTRATHECAL PUMP: ICD-10-CM

## 2024-06-03 DIAGNOSIS — M96.1 LUMBAR POST-LAMINECTOMY SYNDROME: ICD-10-CM

## 2024-06-03 DIAGNOSIS — G89.4 CHRONIC PAIN SYNDROME: Primary | ICD-10-CM

## 2024-06-03 DIAGNOSIS — M96.1 CERVICAL POST-LAMINECTOMY SYNDROME: ICD-10-CM

## 2024-06-03 DIAGNOSIS — M54.81 BILATERAL OCCIPITAL NEURALGIA: ICD-10-CM

## 2024-06-03 DIAGNOSIS — Z96.89 SPINAL CORD STIMULATOR STATUS: ICD-10-CM

## 2024-06-03 DIAGNOSIS — G90.512 COMPLEX REGIONAL PAIN SYNDROME TYPE 1 OF LEFT UPPER EXTREMITY: ICD-10-CM

## 2024-06-03 DIAGNOSIS — M79.18 CERVICAL MYOFASCIAL PAIN SYNDROME: ICD-10-CM

## 2024-06-03 DIAGNOSIS — Z79.891 ENCOUNTER FOR LONG-TERM OPIATE ANALGESIC USE: ICD-10-CM

## 2024-06-03 NOTE — PROGRESS NOTES
CHIEF COMPLAINT  Pain is stable would like to get a OCNB left side,also wants to discuss MRI results.  Solu-Medrol 40 mg Lot [id number] Exp date: 5/31/24 NDC 6617-5825-84  Sensoricaine 0.25% Lot [id number] Exp date: 09/30/2027 NDC 65772-192-86     Subjective   Amalia Lopez is a 68 y.o. female  who presents for follow-up.  She has a history of neck and back pain.  Having a flareup of upper cervical area pain..          Back Pain  This is a chronic problem. The problem has been worse since onset. The pain is present in the lumbar spine. The quality of the pain is described as aching, burning and shooting. The pain radiates to the right thigh and right buttock. The pain is moderate. Associated symptoms include headaches (migraines), numbness and weakness. Pertinent negatives include no abdominal pain, chest pain, dysuria or fever. The treatment provided mild relief.   Migraine  Headache pattern:  Some headache always there, and the pain level varies  Neck Pain   This is a chronic problem. The problem has been rapidly worsening. The pain is associated with an unknown factor. The pain is present in the left side, midline, right side and occipital region. The quality of the pain is described as aching, burning and stabbing. The pain is severe. Associated symptoms include headaches (migraines), numbness and weakness. Pertinent negatives include no chest pain, fever, photophobia or trouble swallowing. She has tried ice, NSAIDs and oral narcotics for the symptoms. Improvement on treatment: Previous significant relief with trigger point injection.        PEG Assessment   What number best describes your pain on average in the past week?7  What number best describes how, during the past week, pain has interfered with your enjoyment of life?7  What number best describes how, during the past week, pain has interfered with your general activity?  7    --  The aforementioned information the Chief Complaint section and above subjective  data including any HPI data, and also the Review of Systems data, has been personally reviewed and affirmed.  --      Review of Pertinent Medical Data ---  Melia report is reviewed:  I reviewed the document in the electronic form under the PDMP tab in the Epic EMR...  - In this function, the report is a current report in as close to real-time as possible.  - The report was available for immediate review.    - I did eric the report as reviewed.  - There is not pertinent data on the last page of the report. There is not concern for aberrant behavior based on this ekasper review.      I reviewed spine CT results from April 24, 2024.  She has a spinal stimulator that is dorsal in the spinal column at C1 and C2.  There is some mild canal narrowing at C4-C5, likely related to uncovertebral hypertrophy.  Previous anterior discectomy and fusion at C5-6 with complete fusion across the disc space.  Advanced degenerative disc disease at C6-C7 with uncovertebral joint hypertrophy especially causing right mild foraminal narrowing at C6-7, which is similar to the C4-5 level.    I reviewed the spine CT results of the lumbar spine from April 24, 2024 also.  Disc bulge at L3-L4 with some mild narrowing.  Prior left laminectomy at L4-L5.  There is some potential loosening around the L5 screws.  Incomplete fusion across the L4-L5 space.  There is some 2 mm retrolisthesis of L5 with respect to S1 and some mild to moderate facet arthropathy at L5-S1.  There is also the intrathecal catheter that enters at L1-L2.    The following portions of the patient's history were reviewed and updated as appropriate: allergies, current medications, past family history, past medical history, past social history, past surgical history, and problem list.    -------    The following portions of the patient's history were reviewed and updated as appropriate: allergies, current medications, past family history, past medical history, past social history,  past surgical history and problem list.    Allergies   Allergen Reactions    Acetaminophen-Codeine Hives    Fentanyl Other (See Comments) and Hives     Dystonic movements  Dystonic movements  Dystonic movements  Dystonic movements    Morphine Other (See Comments)     Other reaction(s): Other (See Comments)  Headaches    Codeine Itching         Current Outpatient Medications:     acetaminophen (TYLENOL) 500 MG tablet, as needed, Disp: , Rfl:     amphetamine-dextroamphetamine (ADDERALL) 10 MG tablet, Take 0.5 tablets by mouth Daily., Disp: , Rfl:     BD PosiFlush 0.9 % flush, , Disp: , Rfl:     Bromfenac Sodium (BromSite) 0.075 % solution, Compounded Drop, also includes Prednisilone Sodium Phosphate 1% and Moxifloxacin 0.5%, Disp: , Rfl:     chlorhexidine (PERIDEX) 0.12 % solution, RINSE AND SPIT 15ML DAILY, Disp: , Rfl: 11    cyanocobalamin 1000 MCG/ML injection, Inject 1 mL into the appropriate muscle as directed by prescriber 1 (One) Time Per Week., Disp: , Rfl:     DEXILANT 60 MG capsule, Take 1 capsule by mouth Daily., Disp: , Rfl: 11    diphenhydrAMINE (BENADRYL) 50 MG/ML injection, , Disp: , Rfl:     famotidine (PEPCID) 40 MG tablet, Take 1 tablet by mouth 2 (Two) Times a Day., Disp: , Rfl:     fludrocortisone 0.1 MG tablet, Take 1 tablet by mouth., Disp: , Rfl:     FLUoxetine (PROzac) 40 MG capsule, Take 1 capsule by mouth Daily., Disp: , Rfl: 3    heparin 100 UNIT/ML solution injection, , Disp: , Rfl:     ibuprofen (ADVIL,MOTRIN) 200 MG tablet, IBUPROFEN 200 MG TABS, Disp: , Rfl:     latanoprost (XALATAN) 0.005 % ophthalmic solution, INSTILL 1 DROP INTO BOTH EYES EVERY DAY IN THE EVENING, Disp: , Rfl:     lidocaine (LIDODERM) 5 %, PLACE 3 PATCHES ON THE SKIN AS DIRECTED BY PROVIDER DAILY., Disp: 90 patch, Rfl: 11    lidocaine-prilocaine (EMLA) 2.5-2.5 % cream, , Disp: , Rfl:     linaclotide (LINZESS) 145 MCG capsule capsule, LINZESS 145 MCG CAPS, Disp: , Rfl:     loratadine (CLARITIN) 10 MG tablet, Take 1  tablet by mouth., Disp: , Rfl:     Lotemax SM 0.38 % gel, Please see attached for detailed directions, Disp: , Rfl:     moxifloxacin (VIGAMOX) 0.5 % ophthalmic solution, Compounded drop, also includes Prednisilone Sodium Phosphate 1% and Bromfenac 0.075%, Disp: , Rfl:     Nurtec 75 MG dispersible tablet, Take 1 tablet by mouth Every Other Day., Disp: , Rfl:     omeprazole (priLOSEC) 40 MG capsule, Take 1 capsule by mouth., Disp: , Rfl:     OnabotulinumtoxinA 200 units reconstituted solution, INJECT UP  UNITS INTRAMUSCULAR INTO HEAD & NECK AREA EVERY 12 WEEKS FOR CHRONIC MIGRAINE TO BE INJECTED IN OFFICE BY PHYSICIAN, Disp: 1 each, Rfl: 2    ondansetron (ZOFRAN) 2 mg/mL injection, , Disp: , Rfl:     ondansetron (ZOFRAN) 8 MG tablet, TAKE 1 TABLET (8 MG TOTAL) BY MOUTH 3 (THREE) TIMES A DAY., Disp: , Rfl:     ondansetron ODT (ZOFRAN-ODT) 8 MG disintegrating tablet, , Disp: , Rfl:     oxyCODONE (ROXICODONE) 10 MG tablet, Take 1 tablet by mouth Every 12 (Twelve) Hours As Needed for Severe Pain. - take Acetaminophen 325 mg with each dose, Disp: 50 tablet, Rfl: 0    Panzyga 20 GM/200ML solution infusion, , Disp: , Rfl:     pregabalin (LYRICA) 150 MG capsule, Take 1 capsule by mouth 3 (Three) Times a Day., Disp: 90 capsule, Rfl: 4    promethazine (PHENERGAN) 25 MG/ML injection, , Disp: , Rfl:     SENNOSIDES-DOCUSATE SODIUM PO, SM SENNA-S TABS, Disp: , Rfl:     sodium chloride 0.9 % solution, , Disp: , Rfl:     Sodium Chloride powder, , Disp: , Rfl:     SUMAtriptan (IMITREX) 100 MG tablet, TAKE 1 TABLET BY MOUTH AS NEEDED FOR HEADACHE, Disp: , Rfl: 6    Xiidra 5 % ophthalmic solution, Administer 1 drop to both eyes 1 (One) Time., Disp: , Rfl:     morphine 10 mg/mL, by Intrathecal route Continuous., Disp: 20 mL, Rfl: 0    Current Outpatient Medications on File Prior to Visit   Medication Sig Dispense Refill    acetaminophen (TYLENOL) 500 MG tablet as needed      amphetamine-dextroamphetamine (ADDERALL) 10 MG tablet  Take 0.5 tablets by mouth Daily.      BD PosiFlush 0.9 % flush       Bromfenac Sodium (BromSite) 0.075 % solution Compounded Drop, also includes Prednisilone Sodium Phosphate 1% and Moxifloxacin 0.5%      chlorhexidine (PERIDEX) 0.12 % solution RINSE AND SPIT 15ML DAILY  11    cyanocobalamin 1000 MCG/ML injection Inject 1 mL into the appropriate muscle as directed by prescriber 1 (One) Time Per Week.      DEXILANT 60 MG capsule Take 1 capsule by mouth Daily.  11    diphenhydrAMINE (BENADRYL) 50 MG/ML injection       famotidine (PEPCID) 40 MG tablet Take 1 tablet by mouth 2 (Two) Times a Day.      fludrocortisone 0.1 MG tablet Take 1 tablet by mouth.      FLUoxetine (PROzac) 40 MG capsule Take 1 capsule by mouth Daily.  3    heparin 100 UNIT/ML solution injection       ibuprofen (ADVIL,MOTRIN) 200 MG tablet IBUPROFEN 200 MG TABS      latanoprost (XALATAN) 0.005 % ophthalmic solution INSTILL 1 DROP INTO BOTH EYES EVERY DAY IN THE EVENING      lidocaine (LIDODERM) 5 % PLACE 3 PATCHES ON THE SKIN AS DIRECTED BY PROVIDER DAILY. 90 patch 11    lidocaine-prilocaine (EMLA) 2.5-2.5 % cream       linaclotide (LINZESS) 145 MCG capsule capsule LINZESS 145 MCG CAPS      loratadine (CLARITIN) 10 MG tablet Take 1 tablet by mouth.      Lotemax SM 0.38 % gel Please see attached for detailed directions      moxifloxacin (VIGAMOX) 0.5 % ophthalmic solution Compounded drop, also includes Prednisilone Sodium Phosphate 1% and Bromfenac 0.075%      Nurtec 75 MG dispersible tablet Take 1 tablet by mouth Every Other Day.      omeprazole (priLOSEC) 40 MG capsule Take 1 capsule by mouth.      OnabotulinumtoxinA 200 units reconstituted solution INJECT UP  UNITS INTRAMUSCULAR INTO HEAD & NECK AREA EVERY 12 WEEKS FOR CHRONIC MIGRAINE TO BE INJECTED IN OFFICE BY PHYSICIAN 1 each 2    ondansetron (ZOFRAN) 2 mg/mL injection       ondansetron (ZOFRAN) 8 MG tablet TAKE 1 TABLET (8 MG TOTAL) BY MOUTH 3 (THREE) TIMES A DAY.      ondansetron ODT  (ZOFRAN-ODT) 8 MG disintegrating tablet       oxyCODONE (ROXICODONE) 10 MG tablet Take 1 tablet by mouth Every 12 (Twelve) Hours As Needed for Severe Pain. - take Acetaminophen 325 mg with each dose 50 tablet 0    Panzyga 20 GM/200ML solution infusion       pregabalin (LYRICA) 150 MG capsule Take 1 capsule by mouth 3 (Three) Times a Day. 90 capsule 4    promethazine (PHENERGAN) 25 MG/ML injection       SENNOSIDES-DOCUSATE SODIUM PO SM SENNA-S TABS      sodium chloride 0.9 % solution       Sodium Chloride powder       SUMAtriptan (IMITREX) 100 MG tablet TAKE 1 TABLET BY MOUTH AS NEEDED FOR HEADACHE  6    Xiidra 5 % ophthalmic solution Administer 1 drop to both eyes 1 (One) Time.       No current facility-administered medications on file prior to visit.         * No active hospital problems. *       Past Medical History:   Diagnosis Date    Biliary stenosis     Depression     Gastroparesis     GERD (gastroesophageal reflux disease)     History of ERCP     IBS (irritable bowel syndrome)     Joint pain     Low back pain     Migraine     Neck pain     Osteoarthritis     Peripheral neuropathy     Port or reservoir infection 05/2023    Sleep apnea     resolved 3/4/24    Spinal cord stimulator status     Vitamin B12 deficiency        Past Surgical History:   Procedure Laterality Date    BACK SURGERY      CHOLECYSTECTOMY      EPIDURAL BLOCK      HYSTERECTOMY      IMPLANTATION / REPLACEMENT INFUSION PUMP      KNEE ARTHROSCOPY Right     NECK SURGERY      PANCREAS SURGERY      SPINAL CORD STIMULATOR IMPLANT      TRIGGER POINT INJECTION         History reviewed. No pertinent family history.    Social History     Socioeconomic History    Marital status:    Tobacco Use    Smoking status: Never    Smokeless tobacco: Never   Vaping Use    Vaping status: Never Used   Substance and Sexual Activity    Alcohol use: No    Drug use: No       -------        Review of Systems   Constitutional:  Negative for activity change, fatigue  "and fever.   HENT:  Negative for trouble swallowing.    Eyes:  Negative for photophobia.   Respiratory:  Negative for cough and chest tightness.    Cardiovascular:  Negative for chest pain.   Gastrointestinal:  Negative for abdominal pain, constipation and diarrhea.   Genitourinary:  Negative for difficulty urinating and dysuria.   Musculoskeletal:  Positive for back pain and neck pain.   Neurological:  Positive for dizziness, weakness, light-headedness, numbness and headaches (migraines).   Psychiatric/Behavioral:  Positive for sleep disturbance. Negative for agitation, self-injury and suicidal ideas. The patient is not nervous/anxious.        Vitals:    06/03/24 1008   BP: 127/77   BP Location: Right arm   Patient Position: Sitting   Cuff Size: Adult   Pulse: (!) 42   Resp: 12   Temp: 96.4 °F (35.8 °C)   TempSrc: Temporal   SpO2: 100%   Weight: 46.4 kg (102 lb 3.2 oz)   Height: 167.6 cm (66\")   PainSc:   6         Objective   Physical Exam  VSS, NNR, NCAT, NMNA, NRD, AAOx3.  There is tenderness in the occipital area on the left but no swelling or obvious signs other than some cervical occipital spasm    -------    Screener & Opioid Assessment for Patients with Pain (SOAPP charlie 1.0-SF)    A questionnaire for patients being considered for chronic opioid therapy.  Patient asked to answer each question as honestly as possible.  Confidential results.     1) How often do you have mood swings?       1 = Seldom  2) How often do you smoke a cigarette within an hour after you wake up?   0 = Never  3) How often have you taken medication other than the way that it was prescribed?  0 = Never  4) How often have you used illegal drugs (I.e. marijuana,cocaine,etc.) in the past 5 years? 0 = Never  5) How often, in your lifetime, have you had legal problems or been arrested?  0 = Never    Any additional information you wish to share about the above answers?      No     SCORE = sum of the ratings from questions 1 through " 5....      1    A score equal to or greater than 4 is considered positive.    - at a cutoff of 4, NPV is 0.85 & PPV is 0.69  - This assessment alone is not the only determining factor in deciding on a treatment plan.      -------      Assessment & Plan   Diagnoses and all orders for this visit:    1. Chronic pain syndrome (Primary)    2. Cervical post-laminectomy syndrome    3. Lumbar post-laminectomy syndrome    4. Complex regional pain syndrome type 1 of left upper extremity    5. Spinal cord stimulator status    6. Presence of intrathecal pump    7. Encounter for long-term opiate analgesic use    8. Bilateral occipital neuralgia    9. Cervical myofascial pain syndrome        Amalia Lopez reports a pain score of 6.  Given her pain assessment as noted, treatment options were discussed and the following options were decided upon as a follow-up plan to address the patient's pain: continuation of current treatment plan for pain, prescription for non-opiod analgesics, prescription for opiod analgesics, and steroid injections.    Continuing use of multimodal analgesics including lidocaine and Botox and oxycodone and pregabalin.  Continuing intrathecal morphine infusion via drug delivery pump.  Continue other analgesics including Tylenol and also uses fluoxetine to try to help with management of an affective component of pain.  Only using NSAID for severe painful flareups because of gastrointestinal comorbidity.    --- Follow-up 3 weeks                 SMITH HANSEN  As part of the patient's treatment plan, I am prescribing controlled substances. The patient has been made aware of appropriate use of such medications, including potential risk of somnolence, limited ability to drive and/or work safely, and the potential for dependence or overdose. It has also been made clear that these medications are for use by this patient only, without concomitant use of alcohol or other substances unless prescribed.     Patient has  completed prescribing agreement detailing terms of continued prescribing of controlled substances, including monitoring SMITH reports, urine drug screening, and pill counts if necessary. The patient is aware that inappropriate use will results in cessation of prescribing such medications.    As the clinician, I personally reviewed the SMITH from as above while the patient was in the office today.  Noting that she lives and fills prescriptions in [place].  Requesting an inspect.    History and physical exam exhibit continued safe and appropriate use of controlled substances.    --  Patient appears stable with current regimen.  Opioid risk low.  No adverse effects. Regarding continuation of opioids, there is no evidence of aberrant behavior or any red flags.  The patient continues with appropriate response to opioid therapy. ADL's remain intact by self.       ---   Dictated utilizing Dragon dictation.   EMR Dragon/Transcription disclaimer:   Much of this encounter note is an electronic transcription/translation of spoken language to printed text. The electronic translation of spoken language may permit erroneous, or at times, nonsensical words or phrases to be inadvertently transcribed; Although I have reviewed the note for such errors, some may still exist.      --    Left cervical occipital trigger point injection:    Informed consent was obtained.  We discussed those items included but not limited to bleeding and nerve injury and infection and paralysis and stroke and death.    A solution was prepared of Solu-Medrol 40 mg Lot [id number] Exp date: 5/31/24 NDC 0167-2193-41; Sensoricaine 0.25% (5ml) Lot [id number] Exp date: 09/30/2027 NDC 53658-015-44       The procedure including risks, expectations, possible complications, and benefits were explained to the patient in detail. Verbal and written consent were obtained.      The area over the myofascial spasm was identified and marked.  3 areas over the left upper cervical  were identified which were left upper trapezius, left splenius capitis, and left levator scapula.  The area over each of the myofascial spasm was prepped with alcohol.  A solution as above was prepared and drawn into syringe using sterile technique.  Using sterile technique a 25 gauge 1 inch needle was placed in the center of the myofascial spasm.  Positive for muscle twitch response.  Negative aspiration.  2 cc of the Marcaine and Depo-Medrol solution was injection into each trigger point, the needle was withdrawn slightly and then redirected in cardial directions X 4 to breakup taut muscle bands.  The same technique was used to inject each identified trigger point.  The needle was removed intact.      The patient tolerated the procedure well without any apparent difficulties or complication.      No blood loss.      The patient reported feeling relief prior to leaving the office.        The needle was removed intact.  Bleeding was minimal.  No apparent complications.      ---      Vitals:    06/03/24 1008   PainSc:   6

## 2024-06-06 DIAGNOSIS — Z97.8 PRESENCE OF INTRATHECAL PUMP: ICD-10-CM

## 2024-06-26 ENCOUNTER — OFFICE VISIT (OUTPATIENT)
Dept: PAIN MEDICINE | Facility: CLINIC | Age: 69
End: 2024-06-26
Payer: MEDICARE

## 2024-06-26 VITALS
SYSTOLIC BLOOD PRESSURE: 102 MMHG | HEIGHT: 66 IN | BODY MASS INDEX: 16.14 KG/M2 | WEIGHT: 100.4 LBS | DIASTOLIC BLOOD PRESSURE: 74 MMHG | TEMPERATURE: 96.4 F | OXYGEN SATURATION: 100 %

## 2024-06-26 DIAGNOSIS — M96.1 LUMBAR POST-LAMINECTOMY SYNDROME: ICD-10-CM

## 2024-06-26 DIAGNOSIS — G89.4 CHRONIC PAIN SYNDROME: Primary | ICD-10-CM

## 2024-06-26 DIAGNOSIS — Z97.8 PRESENCE OF INTRATHECAL PUMP: ICD-10-CM

## 2024-06-26 LAB
POC AMPHETAMINES: NEGATIVE
POC BARBITURATES: NEGATIVE
POC BENZODIAZEPHINES: NEGATIVE
POC COCAINE: NEGATIVE
POC METHADONE: NEGATIVE
POC METHAMPHETAMINE SCREEN URINE: NEGATIVE
POC OPIATES: POSITIVE
POC OXYCODONE: NEGATIVE
POC PHENCYCLIDINE: NEGATIVE
POC PROPOXYPHENE: NEGATIVE
POC THC: NEGATIVE
POC TRICYCLIC ANTIDEPRESSANTS: NEGATIVE

## 2024-06-26 PROCEDURE — 1159F MED LIST DOCD IN RCRD: CPT | Performed by: NURSE PRACTITIONER

## 2024-06-26 PROCEDURE — 62369 ANAL SP INF PMP W/REPRG&FILL: CPT | Performed by: NURSE PRACTITIONER

## 2024-06-26 PROCEDURE — 1160F RVW MEDS BY RX/DR IN RCRD: CPT | Performed by: NURSE PRACTITIONER

## 2024-06-26 PROCEDURE — 80305 DRUG TEST PRSMV DIR OPT OBS: CPT | Performed by: NURSE PRACTITIONER

## 2024-06-26 PROCEDURE — 1125F AMNT PAIN NOTED PAIN PRSNT: CPT | Performed by: NURSE PRACTITIONER

## 2024-06-26 RX ORDER — PREGABALIN 150 MG/1
150 CAPSULE ORAL 3 TIMES DAILY
Qty: 90 CAPSULE | Refills: 4 | Status: SHIPPED | OUTPATIENT
Start: 2024-06-26

## 2024-06-26 NOTE — PROGRESS NOTES
CHIEF COMPLAINT  Back pain, neck pain, migraines. The patient states her pain is unchanged from last visit.  Pump refill    Subjective   Amalia Lopez is a 68 y.o. female  who presents for follow-up.  She has a history of chronic back pain, neck pain.  She presents today for IT pump refill.      The IT pump does help to manage her thoracic and lumbar pain. She has additional painful pathologies including chronic migraine headaches, neuropathic pain which require oral analgesia.  additionally she has been getting occipital nerve blocks intermittently.  Pain today is rated as a 6/10 VAS.  She is currently utilizing intrathecal morphine, total daily dose is 1.25 mg.  She does utilize occasional oral oxycodone 10-3 25 for severe breakthrough pain and other chronic pain issues.  This regimen does help to keep her pain at manageable levels. She remains physically active and continues walking a few miles every day.  She reports no adverse reactions.     Back Pain  This is a chronic problem. The current episode started more than 1 year ago. The problem occurs constantly. The problem has been waxing and waning since onset. The pain is present in the lumbar spine. The quality of the pain is described as aching, burning and shooting. The pain radiates to the left foot and right foot. The pain is at a severity of 6/10. The symptoms are aggravated by sitting and standing (activity). Associated symptoms include abdominal pain, headaches, numbness (right foot) and weakness (bilateral leg). Pertinent negatives include no bladder incontinence, bowel incontinence, chest pain, dysuria or fever. She has tried NSAIDs and analgesics (IT Morphine) for the symptoms. The treatment provided moderate relief.   Neck Pain   This is a chronic problem. The pain is present in the anterior neck, left side and right side. The quality of the pain is described as aching and burning. The pain is at a severity of 6/10. The pain is moderate. Associated  "symptoms include headaches, numbness (right foot) and weakness (bilateral leg). Pertinent negatives include no chest pain or fever. She has tried oral narcotics, neck support, home exercises and muscle relaxants for the symptoms. The treatment provided moderate relief.        PEG Assessment   What number best describes your pain on average in the past week?6  What number best describes how, during the past week, pain has interfered with your enjoyment of life?6  What number best describes how, during the past week, pain has interfered with your general activity?  5    Review of Pertinent Medical Data ---    The following portions of the patient's history were reviewed and updated as appropriate: allergies, current medications, past family history, past medical history, past social history, past surgical history, and problem list.    Review of Systems   Constitutional:  Negative for chills and fever.   Respiratory:  Negative for cough and shortness of breath.    Cardiovascular:  Negative for chest pain.   Gastrointestinal:  Positive for abdominal pain and constipation. Negative for bowel incontinence and diarrhea.   Genitourinary:  Negative for bladder incontinence, difficulty urinating and dysuria.   Musculoskeletal:  Positive for back pain and neck pain.   Neurological:  Positive for dizziness, weakness (bilateral leg), light-headedness, numbness (right foot) and headaches.   Psychiatric/Behavioral:  Negative for agitation.      I have reviewed and confirmed the accuracy of the ROS as documented by the MA/EVANS/RN CAROLANN Ford    Vitals:    06/26/24 1443   BP: 102/74   BP Location: Right arm   Patient Position: Sitting   Temp: 96.4 °F (35.8 °C)   TempSrc: Temporal   SpO2: 100%   Weight: 45.5 kg (100 lb 6.4 oz)   Height: 167.6 cm (66\")   PainSc:   6   PainLoc: Back     Objective   Physical Exam  Vitals and nursing note reviewed.   Constitutional:       General: She is not in acute distress.     Appearance: " Normal appearance. She is not ill-appearing.   Pulmonary:      Effort: Pulmonary effort is normal. No respiratory distress.   Musculoskeletal:      Cervical back: Tenderness and bony tenderness present.      Lumbar back: Tenderness and bony tenderness present.   Neurological:      Mental Status: She is alert and oriented to person, place, and time.      Motor: Motor function is intact. No weakness.      Gait: Gait normal.   Psychiatric:         Mood and Affect: Mood normal.         Behavior: Behavior normal.     Intrathecal pump was interrogated in office today. Results are in chart.     The patient is currently at a dose of 1.5 mg of Morphine per day. There is no addition of PTM bolus.      Under sterile technique, the pump was accessed using a [employer] proprietary refill kit. The volume was withdrawn from the pump. The expected residual volume of the pump was 2.9 ml. The actual residual volume of the pump was 3 ml.     The pump was then refilled with 20 ml of Morphine at a concentration of 10 mg/ml.  The pump was set to continue at its previous rate. No changes were made.      Patient tolerated procedure well. Minimal bleeding was noted. Pump site remains intact with no evidence of erythema or drainage.     Patient will return for pump refill when due or sooner if needed. (10/23/2024)     DASH is 14 months/July 2025.    Assessment & Plan   Diagnoses and all orders for this visit:    1. Chronic pain syndrome (Primary)    2. Presence of intrathecal pump    3. Lumbar post-laminectomy syndrome        --- Refill IT pump. No changes made to regimen  --- Routine UDS in office today as part of monitoring requirements for controlled substances.  The specimen was viewed and the immunoassay result reviewed and is +OPI.  This specimen will be sent to [facility] for confirmation.     --- Continue Percocet, refill not needed  --- Refill Lyrica   --- She may wish to consider weaning prior to next IT pump replacement      Amalia Lopez reports a pain score of 6.  Given her pain assessment as noted, treatment options were discussed and the following options were decided upon as a follow-up plan to address the patient's pain: continuation of current treatment plan for pain.      --- Follow-up for next IT pump refill                  SMITH REPORT  As part of the patient's treatment plan, I am prescribing controlled substances. The patient has been made aware of appropriate use of such medications, including potential risk of somnolence, limited ability to drive and/or work safely, and the potential for dependence or overdose. It has also been made clear that these medications are for use by this patient only, without concomitant use of alcohol or other substances unless prescribed.     Patient has completed prescribing agreement detailing terms of continued prescribing of controlled substances, including monitoring SMITH reports, urine drug screening, and pill counts if necessary. The patient is aware that inappropriate use will results in cessation of prescribing such medications.    As the clinician, I personally reviewed the SMITH from 06/26/2024 while the patient was in the office today.    History and physical exam exhibit continued safe and appropriate use of controlled substances.       Dictated utilizing Dragon dictation.

## 2024-07-08 ENCOUNTER — HOSPITAL ENCOUNTER (EMERGENCY)
Facility: HOSPITAL | Age: 69
Discharge: HOME | End: 2024-07-08
Attending: EMERGENCY MEDICINE
Payer: MEDICARE

## 2024-07-08 VITALS
RESPIRATION RATE: 18 BRPM | SYSTOLIC BLOOD PRESSURE: 120 MMHG | HEIGHT: 66 IN | DIASTOLIC BLOOD PRESSURE: 72 MMHG | TEMPERATURE: 98.5 F | BODY MASS INDEX: 16.04 KG/M2 | OXYGEN SATURATION: 99 % | HEART RATE: 54 BPM | WEIGHT: 99.8 LBS

## 2024-07-08 DIAGNOSIS — M79.642 BILATERAL HAND PAIN: Primary | ICD-10-CM

## 2024-07-08 DIAGNOSIS — M79.641 BILATERAL HAND PAIN: Primary | ICD-10-CM

## 2024-07-08 LAB
ANION GAP SERPL CALCULATED.3IONS-SCNC: 10.6 MMOL/L (ref 5–15)
BACTERIA UR QL AUTO: ABNORMAL /HPF
BASOPHILS # BLD AUTO: 0.01 10*3/MM3 (ref 0–0.2)
BASOPHILS NFR BLD AUTO: 0.2 % (ref 0–1.5)
BILIRUB UR QL STRIP: NEGATIVE
BUN SERPL-MCNC: 13 MG/DL (ref 8–23)
BUN/CREAT SERPL: 18.1 (ref 7–25)
CALCIUM SPEC-SCNC: 9.2 MG/DL (ref 8.6–10.5)
CHLORIDE SERPL-SCNC: 102 MMOL/L (ref 98–107)
CLARITY UR: CLEAR
CO2 SERPL-SCNC: 25.4 MMOL/L (ref 22–29)
COLOR UR: YELLOW
CREAT SERPL-MCNC: 0.72 MG/DL (ref 0.57–1)
DEPRECATED RDW RBC AUTO: 40.3 FL (ref 37–54)
EGFRCR SERPLBLD CKD-EPI 2021: 92.3 ML/MIN/1.73
EOSINOPHIL # BLD AUTO: 0 10*3/MM3 (ref 0–0.4)
EOSINOPHIL NFR BLD AUTO: 0 % (ref 0.3–6.2)
ERYTHROCYTE [DISTWIDTH] IN BLOOD BY AUTOMATED COUNT: 12.4 % (ref 12.3–15.4)
GLUCOSE SERPL-MCNC: 107 MG/DL (ref 65–99)
GLUCOSE UR STRIP-MCNC: NEGATIVE MG/DL
HCT VFR BLD AUTO: 33.6 % (ref 34–46.6)
HGB BLD-MCNC: 11.4 G/DL (ref 12–15.9)
HGB UR QL STRIP.AUTO: NEGATIVE
HYALINE CASTS UR QL AUTO: ABNORMAL /LPF
IMM GRANULOCYTES # BLD AUTO: 0.01 10*3/MM3 (ref 0–0.05)
IMM GRANULOCYTES NFR BLD AUTO: 0.2 % (ref 0–0.5)
KETONES UR QL STRIP: NEGATIVE
LEUKOCYTE ESTERASE UR QL STRIP.AUTO: ABNORMAL
LIPASE SERPL-CCNC: 19 U/L (ref 13–60)
LYMPHOCYTES # BLD AUTO: 0.79 10*3/MM3 (ref 0.7–3.1)
LYMPHOCYTES NFR BLD AUTO: 13.6 % (ref 19.6–45.3)
MCH RBC QN AUTO: 30.1 PG (ref 26.6–33)
MCHC RBC AUTO-ENTMCNC: 33.9 G/DL (ref 31.5–35.7)
MCV RBC AUTO: 88.7 FL (ref 79–97)
MONOCYTES # BLD AUTO: 0.35 10*3/MM3 (ref 0.1–0.9)
MONOCYTES NFR BLD AUTO: 6 % (ref 5–12)
NEUTROPHILS NFR BLD AUTO: 4.66 10*3/MM3 (ref 1.7–7)
NEUTROPHILS NFR BLD AUTO: 80 % (ref 42.7–76)
NITRITE UR QL STRIP: NEGATIVE
NRBC BLD AUTO-RTO: 0 /100 WBC (ref 0–0.2)
PH UR STRIP.AUTO: 8 [PH] (ref 5–8)
PLATELET # BLD AUTO: 153 10*3/MM3 (ref 140–450)
PMV BLD AUTO: 11.5 FL (ref 6–12)
POTASSIUM SERPL-SCNC: 3.5 MMOL/L (ref 3.5–5.2)
PROT UR QL STRIP: NEGATIVE
RBC # BLD AUTO: 3.79 10*6/MM3 (ref 3.77–5.28)
RBC # UR STRIP: ABNORMAL /HPF
REF LAB TEST METHOD: ABNORMAL
SODIUM SERPL-SCNC: 138 MMOL/L (ref 136–145)
SP GR UR STRIP: 1.01 (ref 1–1.03)
SQUAMOUS #/AREA URNS HPF: ABNORMAL /HPF
UROBILINOGEN UR QL STRIP: ABNORMAL
WBC # UR STRIP: ABNORMAL /HPF
WBC NRBC COR # BLD AUTO: 5.82 10*3/MM3 (ref 3.4–10.8)

## 2024-07-08 PROCEDURE — 99283 EMERGENCY DEPT VISIT LOW MDM: CPT

## 2024-07-08 PROCEDURE — 96374 THER/PROPH/DIAG INJ IV PUSH: CPT

## 2024-07-08 PROCEDURE — 25010000002 ONDANSETRON PER 1 MG: Performed by: NURSE PRACTITIONER

## 2024-07-08 PROCEDURE — 96375 TX/PRO/DX INJ NEW DRUG ADDON: CPT

## 2024-07-08 PROCEDURE — 25010000002 KETOROLAC TROMETHAMINE PER 15 MG: Performed by: NURSE PRACTITIONER

## 2024-07-08 PROCEDURE — 85025 COMPLETE CBC W/AUTO DIFF WBC: CPT | Performed by: NURSE PRACTITIONER

## 2024-07-08 PROCEDURE — 83690 ASSAY OF LIPASE: CPT | Performed by: NURSE PRACTITIONER

## 2024-07-08 PROCEDURE — 80048 BASIC METABOLIC PNL TOTAL CA: CPT | Performed by: NURSE PRACTITIONER

## 2024-07-08 PROCEDURE — 36415 COLL VENOUS BLD VENIPUNCTURE: CPT

## 2024-07-08 PROCEDURE — 81001 URINALYSIS AUTO W/SCOPE: CPT | Performed by: NURSE PRACTITIONER

## 2024-07-08 RX ORDER — SODIUM CHLORIDE 0.9 % (FLUSH) 0.9 %
10 SYRINGE (ML) INJECTION AS NEEDED
Status: DISCONTINUED | OUTPATIENT
Start: 2024-07-08 | End: 2024-07-09 | Stop reason: HOSPADM

## 2024-07-08 RX ORDER — KETOROLAC TROMETHAMINE 30 MG/ML
15 INJECTION, SOLUTION INTRAMUSCULAR; INTRAVENOUS ONCE
Status: COMPLETED | OUTPATIENT
Start: 2024-07-08 | End: 2024-07-08

## 2024-07-08 RX ORDER — ONDANSETRON 2 MG/ML
4 INJECTION INTRAMUSCULAR; INTRAVENOUS ONCE
Status: COMPLETED | OUTPATIENT
Start: 2024-07-08 | End: 2024-07-08

## 2024-07-08 RX ORDER — OXYCODONE HYDROCHLORIDE 5 MG/1
10 TABLET ORAL ONCE
Status: COMPLETED | OUTPATIENT
Start: 2024-07-08 | End: 2024-07-08

## 2024-07-08 RX ADMIN — ONDANSETRON 4 MG: 2 INJECTION INTRAMUSCULAR; INTRAVENOUS at 22:02

## 2024-07-08 RX ADMIN — KETOROLAC TROMETHAMINE 15 MG: 30 INJECTION, SOLUTION INTRAMUSCULAR at 22:02

## 2024-07-08 RX ADMIN — OXYCODONE 10 MG: 5 TABLET ORAL at 23:17

## 2024-07-09 DIAGNOSIS — Z79.891 ENCOUNTER FOR LONG-TERM OPIATE ANALGESIC USE: ICD-10-CM

## 2024-07-09 RX ORDER — OXYCODONE HYDROCHLORIDE 10 MG/1
10 TABLET ORAL EVERY 12 HOURS PRN
Qty: 50 TABLET | Refills: 0 | Status: SHIPPED | OUTPATIENT
Start: 2024-07-09

## 2024-07-09 NOTE — ED PROVIDER NOTES
"Subjective   Chief Complaint   Patient presents with    Nausea     Nausea started last night      Hand Pain     Pt c/o bilateral hand redness, burning sensation, started yesterday     Provider, No Known    History of Present Illness  Patient is a 69 yo female presents the ED with complaint of nausea, bilateral hand burning, redness, started last night.  She has any trauma injury or fall.  No fevers.  No recent travel.    She does report she has \"nerve pain\" in her lower extremities.  She reports her hand pain feels similar.    No abdominal pain.  No diarrhea.  No vomiting.  Review of Systems  Per HPI  Past Medical History:   Diagnosis Date    Biliary stenosis     Depression     Gastroparesis     GERD (gastroesophageal reflux disease)     History of ERCP     IBS (irritable bowel syndrome)     Joint pain     Low back pain     Migraine     Neck pain     Osteoarthritis     Peripheral neuropathy     Port or reservoir infection 05/2023    Sleep apnea     resolved 3/4/24    Spinal cord stimulator status     Vitamin B12 deficiency        Allergies   Allergen Reactions    Acetaminophen-Codeine Hives    Fentanyl Other (See Comments) and Hives     Dystonic movements  Dystonic movements  Dystonic movements  Dystonic movements    Morphine Other (See Comments)     Other reaction(s): Other (See Comments)  Headaches    Codeine Itching       Past Surgical History:   Procedure Laterality Date    BACK SURGERY      CHOLECYSTECTOMY      EPIDURAL BLOCK      HYSTERECTOMY      IMPLANTATION / REPLACEMENT INFUSION PUMP      KNEE ARTHROSCOPY Right     NECK SURGERY      PANCREAS SURGERY      SPINAL CORD STIMULATOR IMPLANT      TRIGGER POINT INJECTION         No family history on file.    Social History     Socioeconomic History    Marital status:    Tobacco Use    Smoking status: Never    Smokeless tobacco: Never   Vaping Use    Vaping status: Never Used   Substance and Sexual Activity    Alcohol use: No    Drug use: No "           Objective   Physical Exam  Vitals and nursing note reviewed.   Constitutional:       Appearance: Normal appearance. She is not toxic-appearing.   HENT:      Head: Normocephalic and atraumatic.      Mouth/Throat:      Mouth: Mucous membranes are moist.      Pharynx: Oropharynx is clear.   Eyes:      Extraocular Movements: Extraocular movements intact.      Conjunctiva/sclera: Conjunctivae normal.      Pupils: Pupils are equal, round, and reactive to light.   Cardiovascular:      Rate and Rhythm: Normal rate and regular rhythm.      Heart sounds: Normal heart sounds. No murmur heard.     No gallop.   Pulmonary:      Effort: Pulmonary effort is normal.      Breath sounds: Normal breath sounds.   Abdominal:      General: Bowel sounds are normal.      Palpations: Abdomen is soft.      Tenderness: There is no abdominal tenderness. There is no guarding or rebound.   Musculoskeletal:         General: Normal range of motion.      Cervical back: Normal range of motion and neck supple.      Right lower leg: No edema.      Left lower leg: No edema.      Comments: No erythema, warmth or rashes appreciated to the hands.  Extremities are pink warm and dry, appear well-perfused.  Distal pulses are intact bilaterally.  No skin changes appreciated.  No tenderness.  Compartments soft.  Median, radial, ulnar, AIN nerve testing intact.  Patient able to do thumb opposition normally, paper rock and scissors normally.       Skin:     General: Skin is warm and dry.      Capillary Refill: Capillary refill takes less than 2 seconds.      Findings: No erythema or rash.   Neurological:      Mental Status: She is alert and oriented to person, place, and time.         Procedures           ED Course  ED Course as of 07/09/24 0155   Mon Jul 08, 2024   9309 Patient resting comfortably.  Afebrile nontoxic. [LB]      ED Course User Index  [LB] Melva Estevez, CAROLANN      /72   Pulse 54   Temp 98.5 °F (36.9 °C)   Resp 18    "Ht 167.6 cm (66\")   Wt 45.3 kg (99 lb 12.8 oz)   SpO2 99%   BMI 16.11 kg/m²   Medications   ketorolac (TORADOL) injection 15 mg (15 mg Intravenous Given 7/8/24 2202)   ondansetron (ZOFRAN) injection 4 mg (4 mg Intravenous Given 7/8/24 2202)   oxyCODONE (ROXICODONE) immediate release tablet 10 mg (10 mg Oral Given 7/8/24 2317)     No radiology results for the last day                             Due to significant overcrowding in the ED, the patient was evaluated by myself in a hallway bed. Exam may be limited by privacy, noise, and patient wearing a hospital gown. Explained to limitations to the patient and our overcrowding, they are in agreement to proceed with exam and treatment at this time.            Medical Decision Making  Chart Review: Pain medicine no 6/26/2024.  Refill for pain pump.  On Percocet, Lyrica.  Imaging: Considered x-ray imaging, however patient's not any true trauma injury or fall.  It feels to be low yield at this time.    Pt was Placed on appropriate monitoring.  Differential diagnoses considered for patient presentation, this list is not all inclusive of diagnoses considered: Electrolyte imbalance, neuropathy  Patient presents to the ED for the above complaint, underwent the above, exam and workup. Glucose 107.  UA as above, no urinary symptoms at this time.  Lipase normal.  Neurovascular intact.  No signs of infection to the hands.  Advised continued follow-up with PCP, pain management and continue her home medication regimen.    Discussion/Consultation with other providers:     Disposition: I discussed my findings, plan of care, discharge instructions, the importance of follow up with their PCP/ and or specialist for repeat evaluation and to discuss any abnormal findings in labs or imaging that warrant further outpatient evaluation. We discussed that although a definitive diagnosis is not always found in the ED, it is believed emergent conditions have been ruled out, and patient is safe " for discharge at this time.  We discussed return precautions for the emergency department.  Patient verbalizes understandings, and agrees with current plan of care.  Note Disclaimer: At [facility], we believe that sharing information builds trust and better relationships. You are receiving this note because you recently visited [facility]. It is possible you will see health information before a provider has talked with you about it. This kind of information can be easy to misunderstand. To help you fully understand what it means for your health, we urge you to discuss this note with your provider  Note dictated utilizing Dragon Dictation.  Appropriate PPE worn during patient interactions.        Problems Addressed:  Bilateral hand pain: complicated acute illness or injury    Amount and/or Complexity of Data Reviewed  Labs: ordered.    Risk  Prescription drug management.        Final diagnoses:   Bilateral hand pain       ED Disposition  ED Disposition       ED Disposition   Discharge    Condition   Stable    Comment   --               [facility] EMERGENCY DEPARTMENT  [address] [address]  [phone]        PATIENT CONNECTION - [facility] [address]  [phone]  Schedule an appointment as soon as possible for a visit   Call for assistance with follow up with Primary care provider-call tomorrow.         Medication List      No changes were made to your prescriptions during this visit.            Melva Estevez, CAROLANN  07/09/24 0155

## 2024-07-09 NOTE — DISCHARGE INSTRUCTIONS
Continue home pain medications and Lyrica  Return the ED for new or worsening symptoms  Follow-up with primary care provider, if you not have a primary care provider please utilize patient connection as above to call and establish [facility] [phone]  Return to the ED for new or worsening symptoms

## 2024-07-22 PROBLEM — E43 SEVERE MALNUTRITION: Status: ACTIVE | Noted: 2024-07-22

## 2024-07-22 PROBLEM — I95.1 ORTHOSTATIC HYPOTENSION: Status: ACTIVE | Noted: 2021-08-11

## 2024-07-22 PROBLEM — K21.9 GASTROESOPHAGEAL REFLUX DISEASE: Status: ACTIVE | Noted: 2019-09-17

## 2024-07-22 PROBLEM — R65.21 SEPTIC SHOCK: Status: ACTIVE | Noted: 2024-01-01

## 2024-07-22 PROBLEM — M62.82 RHABDOMYOLYSIS: Status: ACTIVE | Noted: 2024-01-01

## 2024-07-22 PROBLEM — A41.9 SEPTIC SHOCK: Status: ACTIVE | Noted: 2024-07-22

## 2024-07-22 NOTE — CONSULTS
Arterial Catheter Insertion Procedure Note      Indications: vasopressor, accurate blood pressures     Active Time Out:  Correct patient: Yes  Correct procedure: Yes  Correct site: Yes  Verified with: primary rn    Procedure Details:   Informed consent was obtained for the procedure.  Maximum sterile technique was used including usual patient drapes, antiseptics and sterile garments.    Collateral arterial circulation to hand confirmed through Lino's test. Under sterile conditions and utilizing ultrasound guidance, the skin above the radial artery  was prepped with chlorhexidine and covered with a sterile drape. Local anesthesia was applied to the skin and subcutaneous tissues with lidocaine 1%. An 20-gauge needle was then inserted into the artery. Bright red pulsating blood return noted. A guide wire was then passed easily through the catheter. An angiocatheter was then inserted into the vessel over the guide wire. The catheter was secured in place and dressed following sterile protocol.    Line placed by harlan roper RN.    Complications:   none    Findings:  Patient  tolerated procedure well. Primary nurse notified.     Recommendations:  Line will be used for invasive hemodynamic monitoring and blood draws for ABG's.    Harlan Roper RN  7/22/2024

## 2024-07-22 NOTE — ED PROVIDER NOTES
Subjective   History of Present Illness  Chief complaint: General weakness    68-year-old female presents with general weakness.  Family states symptoms have been getting progressively worse for quite some time.  Patient's blood pressure has also been running low.  Family is concerned that patient could be overmedicated.  She has a pain pump.  She has apparently also been taking Phenergan.  She has been sleeping a lot.  Family states she has been somewhat disoriented recently.  They found her on the floor today.  She has been dropping things.  Patient denies any recent illness.  She has had no fever.  She denies vomiting, diarrhea, cough.  She denies any focal numbness, weakness, tingling.    History provided by:  Patient and relative      Review of Systems   Constitutional:  Negative for fever.   HENT:  Negative for congestion.    Respiratory:  Negative for cough and shortness of breath.    Cardiovascular:  Negative for chest pain.   Gastrointestinal:  Negative for abdominal pain, diarrhea and vomiting.   Genitourinary:  Negative for dysuria.   Neurological:  Positive for weakness. Negative for headaches.   Psychiatric/Behavioral:  Positive for confusion.        Past Medical History:   Diagnosis Date    Biliary stenosis     Depression     Gastroparesis     GERD (gastroesophageal reflux disease)     History of ERCP     IBS (irritable bowel syndrome)     Joint pain     Low back pain     Migraine     Neck pain     Osteoarthritis     Peripheral neuropathy     Port or reservoir infection 05/2023    Sleep apnea     resolved 3/4/24    Spinal cord stimulator status     Vitamin B12 deficiency        Allergies   Allergen Reactions    Acetaminophen-Codeine Hives    Fentanyl Other (See Comments) and Hives     Dystonic movements  Dystonic movements  Dystonic movements  Dystonic movements    Morphine Other (See Comments)     Other reaction(s): Other (See Comments)  Headaches    Codeine Itching       Past Surgical History:  "  Procedure Laterality Date    BACK SURGERY      CHOLECYSTECTOMY      EPIDURAL BLOCK      HYSTERECTOMY      IMPLANTATION / REPLACEMENT INFUSION PUMP      KNEE ARTHROSCOPY Right     NECK SURGERY      PANCREAS SURGERY      SPINAL CORD STIMULATOR IMPLANT      TRIGGER POINT INJECTION         No family history on file.    Social History     Socioeconomic History    Marital status:    Tobacco Use    Smoking status: Never    Smokeless tobacco: Never   Vaping Use    Vaping status: Never Used   Substance and Sexual Activity    Alcohol use: No    Drug use: No       BP (!) 86/48   Pulse 99   Temp 97.4 °F (36.3 °C) (Oral)   Resp 18   Ht 167.6 cm (66\")   Wt 47 kg (103 lb 9.9 oz)   SpO2 94%   BMI 16.72 kg/m²       Objective   Physical Exam  Vitals and nursing note reviewed.   Constitutional:       Appearance: She is ill-appearing.      Comments: Drowsy but arousable   HENT:      Head: Normocephalic and atraumatic.      Mouth/Throat:      Mouth: Mucous membranes are dry.   Eyes:      Pupils: Pupils are equal, round, and reactive to light.   Cardiovascular:      Rate and Rhythm: Normal rate and regular rhythm.      Heart sounds: Normal heart sounds.   Pulmonary:      Effort: Pulmonary effort is normal. No respiratory distress.   Abdominal:      Palpations: Abdomen is soft.      Tenderness: There is no abdominal tenderness.   Musculoskeletal:         General: No deformity or signs of injury.   Skin:     General: Skin is warm and dry.   Neurological:      Mental Status: She is oriented to person, place, and time.      Comments: Severe general weakness.  No focal motor or sensory deficit appreciated.         Procedures           ED Course      Results for orders placed or performed during the hospital encounter of 07/21/24   Comprehensive Metabolic Panel    Specimen: Arm, Left; Blood   Result Value Ref Range    Glucose 82 65 - 99 mg/dL    BUN 43 (H) 8 - 23 mg/dL    Creatinine 2.26 (H) 0.57 - 1.00 mg/dL    Sodium 137 136 " - 145 mmol/L    Potassium 3.9 3.5 - 5.2 mmol/L    Chloride 103 98 - 107 mmol/L    CO2 18.2 (L) 22.0 - 29.0 mmol/L    Calcium 8.4 (L) 8.6 - 10.5 mg/dL    Total Protein 5.9 (L) 6.0 - 8.5 g/dL    Albumin 3.1 (L) 3.5 - 5.2 g/dL    ALT (SGPT) 83 (H) 1 - 33 U/L    AST (SGOT) 216 (H) 1 - 32 U/L    Alkaline Phosphatase 237 (H) 39 - 117 U/L    Total Bilirubin 0.4 0.0 - 1.2 mg/dL    Globulin 2.8 gm/dL    A/G Ratio 1.1 g/dL    BUN/Creatinine Ratio 19.0 7.0 - 25.0    Anion Gap 15.8 (H) 5.0 - 15.0 mmol/L    eGFR 23.4 (L) >60.0 mL/min/1.73   Lipase    Specimen: Arm, Left; Blood   Result Value Ref Range    Lipase 7 (L) 13 - 60 U/L   Single High Sensitivity Troponin T    Specimen: Arm, Left; Blood   Result Value Ref Range    HS Troponin T 38 (H) <14 ng/L   Urinalysis With Culture If Indicated - Straight Cath    Specimen: Straight Cath; Urine   Result Value Ref Range    Color, UA Dark Yellow (A) Yellow, Straw    Appearance, UA Cloudy (A) Clear    pH, UA <=5.0 5.0 - 8.0    Specific Gravity, UA 1.022 1.005 - 1.030    Glucose, UA Negative Negative    Ketones, UA Trace (A) Negative    Bilirubin, UA Small (1+) (A) Negative    Blood, UA Large (3+) (A) Negative    Protein,  mg/dL (2+) (A) Negative    Leuk Esterase, UA Small (1+) (A) Negative    Nitrite, UA Positive (A) Negative    Urobilinogen, UA 1.0 E.U./dL 0.2 - 1.0 E.U./dL   Ethanol    Specimen: Arm, Left; Blood   Result Value Ref Range    Ethanol % <0.010 %   Magnesium    Specimen: Arm, Left; Blood   Result Value Ref Range    Magnesium 1.6 1.6 - 2.4 mg/dL   CBC Auto Differential    Specimen: Arm, Left; Blood   Result Value Ref Range    WBC 3.89 3.40 - 10.80 10*3/mm3    RBC 3.49 (L) 3.77 - 5.28 10*6/mm3    Hemoglobin 10.2 (L) 12.0 - 15.9 g/dL    Hematocrit 31.0 (L) 34.0 - 46.6 %    MCV 88.8 79.0 - 97.0 fL    MCH 29.2 26.6 - 33.0 pg    MCHC 32.9 31.5 - 35.7 g/dL    RDW 13.8 12.3 - 15.4 %    RDW-SD 44.8 37.0 - 54.0 fl    Platelets 29 (C) 140 - 450 10*3/mm3   Scan Slide    Specimen:  Arm, Left; Blood   Result Value Ref Range    Scan Slide     Manual Differential    Specimen: Arm, Left; Blood   Result Value Ref Range    Neutrophil % 21.0 (L) 42.7 - 76.0 %    Lymphocyte % 6.0 (L) 19.6 - 45.3 %    Monocyte % 6.0 5.0 - 12.0 %    Eosinophil % 3.0 0.3 - 6.2 %    Bands %  53.0 (H) 0.0 - 5.0 %    Metamyelocyte % 9.0 (H) 0.0 - 0.0 %    Atypical Lymphocyte % 2.0 0.0 - 5.0 %    Neutrophils Absolute 2.88 1.70 - 7.00 10*3/mm3    Lymphocytes Absolute 0.31 (L) 0.70 - 3.10 10*3/mm3    Monocytes Absolute 0.23 0.10 - 0.90 10*3/mm3    Eosinophils Absolute 0.12 0.00 - 0.40 10*3/mm3    Anisocytosis Slight/1+ None Seen    Crenated RBC's Slight/1+ None Seen    Dacrocytes Slight/1+ None Seen    Microcytes Slight/1+ None Seen    Poikilocytes Slight/1+ None Seen    WBC Morphology Normal Normal    Platelet Estimate Decreased Normal   Urinalysis, Microscopic Only - Straight Cath    Specimen: Straight Cath; Urine   Result Value Ref Range    RBC, UA 11-20 (A) None Seen, 0-2 /HPF    WBC, UA 6-10 (A) None Seen, 0-2 /HPF    Bacteria, UA 4+ (A) None Seen /HPF    Squamous Epithelial Cells, UA 0-2 None Seen, 0-2 /HPF    Hyaline Casts, UA 0-2 None Seen /LPF    Amorphous Crystals, UA Small/1+ None Seen /HPF    Methodology Manual Light Microscopy    POC Lactate    Specimen: Blood   Result Value Ref Range    Lactate 3.9 (C) 0.3 - 2.0 mmol/L   ECG 12 Lead Other; Hypotension   Result Value Ref Range    QT Interval 363 ms    QTC Interval 467 ms     CT Head Without Contrast    Result Date: 7/22/2024  Impression: No acute intracranial abnormality. Electronically Signed: Flo Love MD  7/22/2024 12:02 AM EDT  Workstation ID: [id number]    XR Chest 1 View    Result Date: 7/21/2024  Impression: Emphysema. Mild pulmonary edema. Bibasilar atelectasis. Electronically Signed: Flo Love MD  7/21/2024 11:18 PM EDT  Workstation ID: [id number]                                My interpretation of EKG shows sinus rhythm, rate of 99, no ST elevation           Medical Decision Making  Problems Addressed:  Acute kidney injury: complicated acute illness or injury  Septic shock: complicated acute illness or injury  Thrombocytopenia: complicated acute illness or injury  Urinary tract infection without hematuria, site unspecified: complicated acute illness or injury    Amount and/or Complexity of Data Reviewed  Labs: ordered.  Radiology: ordered.  ECG/medicine tests: ordered.    Risk  Prescription drug management.      Patient had the above evaluation.  Results were discussed with the patient.  Patient was hypotensive and sepsis protocol was initiated.  Lactic acid was elevated at 3.9.  White blood cell count was on the low end of normal at 3.89 however differential shows 53% bands.  She was given a dose of cefepime.  Blood cultures were obtained.  Platelets are also significantly low at 29.  Patient states she has had low platelets in the past however on review of records I do not see any significant thrombocytopenia.  CT head shows no acute intracranial abnormality.  My interpretation of chest x-ray shows no infiltrate or effusion.  EKG shows no acute ischemia.  Patient was started on a 30 cc/kg fluid bolus for her hypotension.  CMP significant for acute kidney injury with BUN of 43 and creatinine 2.26.  Patient also has elevation of LFTs.  Bilirubin is normal.  Lipase is normal.  Troponin is mildly elevated at 38.  Patient does not complain of any chest pain or shortness of breath.  Urinalysis does show evidence of urinary tract infection with small leukocyte esterase, positive nitrites, 6-10 white blood cells, 4+ bacteria.  Patient remained hypotensive despite the 30 cc/kg fluid bolus and she was started on Levophed.  I discussed with the nurse practitioner on-call for the intensivist and the patient will be admitted to the ICU.  Critical care time totaled 40 minutes.      Final diagnoses:   Septic shock   Urinary tract infection without hematuria, site unspecified    Acute kidney injury   Thrombocytopenia       ED Disposition  ED Disposition       ED Disposition   Decision to Admit    Condition   --    Comment   Level of Care: Critical Care [6]   Admitting Physician: HUANG CANO [[id number]]   Attending Physician: HUANG CANO [[id number]]                 No follow-up provider specified.       Medication List      No changes were made to your prescriptions during this visit.            Charles Moreno MD  07/22/24 0012

## 2024-07-22 NOTE — H&P
Critical Care History and Physical     Amalia Lopez : 1955 MRN:[id number] LOS:0 ROOM: [facility]     Reason for admission: Rhabdomyolysis     Assessment / Plan     Septic Shock: ( WBC>12 or <4 or >10% bands, HR>90, Lactic acid>2, Creatinine >2, Change in mental status, Platelets <100, and MAP<65 or SBP<90 )  Rhabdomyolysis  Clear etiology is uncertain, suspect that patient is severely dehydrated with UTI and MAXIM probably secondary to polypharmacy as she was found down; however, patient did have a tick noted on her back on arrival to ICU (Tick panel ordered)   Ua + UTI; culture pending  Blood cultures pending  Started on Levophed  Bicarb gtt. initiated  Persistent hypotension in spite of adequate fluid resuscitation  C/w additional fluids as needed. Avoid fluid overload  Titrate vasopressors for a target MAP of 65  She was given cefepime in ED; continue pending hospital course    Acute knee injury superimposed on urinary tract infection  Creatinine 2.26, baseline 0.88  Bicarb gtt   Avoid nephrotoxic agents  Avoid hypotension    Chronic pain syndrome, presence of spinal cord stimulator; presence of pain pump  Polypharmacy  Patient states her pain pump is filled with morphine, however cannot tell me basal rate -unsure if she is able to give herself bolus  Patient also states she gives herself IV Benadryl at home after accessing her port at home  Takes oxycodone every 12 hours  Patient also states she takes Benadryl and Lyrica  Will need pharmacy to verify medications in a.m.    Code Status (Patient has no pulse and is not breathing): CPR (Attempt to Resuscitate)  Medical Interventions (Patient has pulse or is breathing): Full Support       Nutrition:   NPO Diet NPO Type: Strict NPO     VTE Prophylaxis:  Mechanical VTE prophylaxis orders are present.         History of Present illness     Amalia Lopez is a 68 y.o. female with PMH of chronic pain syndrome with spinal cord stimulator and presence of pain pump,  gastroparesis, GERD, vitamin D deficiency presented to the hospital after her daughter found her down at home, and was admitted with a principal diagnosis of Rhabdomyolysis.  Patient's daughter states that she had been progressively getting worse over some, with generalized weakness and frequently dropping things.  Patient has history of hypotension, however today it is lower than normal.  Daughter raised concerns about patient being overmedicated as she takes oxycodone at home and also has a pain pump, in which patient states is filled with morphine but could not tell me her basal dose.  It is unclear if she is able to give herself boluses of morphine.  She is also on Phenergan and Lyrica.  Of note patient came in with her port accessed and informed nursing staff that she accessed her port herself at home, and gives herself IV Benadryl.  Patient's daughter states she talk to her mother at 8 AM yesterday morning, and found her today on the floor around 8 PM.  She is had no fever, cough, congestion, vomiting, diarrhea, or blood in urine or stool.    ED course: Patient's lab work triggered a sepsis and patient was given fluids.  Her lactic acid was elevated at 3.9.  White count was noted to be low end of normal however she was showing 53% bands.  She was treated with IV cefepime empirically.  Patient also noted to have platelet count of 29 in which patient states she has had low platelets in the past, however upon chart review there is no history of thrombocytopenia.  CMP showed an acute kidney injury with a creatinine of 2.26 and elevated LFTs.  Urinalysis showed 4+ bacteria, + leukocytes, + nitrites.  She had persistent hypotension despite 30 cc/kg bolus and was started on Levophed.  She was transferred to ICU for further management of care and treatment.  Upon arrival to ICU, nursing staff completed skin assessment and found a large tic burrowed into patient's back.  This was removed with head intact and cleaned  thoroughly.    ACP: Patient is alert and oriented and states that she is a full code.    Patient was seen and examined on 07/22/24 at 03:26 EDT .    Subjective / Review of systems     Review of Systems   Constitutional:  Negative for chills and fever.   HENT:  Negative for congestion and sore throat.    Respiratory:  Negative for cough and shortness of breath.    Cardiovascular:  Negative for chest pain and palpitations.   Gastrointestinal:  Negative for abdominal pain and nausea.   Endocrine: Negative for heat intolerance and polyuria.   Genitourinary:  Negative for dysuria and urgency.   Musculoskeletal:  Positive for back pain and myalgias. Negative for arthralgias.   Skin:  Negative for rash and wound.   Neurological:  Positive for weakness and headaches. Negative for numbness.   Psychiatric/Behavioral:  Negative for suicidal ideas. The patient is not nervous/anxious.         Past Medical/Surgical/Social/Family History & Allergies     Past Medical History:   Diagnosis Date    Biliary stenosis     Depression     Gastroparesis     GERD (gastroesophageal reflux disease)     History of ERCP     IBS (irritable bowel syndrome)     Joint pain     Low back pain     Migraine     Neck pain     Osteoarthritis     Peripheral neuropathy     Port or reservoir infection 05/2023    Sleep apnea     resolved 3/4/24    Spinal cord stimulator status     Vitamin B12 deficiency       Past Surgical History:   Procedure Laterality Date    BACK SURGERY      CHOLECYSTECTOMY      EPIDURAL BLOCK      HYSTERECTOMY      IMPLANTATION / REPLACEMENT INFUSION PUMP      KNEE ARTHROSCOPY Right     NECK SURGERY      PANCREAS SURGERY      SPINAL CORD STIMULATOR IMPLANT      TRIGGER POINT INJECTION        Social History     Socioeconomic History    Marital status:    Tobacco Use    Smoking status: Never    Smokeless tobacco: Never   Vaping Use    Vaping status: Never Used   Substance and Sexual Activity    Alcohol use: No    Drug use: No       History reviewed. No pertinent family history.   Allergies   Allergen Reactions    Acetaminophen-Codeine Hives    Fentanyl Other (See Comments) and Hives     Dystonic movements  Dystonic movements  Dystonic movements  Dystonic movements    Morphine Other (See Comments)     Other reaction(s): Other (See Comments)  Headaches    Codeine Itching      Social Determinants of Health     Tobacco Use: Low Risk  (7/22/2024)    Patient History     Smoking Tobacco Use: Never     Smokeless Tobacco Use: Never     Passive Exposure: Not on file   Alcohol Use: Not At Risk (7/22/2024)    AUDIT-C     Frequency of Alcohol Consumption: Never     Average Number of Drinks: Patient does not drink     Frequency of Binge Drinking: Never   Financial Resource Strain: Not on file   Food Insecurity: Not on file   Transportation Needs: Not on file   Physical Activity: Not on file   Stress: Not on file   Social Connections: Unknown (10/10/2023)    Family and Community Support     Help with Day-to-Day Activities: Not on file     Lonely or Isolated: Not on file   Interpersonal Safety: Not At Risk (7/21/2024)    Abuse Screen     Unsafe at Home or Work/School: no     Feels Threatened by Someone?: no     Does Anyone Keep You from Contacting Others or Doint Things Outside the Home?: no     Physical Sign of Abuse Present: no   Depression: Not at risk (3/4/2024)    PHQ-2     PHQ-2 Score: 0   Housing Stability: Unknown (7/22/2024)    Housing Stability     Current Living Arrangements: home     Potentially Unsafe Housing Conditions: Not on file   Utilities: Not on file   Health Literacy: Unknown (10/10/2023)    Education     Help with school or training?: Not on file     Preferred Language: Not on file   Employment: Unknown (10/10/2023)    Employment     Do you want help finding or keeping work or a job?: Not on file   Disabilities: At Risk (7/22/2024)    Disabilities     Concentrating, Remembering, or Making Decisions Difficulty: no     Doing Errands  Independently Difficulty: yes        Home Medications     Prior to Admission medications    Medication Sig Start Date End Date Taking? Authorizing Provider   acetaminophen (TYLENOL) 500 MG tablet as needed    Rodrick Scott MD   amphetamine-dextroamphetamine (ADDERALL) 10 MG tablet Take 0.5 tablets by mouth Daily.    Rodrick Scott MD   BD PosiFlush 0.9 % flush  6/17/21   Nurse Anayeli Batres RN   Bromfenac Sodium (BromSite) 0.075 % solution Compounded Drop, also includes Prednisilone Sodium Phosphate 1% and Moxifloxacin 0.5% 3/11/22   Rodrick Scott MD   chlorhexidine (PERIDEX) 0.12 % solution RINSE AND SPIT 15ML DAILY 1/29/18   Rodrick Scott MD   cyanocobalamin 1000 MCG/ML injection Inject 1 mL into the appropriate muscle as directed by prescriber 1 (One) Time Per Week. 10/12/21   Rodrick Scott MD   DEXILANT 60 MG capsule Take 1 capsule by mouth Daily. 3/5/18   Rodrick Scott MD   diphenhydrAMINE (BENADRYL) 50 MG/ML injection  6/30/20   Rodrick Scott MD   famotidine (PEPCID) 40 MG tablet Take 1 tablet by mouth 2 (Two) Times a Day. 11/20/23 11/19/24  Rodrick Scott MD   fludrocortisone 0.1 MG tablet Take 1 tablet by mouth. 8/12/21   Nurse Anayeli Batres RN   FLUoxetine (PROzac) 40 MG capsule Take 1 capsule by mouth Daily. 2/6/18   Rodrick Scott MD   heparin 100 UNIT/ML solution injection  7/14/21   EmergencyNurse Anayeli RN   ibuprofen (ADVIL,MOTRIN) 200 MG tablet IBUPROFEN 200 MG TABS 2/13/17   Rodrick Scott MD   latanoprost (XALATAN) 0.005 % ophthalmic solution INSTILL 1 DROP INTO BOTH EYES EVERY DAY IN THE EVENING 11/1/22   Rodrick Scott MD   lidocaine (LIDODERM) 5 % PLACE 3 PATCHES ON THE SKIN AS DIRECTED BY PROVIDER DAILY. 8/14/23   Jordin Strauss MD   lidocaine-prilocaine (EMLA) 2.5-2.5 % cream  5/17/21   Nurse Anayeli Batres RN   linaclotide (LINZESS) 145 MCG capsule capsule LINZESS 145 MCG CAPS 2/13/17   Sonia  MD Rodrick   loratadine (CLARITIN) 10 MG tablet Take 1 tablet by mouth.    Rodrick Scott MD   Lotemax SM 0.38 % gel Please see attached for detailed directions 2/15/24   Rodrick Scott MD   morphine 10 mg/mL by Intrathecal route Continuous. 6/6/24   Jordin Strauss MD   moxifloxacin (VIGAMOX) 0.5 % ophthalmic solution Compounded drop, also includes Prednisilone Sodium Phosphate 1% and Bromfenac 0.075% 3/11/22   Rodrick Scott MD   Nurtec 75 MG dispersible tablet Take 1 tablet by mouth Every Other Day.    Rodrick Scott MD   omeprazole (priLOSEC) 40 MG capsule Take 1 capsule by mouth.    Rodrick Scott MD   OnabotulinumtoxinA 200 units reconstituted solution INJECT UP  UNITS INTRAMUSCULAR INTO HEAD & NECK AREA EVERY 12 WEEKS FOR CHRONIC MIGRAINE TO BE INJECTED IN OFFICE BY PHYSICIAN 3/2/23   Jordin Strauss MD   ondansetron (ZOFRAN) 2 mg/mL injection  3/25/22   Rodrick Scott MD   ondansetron (ZOFRAN) 8 MG tablet TAKE 1 TABLET (8 MG TOTAL) BY MOUTH 3 (THREE) TIMES A DAY. 9/19/22   Rodrick Scott MD   ondansetron ODT (ZOFRAN-ODT) 8 MG disintegrating tablet  9/28/20   Rodrick Scott MD   oxyCODONE (ROXICODONE) 10 MG tablet Take 1 tablet by mouth Every 12 (Twelve) Hours As Needed for Severe Pain. - take Acetaminophen 325 mg with each dose 7/9/24   Jordin Strauss MD   Panzyga 20 GM/200ML solution infusion  1/6/22   Rodrick Scott MD   pregabalin (LYRICA) 150 MG capsule Take 1 capsule by mouth 3 (Three) Times a Day. 6/26/24   Bev Card APRN   promethazine (PHENERGAN) 25 MG/ML injection  6/16/22   Rodrick Scott MD   SENNOSIDES-DOCUSATE SODIUM PO SM SENNA-S TABS 2/13/17   Rodrick Scott MD   sodium chloride 0.9 % solution  6/30/20   Rodrick Scott MD   Sodium Chloride powder  4/4/22   Rodrick Scott MD   SUMAtriptan (IMITREX) 100 MG tablet TAKE 1 TABLET BY MOUTH AS NEEDED FOR HEADACHE 3/5/18   Provider,  MD Rodrick   Xiidra 5 % ophthalmic solution Administer 1 drop to both eyes 1 (One) Time.    Provider, MD Rodrick        Objective / Physical Exam     Vital signs:  Temp: 97.4 °F (36.3 °C)  BP: (!) 88/41  Heart Rate: 104  Resp: 11  SpO2: 96 %  Weight: 47 kg (103 lb 9.9 oz)    Admission Weight: Weight: 47 kg (103 lb 9.9 oz)    Physical Exam  Constitutional:       Appearance: She is cachectic. She is toxic-appearing.   HENT:      Head: Normocephalic.      Nose: Nose normal.      Mouth/Throat:      Mouth: Mucous membranes are moist.   Eyes:      Extraocular Movements: Extraocular movements intact.      Pupils: Pupils are equal, round, and reactive to light.   Cardiovascular:      Rate and Rhythm: Regular rhythm. Tachycardia present.      Pulses: Normal pulses.      Heart sounds: Normal heart sounds.   Pulmonary:      Effort: Prolonged expiration present.      Breath sounds: Decreased air movement present.      Comments: Shallow respiration  Abdominal:      General: Abdomen is flat. Bowel sounds are decreased.      Palpations: Abdomen is soft.   Musculoskeletal:         General: Normal range of motion.      Comments: Generalized bruising   Skin:     General: Skin is warm.      Coloration: Skin is pale.      Comments: Tick under right shoulder- removed   Neurological:      Mental Status: She is oriented to person, place, and time. She is lethargic.   Psychiatric:         Attention and Perception: She is inattentive.         Mood and Affect: Affect is flat.         Speech: Speech is delayed.         Behavior: Behavior is cooperative.          Labs     Results from last 7 days   Lab Units 07/22/24  0250 07/21/24  2200   WBC 10*3/mm3  --  3.89   HEMATOCRIT %  --  31.0*   HEMATOCRIT POC % 30*  --    PLATELETS 10*3/mm3  --  29*      Results from last 7 days   Lab Units 07/22/24  0250 07/21/24  2200   SODIUM mmol/L  --  137   POTASSIUM mmol/L  --  3.9   CHLORIDE mmol/L  --  103   CO2 mmol/L  --  18.2*   BUN mg/dL  --   43*   CREATININE mg/dL 1.82* 2.26*        Imaging     Chest X ray: My independent assessment showed emphysema, no acute infiltrates or effusions    EKG: My independent evaluation showed normal sinus rhythm, no ST -T changes    Current Medications     Scheduled Meds:  sodium chloride, 10 mL, Intravenous, Q12H         Continuous Infusions:  norepinephrine, 0.02-0.3 mcg/kg/min, Last Rate: 0.12 mcg/kg/min (07/22/24 0232)       Patient continues to be critically ill, remains at risk of clinical deterioration or death and needed high complexity decision making. I have spent a total of 40 minutes providing critical care services to this patient including but not limited to: review of labs/ microbiology/imaging/medications, serial monitoring of vital signs, review of other consultant's notes, review of events in the last 24 hrs, monitoring input/output, review of treatment plan with bedside nurse, RT and other treatment team, management of life support and nutrition needs. I also spoke with patient daughter about the plan of care and answered all questions.    Time spent in performing separately billable procedures and updating family is not included in the critical care time.       CAROLANN Jolly   Critical Care  07/22/24   03:26 EDT

## 2024-07-22 NOTE — CASE MANAGEMENT/SOCIAL WORK
Discharge Planning Assessment   Eulogio     Patient Name: Amalia Lopez  MRN: [id number]  Today's Date: 7/22/2024    Admit Date: 7/21/2024    Plan: From home alone, pending clinical course and PT/OT eval.   Discharge Needs Assessment       Row Name 07/22/24 1414       Living Environment    People in Home alone    Current Living Arrangements home    Potentially Unsafe Housing Conditions none    In the past 12 months has the electric, gas, oil, or water company threatened to shut off services in your home? No    Primary Care Provided by self    Provides Primary Care For no one    Family Caregiver if Needed child(suzie), adult    Family Caregiver Names Ashley - dgt    Quality of Family Relationships helpful;involved;supportive    Able to Return to Prior Arrangements yes       Resource/Environmental Concerns    Resource/Environmental Concerns none    Transportation Concerns none       Transportation Needs    In the past 12 months, has lack of transportation kept you from medical appointments or from getting medications? no    In the past 12 months, has lack of transportation kept you from meetings, work, or from getting things needed for daily living? No       Food Insecurity    Within the past 12 months, you worried that your food would run out before you got the money to buy more. Never true    Within the past 12 months, the food you bought just didn't last and you didn't have money to get more. Never true       Transition Planning    Patient/Family Anticipates Transition to home    Patient/Family Anticipated Services at Transition none    Transportation Anticipated car, drives self;family or friend will provide       Discharge Needs Assessment    Readmission Within the Last 30 Days no previous admission in last 30 days    Equipment Currently Used at Home cpap    Concerns to be Addressed discharge planning    Anticipated Changes Related to Illness none    Equipment Needed After Discharge none                    Discharge Plan       Row Name 07/22/24 1414       Plan    Plan From home alone, pending clinical course and PT/OT eval.    Patient/Family in Agreement with Plan yes    Plan Comments CM met with patient and daughter Ashley at bedside. Patient is baseline A&Ox4, but is confused and drowsy today.  Daughter Ashley provided information. Patient lives at home alone and cares for self, walks 3 mile/day.  Family will transport at discharge. Patient performs ADLs. PCP and pharmacy confirmed. Agreeable to M2B.  Denies financial assistance needs for medication and/or food. Denies any current DME, HH, Caregiver, or rehab services.  Daughter states she does think patient has a CPAP machine at home. DC Barriers: plan IR drain jaguar MRCP today 7/22, NPO, IV Abxs, pain pump (implanted), SC Port, IV steroids, 7L NC, Levo gtt, D5LR @ 100, Plts 25 - tranfuse,  IR/Gen Sx following.                 Expected Discharge Date and Time       Expected Discharge Date Expected Discharge Time    Jul 23, 2024            Demographic Summary       Row Name 07/22/24 1413       General Information    Admission Type inpatient    Arrived From emergency department    Required Notices Provided Important Message from Medicare    Referral Source admission list    Reason for Consult discharge planning    Preferred Language English                   Functional Status       Row Name 07/22/24 1413       Functional Status    Usual Activity Tolerance moderate    Current Activity Tolerance fair       Functional Status, IADL    Medications independent    Meal Preparation independent    Housekeeping independent    Laundry independent    Shopping independent       Mental Status    General Appearance WDL WDL       Mental Status Summary    Recent Changes in Mental Status/Cognitive Functioning no changes             ROMEO Mclain RN  SIPS/ICU   O: [phone]  C: [phone]  [email or web address]

## 2024-07-22 NOTE — PROGRESS NOTES
"Pharmacy Antimicrobial Dosing Service    Subjective:  Amalia Lopez is a 68 y.o.female admitted with septic shock 2/2 acute cholecystitis. Pharmacy has been consulted to dose Vancomycin for possible sepsis.    PMH: spinal cord stimulator, IT morphine pain pump, GERD      Assessment/Plan    1. Day #1 Vancomycin: Pulse dosing d/t renal dysfxn. Considering renal dysfunction, plan to pulse dose vanc. Load with vanc 1.25 g (25 mg/kg TBW) IV x1. Obtain random level with AM labs 7/23. Plan to re-dose when level is expected to be < 20 mcg/mL.    2. Day #1 Ceftriaxone: 2 g IV q24h for BSI.    3. Day #1 Flagyl: 500 mg IV q8h.    4. Day #1 Doxycycline: 100 mg IV q12h.    Will continue to monitor drug levels, renal function, culture and sensitivities, and patient clinical status.       Objective:  Relevant clinical data and objective history reviewed:  167.6 cm (66\")   48.3 kg (106 lb 7.7 oz)   Ideal body weight: 59.3 kg (130 lb 11.7 oz)  Body mass index is 17.19 kg/m².        Results from last 7 days   Lab Units 07/22/24  1413 07/22/24  0905 07/22/24  0250   CREATININE mg/dL 2.05* 2.34* 1.82*     Estimated Creatinine Clearance: 20.6 mL/min (A) (by C-G formula based on SCr of 2.05 mg/dL (H)).  I/O last 3 completed shifts:  In: -   Out: 225 [Urine:225]    Results from last 7 days   Lab Units 07/22/24  0905 07/21/24  2200   WBC 10*3/mm3 2.68* 3.89     Temperature    07/22/24 1124 07/22/24 1150 07/22/24 1605   Temp: 98 °F (36.7 °C) 98.7 °F (37.1 °C) 98.9 °F (37.2 °C)     Baseline culture/source/susceptibility:  Microbiology Results (last 10 days)       Procedure Component Value - Date/Time    Body Fluid Culture - Body Fluid, Gallbladder [[id number]] Collected: 07/22/24 1225    Lab Status: Preliminary result Specimen: Body Fluid from Gallbladder Updated: 07/22/24 1458     Body Fluid Culture Abnormal Stain     Gram Stain Rare (1+) WBCs per low power field      Moderate (3+) Mixed bacterial morphotypes seen on Gram Stain    Urine " Culture - Urine, Straight Cath [[id number]]  (Normal) Collected: 07/21/24 2244    Lab Status: Preliminary result Specimen: Urine from Straight Cath Updated: 07/22/24 1040     Urine Culture No growth    Blood Culture - Blood, Arm, Right [[id number]]  (Abnormal) Collected: 07/21/24 2212    Lab Status: Preliminary result Specimen: Blood from Arm, Right Updated: 07/22/24 1254     Blood Culture Abnormal Stain     Gram Stain Aerobic Bottle Gram positive cocci in clusters      Anaerobic Bottle Gram positive cocci in clusters    Narrative:      Less than seven (7) mL's of blood was collected.  Insufficient quantity may yield false negative results.    Blood Culture ID, PCR - Blood, Arm, Right [[id number]]  (Abnormal) Collected: 07/21/24 2212    Lab Status: Final result Specimen: Blood from Arm, Right Updated: 07/22/24 1253     BCID, PCR Staph aureus. mecA/C and MREJ (methicillin resistance gene) NOT detected. Identification by BCID2 PCR     BOTTLE TYPE Aerobic Bottle    Narrative:      Infectious disease consultation is highly recommended to rule out distant foci of infection.    Blood Culture - Blood, Arm, Left [[id number]]  (Abnormal) Collected: 07/21/24 2200    Lab Status: Preliminary result Specimen: Blood from Arm, Left Updated: 07/22/24 1255     Blood Culture Abnormal Stain     Gram Stain Aerobic Bottle Gram positive cocci in clusters      Anaerobic Bottle Gram positive cocci in clusters    Narrative:      Less than seven (7) mL's of blood was collected.  Insufficient quantity may yield false negative results.            Allyson Paulson RPH  07/22/24 17:28 EDT

## 2024-07-22 NOTE — CONSULTS
Nutrition Services    Patient Name: Amalia Lopez  YOB: 1955  MRN: [id number]  Admission date: 7/21/2024    Comment:  -- Severe chronic disease related malnutrition related to multiple chronic disease conditions as evidenced by severe fat/muscle loss per physical exam, PO intakes less than 75% x greater than 1 month. See MSA below.    -- RD to add ONS when able       CLINICAL NUTRITION ASSESSMENT      Reason for Assessment 7/22: Consult for Nursing Admission Screen, MST of 2, BMI less than 19     H&P      Past Medical History:   Diagnosis Date    Biliary stenosis     Depression     Gastroparesis     GERD (gastroesophageal reflux disease)     History of ERCP     IBS (irritable bowel syndrome)     Joint pain     Low back pain     Migraine     Neck pain     Osteoarthritis     Peripheral neuropathy     Port or reservoir infection 05/2023    Sleep apnea     resolved 3/4/24    Spinal cord stimulator status     Vitamin B12 deficiency        Past Surgical History:   Procedure Laterality Date    BACK SURGERY      CHOLECYSTECTOMY      EPIDURAL BLOCK      HYSTERECTOMY      IMPLANTATION / REPLACEMENT INFUSION PUMP      KNEE ARTHROSCOPY Right     NECK SURGERY      PANCREAS SURGERY      SPINAL CORD STIMULATOR IMPLANT      TRIGGER POINT INJECTION          Current Problems   Septic Shock    Rhabdomyolysis     Acute knee injury superimposed on urinary tract infection     Chronic pain syndrome  Polypharmacy       Encounter Information        Trending Narrative     7/22: Admitted after being found down at home and diagnosed with rhabdomyolysis.  Surgery to see.  Noted patient with tick on her at admission, removed per nursing and tick panel ordered.  Patient discussed in AM rounds.  RD visited patient at bedside.  Spoke to patients daughter at bedside.  Patient has gastroparesis per daughter.  Daughter reports patient has been at current weight for at least a year, no N/V, no chewing/swallowing issues noted, often  "eats salad and yogurt.  Daughter reports patient does not routinely drink Boost/Ensure.  NFPE completed, consistent with nutrition diagnosis of malnutrition using AND/ASPEN criteria. See MSA below.         Anthropometrics        Current Height, Weight Height: 167.6 cm (66\")  Weight: 48.3 kg (106 lb 7.7 oz) (07/22/24 0600)       Usual Body Weight (UBW) Unable to obtain from patient        Trending Weight Hx     This admission: 7/22: 106#             PTA: Stable weight history     Wt Readings from Last 30 Encounters:   07/22/24 0600 48.3 kg (106 lb 7.7 oz)   07/22/24 0300 48.2 kg (106 lb 4.2 oz)   07/21/24 2130 47 kg (103 lb 9.9 oz)   07/08/24 1853 45.3 kg (99 lb 12.8 oz)   06/26/24 1443 45.5 kg (100 lb 6.4 oz)   06/03/24 1008 46.4 kg (102 lb 3.2 oz)   03/04/24 0949 45.5 kg (100 lb 3.2 oz)   11/15/23 0925 44.3 kg (97 lb 9.6 oz)   09/13/23 1015 45.5 kg (100 lb 3.2 oz)   08/07/23 1133 46.4 kg (102 lb 6.4 oz)   07/10/23 1156 45.4 kg (100 lb)   05/03/23 1115 46.2 kg (101 lb 12.8 oz)   04/26/23 0831 46.1 kg (101 lb 9.6 oz)   02/15/23 1023 45.8 kg (101 lb)   01/04/23 0851 44.3 kg (97 lb 9.6 oz)   12/02/22 1129 44.5 kg (98 lb)   09/23/22 1048 47 kg (103 lb 9.6 oz)   09/14/22 1110 45.4 kg (100 lb)   06/29/22 1347 44.5 kg (98 lb)   06/06/22 1035 46.5 kg (102 lb 9.6 oz)   04/06/22 1310 46.4 kg (102 lb 6.4 oz)   03/08/22 0957 46.1 kg (101 lb 9.6 oz)   01/17/22 1056 46.7 kg (103 lb)   12/06/21 1100 46.7 kg (103 lb)   10/27/21 1259 46.9 kg (103 lb 6.4 oz)   09/21/21 1427 45.4 kg (100 lb)   07/28/21 1259 47.4 kg (104 lb 9.6 oz)   07/07/21 1259 47 kg (103 lb 9.6 oz)   03/17/21 1314 48.8 kg (107 lb 9.6 oz)   11/11/20 1327 49 kg (108 lb)   09/30/20 1434 48.8 kg (107 lb 9.6 oz)   08/17/20 1140 50.4 kg (111 lb 3.2 oz)      BMI kg/m2 Body mass index is 17.19 kg/m².       Labs        Pertinent Labs    Results from last 7 days   Lab Units 07/22/24  0250 07/21/24  2200   SODIUM mmol/L  --  137   POTASSIUM mmol/L  --  3.9   CHLORIDE mmol/L  " "--  103   CO2 mmol/L  --  18.2*   BUN mg/dL  --  43*   CREATININE mg/dL 1.82* 2.26*   CALCIUM mg/dL  --  8.4*   BILIRUBIN mg/dL  --  0.4   ALK PHOS U/L  --  237*   ALT (SGPT) U/L  --  83*   AST (SGOT) U/L  --  216*   GLUCOSE mg/dL  --  82     Results from last 7 days   Lab Units 07/22/24  0250 07/21/24  2200   MAGNESIUM mg/dL  --  1.6   HEMOGLOBIN g/dL  --  10.2*   HEMOGLOBIN, POC g/dL 10.0*  --    HEMATOCRIT %  --  31.0*   HEMATOCRIT POC % 30*  --      No results found for: \"HGBA1C\"     Medications    Scheduled Medications cefTRIAXone, 1,000 mg, Intravenous, Q24H  doxycycline, 100 mg, Intravenous, Q12H  [Held by provider] heparin (porcine), 5,000 Units, Subcutaneous, Q8H  hydrocortisone sodium succinate, 50 mg, Intravenous, Q6H  metroNIDAZOLE, 500 mg, Intravenous, Q8H  pantoprazole, 40 mg, Intravenous, Q AM  sodium chloride, 10 mL, Intravenous, Q12H        Infusions dextrose 5 % and lactated Ringer's, 100 mL/hr  norepinephrine, 0.02-0.5 mcg/kg/min, Last Rate: 0.27 mcg/kg/min (07/22/24 0643)  Pharmacy to Dose Cefepime,         PRN Medications   nitroglycerin    ondansetron ODT **OR** ondansetron    Pharmacy to Dose Cefepime    [COMPLETED] Insert Peripheral IV **AND** sodium chloride    sodium chloride    sodium chloride     Physical Findings        Trending Physical   Appearance, NFPE 7/22: NFPE completed, consistent with nutrition diagnosis of malnutrition using AND/ASPEN criteria. See MSA below.      --  Edema  No edema documented      Bowel Function No BM since admission (x 1 day ago)     Tubes No feeding tube      Chewing/Swallowing Unknown baseline      Skin Intact      --  Current Nutrition Orders & Evaluation of Intake       Oral Nutrition     Food Allergies NKFA   Current PO Diet NPO Diet NPO Type: Strict NPO   Supplement None ordered    PO Evaluation     Trending % PO Intake 7/22: NPO   --  Nutritional Risk Screening        NRS-2002 Score          Nutrition Diagnosis         Nutrition Dx Problem 1 Severe " chronic disease related malnutrition related to multiple chronic disease conditions as evidenced by severe fat/muscle loss per physical exam, PO intakes less than 75% x greater than 1 month.        Nutrition Dx Problem 2        Intervention Goal         Intervention Goal(s) No weight loss  Diet advanced per MD     Nutrition Intervention        RD Action Add ONS when able   Completed NFPE     Nutrition Prescription          Diet Prescription NPO   Supplement Prescription Will add when appropriate    --  Monitor/Evaluation        Monitor Per protocol, I&O, PO intake, Supplement intake, Pertinent labs, Weight, Skin status, GI status, Symptoms, POC/GOC     Malnutrition Severity Assessment      Patient meets criteria for : Severe Malnutrition  Malnutrition Type (Last 8 Hours)       Malnutrition Severity Assessment       Row Name 07/22/24 1227       Malnutrition Severity Assessment    Malnutrition Type Chronic Disease - Related Malnutrition      Row Name 07/22/24 1227       Insufficient Energy Intake     Insufficient Energy Intake Findings Severe    Insufficient Energy Intake  <75% of est. energy requirement for > or equal to 1 month      Row Name 07/22/24 1227       Muscle Loss    Loss of Muscle Mass Findings Severe    Eva Region Severe - deep hollowing/scooping, lack of muscle to touch, facial bones well defined    Clavicle Bone Region Severe - protruding prominent bone    Acromion Bone Region Severe - squared shoulders, bones, and acromion process protrusion prominent    Scapular Bone Region Severe - prominent bones, depressions easily visible between ribs, scapula, spine, shoulders    Dorsal Hand Region Severe - prominent depression    Patellar Region Severe - prominent bone, square looking, very little muscle definition    Anterior Thigh Region Severe - line/depression along thigh, obviously thin    Posterior Calf Region Severe - thin with very little definition/firmness      Row Name 07/22/24 1227       Fat Loss     Subcutaneous Fat Loss Findings Severe    Orbital Region  Severe - pronounced hollowness/depression, dark circles, loose saggy skin    Upper Arm Region Severe - mostly skin, very little space between folds, fingers touch    Thoracic & Lumbar Region Severe - ribs visible with prominent depressions, iliac crest very prominent      Row Name 07/22/24 1227       Criteria Met (Must meet criteria for severity in at least 2 of these categories: M Wasting, Fat Loss, Fluid, Secondary Signs, Wt. Status, Intake)    Patient meets criteria for  Severe Malnutrition                     Electronically signed by:  Stormy Baires RD  07/22/24 08:23 EDT

## 2024-07-22 NOTE — PLAN OF CARE
Goal Outcome Evaluation:     Pt A&Ox4, but confused and illogical speech at times. Adequate urine output, straight cath performed for urine tox screen; no BM this shift. Visual hallucinations present at times. On 7L HF NC. Levophed increased since arriving to ICU. Bicarb gtt also infusing. Pt states she accesses and deaccesses her port herself daily and gives herself her own IV medications; port accessed and reaccessed on the unit. Pt increasing lethargy, difficult to keep awake for questions and admission assessment. Vital signs stable with titration of levophed, see MAR. Continuing to monitor.

## 2024-07-23 NOTE — PROCEDURES
"Intubation    Date/Time: 7/22/2024 11:00 PM    Performed by: Clarice Rivero APRN  Authorized by: Clarice Rivero APRN  Consent: The procedure was performed in an emergent situation.  Patient identity confirmed: arm band, provided demographic data, hospital-assigned identification number and anonymous protocol, patient vented/unresponsive  Time out: Immediately prior to procedure a \"time out\" was called to verify the correct patient, procedure, equipment, support staff and site/side marked as required.  Indications: respiratory failure  Intubation method: glidescope.  Preoxygenation: BVM  Laryngoscope size: Mac 3  Tube size: 7.5 mm  Number of attempts: 1  Ventilation between attempts: BVM  Cricoid pressure: no  Cords visualized: yes  Post-procedure assessment: chest rise and CO2 detector  Breath sounds: equal and absent over the epigastrium  Cuff inflated: yes  ETT to lip: 24 cm  Tube secured with: ETT elizabeth  Chest x-ray interpreted by: awaiting radiology at time of documentation.  Patient tolerance: patient tolerated the procedure well with no immediate complications      Electronically signed by CAROLANN Ellis, 07/22/24, 11:01 PM EDT.    "

## 2024-07-23 NOTE — CASE MANAGEMENT/SOCIAL WORK
Case Management Discharge Note      Final Note:          Selected Continued Care - Discharged on 2024 Admission date: 2024 - Discharge disposition:         Final Discharge Disposition Code: [id number] -  in medical facility    ROMEO Mclain RN  SIPS/ICU   O: [phone]  C: [phone]  [email or web address]

## 2024-07-23 NOTE — DISCHARGE SUMMARY
CRITICAL CARE DEATH SUMMARY    PATIENT NAME:     Amalia Lopez  :     1955  MRN:     [id number]    ADMISSION DATE:  2024      DATE/TIME OF DEATH:    2024 at 6:31 AM       CAUSE OF DEATH  Septic shock compounded by rhabdomyolysis acute kidney injury thrombocytopenia and hyperkalemia    FINAL DIAGNOSES  Septic shock  Rhabdomyolysis  Acute kidney injury  Acute thrombocytopenia  Hyperkalemia  Urinary tract infection  Bilateral lower lobe pneumonia  Cholecystitis  Chronic pain syndrome with presence of spinal cord stimulator  Presence of pain pump  Polypharmacy  Malnutrition    HOSPITAL COURSE  Female with PMH of chronic pain syndrome with spinal cord stimulator and presence of pain pump, gastroparesis, GERD, vitamin D deficiency presented to the hospital after her daughter found her down at home, and was admitted with a principal diagnosis of Rhabdomyolysis.  Patient's daughter stated that she had been progressively getting worse over some, with generalized weakness and frequently dropping things.  Patient had history of hypotension, however on admission it was lower than normal.  Daughter raised concerns about patient being overmedicated because she took oxycodone at home and also had a pain pump, which was filled with morphine.  Basal dose and ability to give boluses of medication was unknown.  She was also prescribed Phenergan and Lyrica.  Of note patient came in with her port accessed and informed nursing staff that she accessed her port herself at home, and would give herself IV Benadryl.  Patient's daughter stated she talked to her mother at 8 AM the morning prior to admission, and found her on the floor around 8 PM the day of admission.       ED course: Patient's lab work triggered sepsis and patient was given fluids.  Her lactic acid was elevated at 3.9.  White count was noted to be low end of normal however she was showing 53% bands.  She was treated with IV cefepime empirically.  Patient  also noted to have platelet count of 29 but reportedly had a history of thrombocytopenia though no documentation noted.  CMP showed an acute kidney injury with a creatinine of 2.26 and elevated LFTs.  Urinalysis showed 4+ bacteria, + leukocytes, + nitrites.  She had persistent hypotension despite 30 cc/kg bolus and was started on Levophed. Upon arrival to ICU, nursing staff completed skin assessment and found a large tic burrowed into patient's back.  This was removed with head intact and cleaned thoroughly.    Throughout the evening of 7/22/2024 patient's condition continued to worsen requiring intubation and additional vasopressors.  Patient became hyperkalemic and progressed to multiorgan failure.  Patient had ventricular fibrillation cardiac arrest followed by ventricular tachycardia arrest.  Patient's daughter ultimately requested discontinuation of resuscitative efforts and patient succumbed to her illnesses.    Time of death noted to be on 7/23/2024 at 6:31 AM .  Emotional support provided to family.       PROCEDURES PERFORMED    07/23 0216 Insert Arterial Line, Insert Central Line At Bedside  07/22 2300 Intubation    CONSULTING PHYSICIANS  Consults       Date and Time Order Name Status Description    7/22/2024  7:55 AM Inpatient General Surgery Consult Completed             RESULTS REVIEW  LABS  Lab Results (last 24 hours)       Procedure Component Value Units Date/Time    Blood Gas, Arterial -Obtain on: Current Settings [[id number]]  (Abnormal) Collected: 07/22/24 2322    Specimen: Arterial Blood Updated: 07/22/24 2333     Site Arterial Line     Lino's Test N/A     pH, Arterial 6.766 pH units      pCO2, Arterial 42.3 mm Hg      pO2, Arterial 98.0 mm Hg      HCO3, Arterial 6.1 mmol/L      Base Excess, Arterial -27.4 mmol/L      Comment: Serial Number: [id number]:  [id number]        O2 Saturation, Arterial 86.0 %      Barometric Pressure for Blood Gas --     Comment: N/A        Modality Adult Vent     FIO2 100  %      Ventilator Mode AC     Set Tidal Volume 450     PEEP 5     Notified Who --     Read Back --     Notified Time --     Hemodilution No     Respiratory Rate 22     PO2/FIO2 98    POC Creatinine [[id number]]  (Abnormal) Collected: 07/22/24 2322    Specimen: Arterial Blood Updated: 07/22/24 2327     Creatinine 2.58 mg/dL      Comment: Serial Number: [id number]:  [id number]        eGFR 20.0 mL/min/1.73     POCT Electrolytes +HGB +HCT [[id number]]  (Abnormal) Collected: 07/22/24 2322    Specimen: Arterial Blood Updated: 07/22/24 2332     Sodium 141 mmol/L      POC Potassium 5.6 mmol/L      Ionized Calcium 1.05 mmol/L      Comment: Serial Number: [id number]:  [id number]        Glucose 142 mg/dL      Hematocrit 23 %      Hemoglobin 8.0 g/dL      Notified Time --     Notified Who --     Read Back --    POC Lactate [[id number]]  (Abnormal) Collected: 07/22/24 2322    Specimen: Arterial Blood Updated: 07/22/24 2332     Lactate 15.5 mmol/L      Comment: Serial Number: [id number]:  [id number]        Notified Time --     Notified Who --     Read Back --    POC Glucose Once [[id number]]  (Abnormal) Collected: 07/22/24 2322    Specimen: Arterial Blood Updated: 07/22/24 2327     Glucose 142 mg/dL      Comment: Serial Number: [id number]:  [id number]       Pathology Consultation [[id number]] Collected: 07/22/24 2335    Specimen: Blood, Venous Line; Blood Updated: 07/23/24 1118     Final Diagnosis --     Left shifted granulocytes  Anemia  Thrombocytopenia  No blasts identified       Case Report --     Surgical Pathology Report                         Case: [id number]                                  Authorizing Provider:  Wayne Dunn MD     Collected:           07/22/2024 11:35 PM          Ordering Location:     [facility]       Received:            07/23/2024 06:46 AM                                 INTENSIVE CARE UNIT                                                          Pathologist:           Guerrero Kennedy MD                                                             Specimens:   1) - Blood, Venous Line                                                                             2) -                                                                                       Magnesium [[id number]]  (Normal) Collected: 07/22/24 2335    Specimen: Blood Updated: 07/23/24 0011     Magnesium 2.1 mg/dL     Phosphorus [[id number]]  (Abnormal) Collected: 07/22/24 2335    Specimen: Blood Updated: 07/23/24 0003     Phosphorus 7.2 mg/dL     CK [[id number]]  (Abnormal) Collected: 07/22/24 2335    Specimen: Blood Updated: 07/23/24 0015     Creatine Kinase 6,633 U/L     Vancomycin, Random [[id number]]  (Normal) Collected: 07/22/24 2335    Specimen: Blood Updated: 07/23/24 0003     Vancomycin Random 17.90 mcg/mL     Narrative:      Therapeutic Ranges for Vancomycin    Vancomycin Random   5.0-40.0 mcg/mL  Vancomycin Trough   5.0-20.0 mcg/mL  Vancomycin Peak     20.0-40.0 mcg/mL    Comprehensive Metabolic Panel [[id number]]  (Abnormal) Collected: 07/22/24 2335    Specimen: Blood Updated: 07/23/24 0011     Glucose 129 mg/dL      BUN 53 mg/dL      Creatinine 2.79 mg/dL      Sodium 140 mmol/L      Potassium 5.6 mmol/L      Comment: Slight hemolysis detected by analyzer. Result may be falsely elevated.        Chloride 108 mmol/L      CO2 6.4 mmol/L      Calcium 7.1 mg/dL      Total Protein 4.6 g/dL      Albumin 3.1 g/dL      ALT (SGPT) 79 U/L      AST (SGOT) 294 U/L      Comment: Slight hemolysis detected by analyzer. Result may be falsely elevated.        Alkaline Phosphatase 132 U/L      Total Bilirubin 0.4 mg/dL      Globulin 1.5 gm/dL      A/G Ratio 2.1 g/dL      BUN/Creatinine Ratio 19.0     Anion Gap 25.6 mmol/L      eGFR 18.2 mL/min/1.73     Narrative:      GFR Normal >60  Chronic Kidney Disease <60  Kidney Failure <15      CBC & Differential [[id number]]  (Abnormal) Collected: 07/22/24 2335    Specimen: Blood Updated: 07/23/24 0038    Narrative:       The following orders were created for panel order CBC & Differential.  Procedure                               Abnormality         Status                     ---------                               -----------         ------                     CBC Auto Differential[[id number]]        Abnormal            Final result               Scan Slide[[id number]]                                       Final result                 Please view results for these tests on the individual orders.    CBC Auto Differential [[id number]]  (Abnormal) Collected: 07/22/24 2335    Specimen: Blood Updated: 07/23/24 0038     WBC 5.87 10*3/mm3      RBC 2.74 10*6/mm3      Hemoglobin 8.1 g/dL      Hematocrit 26.1 %      MCV 95.3 fL      MCH 29.6 pg      MCHC 31.0 g/dL      RDW 14.6 %      RDW-SD 51.4 fl      MPV --     Comment: Unable to Calculate          Platelets 21 10*3/mm3     Narrative:      The previously reported component NRBC is no longer being reported. Previous result was 0.3 /100 WBC (Reference Range: 0.0-0.2 /100 WBC) on 7/23/2024 at 0019 EDT.    Scan Slide [[id number]] Collected: 07/22/24 2335    Specimen: Blood Updated: 07/23/24 0038     Scan Slide --     Comment: See Manual Differential Results       Manual Differential [[id number]]  (Abnormal) Collected: 07/22/24 2335    Specimen: Blood Updated: 07/23/24 0038     Neutrophil % 28.0 %      Lymphocyte % 25.0 %      Monocyte % 4.0 %      Eosinophil % 1.0 %      Bands %  24.0 %      Metamyelocyte % 10.0 %      Myelocyte % 3.0 %      Atypical Lymphocyte % 5.0 %      Neutrophils Absolute 3.05 10*3/mm3      Lymphocytes Absolute 1.76 10*3/mm3      Monocytes Absolute 0.23 10*3/mm3      Eosinophils Absolute 0.06 10*3/mm3      Crenated RBC's Slight/1+     Toxic Granulation Mod/2+     Platelet Estimate Decreased    Path Consult Reflex [[id number]] Collected: 07/22/24 2335    Specimen: Blood Updated: 07/23/24 0038     Pathology Review Yes    Pathology Consultation [[id number]] Collected: 07/22/24  2335    Specimen: Blood, Venous Line; Blood Updated: 07/23/24 1118     Final Diagnosis --     Left shifted granulocytes  Anemia  Thrombocytopenia  No blasts identified       Case Report --     Surgical Pathology Report                         Case: [id number]                                  Authorizing Provider:  Wayne Dunn MD     Collected:           07/22/2024 11:35 PM          Ordering Location:     [facility]       Received:            07/23/2024 06:46 AM                                 INTENSIVE CARE UNIT                                                          Pathologist:           Guerrero Kennedy MD                                                            Specimens:   1) - Blood, Venous Line                                                                             2) -                                                                                       POC Glucose Q1H [[id number]]  (Abnormal) Collected: 07/23/24 0248    Specimen: Blood Updated: 07/23/24 0250     Glucose 63 mg/dL      Comment: Serial Number: [id number]:  [id number]       POC Glucose Q1H [[id number]]  (Abnormal) Collected: 07/23/24 0323    Specimen: Blood Updated: 07/23/24 0325     Glucose 255 mg/dL      Comment: Serial Number: [id number]:  [id number]       Blood Gas, Arterial - [[id number]]  (Abnormal) Collected: 07/23/24 0334    Specimen: Arterial Blood Updated: 07/23/24 0349     Site Arterial Line     Lino's Test N/A     pH, Arterial 6.794 pH units      pCO2, Arterial 54.6 mm Hg      pO2, Arterial 77.3 mm Hg      HCO3, Arterial 8.4 mmol/L      Base Excess, Arterial -23.9 mmol/L      Comment: Serial Number: [id number]:  [id number]        O2 Saturation, Arterial 77.0 %      CO2 Content 10.1 mmol/L      Barometric Pressure for Blood Gas --     Comment: N/A        Modality Adult Vent     FIO2 100 %      Ventilator Mode AC     Set Tidal Volume 450     PEEP 5     Notified Who --     Read Back --     Notified Time  --     Hemodilution No     Respiratory Rate 22     PO2/FIO2 77    POC Glucose Q1H [[id number]]  (Abnormal) Collected: 07/23/24 0426    Specimen: Blood Updated: 07/23/24 0427     Glucose 151 mg/dL      Comment: Serial Number: [id number]:  [id number]       CK [[id number]]  (Abnormal) Collected: 07/23/24 0458    Specimen: Blood Updated: 07/23/24 0547     Creatine Kinase 11,582 U/L     CBC & Differential [[id number]]  (Abnormal) Collected: 07/23/24 0458    Specimen: Blood Updated: 07/23/24 0643    Narrative:      The following orders were created for panel order CBC & Differential.  Procedure                               Abnormality         Status                     ---------                               -----------         ------                     CBC Auto Differential[[id number]]        Abnormal            Final result               Scan Slide[[id number]]                                       Final result                 Please view results for these tests on the individual orders.    Comprehensive Metabolic Panel [[id number]]  (Abnormal) Collected: 07/23/24 0458    Specimen: Blood Updated: 07/23/24 0538     Glucose 128 mg/dL      BUN 52 mg/dL      Creatinine 2.93 mg/dL      Sodium 141 mmol/L      Potassium 7.3 mmol/L      Comment: Slight hemolysis detected by analyzer. Result may be falsely elevated.        Chloride 104 mmol/L      CO2 6.3 mmol/L      Calcium 6.8 mg/dL      Total Protein 3.4 g/dL      Albumin 2.4 g/dL      ALT (SGPT) 183 U/L      AST (SGOT) 602 U/L      Alkaline Phosphatase 117 U/L      Total Bilirubin 0.6 mg/dL      Globulin 1.0 gm/dL      A/G Ratio 2.4 g/dL      BUN/Creatinine Ratio 17.7     Anion Gap 30.7 mmol/L      eGFR 17.1 mL/min/1.73     Narrative:      GFR Normal >60  Chronic Kidney Disease <60  Kidney Failure <15      Magnesium [[id number]]  (Abnormal) Collected: 07/23/24 0458    Specimen: Blood Updated: 07/23/24 0538     Magnesium 2.6 mg/dL     Phosphorus [[id number]]  (Abnormal)  Collected: 07/23/24 0458    Specimen: Blood Updated: 07/23/24 0539     Phosphorus 12.1 mg/dL     CBC Auto Differential [[id number]]  (Abnormal) Collected: 07/23/24 0458    Specimen: Blood Updated: 07/23/24 0643     WBC 6.96 10*3/mm3      RBC 2.22 10*6/mm3      Hemoglobin 6.7 g/dL      Hematocrit 22.0 %      MCV 99.1 fL      MCH 30.2 pg      MCHC 30.5 g/dL      RDW 15.2 %      RDW-SD 54.4 fl      MPV --     Comment: Unable to Calculate          Platelets 12 10*3/mm3     Narrative:      The previously reported component NRBC is no longer being reported. Previous result was 0.4 /100 WBC (Reference Range: 0.0-0.2 /100 WBC) on 7/23/2024 at 0522 EDT.    Lactic Acid, Plasma [[id number]]  (Abnormal) Collected: 07/23/24 0458    Specimen: Blood Updated: 07/23/24 0548     Lactate 23.7 mmol/L     Scan Slide [[id number]] Collected: 07/23/24 0458    Specimen: Blood Updated: 07/23/24 0643     Scan Slide --     Comment: See Manual Differential Results       Manual Differential [[id number]]  (Abnormal) Collected: 07/23/24 0458    Specimen: Blood Updated: 07/23/24 0643     Neutrophil % 6.0 %      Lymphocyte % 20.0 %      Monocyte % 1.0 %      Bands %  35.0 %      Metamyelocyte % 36.0 %      Myelocyte % 2.0 %      Neutrophils Absolute 2.85 10*3/mm3      Lymphocytes Absolute 1.39 10*3/mm3      Monocytes Absolute 0.07 10*3/mm3      Anisocytosis Slight/1+     Crenated RBC's Slight/1+     Poikilocytes Slight/1+     Toxic Granulation Slight/1+     Platelet Estimate Decreased    Narrative:      Reviewed by Pathologist within the past 30 days on 072224 .      Fibrinogen [[id number]]  (Abnormal) Collected: 07/23/24 0546    Specimen: Blood Updated: 07/23/24 0610     Fibrinogen 110 mg/dL     Protime-INR [[id number]]  (Abnormal) Collected: 07/23/24 0546    Specimen: Blood Updated: 07/23/24 0610     Protime 22.0 Seconds      INR 2.13    Blood Gas, Arterial - [[id number]]  (Abnormal) Collected: 07/23/24 0606    Specimen: Arterial Blood Updated: 07/23/24  0617     Site Arterial Line     Lino's Test N/A     pH, Arterial 6.778 pH units      pCO2, Arterial 37.2 mm Hg      pO2, Arterial 100.5 mm Hg      HCO3, Arterial 5.5 mmol/L      Base Excess, Arterial -26.7 mmol/L      Comment: Serial Number: [id number]:  [id number]        O2 Saturation, Arterial 87.4 %      CO2 Content 6.6 mmol/L      Barometric Pressure for Blood Gas --     Comment: N/A        Modality Adult Vent     FIO2 100 %      Ventilator Mode AC     Set Tidal Volume 500     PEEP 5     Notified Who --     Read Back --     Notified Time --     Hemodilution No     Respiratory Rate 22     PO2/FIO2 101            MICRO:  All cultures reviewed and negative, or pending with no growth unless otherwise designated in chart below.  Microbiology Results Abnormal       Procedure Component Value - Date/Time    Urine Culture - Urine, Straight Cath [[id number]]  (Normal) Collected: 07/21/24 2244    Lab Status: Final result Specimen: Urine from Straight Cath Updated: 07/23/24 1019     Urine Culture No growth    Body Fluid Culture - Body Fluid, Gallbladder [[id number]] Collected: 07/22/24 1225    Lab Status: Preliminary result Specimen: Body Fluid from Gallbladder Updated: 07/23/24 0917     Body Fluid Culture Growth present, too young to evaluate     Gram Stain Rare (1+) WBCs per low power field      Moderate (3+) Mixed bacterial morphotypes seen on Gram Stain    MRSA Screen, PCR (Inpatient) - Swab, Nares [[id number]]  (Normal) Collected: 07/22/24 1915    Lab Status: Final result Specimen: Swab from Nares Updated: 07/22/24 2103     MRSA PCR No MRSA Detected    Narrative:      The negative predictive value of this diagnostic test is high and should only be used to consider de-escalating anti-MRSA therapy. A positive result may indicate colonization with MRSA and must be correlated clinically.               RADIOLOGY:  XR Chest 1 View    Result Date: 7/23/2024  Impression: Stable extensive bilateral patchy airspace disease Interval  placement of NG tube. Stable position of endotracheal tube Electronically Signed: Fredis Macias MD  7/23/2024 7:52 AM EDT  Workstation ID: [id number]    XR Abdomen KUB    Result Date: 7/23/2024  Impression: Esophagogastric tube is in the stomach. Electronically Signed: Flo Love MD  7/23/2024 4:56 AM EDT  Workstation ID: [id number]    XR Chest 1 View    Result Date: 7/22/2024  Impression: Endotracheal tube is in the midthoracic trachea. There is a right-sided tunneled dialysis catheter. There are diffuse bilateral alveolar and interstitial opacities favored to represent pulmonary edema. Electronically Signed: Flo Love MD  7/22/2024 11:23 PM EDT  Workstation ID: [id number]    US Abdomen Limited    Result Date: 7/22/2024  Impression: 1. Percutaneous nephrostomy tube present within grossly abnormal gallbladder described above 2. Common duct is mildly dilated, correlate with any clinical findings of biliary obstruction. No stones demonstrated within the imaged portion of the common duct 3. Normal ultrasound appearance of the liver, right kidney, and visualized pancreas Electronically Signed: Onur Pérez  7/22/2024 6:28 PM EDT  Workstation ID: [id number]    CT Percutaneous Cholecystostomy    Result Date: 7/22/2024  1. Successful placement of 10 Sami cholecystostomy tube. Electronically Signed: Marino Napier MD  7/22/2024 1:39 PM EDT  Workstation ID: [id number]    CT Abdomen Pelvis Without Contrast    Result Date: 7/22/2024  Impression: 1.Marked distention of the gallbladder with pericholecystic fluid and a small focus of gas in the wall of the gallbladder. There is biliary ductal dilatation. The findings are consistent with acute cholecystitis. 2.Pneumobilia. 3.Bilateral lower lobe pneumonia. Electronically Signed: Flo Love MD  7/22/2024 2:43 AM EDT  Workstation ID: [id number]    CT Head Without Contrast    Result Date: 7/22/2024  Impression: No acute intracranial abnormality. Electronically Signed: Flo Love MD   7/22/2024 12:02 AM EDT  Workstation ID: [id number]    XR Chest 1 View    Result Date: 7/21/2024  Impression: Emphysema. Mild pulmonary edema. Bibasilar atelectasis. Electronically Signed: Flo Love MD  7/21/2024 11:18 PM EDT  Workstation ID: [id number]     For more specific information regarding this patient’s hospital stay at [facility], please refer to patient’s full medical record.  This summary has been culminated by this nurse practitioner on behalf of the critical care team at the request of Dr. Rodas.    Time: Discharge 40 min    Electronically signed by CAROLANN Ellis, 07/23/24 at 21:03 EDT.

## 2024-07-23 NOTE — PROCEDURES
Insert Arterial Line    Date/Time: 7/23/2024 2:16 AM    Performed by: Clarice Rivero APRN  Authorized by: Wayne Dunn MD    Universal Protocol:     Written consent obtained?: Yes      Risks and benefits: Risks, benefits and alternatives were discussed      Consent given by: Next of Kin.    Patient states understanding of procedure being performed: Yes      Patient's understanding of procedure matches consent: Yes      Procedure consent matches procedure scheduled: Yes      Relevant documents present and verified: Yes      Test results available and properly labeled: Yes      Site marked: No      Imaging studies available: Yes      Required items: Required blood products, implants, devices and special equipment available      Patient identity confirmed:  Arm band, provided demographic data, hospital-assigned identification number and anonymous protocol, patient vented/unresponsive    Time out: Immediately prior to the procedure a time out was called    A time out verifies correct patient, procedure, equipment, support staff and site/side marked as required:   Preparation:     Preparation: Patient was prepped and draped in usual sterile fashion    Indications:     Indications: multiple ABGs and hemodynamic monitoring    Location:     Location:  Right femoral  Anesthesia:     Patient sedated: No    Procedure Details:     Ultrasound Guidance: yes  The ultrasound was used for evaluation of possible access sites.  Vessel patency was confirmed with the ultrasound.  Needle entry into vessel was visualized in realtime with the ultrasound.   Permanent recorded image(s) of the vascular access site will be included in the patient record.      Needle gauge:  20    Seldinger technique: Seldinger technique used      Number of attempts:  1  Post-procedure:     Post-procedure:  Line sutured and dressing applied    Post-procedure CMS:  Unchanged     patient tolerated the procedure well with no immediate  "complications  Insert Central Line At Bedside    Date/Time: 7/23/2024 2:16 AM    Performed by: Clarice Rivero APRN  Authorized by: Clarice Rivero APRN  Consent: Written consent obtained.  Risks and benefits: risks, benefits and alternatives were discussed  Consent given by: Next of Kin.  Patient understanding: patient states understanding of the procedure being performed  Patient consent: the patient's understanding of the procedure matches consent given  Procedure consent: procedure consent matches procedure scheduled  Relevant documents: relevant documents present and verified  Test results: test results available and properly labeled  Site marked: the operative site was not marked  Imaging studies: imaging studies available  Required items: required blood products, implants, devices, and special equipment available  Patient identity confirmed: arm band, provided demographic data, hospital-assigned identification number and anonymous protocol, patient vented/unresponsive  Time out: Immediately prior to procedure a \"time out\" was called to verify the correct patient, procedure, equipment, support staff and site/side marked as required.  Indications: vascular access    Sedation:  Patient sedated: no    Preparation: skin prepped with 2% chlorhexidine  Skin prep agent dried: skin prep agent completely dried prior to procedure  Sterile barriers: all five maximum sterile barriers used - cap, mask, sterile gown, sterile gloves, and large sterile sheet  Hand hygiene: hand hygiene performed prior to central venous catheter insertion  Location details: right femoral  Patient position: flat  Catheter type: triple lumen  Catheter size: 7 Fr  Pre-procedure: landmarks identified  Ultrasound guidance: yes  Sterile ultrasound techniques: sterile gel and sterile probe covers were used  Number of attempts: 1  Successful placement: yes  Post-procedure: line sutured and dressing applied  Assessment: blood return through all " ports and free fluid flow  Patient tolerance: patient tolerated the procedure well with no immediate complications        Electronically signed by CAROLANN Ellis, 07/23/24, 2:18 AM EDT.

## 2024-07-23 NOTE — CONSULTS
General Surgery Consult Note      Name: Amalia Lopez ADMIT: 2024   : 1955  PCP: Orville Garner MD    MRN: [id number] LOS: 0 days   AGE/SEX: 68 y.o. female  ROOM: [address] [place] [address]      Note: Information per chart review, ICU team, patient's daughter.    Patient Care Team:  Orville Garner MD as PCP - General (Internal Medicine)  Chief Complaint   Patient presents with    Weakness - Generalized       HPI  68 y.o. female found down who was brought in to the emergency department and was found to be extremely lethargic and hypotensive.  Patient presumed to be in septic shock.  Per the patient's daughter she had progressive weakness over the last several days.  Her daughter went to her house after she did not respond to her phone calls and the patient was found on the floor.  She was confused but awake.  She was found to have acute kidney injury, rhabdomyolysis, and CT scan of the abdomen/pelvis was concerning for possible cholecystitis.  After IV fluid resuscitation she remained hypotensive.  Per the patient's daughter her blood pressures generally run fairly low but she is unsure of what her typical pressures are.  On presentation the patient was also found to have a tick in her back which was removed.    Past Medical History:   Diagnosis Date    Biliary stenosis     Depression     Gastroparesis     GERD (gastroesophageal reflux disease)     History of ERCP     IBS (irritable bowel syndrome)     Joint pain     Low back pain     Migraine     Neck pain     Osteoarthritis     Peripheral neuropathy     Port or reservoir infection 2023    Sleep apnea     resolved 3/4/24    Spinal cord stimulator status     Vitamin B12 deficiency      Past Surgical History:   Procedure Laterality Date    BACK SURGERY      CHOLECYSTECTOMY      EPIDURAL BLOCK      HYSTERECTOMY      IMPLANTATION / REPLACEMENT INFUSION PUMP      KNEE ARTHROSCOPY Right     NECK SURGERY      PANCREAS SURGERY      SPINAL CORD STIMULATOR  IMPLANT      TRIGGER POINT INJECTION       History reviewed. No pertinent family history.    Social History     Tobacco Use    Smoking status: Never    Smokeless tobacco: Never   Vaping Use    Vaping status: Never Used   Substance Use Topics    Alcohol use: No    Drug use: No     Medications Prior to Admission   Medication Sig Dispense Refill Last Dose    acetaminophen (TYLENOL) 500 MG tablet as needed   7/21/2024    amphetamine-dextroamphetamine (ADDERALL) 10 MG tablet Take 0.5 tablets by mouth Daily.   7/21/2024    DEXILANT 60 MG capsule Take 1 capsule by mouth Daily.  11 7/21/2024    diphenhydrAMINE (BENADRYL) 50 MG/ML injection    7/21/2024    famotidine (PEPCID) 40 MG tablet Take 1 tablet by mouth 2 (Two) Times a Day.   7/21/2024    FLUoxetine (PROzac) 40 MG capsule Take 1 capsule by mouth Daily.  3 7/21/2024    ibuprofen (ADVIL,MOTRIN) 200 MG tablet IBUPROFEN 200 MG TABS   7/21/2024    latanoprost (XALATAN) 0.005 % ophthalmic solution INSTILL 1 DROP INTO BOTH EYES EVERY DAY IN THE EVENING   7/21/2024    lidocaine (LIDODERM) 5 % PLACE 3 PATCHES ON THE SKIN AS DIRECTED BY PROVIDER DAILY. 90 patch 11 7/22/2024    loratadine (CLARITIN) 10 MG tablet Take 1 tablet by mouth.   7/21/2024    ondansetron ODT (ZOFRAN-ODT) 8 MG disintegrating tablet    7/21/2024    oxyCODONE (ROXICODONE) 10 MG tablet Take 1 tablet by mouth Every 12 (Twelve) Hours As Needed for Severe Pain. - take Acetaminophen 325 mg with each dose 50 tablet 0 7/21/2024    promethazine (PHENERGAN) 25 MG/ML injection    7/21/2024    BD PosiFlush 0.9 % flush    Unknown    Bromfenac Sodium (BromSite) 0.075 % solution Compounded Drop, also includes Prednisilone Sodium Phosphate 1% and Moxifloxacin 0.5%   Unknown    chlorhexidine (PERIDEX) 0.12 % solution RINSE AND SPIT 15ML DAILY  11 Unknown    cyanocobalamin 1000 MCG/ML injection Inject 1 mL into the appropriate muscle as directed by prescriber 1 (One) Time Per Week.   Unknown    fludrocortisone 0.1 MG  tablet Take 1 tablet by mouth.   Unknown    heparin 100 UNIT/ML solution injection    Unknown    lidocaine-prilocaine (EMLA) 2.5-2.5 % cream    Unknown    linaclotide (LINZESS) 145 MCG capsule capsule LINZESS 145 MCG CAPS   Unknown    Lotemax SM 0.38 % gel Please see attached for detailed directions   Unknown    morphine 10 mg/mL by Intrathecal route Continuous. 20 mL 0 Unknown    moxifloxacin (VIGAMOX) 0.5 % ophthalmic solution Compounded drop, also includes Prednisilone Sodium Phosphate 1% and Bromfenac 0.075%   Unknown    Nurtec 75 MG dispersible tablet Take 1 tablet by mouth Every Other Day.   Unknown    omeprazole (priLOSEC) 40 MG capsule Take 1 capsule by mouth.   Unknown    OnabotulinumtoxinA 200 units reconstituted solution INJECT UP  UNITS INTRAMUSCULAR INTO HEAD & NECK AREA EVERY 12 WEEKS FOR CHRONIC MIGRAINE TO BE INJECTED IN OFFICE BY PHYSICIAN 1 each 2 Unknown    ondansetron (ZOFRAN) 2 mg/mL injection    Unknown    ondansetron (ZOFRAN) 8 MG tablet TAKE 1 TABLET (8 MG TOTAL) BY MOUTH 3 (THREE) TIMES A DAY.   Unknown    pain patient supplied pump 0.0063 mL/hr by Intrathecal route Continuous. Morphine 10 mg/mL IT pump. No boluses. 0.063 mg/hr. Last refilled on 6/26/24. Next refill 10/14/24.       Panzyga 20 GM/200ML solution infusion    Unknown    pregabalin (LYRICA) 150 MG capsule Take 1 capsule by mouth 3 (Three) Times a Day. 90 capsule 4 Unknown    SENNOSIDES-DOCUSATE SODIUM PO SM SENNA-S TABS   Unknown    sodium chloride 0.9 % solution    Unknown    Sodium Chloride powder    Unknown    SUMAtriptan (IMITREX) 100 MG tablet TAKE 1 TABLET BY MOUTH AS NEEDED FOR HEADACHE  6 Unknown    Xiidra 5 % ophthalmic solution Administer 1 drop to both eyes 1 (One) Time.   Unknown     sodium chloride, , ,   [START ON 7/23/2024] cefTRIAXone, 2,000 mg, Intravenous, Q24H  doxycycline, 100 mg, Intravenous, Q12H  [Held by provider] heparin (porcine), 5,000 Units, Subcutaneous, Q8H  hydrocortisone sodium succinate, 50  mg, Intravenous, Q6H  metroNIDAZOLE, 500 mg, Intravenous, Q8H  pantoprazole, 40 mg, Intravenous, Q AM  phenylephrine, , ,   sodium chloride, 10 mL, Intravenous, Q12H      dextrose 5 % and lactated Ringer's, 100 mL/hr, Last Rate: 100 mL/hr (07/22/24 2040)  norepinephrine, 0.02-0.5 mcg/kg/min, Last Rate: 0.5 mcg/kg/min (07/22/24 1931)  Pharmacy to dose vancomycin,   phenylephrine, 0.5-3 mcg/kg/min, Last Rate: 0.5 mcg/kg/min (07/22/24 1958)  vasopressin, 0.03 Units/min, Last Rate: 0.03 Units/min (07/22/24 1747)        sodium chloride    nitroglycerin    ondansetron ODT **OR** ondansetron    Pharmacy to dose vancomycin    phenylephrine    [COMPLETED] Insert Peripheral IV **AND** sodium chloride    sodium chloride    sodium chloride    Vancomycin Pharmacy Intermittent/Pulse Dosing  Acetaminophen-codeine, Fentanyl, Morphine, and Codeine    Review of Systems:   Unable to obtain    Vitals:  Temp:  [97.4 °F (36.3 °C)-99.5 °F (37.5 °C)] 98 °F (36.7 °C)  Heart Rate:  [] 105  Resp:  [11-18] 12  BP: ()/(36-69) 107/53     Physical Exam:   Extremely lethargic but arousable, unable to answer questions  Ill-appearing, cachectic  Nonlabored respirations on O2 by nasal cannula  Abdomen soft, nondistended, no guarding on palpation in the right upper quadrant, no obvious tenderness elsewhere  Percutaneous cholecystostomy tube in place with drainage of dark bilious output  Extremities with no gross deformities    Labs:  Results from last 7 days   Lab Units 07/22/24  1413 07/22/24  0905 07/22/24  0250 07/21/24  2200   WBC 10*3/mm3  --  2.68*  --  3.89   HEMOGLOBIN g/dL  --  11.4*  --  10.2*   HEMOGLOBIN, POC g/dL 8.9*  --  10.0*  --    HEMATOCRIT %  --  33.4*  --  31.0*   HEMATOCRIT POC % 26*  --  30*  --    PLATELETS 10*3/mm3  --  27*  --  29*   MONOCYTES % %  --  3.0*  --  6.0   EOSINOPHIL % %  --   --   --  3.0     Results from last 7 days   Lab Units 07/22/24  1413 07/22/24  0905 07/22/24  0250 07/21/24  2200   SODIUM  mmol/L  --  138  --  137   POTASSIUM mmol/L  --  3.6  --  3.9   CHLORIDE mmol/L  --  106  --  103   CO2 mmol/L  --  19.0*  --  18.2*   BUN mg/dL  --  51*  --  43*   CREATININE mg/dL 2.05* 2.34* 1.82* 2.26*   CALCIUM mg/dL  --  7.6*  --  8.4*   BILIRUBIN mg/dL  --  0.4  --  0.4   ALK PHOS U/L  --  198*  --  237*   ALT (SGPT) U/L  --  83*  --  83*   AST (SGOT) U/L  --  253*  --  216*   GLUCOSE mg/dL  --  74  --  82     Results from last 7 days   Lab Units 07/22/24  0905   INR  1.06   APTT seconds 40.7*     Additional labs reviewed    Imaging:  CT abdomen/pelvis 7/22/2024  Impression:  1.Marked distention of the gallbladder with pericholecystic fluid and a small focus of gas in the wall of the gallbladder. There is biliary ductal dilatation. The findings are consistent with acute cholecystitis.  2.Pneumobilia.  3.Bilateral lower lobe pneumonia.    Ultrasound abdomen 7/22/2024  Impression:  1. Percutaneous nephrostomy tube present within grossly abnormal gallbladder described above  2. Common duct is mildly dilated, correlate with any clinical findings of biliary obstruction. No stones demonstrated within the imaged portion of the common duct  3. Normal ultrasound appearance of the liver, right kidney, and visualized pancreas     Assessment and Plan:  68 y.o. female found down at home with acute kidney injury and septic shock possibly secondary to cholecystitis.    - Unclear if etiology of CT scan findings are secondary to extreme dehydration or is the primary source of sepsis; given patient critically ill recommended percutaneous cholecystostomy tube placement at the time of initial consult this morning  - MRCP ordered given dilation of bile ducts on CT scan to evaluate for possible choledocholithiasis however due to patient with pain pump in place unable to perform MRCP today and ultrasound was ordered  - Ultrasound without clear evidence of choledocholithiasis and labs not consistent with cholangitis as bilirubin is  normal  - Continue supportive care with IV fluids and antibiotics  - Continue IR percutaneous cholecystostomy tube for possible source control    This note was created using Dragon Voice Recognition software.    Salud Mcdaniels MD  07/22/24  20:58 EDT

## 2024-07-23 NOTE — PLAN OF CARE
Goal Outcome Evaluation:      Pt A&Ox3, disoriented to situation upon initial assessment. Levophed and vaso infusing at start of shift. Troy and epi added. All at maximum dose. Around 2240, pt respiratory arrested and emergently intubated at 2250. Family called and came to bedside. Dopamine maxed. Multiple emergency medications given throughout shift, see MAR. See code charting from charge RN. Pt  at 0631.

## 2024-07-23 NOTE — PROGRESS NOTES
"Pharmacy Antimicrobial Dosing Service    Subjective:  Amalia Lopez is a 68 y.o.female admitted with septic shock 2/2 acute cholecystitis. Pharmacy has been consulted to dose Vancomycin for possible sepsis.    PMH: spinal cord stimulator, IT morphine pain pump, GERD      Assessment/Plan    1. Day #2 Vancomycin: Pulse dosing d/t renal dysfxn. Considering renal dysfunction, plan to pulse dose vanc. Loaded with vanc 1.25 g (25 mg/kg TBW) IV x1 7/22. Random level was collected only ~6 hrs post-dose & was 17.9 mcg/mL. Will re-dose with 1 g (~18 mg/kg TW) IV x1. Obtain random level 7/23 at 20:00.  Plan to re-dose when level is expected to be < 20 mcg/mL.    2. Day #2 Ceftriaxone: 2 g IV q24h for BSI.    3. Day #2 Flagyl: 500 mg IV q8h.    4. Day #2 Doxycycline: 100 mg IV q12h.    Will continue to monitor drug levels, renal function, culture and sensitivities, and patient clinical status.       Objective:  Relevant clinical data and objective history reviewed:  167.6 cm (66\")   54.9 kg (121 lb 0.5 oz)   Ideal body weight: 59.3 kg (130 lb 11.7 oz)  Body mass index is 19.54 kg/m².    Results from last 7 days   Lab Units 07/22/24  2335   VANCOMYCIN RM mcg/mL 17.90     Results from last 7 days   Lab Units 07/23/24  0458 07/22/24  2335 07/22/24  2322   CREATININE mg/dL 2.93* 2.79* 2.58*     Estimated Creatinine Clearance: 16.4 mL/min (A) (by C-G formula based on SCr of 2.93 mg/dL (H)).  I/O last 3 completed shifts:  In: 77.5 [Blood:77.5]  Out: 1050 [Urine:225; Drains:825]    Results from last 7 days   Lab Units 07/23/24  0458 07/22/24  2335 07/22/24  0905   WBC 10*3/mm3 6.96 5.87 2.68*     Temperature    07/23/24 0000 07/23/24 0400 07/23/24 0559   Temp: 97.5 °F (36.4 °C) 96.4 °F (35.8 °C) 97.6 °F (36.4 °C)     Baseline culture/source/susceptibility:  Microbiology Results (last 10 days)       Procedure Component Value - Date/Time    MRSA Screen, PCR (Inpatient) - Swab, Nares [[id number]]  (Normal) Collected: 07/22/24 1915    " Lab Status: Final result Specimen: Swab from Nares Updated: 07/22/24 2103     MRSA PCR No MRSA Detected    Narrative:      The negative predictive value of this diagnostic test is high and should only be used to consider de-escalating anti-MRSA therapy. A positive result may indicate colonization with MRSA and must be correlated clinically.    Body Fluid Culture - Body Fluid, Gallbladder [[id number]] Collected: 07/22/24 1225    Lab Status: Preliminary result Specimen: Body Fluid from Gallbladder Updated: 07/23/24 0630     Body Fluid Culture Abnormal Stain     Gram Stain Rare (1+) WBCs per low power field      Moderate (3+) Mixed bacterial morphotypes seen on Gram Stain    Urine Culture - Urine, Straight Cath [[id number]]  (Normal) Collected: 07/21/24 2244    Lab Status: Preliminary result Specimen: Urine from Straight Cath Updated: 07/22/24 1040     Urine Culture No growth    Blood Culture - Blood, Arm, Right [[id number]]  (Abnormal) Collected: 07/21/24 2212    Lab Status: Preliminary result Specimen: Blood from Arm, Right Updated: 07/23/24 0719     Blood Culture Staphylococcus aureus     Comment:   Infectious disease consultation is highly recommended to rule out distant foci of infection.        Isolated from Aerobic and Anaerobic Bottles     Gram Stain Aerobic Bottle Gram positive cocci in clusters      Anaerobic Bottle Gram positive cocci in clusters    Narrative:      Less than seven (7) mL's of blood was collected.  Insufficient quantity may yield false negative results.    Blood Culture ID, PCR - Blood, Arm, Right [[id number]]  (Abnormal) Collected: 07/21/24 2212    Lab Status: Final result Specimen: Blood from Arm, Right Updated: 07/22/24 1253     BCID, PCR Staph aureus. mecA/C and MREJ (methicillin resistance gene) NOT detected. Identification by BCID2 PCR     BOTTLE TYPE Aerobic Bottle    Narrative:      Infectious disease consultation is highly recommended to rule out distant foci of infection.    Blood  Culture - Blood, Arm, Left [[id number]]  (Abnormal) Collected: 07/21/24 2200    Lab Status: Preliminary result Specimen: Blood from Arm, Left Updated: 07/23/24 0719     Blood Culture Staphylococcus aureus     Comment:   Infectious disease consultation is highly recommended to rule out distant foci of infection.        Isolated from Aerobic and Anaerobic Bottles     Gram Stain Aerobic Bottle Gram positive cocci in clusters      Anaerobic Bottle Gram positive cocci in clusters    Narrative:      Less than seven (7) mL's of blood was collected.  Insufficient quantity may yield false negative results.            Allyson Paulson RPH  07/23/24 07:26 EDT

## 2024-07-24 LAB
BACTERIA SPEC AEROBE CULT: ABNORMAL
BACTERIA SPEC AEROBE CULT: ABNORMAL
BH BB BLOOD EXPIRATION DATE: NORMAL
BH BB BLOOD TYPE BARCODE: 6200
BH BB DISPENSE STATUS: NORMAL
BH BB PRODUCT CODE: NORMAL
BH BB UNIT NUMBER: NORMAL
CROSSMATCH INTERPRETATION: NORMAL
GRAM STN SPEC: ABNORMAL
ISOLATED FROM: ABNORMAL
ISOLATED FROM: ABNORMAL
UNIT  ABO: NORMAL
UNIT  RH: NORMAL

## 2024-07-24 NOTE — PROGRESS NOTES
"Enter Query Response Below      Query Response: duplicate request             If applicable, please update the problem list.   Patient: Amalia Lopez        : 1955  Account: [id number]           Admit Date: 2024        How to Respond to this query:       a. Click New Note     b. Answer query within the yellow box.                c. Update the Problem List, if applicable.      If you have any questions about this query contact me at: [email or web address]     Ms. Rivero,     68-year-old female admitted with septic shock due to acute cholecystitis per H&P.   Discharge Summary includes notes from \"ED course\" that report \"Patient's lab work triggered sepsis and patient was given fluids.  Her lactic acid was elevated at 3.9.\"  Lactate levels: 3.9 (), 3.0 ( 0108), 2.8 ( 0250), 3.3 ( 0349), 3.5 ( 0622), 4.3 ( 1310), 4.4 ( 1413), 15.5 ( 232), and 23.7 ().  Treatment included: Monitoring of labs, IV Cefepime, IV Levophed, IV Sodium Bicarbonate Infusion, IV Flagyl, IV Rocephin, IV Vibramycin, 1,410mL IV Normal Saline Bolus ( 221), 1L IV Normal Saline Bolus ( 0935), 500mL IV Lactated Ringers Bolus ( 1429), IV Vasopressin, IV Vancomycin, 1L IV Normal Saline Bolus ( 174), IV Troy-synephrine, 500mL IV Normal Saline (2023), IV Albumin, IV Epinephrine, IV Calcium Gluconate, IV Dopamine, IV Solu-Cortef, IV Versed, Surgery Consult, and Intubation with mechanical ventilation up to 100%.     Please clarify the following:    Lactic acidosis-clinically significant  Lactic acidosis-clinically insignificant  Other (please specify) __________  Unable to determine      By submitting this query, we are merely seeking further clarification of documentation to accurately reflect all conditions that you are monitoring, evaluating, treating or that extend the hospitalization or utilize additional resources of care. Please utilize your independent clinical judgment " when addressing the question(s) above.     This query and your response, once completed, will be entered into the legal medical record.    Sincerely,  SEYMOUR MerazN, RN   Clinical Documentation Integrity Program

## 2024-07-24 NOTE — PROGRESS NOTES
"Enter Query Response Below      Query Response:   Lactic acidosis-clinically significant              If applicable, please update the problem list.     Patient: Amalia Lopez        : 1955  Account: [id number]           Admit Date: 2024        How to Respond to this query:       a. Click New Note     b. Answer query within the yellow box.                c. Update the Problem List, if applicable.      If you have any questions about this query contact me at: [email or web address]     Ms. Rivero,     68-year-old female admitted 2024 with septic shock due to acute cholecystitis per H&P. H&P also reports \"Malnutrition: Cachectic appearance with poor muscle mass, eventually will get nutrition consult.\"   Discharge Summary includes \"Malnutrition\".   Malnutrition Severity Assessment by the registered dietician notes \"Severe chronic disease related malnutrition related to multiple chronic disease conditions as evidenced by severe fat/muscle loss per physical exam, PO intakes less than 75% x greater than 1 month.\", \"Severe insufficient energy intake\", \"Severe muscle loss\", and \"Severe fat loss\".  Nutrition Intervention included \"Add ONS when able\".  Treatment included: Nutrition Consult.     Please clarify diagnosis treated/monitored:    -Chronic illness related severe protein calorie malnutrition  -Other (please specify) __________  -Unable to determine     By submitting this query, we are merely seeking further clarification of documentation to accurately reflect all conditions that you are monitoring, evaluating, treating or that extend the hospitalization or utilize additional resources of care. Please utilize your independent clinical judgment when addressing the question(s) above.     This query and your response, once completed, will be entered into the legal medical record.    Sincerely,  Bryson Torres, DOMINGO, RN   Clinical Documentation Integrity Program     "

## 2024-07-26 LAB
A PHAGOCYTOPH DNA BLD QL NAA+PROBE: NEGATIVE
BACTERIA FLD CULT: ABNORMAL
E CHAFFEENSIS DNA BLD QL NAA+PROBE: NEGATIVE
GRAM STN SPEC: ABNORMAL
GRAM STN SPEC: ABNORMAL
RICKETTSIA RICKETTSII DNA, RT: NOT DETECTED

## 2024-07-27 LAB — BACTERIA SPEC ANAEROBE CULT: NORMAL
